# Patient Record
Sex: FEMALE | Race: WHITE | NOT HISPANIC OR LATINO | Employment: OTHER | URBAN - METROPOLITAN AREA
[De-identification: names, ages, dates, MRNs, and addresses within clinical notes are randomized per-mention and may not be internally consistent; named-entity substitution may affect disease eponyms.]

---

## 2017-01-12 DIAGNOSIS — R30.0 BURNING WITH URINATION: ICD-10-CM

## 2017-01-12 DIAGNOSIS — Z87.440 HISTORY OF UTI: Primary | ICD-10-CM

## 2017-01-15 LAB
APPEARANCE UR: ABNORMAL
BACTERIA UR CULT: ABNORMAL
BACTERIA UR CULT: ABNORMAL
BILIRUB UR QL STRIP: ABNORMAL
COLOR UR: YELLOW
GLUCOSE UR QL: ABNORMAL
HGB UR QL STRIP: ABNORMAL
KETONES UR QL STRIP: ABNORMAL
LEUKOCYTE ESTERASE UR QL STRIP: ABNORMAL
NITRITE UR QL STRIP: ABNORMAL
OTHER ANTIBIOTIC SUSC ISLT: ABNORMAL
PH UR STRIP: 6 [PH] (ref 5–8)
PROT UR QL STRIP: ABNORMAL
SP GR UR: 1.02 (ref 1–1.03)
UROBILINOGEN UR STRIP-MCNC: ABNORMAL MG/DL

## 2017-01-20 ENCOUNTER — HOSPITAL ENCOUNTER (EMERGENCY)
Facility: HOSPITAL | Age: 82
Discharge: HOME | End: 2017-01-20
Attending: EMERGENCY MEDICINE | Admitting: EMERGENCY MEDICINE

## 2017-01-20 ENCOUNTER — APPOINTMENT (OUTPATIENT)
Dept: GENERAL RADIOLOGY | Facility: HOSPITAL | Age: 82
End: 2017-01-20

## 2017-01-20 VITALS
OXYGEN SATURATION: 95 % | RESPIRATION RATE: 16 BRPM | TEMPERATURE: 98 F | HEIGHT: 63 IN | HEART RATE: 56 BPM | BODY MASS INDEX: 25.69 KG/M2 | WEIGHT: 145 LBS | SYSTOLIC BLOOD PRESSURE: 125 MMHG | DIASTOLIC BLOOD PRESSURE: 60 MMHG

## 2017-01-20 DIAGNOSIS — J20.9 ACUTE BRONCHITIS, UNSPECIFIED ORGANISM: Primary | ICD-10-CM

## 2017-01-20 LAB
ANION GAP SERPL CALCULATED.3IONS-SCNC: 14 MMOL/L
BASOPHILS # BLD AUTO: 0.03 10*3/MM3 (ref 0–0.2)
BASOPHILS NFR BLD AUTO: 0.4 % (ref 0–2)
BUN BLD-MCNC: 26 MG/DL (ref 8–23)
BUN/CREAT SERPL: 22.4 (ref 7–25)
CALCIUM SPEC-SCNC: 9 MG/DL (ref 8.8–10.5)
CHLORIDE SERPL-SCNC: 103 MMOL/L (ref 98–107)
CO2 SERPL-SCNC: 25 MMOL/L (ref 22–29)
CREAT BLD-MCNC: 1.16 MG/DL (ref 0.57–1)
DEPRECATED RDW RBC AUTO: 44.4 FL (ref 37–54)
EOSINOPHIL # BLD AUTO: 0.21 10*3/MM3 (ref 0.1–0.3)
EOSINOPHIL NFR BLD AUTO: 3 % (ref 0–4)
ERYTHROCYTE [DISTWIDTH] IN BLOOD BY AUTOMATED COUNT: 13.6 % (ref 11.5–14.5)
GFR SERPL CREATININE-BSD FRML MDRD: 45 ML/MIN/1.73
GLUCOSE BLD-MCNC: 93 MG/DL (ref 65–99)
HCT VFR BLD AUTO: 35.8 % (ref 37–47)
HGB BLD-MCNC: 11.8 G/DL (ref 12–16)
IMM GRANULOCYTES # BLD: 0.02 10*3/MM3 (ref 0–0.03)
IMM GRANULOCYTES NFR BLD: 0.3 % (ref 0–0.5)
LYMPHOCYTES # BLD AUTO: 1.68 10*3/MM3 (ref 0.6–4.8)
LYMPHOCYTES NFR BLD AUTO: 23.7 % (ref 20–45)
MCH RBC QN AUTO: 29.2 PG (ref 27–31)
MCHC RBC AUTO-ENTMCNC: 33 G/DL (ref 31–37)
MCV RBC AUTO: 88.6 FL (ref 81–99)
MONOCYTES # BLD AUTO: 0.61 10*3/MM3 (ref 0–1)
MONOCYTES NFR BLD AUTO: 8.6 % (ref 3–8)
NEUTROPHILS # BLD AUTO: 4.54 10*3/MM3 (ref 1.5–8.3)
NEUTROPHILS NFR BLD AUTO: 64 % (ref 45–70)
NRBC BLD MANUAL-RTO: 0 /100 WBC (ref 0–0)
PLATELET # BLD AUTO: 177 10*3/MM3 (ref 140–500)
PMV BLD AUTO: 10.4 FL (ref 7.4–10.4)
POTASSIUM BLD-SCNC: 3.5 MMOL/L (ref 3.5–5.2)
RBC # BLD AUTO: 4.04 10*6/MM3 (ref 4.2–5.4)
SODIUM BLD-SCNC: 142 MMOL/L (ref 136–145)
WBC NRBC COR # BLD: 7.09 10*3/MM3 (ref 4.8–10.8)

## 2017-01-20 PROCEDURE — 93010 ELECTROCARDIOGRAM REPORT: CPT | Performed by: INTERNAL MEDICINE

## 2017-01-20 PROCEDURE — 71020 HC CHEST PA AND LATERAL: CPT

## 2017-01-20 PROCEDURE — 93005 ELECTROCARDIOGRAM TRACING: CPT

## 2017-01-20 PROCEDURE — 99284 EMERGENCY DEPT VISIT MOD MDM: CPT | Performed by: EMERGENCY MEDICINE

## 2017-01-20 PROCEDURE — 93005 ELECTROCARDIOGRAM TRACING: CPT | Performed by: EMERGENCY MEDICINE

## 2017-01-20 PROCEDURE — 99284 EMERGENCY DEPT VISIT MOD MDM: CPT

## 2017-01-20 PROCEDURE — 85025 COMPLETE CBC W/AUTO DIFF WBC: CPT | Performed by: EMERGENCY MEDICINE

## 2017-01-20 PROCEDURE — 80048 BASIC METABOLIC PNL TOTAL CA: CPT | Performed by: EMERGENCY MEDICINE

## 2017-01-20 RX ORDER — CEFUROXIME AXETIL 250 MG/1
500 TABLET ORAL ONCE
Status: COMPLETED | OUTPATIENT
Start: 2017-01-20 | End: 2017-01-20

## 2017-01-20 RX ORDER — BENZONATATE 200 MG/1
200 CAPSULE ORAL 3 TIMES DAILY PRN
Qty: 21 CAPSULE | Refills: 0 | Status: SHIPPED | OUTPATIENT
Start: 2017-01-20 | End: 2017-01-27

## 2017-01-20 RX ORDER — CEFUROXIME AXETIL 500 MG/1
500 TABLET ORAL 2 TIMES DAILY
Qty: 14 TABLET | Refills: 0 | Status: SHIPPED | OUTPATIENT
Start: 2017-01-20 | End: 2017-01-27

## 2017-01-20 RX ORDER — CEFUROXIME AXETIL 250 MG/1
500 TABLET ORAL ONCE
Status: DISCONTINUED | OUTPATIENT
Start: 2017-01-20 | End: 2017-01-20 | Stop reason: HOSPADM

## 2017-01-20 RX ORDER — BENZONATATE 100 MG/1
200 CAPSULE ORAL ONCE
Status: DISCONTINUED | OUTPATIENT
Start: 2017-01-20 | End: 2017-01-20 | Stop reason: HOSPADM

## 2017-01-20 RX ORDER — BENZONATATE 100 MG/1
200 CAPSULE ORAL ONCE
Status: COMPLETED | OUTPATIENT
Start: 2017-01-20 | End: 2017-01-20

## 2017-01-20 RX ADMIN — CEFUROXIME AXETIL 500 MG: 250 TABLET ORAL at 21:13

## 2017-01-20 RX ADMIN — BENZONATATE 200 MG: 100 CAPSULE, LIQUID FILLED ORAL at 21:13

## 2017-01-21 PROCEDURE — 93005 ELECTROCARDIOGRAM TRACING: CPT | Performed by: EMERGENCY MEDICINE

## 2017-01-21 NOTE — ED PROVIDER NOTES
Subjective   Patient is a 81 y.o. female presenting with URI.   History provided by:  Patient  URI   Presenting symptoms: cough    Presenting symptoms: no fever    Severity:  Moderate  Onset quality:  Gradual  Timing:  Intermittent  Progression:  Worsening  Chronicity:  New  Relieved by:  Nothing  Associated symptoms: no arthralgias, no headaches, no myalgias, no neck pain, no sinus pain, no sneezing, no swollen glands and no wheezing    Associated symptoms comment:  Pain - left mid back  Risk factors: being elderly and recent illness    Risk factors: no chronic cardiac disease, no chronic kidney disease, no chronic respiratory disease and no diabetes mellitus    Risk factors comment:  History of DVT and PE.    HPI Narrative:Ms. Yeoman is an 80 yo WF reason secondary to cough and left back pain.  Patient reports onset of symptoms 4-5 days ago.  She reports occasional yellow sputum.  She has had trouble sleeping secondary to cough.  She went to [facility] earlier today.  They were concerned about possible PE as patient has history of such.  No workup was performed there.  She was transferred to ER for evaluation.    Patient is under care of Dr. Caroline Weiner.  Patient placed on Cipro 3 days ago for recurrent UTIs.  She reports it is not working.        Review of Systems   Constitutional: Negative.  Negative for appetite change, diaphoresis and fever.   HENT: Negative.  Negative for sneezing.    Eyes: Negative.    Respiratory: Positive for cough. Negative for chest tightness, shortness of breath and wheezing.    Cardiovascular: Negative for chest pain, palpitations and leg swelling.   Genitourinary: Negative.  Negative for difficulty urinating, flank pain, frequency and hematuria.   Musculoskeletal: Negative.  Negative for arthralgias, myalgias and neck pain.   Skin: Negative.  Negative for color change, pallor and rash.   Neurological: Negative.  Negative for dizziness, seizures, syncope and headaches.    Psychiatric/Behavioral: Negative.  Negative for agitation, behavioral problems and hallucinations.       Past Medical History   Diagnosis Date   • Esophageal reflux    • Hypercholesteremia    • Hyperlipidemia    • Hypertension    • Osteoporosis    • Pulmonary embolism    • Pulmonary hypertension    • Thyroid disease    • Venous thrombosis        Allergies   Allergen Reactions   • Sulfa Antibiotics        Past Surgical History   Procedure Laterality Date   • Appendectomy     • Hysterectomy     • Cystocele repair       bladder reconstruction   • Salpingectomy         Family History   Problem Relation Age of Onset   • Cancer Mother        Social History     Social History   • Marital status:      Spouse name: N/A   • Number of children: N/A   • Years of education: N/A     Social History Main Topics   • Smoking status: Never Smoker   • Smokeless tobacco: None   • Alcohol use No   • Drug use: None   • Sexual activity: Not Asked     Other Topics Concern   • None     Social History Narrative           Objective   Physical Exam   Constitutional: She is oriented to person, place, and time. She appears well-developed and well-nourished. No distress.   81-year-old white female laying in bed.  She appears in good health for age.  She is friendly and talkative.  She is in no respiratory distress.  She does however have a nonproductive cough.   HENT:   Head: Normocephalic and atraumatic.   Right Ear: External ear normal.   Left Ear: External ear normal.   Nose: Nose normal.   Mouth/Throat: Oropharynx is clear and moist.   Eyes: Conjunctivae and EOM are normal. Pupils are equal, round, and reactive to light.   Neck: Normal range of motion. Neck supple.   Cardiovascular: Normal rate, regular rhythm, normal heart sounds and intact distal pulses.  Exam reveals no gallop and no friction rub.    No murmur heard.  Pulmonary/Chest: Effort normal and breath sounds normal.   Abdominal: Soft. Bowel sounds are normal. She exhibits  no distension. There is no tenderness.   Musculoskeletal: Normal range of motion.   Neurological: She is alert and oriented to person, place, and time.   Skin: Skin is warm and dry. She is not diaphoretic.   Psychiatric: She has a normal mood and affect. Her behavior is normal.   Nursing note and vitals reviewed.      ECG 12 Lead    Date/Time: 1/20/2017 5:00 PM  Performed by: JESÚS LANDA  Authorized by: JESÚS LANDA   Interpreted by physician  Previous ECG: no previous ECG available  Rhythm: sinus bradycardia  Rate: bradycardic  QRS axis: left  Conduction: conduction normal  ST Depression: V4, V5 and V6  T depression: aVR and V2  T flattening: V1, V3, V4, V5, V6, aVL and I  Other findings comments: low-voltage in all leads.  Late R-wave transition.  Clinical impression: abnormal ECG               ED Course  ED Course   Comment By Time   01/20/17  8:16 PM  Patient was referred ER overconcerned about possible PE.  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  This was approximately 10 years ago.  Patient has not been on anticoagulation for years.  Symptoms much more consistent with bronchitis/pneumonia.   Jesús Landa MD 01/20 2107 01/20/17  9:03 PM  Publishers only a minimal anemia and mild renal insufficiency.  This is not unexpected for age.  Patient's symptoms are consistent with PE.  O2 saturations have been 95% or greater on room air.  Normal respiratory rate.  Heart rate is been slightly low.  Normal blood pressure.  None of this consistent with a PE.  Possible left lower lobe infiltrate.  Will treat with by mouth antibiotics for bronchitis. Jesús Landa MD 01/21 0108      Labs Reviewed   BASIC METABOLIC PANEL - Abnormal; Notable for the following:        Result Value    BUN 26 (*)     Creatinine 1.16 (*)     eGFR Non  Amer 45 (*)     All other components within normal limits    Narrative:     The MDRD GFR formula is only valid for adults with stable renal function  between ages 18 and 70.   CBC WITH AUTO DIFFERENTIAL - Abnormal; Notable for the following:     RBC 4.04 (*)     Hemoglobin 11.8 (*)     Hematocrit 35.8 (*)     Monocyte % 8.6 (*)     All other components within normal limits   CBC AND DIFFERENTIAL    Narrative:     The following orders were created for panel order CBC & Differential.  Procedure                               Abnormality         Status                     ---------                               -----------         ------                     CBC Auto Differential[[id number]]         Abnormal            Final result                 Please view results for these tests on the individual orders.             MDM  Number of Diagnoses or Management Options  Acute bronchitis, unspecified organism: new and requires workup     Amount and/or Complexity of Data Reviewed  Clinical lab tests: ordered and reviewed  Tests in the radiology section of CPT®: ordered and reviewed  Independent visualization of images, tracings, or specimens: yes    Risk of Complications, Morbidity, and/or Mortality  Presenting problems: moderate  Diagnostic procedures: moderate  Management options: moderate    Patient Progress  Patient progress: stable      Final diagnoses:   Acute bronchitis, unspecified organism            Ghassan Banegas MD  01/21/17 0105

## 2017-01-21 NOTE — DISCHARGE INSTRUCTIONS
Medications directed.  Resume Macrobid after completion of Ceftin.  Follow-up with PMD as above.  Return to ED for worsened symptoms, medical emergencies.

## 2017-01-31 ENCOUNTER — OFFICE VISIT (OUTPATIENT)
Dept: FAMILY MEDICINE CLINIC | Facility: CLINIC | Age: 82
End: 2017-01-31

## 2017-01-31 VITALS
RESPIRATION RATE: 18 BRPM | HEART RATE: 52 BPM | OXYGEN SATURATION: 97 % | SYSTOLIC BLOOD PRESSURE: 120 MMHG | WEIGHT: 144.8 LBS | BODY MASS INDEX: 25.66 KG/M2 | TEMPERATURE: 98.3 F | DIASTOLIC BLOOD PRESSURE: 74 MMHG | HEIGHT: 63 IN

## 2017-01-31 DIAGNOSIS — I51.7 CARDIOMEGALY: ICD-10-CM

## 2017-01-31 DIAGNOSIS — R05.3 PERSISTENT COUGH: Primary | ICD-10-CM

## 2017-01-31 DIAGNOSIS — Z12.31 ENCOUNTER FOR SCREENING MAMMOGRAM FOR BREAST CANCER: ICD-10-CM

## 2017-01-31 DIAGNOSIS — E03.9 HYPOTHYROIDISM, UNSPECIFIED TYPE: ICD-10-CM

## 2017-01-31 DIAGNOSIS — R07.81 RIB PAIN ON LEFT SIDE: ICD-10-CM

## 2017-01-31 PROBLEM — R79.89 CREATININE ELEVATION: Status: ACTIVE | Noted: 2017-01-31

## 2017-01-31 PROBLEM — IMO0002 CREATININE ELEVATION: Status: ACTIVE | Noted: 2017-01-31

## 2017-01-31 PROCEDURE — 99214 OFFICE O/P EST MOD 30 MIN: CPT | Performed by: INTERNAL MEDICINE

## 2017-01-31 NOTE — MR AVS SNAPSHOT
Donna M Yeoman   1/31/2017 9:30 AM   Office Visit    Dept Phone:  [phone]   Encounter #:  [id number]    Provider:  Caroline Weiner MD   Department:  [facility] PRIMARY [facility]ARE                Your Full Care Plan              Today's Medication Changes          These changes are accurate as of: 1/31/17 10:06 AM.  If you have any questions, ask your nurse or doctor.               Stop taking medication(s)listed here:     ciprofloxacin 250 MG tablet   Commonly known as:  CIPRO   Stopped by:  Caroline Weiner MD           doxycycline 100 MG tablet   Commonly known as:  ADOXA   Stopped by:  Caroline Weiner MD           esomeprazole 40 MG capsule   Commonly known as:  nexIUM   Stopped by:  Caroline Weiner MD                      Your Updated Medication List          This list is accurate as of: 1/31/17 10:06 AM.  Always use your most recent med list.                allopurinol 100 MG tablet   Commonly known as:  ZYLOPRIM   Take 1 tablet by mouth daily.       Calcium Carb-Cholecalciferol 600-800 MG-UNIT tablet       glucose blood test strip   Le Contour Test In Vitro Strip; Patient Sig: Le Contour Test In Vitro Strip TEST ONCE DAILY; 1; 3; 01-Sep-2015; Active       levothyroxine 75 MCG tablet   Commonly known as:  SYNTHROID, LEVOTHROID   Take 1 tablet by mouth daily.       MICROLET LANCETS misc   1 each daily. As directed       nitrofurantoin 50 MG capsule   Commonly known as:  MACRODANTIN       nystatin-triamcinolone 561291-7.1 UNIT/GM-% ointment   Commonly known as:  MYCOLOG       Oxybutynin 3 (28) % (MG/ACT) gel       PROAIR  (90 BASE) MCG/ACT inhaler   Generic drug:  albuterol   2 puffs Q4H for cough and wheeze       rosuvastatin 5 MG tablet   Commonly known as:  CRESTOR   TAKE 1 TABLET DAILY.       valsartan-hydrochlorothiazide 160-25 MG per tablet   Commonly known as:  DIOVAN-HCT   Take 1 tablet by mouth daily.               We Performed the Following     "   T3, Free     T4, Free     TSH       You Were Diagnosed With        Codes Comments    Persistent cough    -  Primary ICD-10-CM: R05  ICD-9-CM: 786.2     Cardiomegaly     ICD-10-CM: I51.7  ICD-9-CM: 429.3     Rib pain on left side     ICD-10-CM: R07.81  ICD-9-CM: 786.50     Encounter for screening mammogram for breast cancer     ICD-10-CM: Z12.31  ICD-9-CM: V76.12     Hypothyroidism, unspecified type     ICD-10-CM: E03.9  ICD-9-CM: 244.9       Medications to be Given to You by a Medical Professional       Instructions     None    Patient Instructions History      Upcoming Appointments     Visit Type Date Time Department    OFFICE VISIT 1/31/2017  9:30 AM [employer] Signup     Our records indicate that you have an active [email or web address] account.    You can view your After Visit Summary by going to [employer] and logging in with your [employer] username and password.  If you don't have a [employer] username and password but a parent or guardian has access to your record, the parent or guardian should login with their own [employer] username and password and access your record to view the After Visit Summary.    If you have questions, you can email [email or web address] or call [phone] to talk to our [employer] staff.  Remember, [employer] is NOT to be used for urgent needs.  For medical emergencies, dial 911.               Other Info from Your Visit           Allergies     Sulfa Antibiotics        Reason for Visit     cough seen at [facility]       Vital Signs     Blood Pressure Pulse Temperature Respirations Height Weight    120/74 (BP Location: Left arm) 52 98.3 °F (36.8 °C) (Oral) 18 63\" (160 cm) 144 lb 12.8 oz (65.7 kg)    Oxygen Saturation Body Mass Index Smoking Status             97% 25.65 kg/m2 Never Smoker         Problems and Diagnoses Noted     Enlarged heart    Serum creatinine raised    Encounter for screening mammogram for breast cancer    Underactive thyroid "    Persistent cough    Rib pain on left side

## 2017-01-31 NOTE — PROGRESS NOTES
Subjective   Donna M Yeoman is a 82 y.o. female who comes in today for   Chief Complaint   Patient presents with   • cough     seen at [facility]    .    History of Present Illness   On 1/20/17 had 3 weeks of pain in posterior back left sided high and then she started coughing so went to  and then he sent her to [facility] ED [place].  CXR showed CMG but otherwise negative.  No fever.  Dry cough but feels PND.  RN clear.  Says she has coughed for over a year.  Is due for mammogram.  ED started her on cefuroxime 500mg for week.  Thinks she is somewhat better.  Cough is better and is sleeping better.    The following portions of the patient's history were reviewed and updated as appropriate: allergies, current medications, past family history, past medical history, past social history, past surgical history and problem list.    Review of Systems   Constitutional: Negative for fever.   Respiratory: Positive for cough. Negative for shortness of breath and wheezing.    Cardiovascular: Negative for chest pain, palpitations and leg swelling.   Musculoskeletal: Positive for back pain (left sided and high up in thoracic area.  constant but cough did make it worse).       Vitals:    01/31/17 0914   BP: 120/74   Pulse: 52   Resp: 18   Temp: 98.3 °F (36.8 °C)   SpO2: 97%       Objective   Physical Exam   Constitutional: She is oriented to person, place, and time. She appears well-developed and well-nourished.   HENT:   Head: Normocephalic and atraumatic.   Right Ear: External ear normal.   Left Ear: External ear normal.   Mouth/Throat: Oropharynx is clear and moist.   Eyes: Conjunctivae are normal.   Neck: Neck supple.   Cardiovascular: Normal rate, regular rhythm and normal heart sounds.    Pulmonary/Chest: Effort normal and breath sounds normal.   Abdominal: Soft. Bowel sounds are normal.   Neurological: She is alert and oriented to person, place, and time.   Skin: Skin is warm.   Psychiatric: She has a normal mood and  affect. Her behavior is normal. Judgment and thought content normal.   Nursing note and vitals reviewed.      Assessment/Plan   Shirley was seen today for cough.    Diagnoses and all orders for this visit:    Persistent cough  -     CT Chest Without Contrast; Future    Cardiomegaly  -     CT Chest Without Contrast; Future    Rib pain on left side  -     CT Chest Without Contrast; Future    Encounter for screening mammogram for breast cancer  -     Mammo Screening Bilateral With CAD; Future    Hypothyroidism, unspecified type  -     TSH  -     T4, Free  -     T3, Free  -     Cancel: Comprehensive Metabolic Panel        Recheck thyroid after she has adjusted her dosage  mammo reordered (she never had done)  Chest ct to look at lungs and bones ?rib fracure vs OA in back (long standing OA)  May need echo to look at possible CMG  Reviewed UC and ED visits from 1/20/17.    adviar inhaler 250/50 disc given 1 puff bid and rinse mouth afterwards             I have asked for the patient to return to clinic in 6month(s).

## 2017-02-01 LAB
ALBUMIN SERPL-MCNC: 4.3 G/DL (ref 3.5–5.2)
ALBUMIN/GLOB SERPL: 1.5 G/DL
ALP SERPL-CCNC: 68 U/L (ref 39–117)
ALT SERPL-CCNC: 15 U/L (ref 1–33)
AST SERPL-CCNC: 22 U/L (ref 1–32)
BILIRUB SERPL-MCNC: 0.6 MG/DL (ref 0.1–1.2)
BUN SERPL-MCNC: 19 MG/DL (ref 8–23)
BUN/CREAT SERPL: 21.6 (ref 7–25)
CALCIUM SERPL-MCNC: 9.5 MG/DL (ref 8.6–10.5)
CHLORIDE SERPL-SCNC: 103 MMOL/L (ref 98–107)
CO2 SERPL-SCNC: 29.1 MMOL/L (ref 22–29)
CREAT SERPL-MCNC: 0.88 MG/DL (ref 0.57–1)
GLOBULIN SER CALC-MCNC: 2.8 GM/DL
GLUCOSE SERPL-MCNC: 101 MG/DL (ref 65–99)
POTASSIUM SERPL-SCNC: 3.9 MMOL/L (ref 3.5–5.2)
PROT SERPL-MCNC: 7.1 G/DL (ref 6–8.5)
SODIUM SERPL-SCNC: 145 MMOL/L (ref 136–145)
T3FREE SERPL-MCNC: 2.6 PG/ML (ref 2–4.4)
T4 FREE SERPL-MCNC: 1.44 NG/DL (ref 0.93–1.7)
TSH SERPL DL<=0.005 MIU/L-ACNC: 1.18 MIU/ML (ref 0.27–4.2)

## 2017-02-09 ENCOUNTER — HOSPITAL ENCOUNTER (OUTPATIENT)
Dept: CT IMAGING | Facility: HOSPITAL | Age: 82
Discharge: HOME | End: 2017-02-09
Admitting: INTERNAL MEDICINE

## 2017-02-09 DIAGNOSIS — R05.3 PERSISTENT COUGH: ICD-10-CM

## 2017-02-09 DIAGNOSIS — I51.7 CARDIOMEGALY: ICD-10-CM

## 2017-02-09 DIAGNOSIS — R07.81 RIB PAIN ON LEFT SIDE: ICD-10-CM

## 2017-02-09 PROCEDURE — 71250 CT THORAX DX C-: CPT

## 2017-02-22 ENCOUNTER — HOSPITAL ENCOUNTER (OUTPATIENT)
Dept: MAMMOGRAPHY | Facility: HOSPITAL | Age: 82
Discharge: HOME | End: 2017-02-22
Admitting: INTERNAL MEDICINE

## 2017-02-22 DIAGNOSIS — Z12.31 ENCOUNTER FOR SCREENING MAMMOGRAM FOR BREAST CANCER: ICD-10-CM

## 2017-02-22 PROCEDURE — G0202 SCR MAMMO BI INCL CAD: HCPCS

## 2017-03-20 DIAGNOSIS — E78.5 HYPERLIPIDEMIA: ICD-10-CM

## 2017-03-20 RX ORDER — ROSUVASTATIN CALCIUM 5 MG/1
TABLET, COATED ORAL
Qty: 90 TABLET | Refills: 1 | Status: SHIPPED | OUTPATIENT
Start: 2017-03-20 | End: 2017-11-13 | Stop reason: SDUPTHER

## 2017-04-06 ENCOUNTER — LAB (OUTPATIENT)
Dept: LAB | Facility: HOSPITAL | Age: 82
End: 2017-04-06

## 2017-04-06 DIAGNOSIS — D47.2 MGUS (MONOCLONAL GAMMOPATHY OF UNKNOWN SIGNIFICANCE): ICD-10-CM

## 2017-04-06 LAB
BASOPHILS # BLD AUTO: 0.03 10*3/MM3 (ref 0–0.1)
BASOPHILS NFR BLD AUTO: 0.5 % (ref 0–1.1)
DEPRECATED RDW RBC AUTO: 44.8 FL (ref 37–49)
EOSINOPHIL # BLD AUTO: 0.16 10*3/MM3 (ref 0–0.36)
EOSINOPHIL NFR BLD AUTO: 2.8 % (ref 1–5)
ERYTHROCYTE [DISTWIDTH] IN BLOOD BY AUTOMATED COUNT: 13.4 % (ref 11.7–14.5)
HCT VFR BLD AUTO: 39.7 % (ref 34–45)
HGB BLD-MCNC: 12.7 G/DL (ref 11.5–14.9)
IMM GRANULOCYTES # BLD: 0.02 10*3/MM3 (ref 0–0.03)
IMM GRANULOCYTES NFR BLD: 0.3 % (ref 0–0.5)
LYMPHOCYTES # BLD AUTO: 1.76 10*3/MM3 (ref 1–3.5)
LYMPHOCYTES NFR BLD AUTO: 30.7 % (ref 20–49)
MCH RBC QN AUTO: 29.3 PG (ref 27–33)
MCHC RBC AUTO-ENTMCNC: 32 G/DL (ref 32–35)
MCV RBC AUTO: 91.7 FL (ref 83–97)
MONOCYTES # BLD AUTO: 0.54 10*3/MM3 (ref 0.25–0.8)
MONOCYTES NFR BLD AUTO: 9.4 % (ref 4–12)
NEUTROPHILS # BLD AUTO: 3.23 10*3/MM3 (ref 1.5–7)
NEUTROPHILS NFR BLD AUTO: 56.3 % (ref 39–75)
NRBC BLD MANUAL-RTO: 0 /100 WBC (ref 0–0)
PLATELET # BLD AUTO: 175 10*3/MM3 (ref 150–375)
PMV BLD AUTO: 9.9 FL (ref 8.9–12.1)
RBC # BLD AUTO: 4.33 10*6/MM3 (ref 3.9–5)
WBC NRBC COR # BLD: 5.74 10*3/MM3 (ref 4–10)

## 2017-04-06 PROCEDURE — 36415 COLL VENOUS BLD VENIPUNCTURE: CPT | Performed by: INTERNAL MEDICINE

## 2017-04-06 PROCEDURE — 85025 COMPLETE CBC W/AUTO DIFF WBC: CPT | Performed by: INTERNAL MEDICINE

## 2017-04-10 LAB
ALBUMIN SERPL-MCNC: 3.7 G/DL (ref 2.9–4.4)
ALBUMIN/GLOB SERPL: 1.2 {RATIO} (ref 0.7–1.7)
ALPHA1 GLOB FLD ELPH-MCNC: 0.2 G/DL (ref 0–0.4)
ALPHA2 GLOB SERPL ELPH-MCNC: 0.8 G/DL (ref 0.4–1)
B-GLOBULIN SERPL ELPH-MCNC: 0.9 G/DL (ref 0.7–1.3)
GAMMA GLOB SERPL ELPH-MCNC: 1.3 G/DL (ref 0.4–1.8)
GLOBULIN SER CALC-MCNC: 3.2 G/DL (ref 2.2–3.9)
IGA SERPL-MCNC: 152 MG/DL (ref 64–422)
IGG SERPL-MCNC: 1147 MG/DL (ref 700–1600)
IGM SERPL-MCNC: 56 MG/DL (ref 26–217)
INTERPRETATION SERPL IEP-IMP: ABNORMAL
KAPPA LC SERPL-MCNC: 26.53 MG/L (ref 3.3–19.4)
KAPPA LC/LAMBDA SER: 0.88 {RATIO} (ref 0.26–1.65)
LAMBDA LC FREE SERPL-MCNC: 30.3 MG/L (ref 5.71–26.3)
Lab: ABNORMAL
M-SPIKE: 0.6 G/DL
PROT SERPL-MCNC: 6.9 G/DL (ref 6–8.5)

## 2017-04-13 ENCOUNTER — APPOINTMENT (OUTPATIENT)
Dept: LAB | Facility: HOSPITAL | Age: 82
End: 2017-04-13

## 2017-04-13 ENCOUNTER — OFFICE VISIT (OUTPATIENT)
Dept: ONCOLOGY | Facility: CLINIC | Age: 82
End: 2017-04-13

## 2017-04-13 VITALS
DIASTOLIC BLOOD PRESSURE: 70 MMHG | WEIGHT: 145.6 LBS | RESPIRATION RATE: 16 BRPM | TEMPERATURE: 97.4 F | BODY MASS INDEX: 25.8 KG/M2 | HEART RATE: 54 BPM | SYSTOLIC BLOOD PRESSURE: 120 MMHG | HEIGHT: 63 IN

## 2017-04-13 DIAGNOSIS — D47.2 MGUS (MONOCLONAL GAMMOPATHY OF UNKNOWN SIGNIFICANCE): Primary | ICD-10-CM

## 2017-04-13 PROCEDURE — G0463 HOSPITAL OUTPT CLINIC VISIT: HCPCS | Performed by: INTERNAL MEDICINE

## 2017-04-13 PROCEDURE — 99213 OFFICE O/P EST LOW 20 MIN: CPT | Performed by: INTERNAL MEDICINE

## 2017-04-13 RX ORDER — ESTRADIOL 0.1 MG/G
CREAM VAGINAL
Refills: 11 | COMMUNITY
Start: 2017-03-28

## 2017-04-13 NOTE — PROGRESS NOTES
Subjective   REASON FOR FOLLOW UP:  IgG Lambda MGUS    HISTORY OF PRESENT ILLNESS:     History of Present Illness  Shirley is a 82 y.o. 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  She had labs drawn in our office last week that showed her monoclonal protein is still present but is very stable.  Her blood count is normal and she looks great in the office today.  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    She is up-to-date on her mammograms.  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    Past Medical History, Past Surgical History, Social History, Family History have been reviewed and are without significant changes except as mentioned.    Review of Systems   Constitutional: Negative for activity change, appetite change, fatigue, fever and unexpected weight change.   HENT: Negative for hearing loss, nosebleeds, trouble swallowing and voice change.    Eyes: Negative for visual disturbance.   Respiratory: Negative for cough, shortness of breath and wheezing.    Cardiovascular: Negative for chest pain and palpitations.   Gastrointestinal: Negative for abdominal pain, diarrhea, nausea and vomiting.   Genitourinary: Negative for difficulty urinating, frequency, hematuria and urgency.   Musculoskeletal: Positive for arthralgias and back pain. Negative for neck pain.   Skin: Negative for rash.   Neurological: Negative for dizziness, seizures, syncope and headaches.   Hematological: Negative for adenopathy. Does not bruise/bleed easily.   Psychiatric/Behavioral: Negative for behavioral problems. The patient is not nervous/anxious.       A comprehensive 14 point review of systems was performed and was negative except as mentioned.    Medications:  The current medication list was reviewed in the EMR    ALLERGIES:    Allergies   Allergen Reactions   • Sulfa  "Antibiotics        Objective      Vitals:    04/13/17 1008   BP: 120/70   Pulse: 54   Resp: 16   Temp: 97.4 °F (36.3 °C)   Weight: 145 lb 9.6 oz (66 kg)   Height: 62.6\" (159 cm)  Comment: new ht.   PainSc: 0-No pain     Current Status 4/13/2017   ECOG score 0       Physical Exam   Constitutional: She is oriented to person, place, and time. She appears well-developed and well-nourished. No distress.   HENT:   Head: Normocephalic.   Eyes: Conjunctivae and EOM are normal. Pupils are equal, round, and reactive to light. No scleral icterus.   Neck: Normal range of motion. Neck supple. No JVD present. No thyromegaly present.   Cardiovascular: Normal rate and regular rhythm.  Exam reveals no gallop and no friction rub.    No murmur heard.  Pulmonary/Chest: Effort normal and breath sounds normal. She has no wheezes. She has no rales.   Abdominal: Soft. She exhibits no distension and no mass. There is no tenderness.   Musculoskeletal: Normal range of motion. She exhibits no edema or deformity.   Lymphadenopathy:     She has no cervical adenopathy.   Neurological: She is alert and oriented to person, place, and time. She has normal reflexes. No cranial nerve deficit.   Skin: Skin is warm and dry. No rash noted. No erythema.   Psychiatric: She has a normal mood and affect. Her behavior is normal. Judgment normal.        RECENT LABS:  Hematology WBC   Date Value Ref Range Status   04/06/2017 5.74 4.00 - 10.00 10*3/mm3 Final     RBC   Date Value Ref Range Status   04/06/2017 4.33 3.90 - 5.00 10*6/mm3 Final     Hemoglobin   Date Value Ref Range Status   04/06/2017 12.7 11.5 - 14.9 g/dL Final     Hematocrit   Date Value Ref Range Status   04/06/2017 39.7 34.0 - 45.0 % Final     Platelets   Date Value Ref Range Status   04/06/2017 175 150 - 375 10*3/mm3 Final        IgG 700 - 1600 mg/dL 1147   IgA 64 - 422 mg/dL 152   IgM 26 - 217 mg/dL 56   Total Protein 6.0 - 8.5 g/dL 6.9   Albumin 2.9 - 4.4 g/dL 3.7   Alpha-1-Globulin 0.0 - 0.4 " g/dL 0.2   Alpha-2-Globulin 0.4 - 1.0 g/dL 0.8   Beta Globulin 0.7 - 1.3 g/dL 0.9   Gamma Globulin 0.4 - 1.8 g/dL 1.3   M-Esteban Not Observed g/dL 0.6 (H)   Globulin 2.2 - 3.9 g/dL 3.2   A/G Ratio 0.7 - 1.7 1.2   Immunofixation Reflex, Serum  Comment   Comments: Immunofixation shows IgG monoclonal protein with lambda light chain   specificity.     Glucose 65 - 99 mg/dL 101 (H)   BUN 8 - 23 mg/dL 19   Creatinine 0.57 - 1.00 mg/dL 0.88   eGFR Non African Am >60 mL/min/1.73 62   Comments: The MDRD GFR formula is only valid for adults with stable   renal function between ages 18 and 70.      eGFR African Am >60 mL/min/1.73 75   BUN/Creatinine Ratio 7.0 - 25.0 21.6   Sodium 136 - 145 mmol/L 145   Potassium 3.5 - 5.2 mmol/L 3.9   Chloride 98 - 107 mmol/L 103   Total CO2 22.0 - 29.0 mmol/L 29.1 (H)   Calcium 8.6 - 10.5 mg/dL 9.5   Total Protein 6.0 - 8.5 g/dL 7.1   Albumin 3.50 - 5.20 g/dL 4.30   Globulin gm/dL 2.8   A/G Ratio g/dL 1.5   Total Bilirubin 0.1 - 1.2 mg/dL 0.6   Alkaline Phosphatase 39 - 117 U/L 68         Assessment/Plan   1.  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    Plan    1.  Return for follow-up in 12 months.  2.  Labs will again be drawn one week prior to visit including a CBC, serum chemistries, serum protein electrophoresis, immunofixation, and free serum light chain analysis.              4/13/2017      CC:

## 2017-06-13 DIAGNOSIS — I10 ESSENTIAL HYPERTENSION: ICD-10-CM

## 2017-06-14 RX ORDER — VALSARTAN AND HYDROCHLOROTHIAZIDE 160; 25 MG/1; MG/1
TABLET ORAL
Qty: 90 TABLET | Refills: 1 | Status: SHIPPED | OUTPATIENT
Start: 2017-06-14 | End: 2017-08-28 | Stop reason: ALTCHOICE

## 2017-07-01 PROBLEM — N64.89 BREAST ASYMMETRY: Status: ACTIVE | Noted: 2017-07-01

## 2017-07-01 PROBLEM — K63.5 COLON POLYP: Status: ACTIVE | Noted: 2017-07-01

## 2017-07-01 PROBLEM — R92.2 DENSE BREASTS: Status: ACTIVE | Noted: 2017-07-01

## 2017-07-01 PROBLEM — N64.4 BREAST PAIN: Status: ACTIVE | Noted: 2017-07-01

## 2017-07-01 PROBLEM — H92.09 OTALGIA: Status: ACTIVE | Noted: 2017-07-01

## 2017-07-01 PROBLEM — B35.4 TINEA CORPORIS: Status: ACTIVE | Noted: 2017-07-01

## 2017-07-01 PROBLEM — R79.9 ABNORMAL SERUM TOTAL PROTEIN LEVEL: Status: ACTIVE | Noted: 2017-07-01

## 2017-07-01 PROBLEM — K29.70 GASTRITIS: Status: ACTIVE | Noted: 2017-07-01

## 2017-07-01 PROBLEM — K44.9 HIATAL HERNIA: Status: ACTIVE | Noted: 2017-07-01

## 2017-07-01 PROBLEM — M54.6 PAIN IN THORACIC SPINE: Status: ACTIVE | Noted: 2017-07-01

## 2017-07-01 PROBLEM — R11.0 NAUSEA: Status: ACTIVE | Noted: 2017-07-01

## 2017-07-01 PROBLEM — M54.2 NECK PAIN: Status: ACTIVE | Noted: 2017-07-01

## 2017-07-01 PROBLEM — R42 DIZZINESS: Status: ACTIVE | Noted: 2017-07-01

## 2017-07-01 PROBLEM — R51.9 HEADACHE: Status: ACTIVE | Noted: 2017-07-01

## 2017-07-01 PROBLEM — R92.30 DENSE BREASTS: Status: ACTIVE | Noted: 2017-07-01

## 2017-07-01 PROBLEM — R20.8 BURNING SENSATION: Status: ACTIVE | Noted: 2017-07-01

## 2017-07-01 PROBLEM — J06.9 ACUTE UPPER RESPIRATORY INFECTION: Status: ACTIVE | Noted: 2017-07-01

## 2017-07-01 PROBLEM — M19.90 OSTEOARTHRITIS: Status: ACTIVE | Noted: 2017-07-01

## 2017-07-01 PROBLEM — I67.9 CEREBROVASCULAR SMALL VESSEL DISEASE: Status: ACTIVE | Noted: 2017-07-01

## 2017-07-01 PROBLEM — J34.0 ULCER OF NOSE: Status: ACTIVE | Noted: 2017-07-01

## 2017-07-01 PROBLEM — J18.9 PNEUMONIA: Status: ACTIVE | Noted: 2017-07-01

## 2017-07-01 PROBLEM — J30.2 SEASONAL ALLERGIC RHINITIS: Status: ACTIVE | Noted: 2017-07-01

## 2017-07-01 PROBLEM — M25.519 SHOULDER JOINT PAIN: Status: ACTIVE | Noted: 2017-07-01

## 2017-07-01 PROBLEM — M54.50 LOW BACK PAIN: Status: ACTIVE | Noted: 2017-07-01

## 2017-07-01 PROBLEM — G62.9 PERIPHERAL NEUROPATHY: Status: ACTIVE | Noted: 2017-07-01

## 2017-07-01 PROBLEM — I10 HYPERTENSION: Status: ACTIVE | Noted: 2017-07-01

## 2017-07-01 PROBLEM — M54.9 BACK PAIN: Status: ACTIVE | Noted: 2017-07-01

## 2017-07-01 PROBLEM — M10.9 GOUT: Status: ACTIVE | Noted: 2017-07-01

## 2017-07-01 PROBLEM — R73.09 INCREASED GLUCOSE LEVEL: Status: ACTIVE | Noted: 2017-07-01

## 2017-08-18 ENCOUNTER — OFFICE VISIT (OUTPATIENT)
Dept: FAMILY MEDICINE CLINIC | Facility: CLINIC | Age: 82
End: 2017-08-18

## 2017-08-18 VITALS
WEIGHT: 153 LBS | HEIGHT: 63 IN | OXYGEN SATURATION: 98 % | HEART RATE: 69 BPM | BODY MASS INDEX: 27.11 KG/M2 | DIASTOLIC BLOOD PRESSURE: 68 MMHG | RESPIRATION RATE: 18 BRPM | TEMPERATURE: 98.6 F | SYSTOLIC BLOOD PRESSURE: 122 MMHG

## 2017-08-18 DIAGNOSIS — R11.0 NAUSEA: ICD-10-CM

## 2017-08-18 DIAGNOSIS — E53.8 B12 DEFICIENCY: ICD-10-CM

## 2017-08-18 DIAGNOSIS — K44.9 HIATAL HERNIA: ICD-10-CM

## 2017-08-18 DIAGNOSIS — E55.9 VITAMIN D DEFICIENCY: ICD-10-CM

## 2017-08-18 DIAGNOSIS — E03.9 HYPOTHYROIDISM, UNSPECIFIED TYPE: ICD-10-CM

## 2017-08-18 DIAGNOSIS — E78.5 HYPERLIPIDEMIA, UNSPECIFIED HYPERLIPIDEMIA TYPE: ICD-10-CM

## 2017-08-18 DIAGNOSIS — E11.9 TYPE 2 DIABETES MELLITUS WITHOUT COMPLICATION, WITHOUT LONG-TERM CURRENT USE OF INSULIN (HCC): ICD-10-CM

## 2017-08-18 DIAGNOSIS — I10 ESSENTIAL HYPERTENSION: Primary | ICD-10-CM

## 2017-08-18 DIAGNOSIS — K21.00 GASTROESOPHAGEAL REFLUX DISEASE WITH ESOPHAGITIS: ICD-10-CM

## 2017-08-18 LAB
25(OH)D3+25(OH)D2 SERPL-MCNC: 30.4 NG/ML (ref 30–100)
ALBUMIN SERPL-MCNC: 4.4 G/DL (ref 3.5–5.2)
ALBUMIN/GLOB SERPL: 1.5 G/DL
ALP SERPL-CCNC: 69 U/L (ref 39–117)
ALT SERPL-CCNC: 12 U/L (ref 1–33)
AST SERPL-CCNC: 18 U/L (ref 1–32)
BASOPHILS # BLD AUTO: 0.03 10*3/MM3 (ref 0–0.2)
BASOPHILS NFR BLD AUTO: 0.3 % (ref 0–1.5)
BILIRUB SERPL-MCNC: 0.6 MG/DL (ref 0.1–1.2)
BUN SERPL-MCNC: 45 MG/DL (ref 8–23)
BUN/CREAT SERPL: 18.1 (ref 7–25)
CALCIUM SERPL-MCNC: 9.7 MG/DL (ref 8.6–10.5)
CHLORIDE SERPL-SCNC: 103 MMOL/L (ref 98–107)
CO2 SERPL-SCNC: 28.2 MMOL/L (ref 22–29)
CREAT SERPL-MCNC: 2.48 MG/DL (ref 0.57–1)
EOSINOPHIL # BLD AUTO: 0.39 10*3/MM3 (ref 0–0.7)
EOSINOPHIL NFR BLD AUTO: 4.5 % (ref 0.3–6.2)
ERYTHROCYTE [DISTWIDTH] IN BLOOD BY AUTOMATED COUNT: 13.9 % (ref 11.7–13)
GLOBULIN SER CALC-MCNC: 3 GM/DL
GLUCOSE SERPL-MCNC: 104 MG/DL (ref 65–99)
HBA1C MFR BLD: 6.85 % (ref 4.8–5.6)
HCT VFR BLD AUTO: 39.3 % (ref 35.6–45.5)
HGB BLD-MCNC: 12.1 G/DL (ref 11.9–15.5)
IMM GRANULOCYTES # BLD: 0.02 10*3/MM3 (ref 0–0.03)
IMM GRANULOCYTES NFR BLD: 0.2 % (ref 0–0.5)
LIPASE SERPL-CCNC: 34 U/L (ref 13–60)
LYMPHOCYTES # BLD AUTO: 1.34 10*3/MM3 (ref 0.9–4.8)
LYMPHOCYTES NFR BLD AUTO: 15.4 % (ref 19.6–45.3)
MCH RBC QN AUTO: 29 PG (ref 26.9–32)
MCHC RBC AUTO-ENTMCNC: 30.8 G/DL (ref 32.4–36.3)
MCV RBC AUTO: 94.2 FL (ref 80.5–98.2)
MONOCYTES # BLD AUTO: 0.66 10*3/MM3 (ref 0.2–1.2)
MONOCYTES NFR BLD AUTO: 7.6 % (ref 5–12)
NEUTROPHILS # BLD AUTO: 6.26 10*3/MM3 (ref 1.9–8.1)
NEUTROPHILS NFR BLD AUTO: 72 % (ref 42.7–76)
PLATELET # BLD AUTO: 218 10*3/MM3 (ref 140–500)
POTASSIUM SERPL-SCNC: 4.2 MMOL/L (ref 3.5–5.2)
PROT SERPL-MCNC: 7.4 G/DL (ref 6–8.5)
RBC # BLD AUTO: 4.17 10*6/MM3 (ref 3.9–5.2)
SODIUM SERPL-SCNC: 145 MMOL/L (ref 136–145)
T4 FREE SERPL-MCNC: 1.3 NG/DL (ref 0.93–1.7)
TSH SERPL DL<=0.005 MIU/L-ACNC: 1.79 MIU/ML (ref 0.27–4.2)
VIT B12 SERPL-MCNC: 1783 PG/ML (ref 211–946)
WBC # BLD AUTO: 8.7 10*3/MM3 (ref 4.5–10.7)

## 2017-08-18 PROCEDURE — 99214 OFFICE O/P EST MOD 30 MIN: CPT | Performed by: INTERNAL MEDICINE

## 2017-08-18 RX ORDER — OMEPRAZOLE 20 MG/1
20 CAPSULE, DELAYED RELEASE ORAL DAILY
COMMUNITY
End: 2017-08-18 | Stop reason: SDUPTHER

## 2017-08-18 RX ORDER — OMEPRAZOLE 20 MG/1
20 CAPSULE, DELAYED RELEASE ORAL 2 TIMES DAILY
Qty: 180 CAPSULE | Refills: 1 | Status: SHIPPED | OUTPATIENT
Start: 2017-08-18 | End: 2018-09-14 | Stop reason: SDUPTHER

## 2017-08-18 RX ORDER — CEPHALEXIN 250 MG/1
250 CAPSULE ORAL 4 TIMES DAILY
COMMUNITY
End: 2017-10-31

## 2017-08-18 NOTE — PROGRESS NOTES
"Subjective Donna M Yeoman is a 82 y.o. female who comes in today for   Chief Complaint   Patient presents with   • stomach issues   .    History of Present Illness   Here to f/u on recent frequent UTI's and required  Multiple antibiotics.  Finally cleared the urine and I did see that urine was clear recently.  Got a stomach bug during the UTI's and she got constipated and took otc and broke loose.  Has had some left sided pain in abdomen for 4 weeks. More of a persistent pain that gets worse at times.  Also some discomfort in her epigastric area.  Always feels nauseated.  This all started after and during the process of UTI's and stomach bug that made her feel nauseated and hot/cold which lasted a few days only.  Appetite is \"too good\"  Feels hungry all the time.  No weight loss.  Has gained 6 pounds.  Daily normal bm now.  No blood in stool.  No vomiting.  No sx at night other than nausea.  Back wakes her from pain but not the belly pain. In May, NP from [facility] came to her home and changed her stomach medications.  She is on omeprazole 20mg qd.  Also takes diovan/hct for HTN and crestor for HL and levoxyl for HT.  5/2014 EGD and cscope were ok.  Showed HH and gastritis .  Denies depression but has little drive or motivation.     The following portions of the patient's history were reviewed and updated as appropriate: allergies, current medications, past family history, past medical history, past social history, past surgical history and problem list.    Review of Systems   Constitutional: Positive for fatigue. Negative for fever.   Gastrointestinal: Positive for nausea. Negative for diarrhea and vomiting.   Psychiatric/Behavioral: Negative for dysphoric mood. The patient is not nervous/anxious.        Vitals:    08/18/17 1100   BP: 122/68   Pulse: 69   Resp: 18   Temp: 98.6 °F (37 °C)   SpO2: 98%       Objective   Physical Exam   Constitutional: She is oriented to person, place, and time. She appears " well-developed and well-nourished.   HENT:   Head: Normocephalic and atraumatic.   Right Ear: External ear normal.   Left Ear: External ear normal.   Mouth/Throat: Oropharynx is clear and moist.   Eyes: Conjunctivae are normal.   Cardiovascular: Normal rate, regular rhythm and normal heart sounds.    Pulmonary/Chest: Effort normal and breath sounds normal.   Abdominal: Soft. Bowel sounds are normal. She exhibits no distension and no mass. There is no tenderness.   Neurological: She is alert and oriented to person, place, and time.   Skin: Skin is warm.   Psychiatric: She has a normal mood and affect. Her behavior is normal. Judgment and thought content normal.   Nursing note and vitals reviewed.      Assessment/Plan   Shirley was seen today for stomach issues.    Diagnoses and all orders for this visit:    Essential hypertension  -     Vitamin D 25 Hydroxy  -     Vitamin B12  -     Comprehensive Metabolic Panel  -     Hemoglobin A1c  -     TSH  -     T4, Free  -     CBC & Differential  -     Lipase    Hyperlipidemia, unspecified hyperlipidemia type  -     Vitamin D 25 Hydroxy  -     Vitamin B12  -     Comprehensive Metabolic Panel  -     Hemoglobin A1c  -     TSH  -     T4, Free  -     CBC & Differential  -     Lipase    Hiatal hernia  -     Vitamin D 25 Hydroxy  -     Vitamin B12  -     Comprehensive Metabolic Panel  -     Hemoglobin A1c  -     TSH  -     T4, Free  -     CBC & Differential  -     Lipase    B12 deficiency  -     Vitamin D 25 Hydroxy  -     Vitamin B12  -     Comprehensive Metabolic Panel  -     Hemoglobin A1c  -     TSH  -     T4, Free  -     CBC & Differential  -     Lipase    Gastroesophageal reflux disease with esophagitis  -     Vitamin D 25 Hydroxy  -     Vitamin B12  -     Comprehensive Metabolic Panel  -     Hemoglobin A1c  -     TSH  -     T4, Free  -     CBC & Differential  -     Lipase    Nausea  -     Vitamin D 25 Hydroxy  -     Vitamin B12  -     Comprehensive Metabolic Panel  -      Hemoglobin A1c  -     TSH  -     T4, Free  -     CBC & Differential  -     Lipase    Hypothyroidism, unspecified type  -     Vitamin D 25 Hydroxy  -     Vitamin B12  -     Comprehensive Metabolic Panel  -     Hemoglobin A1c  -     TSH  -     T4, Free  -     CBC & Differential  -     Lipase    Type 2 diabetes mellitus without complication, without long-term current use of insulin  -     Vitamin D 25 Hydroxy  -     Vitamin B12  -     Comprehensive Metabolic Panel  -     Hemoglobin A1c  -     TSH  -     T4, Free  -     CBC & Differential  -     Lipase    Vitamin D deficiency   -     Vitamin D 25 Hydroxy    Other orders  -     omeprazole (priLOSEC) 20 MG capsule; Take 1 capsule by mouth 2 (Two) Times a Day.      Increase omeprazole to bid  Start florastor and benefiber  If discomfort and sx persist, then will have her see dr palmer or get CT scan. Has had these sx's for years.  She Also has a long and difficult h/o OA in her back and wonder if it could be related pain to that?  We also discussed the possibility that her fatigue and nausea (without weight loss) could be related to depression.  It is the anniversary of her 's death and she is lonely and misses him greatly.  She will let me know if she wants to pursue treatment for depression.    Labs today               I have asked for the patient to return to clinic in 6month(s).

## 2017-08-22 ENCOUNTER — RESULTS ENCOUNTER (OUTPATIENT)
Dept: FAMILY MEDICINE CLINIC | Facility: CLINIC | Age: 82
End: 2017-08-22

## 2017-08-22 DIAGNOSIS — R10.9 LEFT FLANK PAIN: ICD-10-CM

## 2017-08-22 DIAGNOSIS — R79.89 ELEVATED SERUM CREATININE: Primary | ICD-10-CM

## 2017-08-22 DIAGNOSIS — R79.89 ELEVATED SERUM CREATININE: ICD-10-CM

## 2017-08-24 ENCOUNTER — APPOINTMENT (OUTPATIENT)
Dept: CT IMAGING | Facility: HOSPITAL | Age: 82
End: 2017-08-24

## 2017-08-24 ENCOUNTER — HOSPITAL ENCOUNTER (OUTPATIENT)
Dept: CT IMAGING | Facility: HOSPITAL | Age: 82
Discharge: HOME | End: 2017-08-24
Admitting: INTERNAL MEDICINE

## 2017-08-24 DIAGNOSIS — R79.89 ELEVATED SERUM CREATININE: ICD-10-CM

## 2017-08-24 DIAGNOSIS — R10.9 LEFT FLANK PAIN: ICD-10-CM

## 2017-08-24 LAB
APPEARANCE UR: CLEAR
BILIRUB UR QL STRIP: NEGATIVE
COLOR UR: YELLOW
GLUCOSE UR QL: NEGATIVE
HGB UR QL STRIP: NEGATIVE
KETONES UR QL STRIP: NEGATIVE
LEUKOCYTE ESTERASE UR QL STRIP: NEGATIVE
NITRITE UR QL STRIP: NEGATIVE
PH UR STRIP: 6.5 [PH] (ref 5–8)
PROT UR QL STRIP: NEGATIVE
SP GR UR: 1.02 (ref 1–1.03)
UROBILINOGEN UR STRIP-MCNC: NORMAL MG/DL

## 2017-08-24 PROCEDURE — 74176 CT ABD & PELVIS W/O CONTRAST: CPT

## 2017-08-25 ENCOUNTER — TELEPHONE (OUTPATIENT)
Dept: FAMILY MEDICINE CLINIC | Facility: CLINIC | Age: 82
End: 2017-08-25

## 2017-08-25 DIAGNOSIS — N28.9 KIDNEY FUNCTION ABNORMAL: Primary | ICD-10-CM

## 2017-08-25 LAB
ALBUMIN SERPL-MCNC: 3.9 G/DL (ref 3.5–4.7)
ALBUMIN/GLOB SERPL: 1.4 {RATIO} (ref 1.2–2.2)
ALP SERPL-CCNC: 67 IU/L (ref 39–117)
ALT SERPL-CCNC: 10 IU/L (ref 0–32)
AST SERPL-CCNC: 20 IU/L (ref 0–40)
BILIRUB SERPL-MCNC: 0.5 MG/DL (ref 0–1.2)
BUN SERPL-MCNC: 27 MG/DL (ref 8–27)
BUN/CREAT SERPL: 16 (ref 12–28)
CALCIUM SERPL-MCNC: 9.1 MG/DL (ref 8.7–10.3)
CHLORIDE SERPL-SCNC: 106 MMOL/L (ref 96–106)
CO2 SERPL-SCNC: 28 MMOL/L (ref 18–29)
CREAT SERPL-MCNC: 1.72 MG/DL (ref 0.57–1)
GLOBULIN SER CALC-MCNC: 2.8 G/DL (ref 1.5–4.5)
GLUCOSE SERPL-MCNC: 148 MG/DL (ref 65–99)
POTASSIUM SERPL-SCNC: 4.2 MMOL/L (ref 3.5–5.2)
PROT SERPL-MCNC: 6.7 G/DL (ref 6–8.5)
SODIUM SERPL-SCNC: 143 MMOL/L (ref 134–144)

## 2017-08-28 ENCOUNTER — TELEPHONE (OUTPATIENT)
Dept: FAMILY MEDICINE CLINIC | Facility: CLINIC | Age: 82
End: 2017-08-28

## 2017-08-28 RX ORDER — AMLODIPINE BESYLATE 5 MG/1
5 TABLET ORAL DAILY
Qty: 30 TABLET | Refills: 2 | Status: SHIPPED | OUTPATIENT
Start: 2017-08-28 | End: 2017-11-28

## 2017-08-28 NOTE — TELEPHONE ENCOUNTER
Ok can try that but she has to keep hydrating and monitoring her BP and call me if it goes above 145/90.

## 2017-08-28 NOTE — TELEPHONE ENCOUNTER
Spoke with Dr. Webb. He said that he agreed with me about dc valsartan hctz and starting amlodipine 5mg qd instead.  Please order ua c/s and cmp on Friday or Monday next week.  Also she should f/u her urologist about the possible air in bladder found on CT scan this week if possible  Who does she see and can we get her in this week?

## 2017-08-28 NOTE — TELEPHONE ENCOUNTER
Patient would like to do medication change first and then have labs done if no improvement then would like to see specialist.

## 2017-08-28 NOTE — TELEPHONE ENCOUNTER
----- Message from Caroline Weiner MD sent at 8/28/2017 12:45 PM EDT -----  Regarding: valsartan-HCTZ  I think it is worth d/c valsartan-hctz and start amlodipine 5mg once daily to see if that may help improve her kidney function.  Let me know if she is ok with that change and where to send the new rx

## 2017-08-28 NOTE — TELEPHONE ENCOUNTER
I reviewed lab results Sat morning.  They weren't called to me but I happened to check Saturday.  Her creatinine is much better but still elevated.  Is she ok with me sending her to see kidney doctor, Dr. Webb, to see if he can find a cause for the change in function? In meantime, continue to hydrate well.  Avoid nsaids and no IV contrast

## 2017-09-05 ENCOUNTER — TELEPHONE (OUTPATIENT)
Dept: FAMILY MEDICINE CLINIC | Facility: CLINIC | Age: 82
End: 2017-09-05

## 2017-09-05 NOTE — TELEPHONE ENCOUNTER
Patient saw urologist this morning and with everything going on that there is no major concern, just going to keep an on everything. And the urine that was done in his office was clear.

## 2017-09-06 DIAGNOSIS — N28.9 ABNORMAL RENAL FUNCTION FINDING: Primary | ICD-10-CM

## 2017-09-06 NOTE — TELEPHONE ENCOUNTER
Yes she did she that and she does not self cath, thought this was sent back in a lab message; sorry.   She is coming in next week for the CMP

## 2017-09-06 NOTE — TELEPHONE ENCOUNTER
Ok thanks for letting me know.  Did he see her CT report about air in the bladder and questioning if she self catheterized?  When does she repeat cmp after her med changes?  Next week?

## 2017-09-11 ENCOUNTER — APPOINTMENT (OUTPATIENT)
Dept: CARDIOLOGY | Facility: HOSPITAL | Age: 82
End: 2017-09-11

## 2017-09-11 ENCOUNTER — HOSPITAL ENCOUNTER (EMERGENCY)
Facility: HOSPITAL | Age: 82
Discharge: HOME | End: 2017-09-11
Attending: EMERGENCY MEDICINE | Admitting: EMERGENCY MEDICINE

## 2017-09-11 VITALS
BODY MASS INDEX: 26.58 KG/M2 | TEMPERATURE: 98 F | WEIGHT: 150 LBS | RESPIRATION RATE: 16 BRPM | DIASTOLIC BLOOD PRESSURE: 95 MMHG | OXYGEN SATURATION: 98 % | HEART RATE: 80 BPM | SYSTOLIC BLOOD PRESSURE: 159 MMHG | HEIGHT: 63 IN

## 2017-09-11 DIAGNOSIS — R60.0 EDEMA OF RIGHT LOWER EXTREMITY: Primary | ICD-10-CM

## 2017-09-11 LAB
ALBUMIN SERPL-MCNC: 4.2 G/DL (ref 3.5–5.2)
ALBUMIN/GLOB SERPL: 1.2 G/DL
ALP SERPL-CCNC: 63 U/L (ref 39–117)
ALT SERPL W P-5'-P-CCNC: 13 U/L (ref 1–33)
ANION GAP SERPL CALCULATED.3IONS-SCNC: 10.5 MMOL/L
AST SERPL-CCNC: 18 U/L (ref 1–32)
BASOPHILS # BLD AUTO: 0.03 10*3/MM3 (ref 0–0.2)
BASOPHILS NFR BLD AUTO: 0.4 % (ref 0–1.5)
BH CV LOWER VASCULAR LEFT COMMON FEMORAL AUGMENT: NORMAL
BH CV LOWER VASCULAR LEFT COMMON FEMORAL COMPETENT: NORMAL
BH CV LOWER VASCULAR LEFT COMMON FEMORAL COMPRESS: NORMAL
BH CV LOWER VASCULAR LEFT COMMON FEMORAL PHASIC: NORMAL
BH CV LOWER VASCULAR LEFT COMMON FEMORAL SPONT: NORMAL
BH CV LOWER VASCULAR RIGHT COMMON FEMORAL AUGMENT: NORMAL
BH CV LOWER VASCULAR RIGHT COMMON FEMORAL COMPETENT: NORMAL
BH CV LOWER VASCULAR RIGHT COMMON FEMORAL COMPRESS: NORMAL
BH CV LOWER VASCULAR RIGHT COMMON FEMORAL PHASIC: NORMAL
BH CV LOWER VASCULAR RIGHT COMMON FEMORAL SPONT: NORMAL
BH CV LOWER VASCULAR RIGHT DISTAL FEMORAL COMPRESS: NORMAL
BH CV LOWER VASCULAR RIGHT GASTRONEMIUS COMPRESS: NORMAL
BH CV LOWER VASCULAR RIGHT GREATER SAPH AK COMPRESS: NORMAL
BH CV LOWER VASCULAR RIGHT GREATER SAPH BK COMPRESS: NORMAL
BH CV LOWER VASCULAR RIGHT MID FEMORAL AUGMENT: NORMAL
BH CV LOWER VASCULAR RIGHT MID FEMORAL COMPETENT: NORMAL
BH CV LOWER VASCULAR RIGHT MID FEMORAL COMPRESS: NORMAL
BH CV LOWER VASCULAR RIGHT MID FEMORAL PHASIC: NORMAL
BH CV LOWER VASCULAR RIGHT MID FEMORAL SPONT: NORMAL
BH CV LOWER VASCULAR RIGHT PERONEAL COMPRESS: NORMAL
BH CV LOWER VASCULAR RIGHT POPLITEAL AUGMENT: NORMAL
BH CV LOWER VASCULAR RIGHT POPLITEAL COMPETENT: NORMAL
BH CV LOWER VASCULAR RIGHT POPLITEAL COMPRESS: NORMAL
BH CV LOWER VASCULAR RIGHT POPLITEAL PHASIC: NORMAL
BH CV LOWER VASCULAR RIGHT POPLITEAL SPONT: NORMAL
BH CV LOWER VASCULAR RIGHT POSTERIOR TIBIAL COMPRESS: NORMAL
BH CV LOWER VASCULAR RIGHT PROXIMAL FEMORAL COMPRESS: NORMAL
BH CV LOWER VASCULAR RIGHT SAPHENOFEMORAL JUNCTION AUGMENT: NORMAL
BH CV LOWER VASCULAR RIGHT SAPHENOFEMORAL JUNCTION COMPETENT: NORMAL
BH CV LOWER VASCULAR RIGHT SAPHENOFEMORAL JUNCTION COMPRESS: NORMAL
BH CV LOWER VASCULAR RIGHT SAPHENOFEMORAL JUNCTION PHASIC: NORMAL
BH CV LOWER VASCULAR RIGHT SAPHENOFEMORAL JUNCTION SPONT: NORMAL
BILIRUB SERPL-MCNC: 0.6 MG/DL (ref 0.1–1.2)
BUN BLD-MCNC: 19 MG/DL (ref 8–23)
BUN/CREAT SERPL: 14.1 (ref 7–25)
CALCIUM SPEC-SCNC: 9.6 MG/DL (ref 8.6–10.5)
CHLORIDE SERPL-SCNC: 103 MMOL/L (ref 98–107)
CO2 SERPL-SCNC: 27.5 MMOL/L (ref 22–29)
CREAT BLD-MCNC: 1.35 MG/DL (ref 0.57–1)
DEPRECATED RDW RBC AUTO: 47.2 FL (ref 37–54)
EOSINOPHIL # BLD AUTO: 0.25 10*3/MM3 (ref 0–0.7)
EOSINOPHIL NFR BLD AUTO: 3.4 % (ref 0.3–6.2)
ERYTHROCYTE [DISTWIDTH] IN BLOOD BY AUTOMATED COUNT: 14.2 % (ref 11.7–13)
GFR SERPL CREATININE-BSD FRML MDRD: 38 ML/MIN/1.73
GLOBULIN UR ELPH-MCNC: 3.4 GM/DL
GLUCOSE BLD-MCNC: 109 MG/DL (ref 65–99)
HCT VFR BLD AUTO: 35.8 % (ref 35.6–45.5)
HGB BLD-MCNC: 11.8 G/DL (ref 11.9–15.5)
HOLD SPECIMEN: NORMAL
HOLD SPECIMEN: NORMAL
IMM GRANULOCYTES # BLD: 0.02 10*3/MM3 (ref 0–0.03)
IMM GRANULOCYTES NFR BLD: 0.3 % (ref 0–0.5)
LYMPHOCYTES # BLD AUTO: 1.93 10*3/MM3 (ref 0.9–4.8)
LYMPHOCYTES NFR BLD AUTO: 26.1 % (ref 19.6–45.3)
MCH RBC QN AUTO: 30.4 PG (ref 26.9–32)
MCHC RBC AUTO-ENTMCNC: 33 G/DL (ref 32.4–36.3)
MCV RBC AUTO: 92.3 FL (ref 80.5–98.2)
MONOCYTES # BLD AUTO: 0.48 10*3/MM3 (ref 0.2–1.2)
MONOCYTES NFR BLD AUTO: 6.5 % (ref 5–12)
NEUTROPHILS # BLD AUTO: 4.69 10*3/MM3 (ref 1.9–8.1)
NEUTROPHILS NFR BLD AUTO: 63.3 % (ref 42.7–76)
PLATELET # BLD AUTO: 171 10*3/MM3 (ref 140–500)
PMV BLD AUTO: 10.3 FL (ref 6–12)
POTASSIUM BLD-SCNC: 4 MMOL/L (ref 3.5–5.2)
PROT SERPL-MCNC: 7.6 G/DL (ref 6–8.5)
RBC # BLD AUTO: 3.88 10*6/MM3 (ref 3.9–5.2)
SODIUM BLD-SCNC: 141 MMOL/L (ref 136–145)
WBC NRBC COR # BLD: 7.4 10*3/MM3 (ref 4.5–10.7)
WHOLE BLOOD HOLD SPECIMEN: NORMAL
WHOLE BLOOD HOLD SPECIMEN: NORMAL

## 2017-09-11 PROCEDURE — 99284 EMERGENCY DEPT VISIT MOD MDM: CPT

## 2017-09-11 PROCEDURE — 93971 EXTREMITY STUDY: CPT

## 2017-09-11 PROCEDURE — 85025 COMPLETE CBC W/AUTO DIFF WBC: CPT | Performed by: EMERGENCY MEDICINE

## 2017-09-11 PROCEDURE — 80053 COMPREHEN METABOLIC PANEL: CPT | Performed by: EMERGENCY MEDICINE

## 2017-09-11 PROCEDURE — 36415 COLL VENOUS BLD VENIPUNCTURE: CPT | Performed by: EMERGENCY MEDICINE

## 2017-09-11 NOTE — ED TRIAGE NOTES
Came from urgent care to ED to r/o dvt in right leg. C/o swelling to entire lower limb that started last night without injury. + slight red coloring noted

## 2017-09-11 NOTE — PROGRESS NOTES
RLEV doppler completed. Prelim is negative for DVT called to [facility] all physicians were currently busy.

## 2017-09-12 NOTE — ED PROVIDER NOTES
" EMERGENCY DEPARTMENT ENCOUNTER    CHIEF COMPLAINT  Chief Complaint: right leg swelling  History given by: pt  History limited by: nothing  Room Number: 14/14  PMD: Caroline Weiner MD      HPI:  Pt is a 82 y.o. female who presents complaining of right leg swelling that began last night. She states that she is not very active and there has been no change in activity level. She denies fever, CP, and SOA. Pt has had previous DVT's. Pt is not on blood thinners. She is admittedly not thebest at keeping her feet elevated.    Duration:  One day  Onset: gradual  Timing: constant  Location: right leg  Radiation: none  Quality: \"swelling\"  Intensity/Severity: moderate  Progression: unchanged  Associated Symptoms: none  Aggravating Factors: unknown  Alleviating Factors: unknown  Previous Episodes: Pt has had previous DVT's  Treatment before arrival: unknown    PAST MEDICAL HISTORY  Active Ambulatory Problems     Diagnosis Date Noted   • Bloating 02/24/2016   • Epigastric pain 02/24/2016   • B12 deficiency 02/24/2016   • Type 2 diabetes mellitus without complication 02/24/2016   • Essential hypertension 02/24/2016   • MGUS (monoclonal gammopathy of unknown significance) 04/12/2016   • Sore throat 05/09/2016   • Gastroesophageal reflux disease with esophagitis 08/09/2016   • Cough 08/09/2016   • Hypothyroidism 08/09/2016   • Hyperglycemia 08/09/2016   • Right foot pain 08/09/2016   • Healthcare maintenance 11/22/2016   • Type 2 diabetes mellitus without complication, without long-term current use of insulin 11/22/2016   • Hyperlipidemia 11/22/2016   • Dysuria 11/22/2016   • Persistent cough 01/31/2017   • Cardiomegaly 01/31/2017   • Rib pain on left side 01/31/2017   • Creatinine elevation 01/31/2017   • Encounter for screening mammogram for breast cancer 01/31/2017   • Abnormal serum total protein level 07/01/2017   • Acute upper respiratory infection 07/01/2017   • Breast asymmetry 07/01/2017   • Breast pain 07/01/2017   • " Burning sensation 07/01/2017   • Cerebrovascular small vessel disease 07/01/2017   • Colon polyp 07/01/2017   • Dense breasts 07/01/2017   • Dizziness 07/01/2017   • Increased glucose level 07/01/2017   • Gastritis 07/01/2017   • Gout 07/01/2017   • Headache 07/01/2017   • Hiatal hernia 07/01/2017   • Hypertension 07/01/2017   • Low back pain 07/01/2017   • Ulcer of nose 07/01/2017   • Nausea 07/01/2017   • Neck pain 07/01/2017   • Osteoarthritis 07/01/2017   • Otalgia 07/01/2017   • Pain in thoracic spine 07/01/2017   • Shoulder joint pain 07/01/2017   • Back pain 07/01/2017   • Peripheral neuropathy 07/01/2017   • Pneumonia 07/01/2017   • Seasonal allergic rhinitis 07/01/2017   • Tinea corporis 07/01/2017   • Vitamin D deficiency  08/18/2017     Resolved Ambulatory Problems     Diagnosis Date Noted   • No Resolved Ambulatory Problems     Past Medical History:   Diagnosis Date   • Arthritis    • Colon polyp 7/1/2017   • Diabetes mellitus    • Esophageal reflux    • GERD (gastroesophageal reflux disease)    • H/O DVT (deep venous thrombosis)    • Headache 7/1/2017   • Hiatal hernia 7/1/2017   • Hypercholesteremia    • Hyperlipidemia    • Hypertension    • Low back pain 7/1/2017   • MGUS (monoclonal gammopathy of unknown significance)    • Osteoporosis    • Pneumonia 7/1/2017   • Pulmonary embolism    • Pulmonary hypertension    • Thyroid disease    • Venous thrombosis        PAST SURGICAL HISTORY  Past Surgical History:   Procedure Laterality Date   • APPENDECTOMY  1949   • BLADDER REPAIR     • COLONOSCOPY     • CYSTOCELE REPAIR      bladder reconstruction   • HYSTERECTOMY     • SALPINGECTOMY     • SALPINGO OOPHORECTOMY      for ectopic pregnancy       FAMILY HISTORY  Family History   Problem Relation Age of Onset   • Breast cancer Mother    • Colon cancer Mother    • Heart disease Other    • Diabetes Other    • Hypertension Other        SOCIAL HISTORY  Social History     Social History   • Marital status:       Spouse name: N/A   • Number of children: 4   • Years of education: High School     Occupational History   •  Retired     Social History Main Topics   • Smoking status: Never Smoker   • Smokeless tobacco: Not on file   • Alcohol use No   • Drug use: No   • Sexual activity: Not on file     Other Topics Concern   • Not on file     Social History Narrative    Patient is a . Her  was a patient at our practice with prostate cancer.        ALLERGIES  Review of patient's allergies indicates no known allergies.    REVIEW OF SYSTEMS  Review of Systems   Constitutional: Negative for fever.   HENT: Negative for sore throat.    Eyes: Negative.    Respiratory: Negative for cough and shortness of breath.    Cardiovascular: Positive for leg swelling (right). Negative for chest pain.   Gastrointestinal: Negative for abdominal pain, diarrhea and vomiting.   Genitourinary: Negative for dysuria.   Musculoskeletal: Negative for neck pain.   Skin: Negative for rash.   Allergic/Immunologic: Negative.    Neurological: Negative for weakness, numbness and headaches.   Hematological: Negative.    Psychiatric/Behavioral: Negative.    All other systems reviewed and are negative.      PHYSICAL EXAM  ED Triage Vitals   Temp Heart Rate Resp BP SpO2   09/11/17 1522 09/11/17 1522 09/11/17 1522 09/11/17 1522 09/11/17 1522   98.7 °F (37.1 °C) 87 17 156/93 97 %      Temp src Heart Rate Source Patient Position BP Location FiO2 (%)   09/11/17 1522 -- -- 09/11/17 1522 --   Tympanic   Right arm        Physical Exam   Constitutional: She is oriented to person, place, and time and well-developed, well-nourished, and in no distress. No distress.   HENT:   Head: Normocephalic and atraumatic.   Eyes: EOM are normal. Pupils are equal, round, and reactive to light.   Neck: Normal range of motion. Neck supple.   Cardiovascular: Normal rate, regular rhythm and normal heart sounds.    Pulmonary/Chest: Effort normal and breath sounds normal. No  respiratory distress.   Abdominal: Soft. There is no tenderness. There is no rebound and no guarding.   Musculoskeletal: Normal range of motion.        Right lower leg: She exhibits edema (minimal).   Neurological: She is alert and oriented to person, place, and time. She has normal sensation and normal strength.   Skin: Skin is warm and dry. No rash noted.   Psychiatric: Mood and affect normal.   Nursing note and vitals reviewed.      LAB RESULTS  Lab Results (last 24 hours)     Procedure Component Value Units Date/Time    CBC & Differential [[id number]] Collected:  09/11/17 1643    Specimen:  Blood Updated:  09/11/17 1711    Narrative:       The following orders were created for panel order CBC & Differential.  Procedure                               Abnormality         Status                     ---------                               -----------         ------                     CBC Auto Differential[[id number]]        Abnormal            Final result                 Please view results for these tests on the individual orders.    Comprehensive Metabolic Panel [[id number]]  (Abnormal) Collected:  09/11/17 1643    Specimen:  Blood Updated:  09/11/17 1731     Glucose 109 (H) mg/dL      BUN 19 mg/dL      Creatinine 1.35 (H) mg/dL      Sodium 141 mmol/L      Potassium 4.0 mmol/L      Chloride 103 mmol/L      CO2 27.5 mmol/L      Calcium 9.6 mg/dL      Total Protein 7.6 g/dL      Albumin 4.20 g/dL      ALT (SGPT) 13 U/L      AST (SGOT) 18 U/L      Alkaline Phosphatase 63 U/L      Total Bilirubin 0.6 mg/dL      eGFR Non African Amer 38 (L) mL/min/1.73      Globulin 3.4 gm/dL      A/G Ratio 1.2 g/dL      BUN/Creatinine Ratio 14.1     Anion Gap 10.5 mmol/L     Narrative:       The MDRD GFR formula is only valid for adults with stable renal function between ages 18 and 70.    CBC Auto Differential [[id number]]  (Abnormal) Collected:  09/11/17 1643    Specimen:  Blood Updated:  09/11/17 1711     WBC 7.40 10*3/mm3      RBC  3.88 (L) 10*6/mm3      Hemoglobin 11.8 (L) g/dL      Hematocrit 35.8 %      MCV 92.3 fL      MCH 30.4 pg      MCHC 33.0 g/dL      RDW 14.2 (H) %      RDW-SD 47.2 fl      MPV 10.3 fL      Platelets 171 10*3/mm3      Neutrophil % 63.3 %      Lymphocyte % 26.1 %      Monocyte % 6.5 %      Eosinophil % 3.4 %      Basophil % 0.4 %      Immature Grans % 0.3 %      Neutrophils, Absolute 4.69 10*3/mm3      Lymphocytes, Absolute 1.93 10*3/mm3      Monocytes, Absolute 0.48 10*3/mm3      Eosinophils, Absolute 0.25 10*3/mm3      Basophils, Absolute 0.03 10*3/mm3      Immature Grans, Absolute 0.02 10*3/mm3           I ordered the above labs and reviewed the results    PROCEDURES  Procedures      PROGRESS AND CONSULTS  ED Course     1524 - Ordered labs and Duplex Right Lower Extremity for further evaluation.     2035 - Notified pt of normal imaging. Advised pt to wear compression socks and elevate. Discussed plan to discharge the pt. Pt understands and agrees with plan, all questions addressed.    MEDICAL DECISION MAKING  Results were reviewed/discussed with the patient and they were also made aware of online access. Pt also made aware that some labs, such as cultures, will not be resulted during ER visit and follow up with PMD is necessary.     MDM  Number of Diagnoses or Management Options     Amount and/or Complexity of Data Reviewed  Clinical lab tests: reviewed and ordered (Creatinine - 1.35)  Tests in the radiology section of CPT®: reviewed and ordered (Duplex Right Lower Extremity - normal exam)           DIAGNOSIS  Final diagnoses:   Edema of right lower extremity       DISPOSITION  DISCHARGE    Patient discharged in stable condition.    Reviewed implications of results, diagnosis, meds, responsibility to follow up, warning signs and symptoms of possible worsening, potential complications and reasons to return to ER.    Patient/Family voiced understanding of above instructions.    Discussed plan for discharge, as there is  no emergent indication for admission.  Pt/family is agreeable and understands need for follow up and repeat testing.  Pt is aware that discharge does not mean that nothing is wrong but it indicates no emergency is present that requires admission and they must continue care with follow-up as given below or physician of their choice.     FOLLOW-UP  Caroline Weiner MD  [address]  [facility] [address]  [place] [id number] [phone]      As needed         Medication List      Stop          Cefixime 400 MG tablet   Commonly known as:  SUPRAX       cephalexin 250 MG capsule   Commonly known as:  KEFLEX       PROAIR  (90 Base) MCG/ACT inhaler   Generic drug:  albuterol               Latest Documented Vital Signs:  As of 11:11 PM  BP- 159/95 HR- 80 Temp- 98 °F (36.7 °C) (Oral) O2 sat- 98%    --  Documentation assistance provided by maximino Bernal for Dr. Mccullough.  Information recorded by the scribe was done at my direction and has been verified and validated by me.     Stanley Bernal  09/11/17 2040       Pan Mccullough MD  09/11/17 8590

## 2017-10-17 RX ORDER — LEVOTHYROXINE SODIUM 0.07 MG/1
TABLET ORAL
Qty: 90 TABLET | Refills: 1 | Status: SHIPPED | OUTPATIENT
Start: 2017-10-17 | End: 2017-11-28

## 2017-10-30 ENCOUNTER — TELEPHONE (OUTPATIENT)
Dept: FAMILY MEDICINE CLINIC | Facility: CLINIC | Age: 82
End: 2017-10-30

## 2017-10-30 NOTE — TELEPHONE ENCOUNTER
We had a medicare wellness at 8:30 that was scheduled for an hour, I made it 30 minutes and then worked her in at 9:00

## 2017-10-30 NOTE — TELEPHONE ENCOUNTER
Would like an appt for a bladder and ear infection. Did not know where to put her. We are booked this week,

## 2017-10-31 ENCOUNTER — OFFICE VISIT (OUTPATIENT)
Dept: FAMILY MEDICINE CLINIC | Facility: CLINIC | Age: 82
End: 2017-10-31

## 2017-10-31 VITALS
SYSTOLIC BLOOD PRESSURE: 124 MMHG | WEIGHT: 155.3 LBS | HEART RATE: 62 BPM | RESPIRATION RATE: 18 BRPM | OXYGEN SATURATION: 97 % | BODY MASS INDEX: 27.52 KG/M2 | TEMPERATURE: 98.3 F | HEIGHT: 63 IN | DIASTOLIC BLOOD PRESSURE: 74 MMHG

## 2017-10-31 DIAGNOSIS — R07.81 RIB PAIN ON LEFT SIDE: ICD-10-CM

## 2017-10-31 DIAGNOSIS — L72.0 INCLUSION CYST: ICD-10-CM

## 2017-10-31 DIAGNOSIS — R09.81 SINUS CONGESTION: ICD-10-CM

## 2017-10-31 DIAGNOSIS — R30.0 DYSURIA: Primary | ICD-10-CM

## 2017-10-31 LAB
BILIRUB BLD-MCNC: NEGATIVE MG/DL
CLARITY, POC: CLEAR
COLOR UR: NORMAL
GLUCOSE UR STRIP-MCNC: NEGATIVE MG/DL
KETONES UR QL: NEGATIVE
LEUKOCYTE EST, POC: NEGATIVE
NITRITE UR-MCNC: NEGATIVE MG/ML
PH UR: 5.5 [PH] (ref 5–8)
PROT UR STRIP-MCNC: NEGATIVE MG/DL
RBC # UR STRIP: NEGATIVE /UL
SP GR UR: 1.02 (ref 1–1.03)
UROBILINOGEN UR QL: NORMAL

## 2017-10-31 PROCEDURE — 99214 OFFICE O/P EST MOD 30 MIN: CPT | Performed by: INTERNAL MEDICINE

## 2017-10-31 PROCEDURE — 81003 URINALYSIS AUTO W/O SCOPE: CPT | Performed by: INTERNAL MEDICINE

## 2017-10-31 RX ORDER — LIDOCAINE 50 MG/G
1 PATCH TOPICAL EVERY 24 HOURS
Qty: 30 PATCH | Refills: 3 | Status: SHIPPED | OUTPATIENT
Start: 2017-10-31 | End: 2019-01-24

## 2017-10-31 RX ORDER — AMOXICILLIN AND CLAVULANATE POTASSIUM 875; 125 MG/1; MG/1
1 TABLET, FILM COATED ORAL 2 TIMES DAILY
Qty: 20 TABLET | Refills: 0 | Status: SHIPPED | OUTPATIENT
Start: 2017-10-31 | End: 2017-11-28

## 2017-11-01 PROBLEM — L72.0 INCLUSION CYST: Status: ACTIVE | Noted: 2017-11-01

## 2017-11-01 PROBLEM — R09.81 SINUS CONGESTION: Status: ACTIVE | Noted: 2017-11-01

## 2017-11-13 DIAGNOSIS — E78.5 HYPERLIPIDEMIA: ICD-10-CM

## 2017-11-13 RX ORDER — ALLOPURINOL 100 MG/1
TABLET ORAL
Qty: 90 TABLET | Refills: 1 | Status: SHIPPED | OUTPATIENT
Start: 2017-11-13 | End: 2018-05-21 | Stop reason: SDUPTHER

## 2017-11-13 RX ORDER — ROSUVASTATIN CALCIUM 5 MG/1
TABLET, COATED ORAL
Qty: 90 TABLET | Refills: 1 | Status: SHIPPED | OUTPATIENT
Start: 2017-11-13 | End: 2018-05-21 | Stop reason: SDUPTHER

## 2017-11-20 ENCOUNTER — TELEPHONE (OUTPATIENT)
Dept: FAMILY MEDICINE CLINIC | Facility: CLINIC | Age: 82
End: 2017-11-20

## 2017-11-20 NOTE — TELEPHONE ENCOUNTER
There is an over the counter 4% lidocaine patch that she can use or would she like to see if a topical cream would help the pain?  Neither may be covered by her insurance

## 2017-11-21 ENCOUNTER — TELEPHONE (OUTPATIENT)
Dept: FAMILY MEDICINE CLINIC | Facility: CLINIC | Age: 82
End: 2017-11-21

## 2017-11-21 DIAGNOSIS — G89.29 CHRONIC THORACIC BACK PAIN, UNSPECIFIED BACK PAIN LATERALITY: Primary | ICD-10-CM

## 2017-11-21 DIAGNOSIS — M54.6 CHRONIC THORACIC BACK PAIN, UNSPECIFIED BACK PAIN LATERALITY: Primary | ICD-10-CM

## 2017-11-21 DIAGNOSIS — M54.2 NECK PAIN: ICD-10-CM

## 2017-11-21 NOTE — TELEPHONE ENCOUNTER
With her elevated kidney function, I would rather her avoid the NSAID class of meds which is what meloxicam is in.  Does she want to see pain management?

## 2017-11-21 NOTE — TELEPHONE ENCOUNTER
Pt said that she saw dr. stinson about her ears and he also thinks a lot of this is stimulating from her arthritis and he prescribed her meloxicam is this okay to take for pt?   She also got OTC patches.     [phone]

## 2017-11-28 ENCOUNTER — OFFICE VISIT (OUTPATIENT)
Dept: PAIN MEDICINE | Facility: CLINIC | Age: 82
End: 2017-11-28

## 2017-11-28 VITALS
HEART RATE: 89 BPM | BODY MASS INDEX: 27.89 KG/M2 | DIASTOLIC BLOOD PRESSURE: 88 MMHG | WEIGHT: 157.4 LBS | SYSTOLIC BLOOD PRESSURE: 179 MMHG | RESPIRATION RATE: 18 BRPM | OXYGEN SATURATION: 96 % | HEIGHT: 63 IN | TEMPERATURE: 97 F

## 2017-11-28 DIAGNOSIS — M54.2 NECK PAIN: Primary | ICD-10-CM

## 2017-11-28 DIAGNOSIS — M54.50 CHRONIC BILATERAL LOW BACK PAIN WITHOUT SCIATICA: ICD-10-CM

## 2017-11-28 DIAGNOSIS — M54.6 PAIN IN THORACIC SPINE: ICD-10-CM

## 2017-11-28 DIAGNOSIS — G89.29 CHRONIC BILATERAL LOW BACK PAIN WITHOUT SCIATICA: ICD-10-CM

## 2017-11-28 LAB
POC AMPHETAMINES: NEGATIVE
POC BARBITURATES: NEGATIVE
POC BENZODIAZEPHINES: NEGATIVE
POC COCAINE: NEGATIVE
POC METHADONE: NEGATIVE
POC METHAMPHETAMINE SCREEN URINE: NEGATIVE
POC OPIATES: NEGATIVE
POC OXYCODONE: NEGATIVE
POC PHENCYCLIDINE: NEGATIVE
POC PROPOXYPHENE: NEGATIVE
POC THC: NEGATIVE
POC TRICYCLIC ANTIDEPRESSANTS: NEGATIVE

## 2017-11-28 PROCEDURE — 80305 DRUG TEST PRSMV DIR OPT OBS: CPT | Performed by: PAIN MEDICINE

## 2017-11-28 PROCEDURE — 99204 OFFICE O/P NEW MOD 45 MIN: CPT | Performed by: PAIN MEDICINE

## 2017-11-28 RX ORDER — AMLODIPINE BESYLATE 5 MG/1
5 TABLET ORAL DAILY
Qty: 30 TABLET | Refills: 3 | Status: SHIPPED | OUTPATIENT
Start: 2017-11-28 | End: 2017-12-10 | Stop reason: SDUPTHER

## 2017-11-28 RX ORDER — ASPIRIN 81 MG/1
81 TABLET ORAL DAILY
COMMUNITY

## 2017-11-28 NOTE — PATIENT INSTRUCTIONS
- can increase to 3 patches instead of just 1 for better coverage given multiple areas of pain.   - avoid NSAIDS : ibuprofen, aleve, naproxen  - ok to take tylenol as needed.   - will order compound cream- will be mailed to house.
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
## 2017-11-30 ENCOUNTER — TELEPHONE (OUTPATIENT)
Dept: FAMILY MEDICINE CLINIC | Facility: CLINIC | Age: 82
End: 2017-11-30

## 2017-11-30 NOTE — TELEPHONE ENCOUNTER
----- Message from Caroline Weiner MD sent at 11/28/2017 12:27 PM EST -----  Regarding: BP  Pt stopped in and said her BP is running high after d/c diovan hct in early fall.  She was to start amlodipine 5mg daily but somehow it was not started.  I have sent it into [facility] and she should take one daily.  Let me know in a week how her BP looks.  Goal is less than 135/85

## 2017-12-10 RX ORDER — AMLODIPINE BESYLATE 5 MG/1
TABLET ORAL
Qty: 45 TABLET | Refills: 4 | Status: SHIPPED | OUTPATIENT
Start: 2017-12-10 | End: 2018-01-24 | Stop reason: SINTOL

## 2017-12-19 ENCOUNTER — TELEPHONE (OUTPATIENT)
Dept: FAMILY MEDICINE CLINIC | Facility: CLINIC | Age: 82
End: 2017-12-19

## 2017-12-19 NOTE — TELEPHONE ENCOUNTER
Have her go back to the 5mg 1 1/2 tablets daily and is she ok to take a low dose HCTZ 12. 5mg qd?

## 2017-12-19 NOTE — TELEPHONE ENCOUNTER
Patient is starting to swell in legs feet and ankles after increasing the blood pressure medication. She is currently taking 2 of the 5mg daily due to BP elevation

## 2017-12-20 RX ORDER — HYDROCHLOROTHIAZIDE 12.5 MG/1
12.5 CAPSULE, GELATIN COATED ORAL DAILY
Qty: 30 CAPSULE | Refills: 3 | Status: SHIPPED | OUTPATIENT
Start: 2017-12-20 | End: 2018-01-24 | Stop reason: SDUPTHER

## 2017-12-20 NOTE — TELEPHONE ENCOUNTER
Patient is okay to go back to 1 1/2 tablets. And she is okay with the HCTZ. Please send to pharmacy

## 2018-01-24 ENCOUNTER — OFFICE VISIT (OUTPATIENT)
Dept: FAMILY MEDICINE CLINIC | Facility: CLINIC | Age: 83
End: 2018-01-24

## 2018-01-24 VITALS
HEART RATE: 84 BPM | TEMPERATURE: 97.8 F | HEIGHT: 63 IN | DIASTOLIC BLOOD PRESSURE: 70 MMHG | WEIGHT: 157.4 LBS | OXYGEN SATURATION: 97 % | BODY MASS INDEX: 27.89 KG/M2 | RESPIRATION RATE: 18 BRPM | SYSTOLIC BLOOD PRESSURE: 130 MMHG

## 2018-01-24 DIAGNOSIS — R60.0 LEG EDEMA: ICD-10-CM

## 2018-01-24 DIAGNOSIS — R35.0 URINARY FREQUENCY: ICD-10-CM

## 2018-01-24 DIAGNOSIS — M54.50 CHRONIC BILATERAL LOW BACK PAIN WITHOUT SCIATICA: ICD-10-CM

## 2018-01-24 DIAGNOSIS — E11.9 TYPE 2 DIABETES MELLITUS WITHOUT COMPLICATION, WITHOUT LONG-TERM CURRENT USE OF INSULIN (HCC): ICD-10-CM

## 2018-01-24 DIAGNOSIS — M1A.0490 IDIOPATHIC CHRONIC GOUT OF HAND WITHOUT TOPHUS, UNSPECIFIED LATERALITY: ICD-10-CM

## 2018-01-24 DIAGNOSIS — E53.8 B12 DEFICIENCY: ICD-10-CM

## 2018-01-24 DIAGNOSIS — G89.29 CHRONIC BILATERAL LOW BACK PAIN WITHOUT SCIATICA: ICD-10-CM

## 2018-01-24 DIAGNOSIS — R21 RASH: ICD-10-CM

## 2018-01-24 DIAGNOSIS — E55.9 VITAMIN D DEFICIENCY: ICD-10-CM

## 2018-01-24 DIAGNOSIS — I10 ESSENTIAL HYPERTENSION: Primary | ICD-10-CM

## 2018-01-24 LAB
25(OH)D3+25(OH)D2 SERPL-MCNC: 33.2 NG/ML (ref 30–100)
ALBUMIN SERPL-MCNC: 4.6 G/DL (ref 3.5–5.2)
ALBUMIN/GLOB SERPL: 1.4 G/DL
ALP SERPL-CCNC: 90 U/L (ref 39–117)
ALT SERPL-CCNC: 16 U/L (ref 1–33)
AST SERPL-CCNC: 25 U/L (ref 1–32)
BILIRUB SERPL-MCNC: 0.9 MG/DL (ref 0.1–1.2)
BUN SERPL-MCNC: 18 MG/DL (ref 8–23)
BUN/CREAT SERPL: 17 (ref 7–25)
CALCIUM SERPL-MCNC: 9.7 MG/DL (ref 8.6–10.5)
CHLORIDE SERPL-SCNC: 102 MMOL/L (ref 98–107)
CO2 SERPL-SCNC: 27.7 MMOL/L (ref 22–29)
CREAT SERPL-MCNC: 1.06 MG/DL (ref 0.57–1)
GFR SERPLBLD CREATININE-BSD FMLA CKD-EPI: 50 ML/MIN/1.73
GFR SERPLBLD CREATININE-BSD FMLA CKD-EPI: 60 ML/MIN/1.73
GLOBULIN SER CALC-MCNC: 3.3 GM/DL
GLUCOSE SERPL-MCNC: 127 MG/DL (ref 65–99)
HBA1C MFR BLD: 6.4 % (ref 4.8–5.6)
POTASSIUM SERPL-SCNC: 3.7 MMOL/L (ref 3.5–5.2)
PROT SERPL-MCNC: 7.9 G/DL (ref 6–8.5)
SODIUM SERPL-SCNC: 145 MMOL/L (ref 136–145)
T4 FREE SERPL-MCNC: 1.45 NG/DL (ref 0.93–1.7)
TSH SERPL DL<=0.005 MIU/L-ACNC: 2.61 MIU/ML (ref 0.27–4.2)
URATE SERPL-MCNC: 6 MG/DL (ref 2.4–5.7)
VIT B12 SERPL-MCNC: 1139 PG/ML (ref 211–946)

## 2018-01-24 PROCEDURE — 99214 OFFICE O/P EST MOD 30 MIN: CPT | Performed by: INTERNAL MEDICINE

## 2018-01-24 RX ORDER — CARVEDILOL 6.25 MG/1
6.25 TABLET ORAL 2 TIMES DAILY WITH MEALS
Qty: 60 TABLET | Refills: 4 | Status: SHIPPED | OUTPATIENT
Start: 2018-01-24 | End: 2018-06-16 | Stop reason: SDUPTHER

## 2018-01-24 RX ORDER — CEPHALEXIN 500 MG/1
500 CAPSULE ORAL 3 TIMES DAILY
Qty: 21 CAPSULE | Refills: 0 | Status: SHIPPED | OUTPATIENT
Start: 2018-01-24 | End: 2018-02-24

## 2018-01-24 RX ORDER — HYDROCHLOROTHIAZIDE 12.5 MG/1
CAPSULE, GELATIN COATED ORAL
Qty: 60 CAPSULE | Refills: 3 | Status: SHIPPED | OUTPATIENT
Start: 2018-01-24 | End: 2018-10-11 | Stop reason: SDUPTHER

## 2018-01-24 NOTE — PROGRESS NOTES
Subjective   Donna M Yeoman is a 82 y.o. female who comes in today for   Chief Complaint   Patient presents with   • Hypertension   • feet/ankle swelling   .    History of Present Illness   Here for LE swelling for 2 weeks and redness that started in the RLE and LLE  this morning.  No fever or chills.  LLE also swelling but less than the right.  She did start norvasc a few months ago (8/2017) due to elevated bp after we d/c diovan/hctz (which we had to do when she had acute renal injury from unknown cause) and we later  had to increase her to 7.5 mg norvasc and then swelling started.  Cause of renal insuff was never found.  She does see Dr. Allan regularly.  CT was also done in 8/2017.  Has h/o frequent UTI's and pessary usage in past.    The following portions of the patient's history were reviewed and updated as appropriate: allergies, current medications, past family history, past medical history, past social history, past surgical history and problem list.    Review of Systems   Constitutional: Negative for fever.   Respiratory: Negative for shortness of breath.    Cardiovascular: Positive for leg swelling.   Genitourinary: Positive for frequency.   Skin: Positive for rash.       Vitals:    01/24/18 0939   BP: 130/70   Pulse: 84   Resp: 18   Temp: 97.8 °F (36.6 °C)   SpO2: 97%       Objective   Physical Exam   Constitutional: She is oriented to person, place, and time. She appears well-developed and well-nourished.   HENT:   Head: Normocephalic and atraumatic.   Right Ear: External ear normal.   Left Ear: External ear normal.   Mouth/Throat: Oropharynx is clear and moist.   Eyes: Conjunctivae are normal.   Neck: Neck supple.   Cardiovascular: Normal rate, regular rhythm and normal heart sounds.    No bruits   Pulmonary/Chest: Effort normal and breath sounds normal. No respiratory distress. She has no wheezes. She has no rales.   Abdominal: Soft. Bowel sounds are normal. She exhibits no distension and no mass.  There is no tenderness.   Musculoskeletal: She exhibits edema (right greater than left but is 1+ at best.  red macular anterior leg rash without true cellulitis.  resembles prickly heat.  maybe a tiny bit warm to touch).   Lymphadenopathy:     She has no cervical adenopathy.   Neurological: She is alert and oriented to person, place, and time.   Skin: Skin is warm.   Psychiatric: She has a normal mood and affect. Her behavior is normal. Judgment and thought content normal.   Nursing note and vitals reviewed.      Assessment/Plan   Shirley was seen today for hypertension and feet/ankle swelling.    Diagnoses and all orders for this visit:    Essential hypertension  -     Comprehensive Metabolic Panel  -     T4, Free  -     TSH  -     Urinalysis With / Culture If Indicated - Urine, Clean Catch  -     Hemoglobin A1c  -     Vitamin D 25 Hydroxy  -     Vitamin B12    B12 deficiency  -     Comprehensive Metabolic Panel  -     T4, Free  -     TSH  -     Urinalysis With / Culture If Indicated - Urine, Clean Catch  -     Hemoglobin A1c  -     Vitamin D 25 Hydroxy  -     Vitamin B12    Vitamin D deficiency   -     Comprehensive Metabolic Panel  -     T4, Free  -     TSH  -     Urinalysis With / Culture If Indicated - Urine, Clean Catch  -     Hemoglobin A1c  -     Vitamin D 25 Hydroxy  -     Vitamin B12    Type 2 diabetes mellitus without complication, without long-term current use of insulin  -     Comprehensive Metabolic Panel  -     T4, Free  -     TSH  -     Urinalysis With / Culture If Indicated - Urine, Clean Catch  -     Hemoglobin A1c  -     Vitamin D 25 Hydroxy  -     Vitamin B12    Chronic bilateral low back pain without sciatica  -     Comprehensive Metabolic Panel  -     T4, Free  -     TSH  -     Urinalysis With / Culture If Indicated - Urine, Clean Catch  -     Hemoglobin A1c  -     Vitamin D 25 Hydroxy  -     Vitamin B12    Urinary frequency  -     Comprehensive Metabolic Panel  -     T4, Free  -     TSH  -      Urinalysis With / Culture If Indicated - Urine, Clean Catch  -     Hemoglobin A1c  -     Vitamin D 25 Hydroxy  -     Vitamin B12    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  -     cephalexin (KEFLEX) 500 MG capsule; Take 1 capsule by mouth 3 (Three) Times a Day.      Will d/c norvasc due to LE edema and constipation.  We have tried lowering the dosage and it isn't helping too much.  Start keflex for presumed very early and minimal cellulitic changes to LE skin anteriorly.  Have asked her to wear support hose.  Can increase her HCTZ to 12.5mg 1 to 2 tablets qd for swelling.  Will start coreg 3.125mg bid for HTN.  If her Uric acid level is improved, will go qod on allopurinol.    Check labs and ua              I have asked for the patient to return to clinic in 6month(s).

## 2018-01-25 LAB
APPEARANCE UR: CLEAR
BACTERIA #/AREA URNS HPF: ABNORMAL /HPF
BILIRUB UR QL STRIP: NEGATIVE
CASTS URNS MICRO: ABNORMAL
CASTS URNS QL MICRO: PRESENT /LPF
COLOR UR: YELLOW
EPI CELLS #/AREA URNS HPF: ABNORMAL /HPF
GLUCOSE UR QL: NEGATIVE
HGB UR QL STRIP: NEGATIVE
KETONES UR QL STRIP: NEGATIVE
LEUKOCYTE ESTERASE UR QL STRIP: NEGATIVE
MICRO URNS: NORMAL
MICRO URNS: NORMAL
MUCOUS THREADS URNS QL MICRO: PRESENT /HPF
NITRITE UR QL STRIP: NEGATIVE
PH UR STRIP: 5.5 [PH] (ref 5–7.5)
PROT UR QL STRIP: NEGATIVE
RBC #/AREA URNS HPF: ABNORMAL /HPF
SP GR UR: 1.02 (ref 1–1.03)
URINALYSIS REFLEX: NORMAL
UROBILINOGEN UR STRIP-MCNC: 0.2 MG/DL (ref 0.2–1)
WBC #/AREA URNS HPF: ABNORMAL /HPF

## 2018-01-29 ENCOUNTER — OFFICE VISIT (OUTPATIENT)
Dept: PAIN MEDICINE | Facility: CLINIC | Age: 83
End: 2018-01-29

## 2018-01-29 VITALS
OXYGEN SATURATION: 97 % | SYSTOLIC BLOOD PRESSURE: 139 MMHG | BODY MASS INDEX: 28 KG/M2 | HEART RATE: 60 BPM | HEIGHT: 63 IN | TEMPERATURE: 98.4 F | RESPIRATION RATE: 18 BRPM | WEIGHT: 158 LBS | DIASTOLIC BLOOD PRESSURE: 76 MMHG

## 2018-01-29 DIAGNOSIS — G89.29 CHRONIC BILATERAL LOW BACK PAIN WITH RIGHT-SIDED SCIATICA: Primary | ICD-10-CM

## 2018-01-29 DIAGNOSIS — M54.41 CHRONIC BILATERAL LOW BACK PAIN WITH RIGHT-SIDED SCIATICA: Primary | ICD-10-CM

## 2018-01-29 DIAGNOSIS — M54.2 NECK PAIN: ICD-10-CM

## 2018-01-29 PROCEDURE — 99213 OFFICE O/P EST LOW 20 MIN: CPT | Performed by: PAIN MEDICINE

## 2018-01-29 NOTE — PROGRESS NOTES
CHIEF COMPLAINT: Back Pain and Neck Pain    HPI  Donna M Yeoman is a 82 y.o. female.  She is here to follow up for Back Pain and Neck Pain    Donna M Yeoman is a 82 y.o. female  who presents to the office for follow-up. Since last visit their pain has improved with changes. Has increased number of patches, using heat and rarely tylenol and pain has improved. Insurance would not cover cream. Pain alternates between neck and low back.   Low back pain is intermittent dull ache and radiates down lateral aspect of RLE to right ankle.     The patient states their pain is a 2 on a scale of 1-10.  The patient describes this pain as episodic, dull, sharp.  The pain is located in bilateral neck and does radiate down RLE but has than before. This painful problem is aggravated by housework, physical activity and is alleviated by rest, pain medication.    Past pain medications:   NSAIDS - can not take due to elevated kidney function     Current pain medications:   lidoderm patches- alternates to locations on body.- OTC patches  Patient states narcotic medication makes her feel funny  Aleve prn     Past therapies:  Physical Therapy: yes (back pain)  Chiropractor: no  Massage Therapy: no  TENS: yes (didn't help)  Neck or back surgery: no  Past pain management: no     Previous Injections: back injections at [facility] in 2006  Effect of Injection (%): helped alot    PEG Assessment   What number best describes your pain on average in the past week? 5  What number best describes how, during the past week, pain has interfered with your enjoyment of life? 2  What number best describes how, during the past week, pain has interfered with your general activity? 2      Current Outpatient Prescriptions:   •  allopurinol (ZYLOPRIM) 100 MG tablet, TAKE 1 TABLET DAILY, Disp: 90 tablet, Rfl: 1  •  aspirin 81 MG EC tablet, Take 81 mg by mouth Daily., Disp: , Rfl:   •  carvedilol (COREG) 6.25 MG tablet, Take 1 tablet by mouth 2 (Two) Times  a Day With Meals., Disp: 60 tablet, Rfl: 4  •  cephalexin (KEFLEX) 500 MG capsule, Take 1 capsule by mouth 3 (Three) Times a Day., Disp: 21 capsule, Rfl: 0  •  ESTRACE VAGINAL 0.1 MG/GM vaginal cream, INSERT 1/2 TO 1 INCH VAGINALLY ONCE WEEKLY AS DIRECTED, Disp: , Rfl: 11  •  FIBER PO, Take  by mouth Daily., Disp: , Rfl:   •  hydrochlorothiazide (MICROZIDE) 12.5 MG capsule, Take 1 to 2 tablets daily for HTN and LE swelling, Disp: 60 capsule, Rfl: 3  •  levothyroxine (SYNTHROID, LEVOTHROID) 75 MCG tablet, Take 1 tablet by mouth daily., Disp: 90 tablet, Rfl: 2  •  lidocaine (LIDODERM) 5 %, Place 1 patch on the skin Daily. Remove & Discard patch within 12 hours or as directed by MD, Disp: 30 patch, Rfl: 3  •  NON FORMULARY, 50 mg. NITROFORMAC, Disp: , Rfl:   •  omeprazole (priLOSEC) 20 MG capsule, Take 1 capsule by mouth 2 (Two) Times a Day., Disp: 180 capsule, Rfl: 1  •  Oxybutynin 3 (28) % (MG/ACT) gel, Place on the skin., Disp: , Rfl:   •  PROAIR  (90 BASE) MCG/ACT inhaler, 2 puffs Q4H for cough and wheeze, Disp: 1 inhaler, Rfl: 0  •  Probiotic Product (PROBIOTIC DAILY PO), Take  by mouth Daily., Disp: , Rfl:   •  rosuvastatin (CRESTOR) 5 MG tablet, TAKE 1 TABLET DAILY., Disp: 90 tablet, Rfl: 1  •  sulfamethoxazole-trimethoprim (BACTRIM DS,SEPTRA DS) 800-160 MG per tablet, Take 1 tablet by mouth 2 (Two) Times a Day., Disp: 14 tablet, Rfl: 0    IMAGING  MRI OF THE CERVICAL SPINE WITHOUT CONTRAST, 04/09/2015  CLINICAL HISTORY:  Neck pain and bilateral arm pain   TECHNIQUE: Sagittal T1, gradient echo T2 and fat-suppressed fast  spin-echo T2-weighted images were obtained of the cervical spine. In  addition, axial gradient echo T2-weighted images were obtained from the  skull base to T1 and oblique sagittal T2-weighted images were obtained  through the right and left cervical neural foramina.  This study is correlated to prior MRI of the cervical spine from [facility] on 03/11/2011.  FINDINGS: The  craniocervical junction is normal in appearance. The  cervical cord and upper thoracic cord is normal in signal intensity  The C1-2 level is normal in appearance.  At C2-3, posterior disc margin is normal. The facets and uncovertebral  joints are normal and there is no canal or foraminal narrowing at C2-3.  At C3-4, there is mild disc space narrowing, degenerative endplate  change, and minimal diffuse posterior disc osteophyte complex results in  minimal canal narrowing. There is minimal right     There is mild  bilateral uncovertebral joint hypertrophy and there is mild left and  mild-to-moderate right bony foraminal narrowing which could potentially  affect the exiting right C4 nerve root.  At C4-5, there is left posterolateral eccentric focal small disc  protrusion that only minimally narrows the left side of the canal,  extends into the medial edge of the left foramen and there is moderate  narrowing of the medial aspect the left foramen at C4-5 which could  compromise the exiting left C5 nerve root. There is no central canal or  right foraminal narrowing at C4-5.  At C5-6, there is mild disc space narrowing and degenerative endplate  change, a minimal diffuse posterior disc osteophyte complex but only  minimal canal narrowing. There is some bilateral uncovertebral joint  hypertrophy left greater than right. There is only minimal facet  overgrowth. There is mild right and there is moderate left bony  foraminal narrowing at C5-6 that could potentially affect the exiting  left C6 nerve root.  At C6-7, there is moderate disc space narrowing. There are mild  degenerative endplate changes, minimal diffuse posterior disc osteophyte  complex but essentially no canal narrowing at C6-7. There is mild  bilateral uncovertebral joint hypertrophy. There is minimal left and  there is mild-to-moderate right bony foraminal narrowing at C6-7 that  could potentially affect the exiting right C7 nerve root.  At C7-T1, the disc  space is normal. There is mild right facet  overgrowth. There is no canal or foraminal narrowing at C7-T1.     MR LUMBAR SPINE WO CONTRAST-  HISTORY: Back pain, right radiculopathy.  COMPARISON: 09/22/2006.  There is a grade 1 retrolisthesis of L4 upon L5, unchanged. There is  moderate to severe loss of disc height and moderate endplate  degenerative changes at L4-L5 which are more prominent as compared to  09/22/2006. The conus is at T12-L1.  T12-L1: A small left paracentral disc bulge or protrusion is identified  which does not abut the cord. There is no evidence of lateral recess  narrowing. This is slightly more prominent as compared to the previous  examination.  L1-L2: There is a mild degree of facet degenerative disease bilaterally.  There is no evidence of disc herniation.  L2-L3: A broad-based disc osteophyte complex is present which is  slightly more prominent to the left. There is mild neural foraminal, as  on the right and mild to moderate neuroforaminal compromise on the left  secondary to extension of a disc osteophyte complex into the neural  foramen. This is slightly more prominent as compared to the previous  examination. The distal osteophyte complex on the left does not abut the  exiting nerve.  L3-L4: Mild facet degenerative disease is present bilaterally. There is  mild neural foraminal, as bilaterally.  L4-L5: Moderate facet degenerative disease is present bilaterally, more  prominent on the right similar to the prior examination. There is no  evidence of a focal herniation. The broad-based disc osteophyte complex  results in mild flattening of the ventral surface of the thecal sac.  There is mild neural foraminal compromise on the left and moderate  neural compromise on the right secondary to extension of the disc  osteophyte complex into the neural foramen. This is less prominent as  compared to the prior examination.  L5-S1: A broad-based disc osteophyte complex is present which is  slightly  "more prominent to the. Mild neural foraminal, as is present  bilaterally, unchanged.  IMPRESSION  1. No evidence of disc herniation.  2. 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 Although the loss of disc height and endplate  degenerative changes are more prominent at L4-L5, the degree of neural  foraminal, as on the right is less prominent. There is no evidence of a  compression fracture.    Imaging last reviewed: 01/29/18     PFSH:  The following portions of the patient's history were reviewed and updated as appropriate: problem list, past medical history, past surgery history, social history, family history, medications, and allergies    Review of Systems   Constitutional: Negative for chills and fever.   Respiratory: Negative for shortness of breath.    Cardiovascular: Negative for chest pain.   Gastrointestinal: Negative for constipation, diarrhea, nausea and vomiting.   Genitourinary: Negative for difficulty urinating, dyspareunia and dysuria.   Musculoskeletal: Positive for back pain and neck pain.   Skin: Negative for rash and wound.   Allergic/Immunologic: Negative for immunocompromised state.   Neurological: Negative for dizziness, weakness, light-headedness, numbness and headaches.   Hematological: Does not bruise/bleed easily.   Psychiatric/Behavioral: Negative for confusion, hallucinations, self-injury, sleep disturbance and suicidal ideas. The patient is not nervous/anxious.    All other systems reviewed and are negative.      Vitals:    01/29/18 1313   BP: 139/76   Pulse: 60   Resp: 18   Temp: 98.4 °F (36.9 °C)   SpO2: 97%   Weight: 71.7 kg (158 lb)   Height: 160 cm (63\")   PainSc:   2   PainLoc: Neck       Physical Exam   Constitutional: She appears well-developed and well-nourished. No distress.   HENT:   Head: Normocephalic and atraumatic.   Nose: Nose normal.   Mouth/Throat: Oropharynx is clear and moist.   Eyes: Conjunctivae and EOM are normal. "   Neck: Normal range of motion. Neck supple.   Pulmonary/Chest: Effort normal. No stridor. No respiratory distress.   Musculoskeletal:        Cervical back: She exhibits decreased range of motion, tenderness and pain.        Thoracic back: She exhibits tenderness and pain.        Lumbar back: She exhibits pain. She exhibits no tenderness.   +bilateral greater OCN but not over lesser OCN   Neurological: She is alert. She has normal strength. No cranial nerve deficit or sensory deficit.   Skin: Skin is warm and dry. No rash noted. She is not diaphoretic.   Psychiatric: She has a normal mood and affect. Her speech is normal and behavior is normal.   Nursing note and vitals reviewed.    Ortho Exam  Neurologic Exam     Mental Status   Speech: speech is normal     Cranial Nerves     CN III, IV, VI   Extraocular motions are normal.     Motor Exam     Strength   Strength 5/5 throughout.       Lab Results   Component Value Date    POCMETH Negative 11/28/2017    POCAMPHET Negative 11/28/2017    POCBARBITUR Negative 11/28/2017    POCBENZO Negative 11/28/2017    POCCOCAINE Negative 11/28/2017    POCMETHADO Negative 11/28/2017    POCOPIATES Negative 11/28/2017    POCOXYCODO Negative 11/28/2017    POCPHENCYC Negative 11/28/2017    POCPROPOXY Negative 11/28/2017    POCTHC Negative 11/28/2017    POCTRICYC Negative 11/28/2017     Last UDS results reviewed: 01/29/18   Last UDS: 11/2017  Comments: Consistent       Date of last APRIL reviewed : 01/29/18   Comments: Consistent     Assessment/Plan   Shirley was seen today for back pain and neck pain.    Diagnoses and all orders for this visit:    Chronic bilateral low back pain with right-sided sciatica    Neck pain      Requested Prescriptions      No prescriptions requested or ordered in this encounter     - Patient has significant cervical arthritis seen.  Along with bilateral foraminal narrowing.  This likely explains her chronic neck pain and intermittent right upper extremity  numbness.  Significant lumbar arthritic changes seen as well.  - Good pain relief with increasing number of patches due to her multiple areas.    - Patient's pain is currently well controlled on current regimen.  No changes made today.  - avoid NSAIDS due to elevated Cr level. Labs reviewed, Cr was 1.35 on 9/11/2017.   - ok to continue to take tylenol, normal liver function.   - Insurance did not cover compound cream.  Can consider Voltaren gel in the future given minimal systemic absorption with topical application.  Will hold off given pain is currently well controlled.  - may benefit from epidural steroid injection into both cervical and lumbar spine. She would like to hold off for now and try medication changes first. She will call back to schedule if she would like to precede with injections.   - Continue home exercise program.     Wt Readings from Last 3 Encounters:   01/29/18 71.7 kg (158 lb)   01/24/18 71.4 kg (157 lb 6.4 oz)   11/28/17 71.4 kg (157 lb 6.4 oz)     Body mass index is 27.99 kg/(m^2). Patient counseled on the importance of weight loss to help with overall health and pain control. Patient instructed to attempt weight loss.   Plan:   increase water intake and increase physical activity    Follow-up as needed for pain     Gissell Ahmadi MD  Pain Management

## 2018-04-17 ENCOUNTER — LAB (OUTPATIENT)
Dept: LAB | Facility: HOSPITAL | Age: 83
End: 2018-04-17

## 2018-04-17 DIAGNOSIS — D47.2 MGUS (MONOCLONAL GAMMOPATHY OF UNKNOWN SIGNIFICANCE): ICD-10-CM

## 2018-04-17 LAB
ALBUMIN SERPL-MCNC: 4.2 G/DL (ref 3.5–5.2)
ALBUMIN/GLOB SERPL: 1.4 G/DL (ref 1.1–2.4)
ALP SERPL-CCNC: 71 U/L (ref 38–116)
ALT SERPL W P-5'-P-CCNC: 13 U/L (ref 0–33)
ANION GAP SERPL CALCULATED.3IONS-SCNC: 12.4 MMOL/L
AST SERPL-CCNC: 22 U/L (ref 0–32)
BASOPHILS # BLD AUTO: 0.02 10*3/MM3 (ref 0–0.1)
BASOPHILS NFR BLD AUTO: 0.3 % (ref 0–1.1)
BILIRUB SERPL-MCNC: 0.5 MG/DL (ref 0.1–1.2)
BUN BLD-MCNC: 18 MG/DL (ref 6–20)
BUN/CREAT SERPL: 18.8 (ref 7.3–30)
CALCIUM SPEC-SCNC: 9.3 MG/DL (ref 8.5–10.2)
CHLORIDE SERPL-SCNC: 103 MMOL/L (ref 98–107)
CO2 SERPL-SCNC: 27.6 MMOL/L (ref 22–29)
CREAT BLD-MCNC: 0.96 MG/DL (ref 0.6–1.1)
DEPRECATED RDW RBC AUTO: 47.5 FL (ref 37–49)
EOSINOPHIL # BLD AUTO: 0.18 10*3/MM3 (ref 0–0.36)
EOSINOPHIL NFR BLD AUTO: 3.1 % (ref 1–5)
ERYTHROCYTE [DISTWIDTH] IN BLOOD BY AUTOMATED COUNT: 14.1 % (ref 11.7–14.5)
GFR SERPL CREATININE-BSD FRML MDRD: 56 ML/MIN/1.73
GLOBULIN UR ELPH-MCNC: 3.1 GM/DL (ref 1.8–3.5)
GLUCOSE BLD-MCNC: 177 MG/DL (ref 74–124)
HCT VFR BLD AUTO: 40.4 % (ref 34–45)
HGB BLD-MCNC: 13 G/DL (ref 11.5–14.9)
IMM GRANULOCYTES # BLD: 0.02 10*3/MM3 (ref 0–0.03)
IMM GRANULOCYTES NFR BLD: 0.3 % (ref 0–0.5)
LYMPHOCYTES # BLD AUTO: 1.37 10*3/MM3 (ref 1–3.5)
LYMPHOCYTES NFR BLD AUTO: 23.8 % (ref 20–49)
MCH RBC QN AUTO: 29.5 PG (ref 27–33)
MCHC RBC AUTO-ENTMCNC: 32.2 G/DL (ref 32–35)
MCV RBC AUTO: 91.6 FL (ref 83–97)
MONOCYTES # BLD AUTO: 0.64 10*3/MM3 (ref 0.25–0.8)
MONOCYTES NFR BLD AUTO: 11.1 % (ref 4–12)
NEUTROPHILS # BLD AUTO: 3.53 10*3/MM3 (ref 1.5–7)
NEUTROPHILS NFR BLD AUTO: 61.4 % (ref 39–75)
NRBC BLD MANUAL-RTO: 0 /100 WBC (ref 0–0)
PLATELET # BLD AUTO: 179 10*3/MM3 (ref 150–375)
PMV BLD AUTO: 9.9 FL (ref 8.9–12.1)
POTASSIUM BLD-SCNC: 3.8 MMOL/L (ref 3.5–4.7)
PROT SERPL-MCNC: 7.3 G/DL (ref 6.3–8)
RBC # BLD AUTO: 4.41 10*6/MM3 (ref 3.9–5)
SODIUM BLD-SCNC: 143 MMOL/L (ref 134–145)
WBC NRBC COR # BLD: 5.76 10*3/MM3 (ref 4–10)

## 2018-04-17 PROCEDURE — 85025 COMPLETE CBC W/AUTO DIFF WBC: CPT | Performed by: INTERNAL MEDICINE

## 2018-04-17 PROCEDURE — 80053 COMPREHEN METABOLIC PANEL: CPT

## 2018-04-17 PROCEDURE — 36415 COLL VENOUS BLD VENIPUNCTURE: CPT | Performed by: INTERNAL MEDICINE

## 2018-04-18 LAB
ALBUMIN SERPL-MCNC: 3.7 G/DL (ref 2.9–4.4)
ALBUMIN/GLOB SERPL: 1.2 {RATIO} (ref 0.7–1.7)
ALPHA1 GLOB FLD ELPH-MCNC: 0.2 G/DL (ref 0–0.4)
ALPHA2 GLOB SERPL ELPH-MCNC: 0.8 G/DL (ref 0.4–1)
B-GLOBULIN SERPL ELPH-MCNC: 1 G/DL (ref 0.7–1.3)
GAMMA GLOB SERPL ELPH-MCNC: 1.3 G/DL (ref 0.4–1.8)
GLOBULIN SER CALC-MCNC: 3.3 G/DL (ref 2.2–3.9)
IGA SERPL-MCNC: 152 MG/DL (ref 64–422)
IGG SERPL-MCNC: 1166 MG/DL (ref 700–1600)
IGM SERPL-MCNC: 51 MG/DL (ref 26–217)
INTERPRETATION SERPL IEP-IMP: ABNORMAL
KAPPA LC SERPL-MCNC: 19.5 MG/L (ref 3.3–19.4)
KAPPA LC/LAMBDA SER: 0.66 {RATIO} (ref 0.26–1.65)
LAMBDA LC FREE SERPL-MCNC: 29.4 MG/L (ref 5.7–26.3)
Lab: ABNORMAL
M-SPIKE: 0.6 G/DL
PROT SERPL-MCNC: 7 G/DL (ref 6–8.5)

## 2018-04-24 ENCOUNTER — OFFICE VISIT (OUTPATIENT)
Dept: ONCOLOGY | Facility: CLINIC | Age: 83
End: 2018-04-24

## 2018-04-24 ENCOUNTER — APPOINTMENT (OUTPATIENT)
Dept: LAB | Facility: HOSPITAL | Age: 83
End: 2018-04-24

## 2018-04-24 VITALS
OXYGEN SATURATION: 98 % | SYSTOLIC BLOOD PRESSURE: 178 MMHG | HEIGHT: 63 IN | HEART RATE: 62 BPM | TEMPERATURE: 98 F | BODY MASS INDEX: 28.42 KG/M2 | DIASTOLIC BLOOD PRESSURE: 94 MMHG | RESPIRATION RATE: 14 BRPM | WEIGHT: 160.4 LBS

## 2018-04-24 DIAGNOSIS — D47.2 MGUS (MONOCLONAL GAMMOPATHY OF UNKNOWN SIGNIFICANCE): Primary | ICD-10-CM

## 2018-04-24 PROCEDURE — G0463 HOSPITAL OUTPT CLINIC VISIT: HCPCS | Performed by: INTERNAL MEDICINE

## 2018-04-24 PROCEDURE — 99213 OFFICE O/P EST LOW 20 MIN: CPT | Performed by: INTERNAL MEDICINE

## 2018-04-24 NOTE — PROGRESS NOTES
Subjective   REASON FOR FOLLOW UP:  IgG Lambda MGUS    HISTORY OF PRESENT ILLNESS:     History of Present Illness  Shirley is a 83 y.o. 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  She had labs drawn in our office 4/17/2018 that showed her monoclonal protein is still present but is very stable.  Her blood count is normal and she looks great in the office today.  She remains very active for her age.     She is up-to-date on her mammograms.  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    Her blood pressure was a little elevated today she reports she's had some modification in her antihypertensive medications.  She plans to get back in with her primary care to discuss this.    Past Medical History, Past Surgical History, Social History, Family History have been reviewed and are without significant changes except as mentioned.    Review of Systems   Constitutional: Negative for activity change, appetite change, fatigue, fever and unexpected weight change.   HENT: Negative for hearing loss, nosebleeds, trouble swallowing and voice change.    Eyes: Negative for visual disturbance.   Respiratory: Negative for cough, shortness of breath and wheezing.    Cardiovascular: Negative for chest pain and palpitations.   Gastrointestinal: Negative for abdominal pain, diarrhea, nausea and vomiting.   Genitourinary: Negative for difficulty urinating, frequency, hematuria and urgency.   Musculoskeletal: Positive for arthralgias and back pain. Negative for neck pain.   Skin: Negative for rash.   Neurological: Negative for dizziness, seizures, syncope and headaches.   Hematological: Negative for adenopathy. Does not bruise/bleed easily.   Psychiatric/Behavioral: Negative for behavioral problems. The patient is not nervous/anxious.       A comprehensive 14 point review of systems was performed and was negative except as mentioned.    Medications:  The  "current medication list was reviewed in the EMR    ALLERGIES:    No Known Allergies    Objective      Vitals:    04/24/18 1324   BP: 178/94   Pulse: 62   Resp: 14   Temp: 98 °F (36.7 °C)   TempSrc: Oral   SpO2: 98%   Weight: 72.8 kg (160 lb 6.4 oz)   Height: 160 cm (62.99\")   PainSc: 0-No pain     Current Status 4/24/2018   ECOG score 0       Physical Exam   Constitutional: She is oriented to person, place, and time. She appears well-developed and well-nourished. No distress.   HENT:   Head: Normocephalic.   Eyes: Conjunctivae and EOM are normal. Pupils are equal, round, and reactive to light. No scleral icterus.   Neck: Normal range of motion. Neck supple. No JVD present. No thyromegaly present.   Cardiovascular: Normal rate and regular rhythm.  Exam reveals no gallop and no friction rub.    No murmur heard.  Pulmonary/Chest: Effort normal and breath sounds normal. She has no wheezes. She has no rales.   Abdominal: Soft. She exhibits no distension and no mass. There is no tenderness.   Musculoskeletal: Normal range of motion. She exhibits no edema or deformity.   Lymphadenopathy:     She has no cervical adenopathy.   Neurological: She is alert and oriented to person, place, and time. She has normal reflexes. No cranial nerve deficit.   Skin: Skin is warm and dry. No rash noted. No erythema.   Psychiatric: She has a normal mood and affect. Her behavior is normal. Judgment normal.        RECENT LABS:  Hematology WBC   Date Value Ref Range Status   04/17/2018 5.76 4.00 - 10.00 10*3/mm3 Final     RBC   Date Value Ref Range Status   04/17/2018 4.41 3.90 - 5.00 10*6/mm3 Final     Hemoglobin   Date Value Ref Range Status   04/17/2018 13.0 11.5 - 14.9 g/dL Final     Hematocrit   Date Value Ref Range Status   04/17/2018 40.4 34.0 - 45.0 % Final     Platelets   Date Value Ref Range Status   04/17/2018 179 150 - 375 10*3/mm3 Final          Lab Results   Component Value Date    GLUCOSE 177 (H) 04/17/2018    BUN 18 04/17/2018 "    CREATININE 0.96 04/17/2018    EGFRIFNONA 56 (L) 04/17/2018    EGFRIFAFRI 60 (L) 01/24/2018    BCR 18.8 04/17/2018    K 3.8 04/17/2018    CO2 27.6 04/17/2018    CALCIUM 9.3 04/17/2018    PROTENTOTREF 7.0 04/17/2018    ALBUMIN 4.20 04/17/2018    ALBUMIN 3.7 04/17/2018    LABIL2 1.4 04/17/2018    LABIL2 1.2 04/17/2018    AST 22 04/17/2018    ALT 13 04/17/2018     IgG 700 - 1600 mg/dL 1166    IgA 64 - 422 mg/dL 152    IgM 26 - 217 mg/dL 51    Total Protein 6.0 - 8.5 g/dL 7.0    Albumin 2.9 - 4.4 g/dL 3.7    Alpha-1-Globulin 0.0 - 0.4 g/dL 0.2    Alpha-2-Globulin 0.4 - 1.0 g/dL 0.8    Beta Globulin 0.7 - 1.3 g/dL 1.0    Gamma Globulin 0.4 - 1.8 g/dL 1.3    M-Esteban Not Observed g/dL 0.6     Globulin 2.2 - 3.9 g/dL 3.3    A/G Ratio 0.7 - 1.7 1.2    Immunofixation Reflex, Serum  Comment    Comments: Immunofixation shows IgG monoclonal protein with lambda light chain   specificity.     Free Light Chain, Kappa 3.3 - 19.4 mg/L 19.5     Free Lambda Light Chains 5.7 - 26.3 mg/L 29.4     Kappa/Lambda Ratio 0.26 - 1.65 0.66        Assessment/Plan   1.  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    Plan    1.  Return for follow-up in 12 months.  2.  Labs will again be drawn one week prior to visit including a CBC, serum chemistries, serum protein electrophoresis, immunofixation, and free serum light chain analysis.              4/24/2018      CC:

## 2018-05-21 DIAGNOSIS — E78.5 HYPERLIPIDEMIA: ICD-10-CM

## 2018-05-21 RX ORDER — ROSUVASTATIN CALCIUM 5 MG/1
TABLET, COATED ORAL
Qty: 90 TABLET | Refills: 1 | Status: SHIPPED | OUTPATIENT
Start: 2018-05-21 | End: 2018-11-19 | Stop reason: SDUPTHER

## 2018-05-21 RX ORDER — ALLOPURINOL 100 MG/1
TABLET ORAL
Qty: 90 TABLET | Refills: 1 | Status: SHIPPED | OUTPATIENT
Start: 2018-05-21 | End: 2019-01-28 | Stop reason: SDUPTHER

## 2018-06-18 RX ORDER — CARVEDILOL 6.25 MG/1
6.25 TABLET ORAL 2 TIMES DAILY WITH MEALS
Qty: 60 TABLET | Refills: 2 | Status: SHIPPED | OUTPATIENT
Start: 2018-06-18 | End: 2018-09-13 | Stop reason: SDUPTHER

## 2018-06-25 ENCOUNTER — TRANSCRIBE ORDERS (OUTPATIENT)
Dept: ADMINISTRATIVE | Facility: HOSPITAL | Age: 83
End: 2018-06-25

## 2018-06-25 DIAGNOSIS — Z12.31 SCREENING MAMMOGRAM, ENCOUNTER FOR: Primary | ICD-10-CM

## 2018-07-10 ENCOUNTER — HOSPITAL ENCOUNTER (OUTPATIENT)
Dept: MAMMOGRAPHY | Facility: HOSPITAL | Age: 83
Discharge: HOME | End: 2018-07-10
Admitting: INTERNAL MEDICINE

## 2018-07-10 DIAGNOSIS — Z12.31 SCREENING MAMMOGRAM, ENCOUNTER FOR: ICD-10-CM

## 2018-07-10 PROCEDURE — 77063 BREAST TOMOSYNTHESIS BI: CPT

## 2018-07-10 PROCEDURE — 77067 SCR MAMMO BI INCL CAD: CPT

## 2018-07-19 ENCOUNTER — OFFICE VISIT (OUTPATIENT)
Dept: FAMILY MEDICINE CLINIC | Facility: CLINIC | Age: 83
End: 2018-07-19

## 2018-07-19 VITALS
HEART RATE: 73 BPM | OXYGEN SATURATION: 95 % | BODY MASS INDEX: 28 KG/M2 | TEMPERATURE: 98.2 F | WEIGHT: 158 LBS | SYSTOLIC BLOOD PRESSURE: 140 MMHG | DIASTOLIC BLOOD PRESSURE: 70 MMHG | HEIGHT: 63 IN

## 2018-07-19 DIAGNOSIS — R30.0 DYSURIA: ICD-10-CM

## 2018-07-19 DIAGNOSIS — J06.9 ACUTE URI: ICD-10-CM

## 2018-07-19 DIAGNOSIS — N30.00 ACUTE CYSTITIS WITHOUT HEMATURIA: Primary | ICD-10-CM

## 2018-07-19 LAB
BILIRUB BLD-MCNC: NEGATIVE MG/DL
CLARITY, POC: CLEAR
COLOR UR: YELLOW
GLUCOSE UR STRIP-MCNC: NEGATIVE MG/DL
KETONES UR QL: NEGATIVE
LEUKOCYTE EST, POC: ABNORMAL
NITRITE UR-MCNC: NEGATIVE MG/ML
PH UR: 6.5 [PH] (ref 5–8)
PROT UR STRIP-MCNC: NEGATIVE MG/DL
RBC # UR STRIP: NEGATIVE /UL
SP GR UR: 1.01 (ref 1–1.03)
UROBILINOGEN UR QL: NORMAL

## 2018-07-19 PROCEDURE — 81003 URINALYSIS AUTO W/O SCOPE: CPT | Performed by: NURSE PRACTITIONER

## 2018-07-19 PROCEDURE — 99214 OFFICE O/P EST MOD 30 MIN: CPT | Performed by: NURSE PRACTITIONER

## 2018-07-19 RX ORDER — SULFAMETHOXAZOLE AND TRIMETHOPRIM 400; 80 MG/1; MG/1
1 TABLET ORAL 2 TIMES DAILY
Qty: 14 TABLET | Refills: 0 | Status: SHIPPED | OUTPATIENT
Start: 2018-07-19 | End: 2018-08-31

## 2018-07-19 NOTE — PROGRESS NOTES
Subjective Donna M Yeoman is a 83 y.o. female.     Pleasant patient here today complains of sinus pressure and postnasal drip  Scratchy sore throat cough  Nonproductive  No fever no chills no chest pain or shortness of breath  Symptoms started approximate 6 days ago no worsening    Also thinks she may have a UTI  Urinary frequency urgency positive odor and dysuria    History of frequent urinary tract infections    No abdominal pain no flank pain no lethargy or severe symptoms            Sinusitis   Associated symptoms include coughing. Pertinent negatives include no chills or shortness of breath.   Cough   Pertinent negatives include no chest pain, chills, fever or shortness of breath.   Urinary Tract Infection    Pertinent negatives include no chills.        The following portions of the patient's history were reviewed and updated as appropriate: allergies, current medications, past family history, past medical history, past social history, past surgical history and problem list.    Review of Systems   Constitutional: Negative for chills, fatigue and fever.   Respiratory: Positive for cough. Negative for shortness of breath.    Cardiovascular: Negative for chest pain.   Genitourinary: Positive for dysuria.   Musculoskeletal: Negative for back pain.       Objective   Physical Exam   Constitutional: She is oriented to person, place, and time. She appears well-developed and well-nourished.   Nontoxic appears well quite pleasant   HENT:   Head: Normocephalic.   Mouth/Throat: Oropharynx is clear and moist.   Turbinates congested   Eyes: Pupils are equal, round, and reactive to light. Conjunctivae are normal.   Neck: Neck supple.   Cardiovascular: Normal rate, regular rhythm and normal heart sounds.  Exam reveals no friction rub.    No murmur heard.  Pulmonary/Chest: Effort normal and breath sounds normal. No respiratory distress. She has no wheezes.   Abdominal: Soft. Bowel sounds are normal.   No CVA tenderness    Musculoskeletal: She exhibits no edema.   Neurological: She is alert and oriented to person, place, and time.   Skin: Skin is warm and dry.   Psychiatric: She has a normal mood and affect. Her behavior is normal. Judgment and thought content normal.   Vitals reviewed.        Assessment/Plan   Shirley was seen today for sinusitis, cough and urinary tract infection.    Diagnoses and all orders for this visit:    Acute cystitis without hematuria  -     Urine Culture - Urine, Urine, Clean Catch    Dysuria  -     POCT urinalysis dipstick, automated  -     Urine Culture - Urine, Urine, Clean Catch    Acute URI    Other orders  -     sulfamethoxazole-trimethoprim (BACTRIM) 400-80 MG tablet; Take 1 tablet by mouth 2 (Two) Times a Day.                  Discharge instructions  Plenty of fluids rest  Saline nasal spray as needed  Menthol as needed  Mucinex DM for cough congestion    Start Bactrim today    If increased pain fever chills nausea vomiting chest pain shortness of breath emergency room  Recheck if not improving next week  If fever 101 or greater  Urgent recheck or urgent care for chest x-rayThmica Ferguson,      James Epley, NP

## 2018-07-19 NOTE — PATIENT INSTRUCTIONS
Discharge instructions  Plenty of fluids rest  Saline nasal spray as needed  Menthol as needed  Mucinex DM for cough congestion    Start Bactrim today    If increased pain fever chills nausea vomiting chest pain shortness of breath emergency room  Recheck if not improving next week  If fever 101 or greater  Urgent recheck or urgent care for chest x-rayThank Marty,      James Epley,  NP

## 2018-07-23 RX ORDER — LEVOTHYROXINE SODIUM 0.07 MG/1
TABLET ORAL
Qty: 90 TABLET | Refills: 1 | Status: SHIPPED | OUTPATIENT
Start: 2018-07-23 | End: 2018-11-30 | Stop reason: SDUPTHER

## 2018-07-25 LAB
BACTERIA UR CULT: ABNORMAL
BACTERIA UR CULT: ABNORMAL
OTHER ANTIBIOTIC SUSC ISLT: ABNORMAL

## 2018-08-16 DIAGNOSIS — I15.9 SECONDARY HYPERTENSION: ICD-10-CM

## 2018-08-16 DIAGNOSIS — E55.9 VITAMIN D DEFICIENCY: ICD-10-CM

## 2018-08-16 DIAGNOSIS — E03.9 HYPOTHYROIDISM, UNSPECIFIED TYPE: ICD-10-CM

## 2018-08-16 DIAGNOSIS — E78.5 HYPERLIPIDEMIA, UNSPECIFIED HYPERLIPIDEMIA TYPE: Primary | ICD-10-CM

## 2018-08-16 DIAGNOSIS — E53.8 VITAMIN B12 DEFICIENCY: ICD-10-CM

## 2018-08-16 DIAGNOSIS — E11.9 TYPE 2 DIABETES MELLITUS WITHOUT COMPLICATION, WITHOUT LONG-TERM CURRENT USE OF INSULIN (HCC): ICD-10-CM

## 2018-08-25 LAB
25(OH)D3+25(OH)D2 SERPL-MCNC: 39.9 NG/ML (ref 30–100)
ALBUMIN SERPL-MCNC: 4 G/DL (ref 3.5–5.2)
ALBUMIN/GLOB SERPL: 1.4 G/DL
ALP SERPL-CCNC: 66 U/L (ref 39–117)
ALT SERPL-CCNC: 15 U/L (ref 1–33)
AST SERPL-CCNC: 23 U/L (ref 1–32)
BASOPHILS # BLD AUTO: 0.01 10*3/MM3 (ref 0–0.2)
BASOPHILS NFR BLD AUTO: 0.2 % (ref 0–1.5)
BILIRUB SERPL-MCNC: 0.7 MG/DL (ref 0.1–1.2)
BUN SERPL-MCNC: 17 MG/DL (ref 8–23)
BUN/CREAT SERPL: 18.7 (ref 7–25)
CALCIUM SERPL-MCNC: 9.1 MG/DL (ref 8.6–10.5)
CHLORIDE SERPL-SCNC: 105 MMOL/L (ref 98–107)
CHOLEST SERPL-MCNC: 159 MG/DL (ref 0–200)
CO2 SERPL-SCNC: 26.5 MMOL/L (ref 22–29)
CREAT SERPL-MCNC: 0.91 MG/DL (ref 0.57–1)
EOSINOPHIL # BLD AUTO: 0.19 10*3/MM3 (ref 0–0.7)
EOSINOPHIL NFR BLD AUTO: 3.5 % (ref 0.3–6.2)
ERYTHROCYTE [DISTWIDTH] IN BLOOD BY AUTOMATED COUNT: 14.4 % (ref 11.7–13)
GLOBULIN SER CALC-MCNC: 2.9 GM/DL
GLUCOSE SERPL-MCNC: 127 MG/DL (ref 65–99)
HBA1C MFR BLD: 6.5 % (ref 4.8–5.6)
HCT VFR BLD AUTO: 42.6 % (ref 35.6–45.5)
HDLC SERPL-MCNC: 39 MG/DL (ref 40–60)
HGB BLD-MCNC: 13.2 G/DL (ref 11.9–15.5)
IMM GRANULOCYTES # BLD: 0 10*3/MM3 (ref 0–0.03)
IMM GRANULOCYTES NFR BLD: 0 % (ref 0–0.5)
LDLC SERPL CALC-MCNC: 94 MG/DL (ref 0–100)
LDLC/HDLC SERPL: 2.4 {RATIO}
LYMPHOCYTES # BLD AUTO: 1.42 10*3/MM3 (ref 0.9–4.8)
LYMPHOCYTES NFR BLD AUTO: 25.8 % (ref 19.6–45.3)
MCH RBC QN AUTO: 29.4 PG (ref 26.9–32)
MCHC RBC AUTO-ENTMCNC: 31 G/DL (ref 32.4–36.3)
MCV RBC AUTO: 94.9 FL (ref 80.5–98.2)
MONOCYTES # BLD AUTO: 0.46 10*3/MM3 (ref 0.2–1.2)
MONOCYTES NFR BLD AUTO: 8.4 % (ref 5–12)
NEUTROPHILS # BLD AUTO: 3.42 10*3/MM3 (ref 1.9–8.1)
NEUTROPHILS NFR BLD AUTO: 62.1 % (ref 42.7–76)
PLATELET # BLD AUTO: 216 10*3/MM3 (ref 140–500)
POTASSIUM SERPL-SCNC: 3.7 MMOL/L (ref 3.5–5.2)
PROT SERPL-MCNC: 6.9 G/DL (ref 6–8.5)
RBC # BLD AUTO: 4.49 10*6/MM3 (ref 3.9–5.2)
SODIUM SERPL-SCNC: 144 MMOL/L (ref 136–145)
T3FREE SERPL-MCNC: 2.7 PG/ML (ref 2–4.4)
T4 FREE SERPL-MCNC: 1.39 NG/DL (ref 0.93–1.7)
TRIGL SERPL-MCNC: 132 MG/DL (ref 0–150)
TSH SERPL DL<=0.005 MIU/L-ACNC: 2.82 MIU/ML (ref 0.27–4.2)
VIT B12 SERPL-MCNC: 1146 PG/ML (ref 211–946)
VLDLC SERPL CALC-MCNC: 26.4 MG/DL (ref 5–40)
WBC # BLD AUTO: 5.5 10*3/MM3 (ref 4.5–10.7)

## 2018-08-31 ENCOUNTER — OFFICE VISIT (OUTPATIENT)
Dept: FAMILY MEDICINE CLINIC | Facility: CLINIC | Age: 83
End: 2018-08-31

## 2018-08-31 ENCOUNTER — TELEPHONE (OUTPATIENT)
Dept: FAMILY MEDICINE CLINIC | Facility: CLINIC | Age: 83
End: 2018-08-31

## 2018-08-31 VITALS
DIASTOLIC BLOOD PRESSURE: 70 MMHG | WEIGHT: 156.6 LBS | TEMPERATURE: 97.8 F | SYSTOLIC BLOOD PRESSURE: 126 MMHG | HEIGHT: 63 IN | BODY MASS INDEX: 27.75 KG/M2 | RESPIRATION RATE: 16 BRPM | OXYGEN SATURATION: 96 % | HEART RATE: 66 BPM

## 2018-08-31 DIAGNOSIS — E53.8 B12 DEFICIENCY: ICD-10-CM

## 2018-08-31 DIAGNOSIS — M54.50 CHRONIC BILATERAL LOW BACK PAIN WITHOUT SCIATICA: ICD-10-CM

## 2018-08-31 DIAGNOSIS — R10.9 RIGHT LATERAL ABDOMINAL PAIN: ICD-10-CM

## 2018-08-31 DIAGNOSIS — E03.9 HYPOTHYROIDISM, UNSPECIFIED TYPE: ICD-10-CM

## 2018-08-31 DIAGNOSIS — I10 ESSENTIAL HYPERTENSION: Primary | ICD-10-CM

## 2018-08-31 DIAGNOSIS — G89.29 CHRONIC BILATERAL LOW BACK PAIN WITHOUT SCIATICA: ICD-10-CM

## 2018-08-31 DIAGNOSIS — E11.9 TYPE 2 DIABETES MELLITUS WITHOUT COMPLICATION, WITHOUT LONG-TERM CURRENT USE OF INSULIN (HCC): ICD-10-CM

## 2018-08-31 DIAGNOSIS — K44.9 HIATAL HERNIA: ICD-10-CM

## 2018-08-31 DIAGNOSIS — K21.00 GASTROESOPHAGEAL REFLUX DISEASE WITH ESOPHAGITIS: ICD-10-CM

## 2018-08-31 DIAGNOSIS — E78.5 HYPERLIPIDEMIA, UNSPECIFIED HYPERLIPIDEMIA TYPE: ICD-10-CM

## 2018-08-31 DIAGNOSIS — R10.9 LEFT LATERAL ABDOMINAL PAIN: ICD-10-CM

## 2018-08-31 DIAGNOSIS — R11.0 NAUSEA: ICD-10-CM

## 2018-08-31 DIAGNOSIS — D17.9 MULTIPLE LIPOMAS: ICD-10-CM

## 2018-08-31 DIAGNOSIS — E55.9 VITAMIN D DEFICIENCY: ICD-10-CM

## 2018-08-31 PROCEDURE — 99214 OFFICE O/P EST MOD 30 MIN: CPT | Performed by: INTERNAL MEDICINE

## 2018-08-31 RX ORDER — DULOXETIN HYDROCHLORIDE 30 MG/1
30 CAPSULE, DELAYED RELEASE ORAL DAILY
Qty: 30 CAPSULE | Refills: 5 | Status: SHIPPED | OUTPATIENT
Start: 2018-08-31 | End: 2018-11-30 | Stop reason: SINTOL

## 2018-09-11 ENCOUNTER — HOSPITAL ENCOUNTER (OUTPATIENT)
Dept: CT IMAGING | Facility: HOSPITAL | Age: 83
Discharge: HOME | End: 2018-09-11
Admitting: INTERNAL MEDICINE

## 2018-09-11 DIAGNOSIS — R10.9 RIGHT LATERAL ABDOMINAL PAIN: ICD-10-CM

## 2018-09-11 DIAGNOSIS — R11.0 NAUSEA: ICD-10-CM

## 2018-09-11 DIAGNOSIS — R10.9 LEFT LATERAL ABDOMINAL PAIN: ICD-10-CM

## 2018-09-11 DIAGNOSIS — D17.9 MULTIPLE LIPOMAS: ICD-10-CM

## 2018-09-11 LAB — CREAT BLDA-MCNC: 0.9 MG/DL (ref 0.6–1.3)

## 2018-09-11 PROCEDURE — 25010000002 IOPAMIDOL 61 % SOLUTION: Performed by: INTERNAL MEDICINE

## 2018-09-11 PROCEDURE — 82565 ASSAY OF CREATININE: CPT

## 2018-09-11 PROCEDURE — 74177 CT ABD & PELVIS W/CONTRAST: CPT

## 2018-09-11 RX ADMIN — IOPAMIDOL 85 ML: 612 INJECTION, SOLUTION INTRAVENOUS at 09:17

## 2018-09-12 ENCOUNTER — TELEPHONE (OUTPATIENT)
Dept: FAMILY MEDICINE CLINIC | Facility: CLINIC | Age: 83
End: 2018-09-12

## 2018-09-12 NOTE — TELEPHONE ENCOUNTER
Patient would like a 90 day supply of omeprazole 20mg sent to [facility] pharmacy/

## 2018-09-13 RX ORDER — OMEPRAZOLE 20 MG/1
20 CAPSULE, DELAYED RELEASE ORAL
Qty: 180 CAPSULE | Refills: 1 | OUTPATIENT
Start: 2018-09-13

## 2018-09-13 RX ORDER — CARVEDILOL 6.25 MG/1
6.25 TABLET ORAL 2 TIMES DAILY WITH MEALS
Qty: 60 TABLET | Refills: 5 | Status: SHIPPED | OUTPATIENT
Start: 2018-09-13 | End: 2019-03-17 | Stop reason: SDUPTHER

## 2018-09-14 RX ORDER — OMEPRAZOLE 20 MG/1
20 CAPSULE, DELAYED RELEASE ORAL 2 TIMES DAILY
Qty: 180 CAPSULE | Refills: 1 | Status: SHIPPED | OUTPATIENT
Start: 2018-09-14 | End: 2018-09-18 | Stop reason: SDUPTHER

## 2018-09-18 ENCOUNTER — TELEPHONE (OUTPATIENT)
Dept: FAMILY MEDICINE CLINIC | Facility: CLINIC | Age: 83
End: 2018-09-18

## 2018-09-18 RX ORDER — OMEPRAZOLE 40 MG/1
40 CAPSULE, DELAYED RELEASE ORAL 2 TIMES DAILY
Qty: 90 CAPSULE | Refills: 1 | Status: SHIPPED | OUTPATIENT
Start: 2018-09-18 | End: 2019-01-02 | Stop reason: SDUPTHER

## 2018-09-18 NOTE — TELEPHONE ENCOUNTER
Insurance will not cover omeprazole BID- would you like to prescribe differently?  If so please sent to [facility]

## 2018-10-08 ENCOUNTER — OFFICE (AMBULATORY)
Dept: URBAN - METROPOLITAN AREA CLINIC 2 | Facility: CLINIC | Age: 83
End: 2018-10-08

## 2018-10-08 VITALS
HEART RATE: 54 BPM | DIASTOLIC BLOOD PRESSURE: 76 MMHG | HEIGHT: 63 IN | SYSTOLIC BLOOD PRESSURE: 128 MMHG | WEIGHT: 153 LBS

## 2018-10-08 DIAGNOSIS — K44.9 DIAPHRAGMATIC HERNIA WITHOUT OBSTRUCTION OR GANGRENE: ICD-10-CM

## 2018-10-08 DIAGNOSIS — K30 FUNCTIONAL DYSPEPSIA: ICD-10-CM

## 2018-10-08 DIAGNOSIS — R14.2 ERUCTATION: ICD-10-CM

## 2018-10-08 DIAGNOSIS — Z86.010 PERSONAL HISTORY OF COLONIC POLYPS: ICD-10-CM

## 2018-10-08 DIAGNOSIS — K21.9 GASTRO-ESOPHAGEAL REFLUX DISEASE WITHOUT ESOPHAGITIS: ICD-10-CM

## 2018-10-08 DIAGNOSIS — K22.2 ESOPHAGEAL OBSTRUCTION: ICD-10-CM

## 2018-10-08 DIAGNOSIS — R11.0 NAUSEA: ICD-10-CM

## 2018-10-08 DIAGNOSIS — R19.4 CHANGE IN BOWEL HABIT: ICD-10-CM

## 2018-10-08 DIAGNOSIS — R13.10 DYSPHAGIA, UNSPECIFIED: ICD-10-CM

## 2018-10-08 DIAGNOSIS — K59.00 CONSTIPATION, UNSPECIFIED: ICD-10-CM

## 2018-10-08 DIAGNOSIS — R14.3 FLATULENCE: ICD-10-CM

## 2018-10-08 DIAGNOSIS — R14.0 ABDOMINAL DISTENSION (GASEOUS): ICD-10-CM

## 2018-10-08 PROCEDURE — 99204 OFFICE O/P NEW MOD 45 MIN: CPT | Performed by: INTERNAL MEDICINE

## 2018-10-08 NOTE — SERVICEHPINOTES
Thank you very much for referring Ms. Yeoman for evaluation. As you know she is a pleasant 83-year-old white female who does have a history of reflux, she also has a history of an esophageal stricture, hiatal hernia polyps and diverticular disease. She says that she's been having ongoing nausea for a year or more. She denies emesis. She tries to avoid spicy or fried foods. She also has a history of reflux. She is now on omeprazole 40 mg daily and she thinks that's helped. There is no weight loss, there is no melena or hematemesis. She is not a smoker or drinker. She also has vague dysphagia symptoms and says sometimes things don't want to go down and she points to the base of the neck. She does have a history of an esophageal stricture. She also says at night she'll get burning in the chest and throat. In spite of her complaint she has a good appetite. Her weight is stable. She does complain of increased belching as well as increased flatus as of late. CT scan and laboratories are unremarkable.She also has had a change in bowel habits. She is to have 2-3 stools a day she now says that she'll have about 5 of them times a week. She'll feel like she has to go but can't she'll have to strain. There is no bleeding however. Family history is unremarkable. She does have a personal history of polyps and is due for colonoscopy next year. In spite of her complaint she is in no distress, she does not look acutely ill.

## 2018-10-08 NOTE — SERVICENOTES
Records reviewed.  CT scan from September 11 unremarkable other than diverticular disease, renal cysts and mild atherosclerosis.  HDL 39.  Glucose 127.  Chemistries otherwise unremarkable. CBC unremarkable.  Hgb A1c 6.5.

## 2018-10-12 RX ORDER — HYDROCHLOROTHIAZIDE 12.5 MG/1
CAPSULE, GELATIN COATED ORAL
Qty: 60 CAPSULE | Refills: 3 | Status: SHIPPED | OUTPATIENT
Start: 2018-10-12 | End: 2019-09-05 | Stop reason: SDUPTHER

## 2018-10-15 VITALS
SYSTOLIC BLOOD PRESSURE: 161 MMHG | HEART RATE: 84 BPM | RESPIRATION RATE: 21 BRPM | SYSTOLIC BLOOD PRESSURE: 133 MMHG | SYSTOLIC BLOOD PRESSURE: 147 MMHG | OXYGEN SATURATION: 94 % | OXYGEN SATURATION: 84 % | HEART RATE: 69 BPM | SYSTOLIC BLOOD PRESSURE: 153 MMHG | TEMPERATURE: 95.6 F | HEART RATE: 66 BPM | RESPIRATION RATE: 25 BRPM | DIASTOLIC BLOOD PRESSURE: 73 MMHG | SYSTOLIC BLOOD PRESSURE: 124 MMHG | HEART RATE: 63 BPM | DIASTOLIC BLOOD PRESSURE: 81 MMHG | WEIGHT: 154 LBS | DIASTOLIC BLOOD PRESSURE: 78 MMHG | RESPIRATION RATE: 24 BRPM | OXYGEN SATURATION: 99 % | RESPIRATION RATE: 17 BRPM | DIASTOLIC BLOOD PRESSURE: 92 MMHG | RESPIRATION RATE: 20 BRPM | HEART RATE: 71 BPM | HEART RATE: 65 BPM | OXYGEN SATURATION: 95 % | DIASTOLIC BLOOD PRESSURE: 76 MMHG | DIASTOLIC BLOOD PRESSURE: 118 MMHG | DIASTOLIC BLOOD PRESSURE: 104 MMHG | OXYGEN SATURATION: 90 % | RESPIRATION RATE: 11 BRPM | RESPIRATION RATE: 22 BRPM | SYSTOLIC BLOOD PRESSURE: 182 MMHG | HEART RATE: 58 BPM | TEMPERATURE: 97.4 F | SYSTOLIC BLOOD PRESSURE: 162 MMHG | OXYGEN SATURATION: 97 % | DIASTOLIC BLOOD PRESSURE: 59 MMHG | RESPIRATION RATE: 30 BRPM | RESPIRATION RATE: 18 BRPM | DIASTOLIC BLOOD PRESSURE: 89 MMHG | HEART RATE: 56 BPM | DIASTOLIC BLOOD PRESSURE: 83 MMHG | OXYGEN SATURATION: 98 % | SYSTOLIC BLOOD PRESSURE: 116 MMHG | DIASTOLIC BLOOD PRESSURE: 94 MMHG | SYSTOLIC BLOOD PRESSURE: 146 MMHG | OXYGEN SATURATION: 91 % | HEIGHT: 63 IN

## 2018-10-16 ENCOUNTER — AMBULATORY SURGICAL CENTER (AMBULATORY)
Dept: URBAN - METROPOLITAN AREA SURGERY 17 | Facility: SURGERY | Age: 83
End: 2018-10-16
Payer: MEDICARE

## 2018-10-16 ENCOUNTER — OFFICE (AMBULATORY)
Dept: URBAN - METROPOLITAN AREA PATHOLOGY 4 | Facility: PATHOLOGY | Age: 83
End: 2018-10-16
Payer: MEDICARE

## 2018-10-16 DIAGNOSIS — K64.4 RESIDUAL HEMORRHOIDAL SKIN TAGS: ICD-10-CM

## 2018-10-16 DIAGNOSIS — K21.9 GASTRO-ESOPHAGEAL REFLUX DISEASE WITHOUT ESOPHAGITIS: ICD-10-CM

## 2018-10-16 DIAGNOSIS — K22.2 ESOPHAGEAL OBSTRUCTION: ICD-10-CM

## 2018-10-16 DIAGNOSIS — Z86.010 PERSONAL HISTORY OF COLONIC POLYPS: ICD-10-CM

## 2018-10-16 DIAGNOSIS — K31.7 POLYP OF STOMACH AND DUODENUM: ICD-10-CM

## 2018-10-16 DIAGNOSIS — K29.70 GASTRITIS, UNSPECIFIED, WITHOUT BLEEDING: ICD-10-CM

## 2018-10-16 DIAGNOSIS — R13.10 DYSPHAGIA, UNSPECIFIED: ICD-10-CM

## 2018-10-16 DIAGNOSIS — K29.50 UNSPECIFIED CHRONIC GASTRITIS WITHOUT BLEEDING: ICD-10-CM

## 2018-10-16 DIAGNOSIS — K31.89 OTHER DISEASES OF STOMACH AND DUODENUM: ICD-10-CM

## 2018-10-16 DIAGNOSIS — K57.30 DIVERTICULOSIS OF LARGE INTESTINE WITHOUT PERFORATION OR ABS: ICD-10-CM

## 2018-10-16 LAB
GI HISTOLOGY: A. SELECT: (no result)
GI HISTOLOGY: B. SELECT: (no result)
GI HISTOLOGY: PDF REPORT: (no result)

## 2018-10-16 PROCEDURE — 45378 DIAGNOSTIC COLONOSCOPY: CPT | Mod: PT | Performed by: INTERNAL MEDICINE

## 2018-10-16 PROCEDURE — 43248 EGD GUIDE WIRE INSERTION: CPT | Performed by: INTERNAL MEDICINE

## 2018-10-16 PROCEDURE — 88305 TISSUE EXAM BY PATHOLOGIST: CPT | Performed by: INTERNAL MEDICINE

## 2018-10-16 PROCEDURE — 43239 EGD BIOPSY SINGLE/MULTIPLE: CPT | Mod: 59 | Performed by: INTERNAL MEDICINE

## 2018-10-16 PROCEDURE — 88342 IMHCHEM/IMCYTCHM 1ST ANTB: CPT | Performed by: INTERNAL MEDICINE

## 2018-10-26 ENCOUNTER — TELEPHONE (OUTPATIENT)
Dept: FAMILY MEDICINE CLINIC | Facility: CLINIC | Age: 83
End: 2018-10-26

## 2018-10-27 NOTE — TELEPHONE ENCOUNTER
I received it.  Stomach showed gastritis,  Colon diverticulosis and high fiber diet recommended.  Biopsies were taken that I can not see  She should call their office for results

## 2018-11-19 DIAGNOSIS — E78.5 HYPERLIPIDEMIA: ICD-10-CM

## 2018-11-19 RX ORDER — ROSUVASTATIN CALCIUM 5 MG/1
TABLET, COATED ORAL
Qty: 90 TABLET | Refills: 1 | Status: SHIPPED | OUTPATIENT
Start: 2018-11-19 | End: 2019-05-24 | Stop reason: SDUPTHER

## 2018-11-30 ENCOUNTER — OFFICE VISIT (OUTPATIENT)
Dept: FAMILY MEDICINE CLINIC | Facility: CLINIC | Age: 83
End: 2018-11-30

## 2018-11-30 VITALS
OXYGEN SATURATION: 95 % | WEIGHT: 153.4 LBS | DIASTOLIC BLOOD PRESSURE: 85 MMHG | TEMPERATURE: 98.1 F | SYSTOLIC BLOOD PRESSURE: 177 MMHG | HEART RATE: 76 BPM | BODY MASS INDEX: 27.18 KG/M2

## 2018-11-30 DIAGNOSIS — M54.41 CHRONIC BILATERAL LOW BACK PAIN WITH RIGHT-SIDED SCIATICA: ICD-10-CM

## 2018-11-30 DIAGNOSIS — G89.29 CHRONIC BILATERAL LOW BACK PAIN WITH RIGHT-SIDED SCIATICA: ICD-10-CM

## 2018-11-30 DIAGNOSIS — Z23 NEED FOR PNEUMOCOCCAL VACCINE: ICD-10-CM

## 2018-11-30 DIAGNOSIS — Z23 NEED FOR IMMUNIZATION AGAINST INFLUENZA: Primary | ICD-10-CM

## 2018-11-30 PROCEDURE — 99213 OFFICE O/P EST LOW 20 MIN: CPT | Performed by: INTERNAL MEDICINE

## 2018-11-30 PROCEDURE — G0008 ADMIN INFLUENZA VIRUS VAC: HCPCS | Performed by: INTERNAL MEDICINE

## 2018-11-30 PROCEDURE — 90670 PCV13 VACCINE IM: CPT | Performed by: INTERNAL MEDICINE

## 2018-11-30 PROCEDURE — G0009 ADMIN PNEUMOCOCCAL VACCINE: HCPCS | Performed by: INTERNAL MEDICINE

## 2018-11-30 PROCEDURE — 90662 IIV NO PRSV INCREASED AG IM: CPT | Performed by: INTERNAL MEDICINE

## 2018-11-30 RX ORDER — TOLTERODINE TARTRATE 2 MG/1
2 TABLET, EXTENDED RELEASE ORAL 2 TIMES DAILY
Refills: 4 | COMMUNITY
Start: 2018-11-14 | End: 2019-01-24

## 2018-11-30 RX ORDER — METHYLPREDNISOLONE 4 MG/1
TABLET ORAL
Qty: 21 TABLET | Refills: 0 | Status: SHIPPED | OUTPATIENT
Start: 2018-11-30 | End: 2019-01-24

## 2018-11-30 NOTE — PATIENT INSTRUCTIONS
MyPlate from [employer]  The general, healthful diet is based on the 2010 Dietary Guidelines for Americans. The amount of food you need to eat from each food group depends on your age, sex, and level of physical activity and can be individualized by a dietitian. Go to ChooseMyPlate.gov for more information.  What do I need to know about the MyPlate plan?  · Enjoy your food, but eat less.  · Avoid oversized portions.  ? ½ of your plate should include fruits and vegetables.  ? ¼ of your plate should be grains.  ? ¼ of your plate should be protein.  Grains  · Make at least half of your grains whole grains.  · For a 2,000 calorie daily food plan, eat 6 oz every day.  · 1 oz is about 1 slice bread, 1 cup cereal, or ½ cup cooked rice, cereal, or pasta.  Vegetables  · Make half your plate fruits and vegetables.  · For a 2,000 calorie daily food plan, eat 2½ cups every day.  · 1 cup is about 1 cup raw or cooked vegetables or vegetable juice or 2 cups raw leafy greens.  Fruits  · Make half your plate fruits and vegetables.  · For a 2,000 calorie daily food plan, eat 2 cups every day.  · 1 cup is about 1 cup fruit or 100% fruit juice or ½ cup dried fruit.  Protein  · For a 2,000 calorie daily food plan, eat 5½ oz every day.  · 1 oz is about 1 oz meat, poultry, or fish, ¼ cup cooked beans, 1 egg, 1 Tbsp peanut butter, or ½ oz nuts or seeds.  Dairy  · Switch to fat-free or low-fat (1%) milk.  · For a 2,000 calorie daily food plan, eat 3 cups every day.  · 1 cup is about 1 cup milk or yogurt or soy milk (soy beverage), 1½ oz natural cheese, or 2 oz processed cheese.  Fats, Oils, and Empty Calories  · Only small amounts of oils are recommended.  · Empty calories are calories from solid fats or added sugars.  · Compare sodium in foods like soup, bread, and frozen meals. Choose the foods with lower numbers.  · Drink water instead of sugary drinks.  What foods can I eat?  Grains  Whole grains such as whole wheat, quinoa, millet, and  bulgur. Bread, rolls, and pasta made from whole grains. Brown or wild rice. Hot or cold cereals made from whole grains and without added sugar.  Vegetables  All fresh vegetables, especially fresh red, dark green, or orange vegetables. Peas and beans. Low-sodium frozen or canned vegetables prepared without added salt. Low-sodium vegetable juices.  Fruits  All fresh, frozen, and dried fruits. Canned fruit packed in water or fruit juice without added sugar. Fruit juices without added sugar.  Meats and Other Protein Sources  Boiled, baked, or grilled lean meat trimmed of fat. Skinless poultry. Fresh seafood and shellfish. Canned seafood packed in water. Unsalted nuts and unsalted nut butters. Tofu. Dried beans and pea. Eggs.  Dairy  Low-fat or fat-free milk, yogurt, and cheeses.  Sweets and Desserts  Frozen desserts made from low-fat milk.  Fats and Oils  Olive, peanut, and canola oils and margarine. Salad dressing and mayonnaise made from these oils.  Other  Soups and casseroles made from allowed ingredients and without added fat or salt.  The items listed above may not be a complete list of recommended foods or beverages. Contact your dietitian for more options.  What foods are not recommended?  Grains  Sweetened, low-fiber cereals. Packaged baked goods. Snack crackers and chips. Cheese crackers, butter crackers, and biscuits. Frozen waffles, sweet breads, doughnuts, pastries, packaged baking mixes, pancakes, cakes, and cookies.  Vegetables  Regular canned or frozen vegetables or vegetables prepared with salt. Canned tomatoes. Canned tomato sauce. Fried vegetables. Vegetables in cream sauce or cheese sauce.  Fruits  Fruits packed in syrup or made with added sugar.  Meats and Other Protein Sources  Marbled or fatty meats such as ribs. Poultry with skin. Fried meats, poultry, eggs, or fish. Sausages, hot dogs, and deli meats such as pastrami, bologna, or salami.  Dairy  Whole milk, cream, cheeses made from whole milk,  sour cream. Ice cream or yogurt made from whole milk or with added sugar.  Beverages  For adults, no more than one alcoholic drink per day. Regular soft drinks or other sugary beverages. Juice drinks.  Sweets and Desserts  Sugary or fatty desserts, candy, and other sweets.  Fats and Oils  Solid shortening or partially hydrogenated oils. Solid margarine. Margarine that contains trans fats. Butter.  The items listed above may not be a complete list of foods and beverages to avoid. Contact your dietitian for more information.  This information is not intended to replace advice given to you by your health care provider. Make sure you discuss any questions you have with your health care provider.  Document Released: 01/06/2009 Document Revised: 05/25/2017 Document Reviewed: 11/26/2014  [employer] Interactive Patient Education © 2018 [employer]      Calorie Counting for Weight Loss  Calories are units of energy. Your body needs a certain amount of calories from food to keep you going throughout the day. When you eat more calories than your body needs, your body stores the extra calories as fat. When you eat fewer calories than your body needs, your body burns fat to get the energy it needs.  Calorie counting means keeping track of how many calories you eat and drink each day. Calorie counting can be helpful if you need to lose weight. If you make sure to eat fewer calories than your body needs, you should lose weight. Ask your health care provider what a healthy weight is for you.  For calorie counting to work, you will need to eat the right number of calories in a day in order to lose a healthy amount of weight per week. A dietitian can help you determine how many calories you need in a day and will give you suggestions on how to reach your calorie goal.  · A healthy amount of weight to lose per week is usually 1-2 lb (0.5-0.9 kg). This usually means that your daily calorie intake should be reduced by 500-750  calories.  · Eating 1,200 - 1,500 calories per day can help most women lose weight.  · Eating 1,500 - 1,800 calories per day can help most men lose weight.    What do I need to know about calorie counting?  In order to meet your daily calorie goal, you will need to:  · Find out how many calories are in each food you would like to eat. Try to do this before you eat.  · Decide how much of the food you plan to eat.  · Write down what you ate and how many calories it had. Doing this is called keeping a food log.    To successfully lose weight, it is important to balance calorie counting with a healthy lifestyle that includes regular activity. Aim for 150 minutes of moderate exercise (such as walking) or 75 minutes of vigorous exercise (such as running) each week.  Where do I find calorie information?    The number of calories in a food can be found on a Nutrition Facts label. If a food does not have a Nutrition Facts label, try to look up the calories online or ask your dietitian for help.  Remember that calories are listed per serving. If you choose to have more than one serving of a food, you will have to multiply the calories per serving by the amount of servings you plan to eat. For example, the label on a package of bread might say that a serving size is 1 slice and that there are 90 calories in a serving. If you eat 1 slice, you will have eaten 90 calories. If you eat 2 slices, you will have eaten 180 calories.  How do I keep a food log?  Immediately after each meal, record the following information in your food log:  · What you ate. Don't forget to include toppings, sauces, and other extras on the food.  · How much you ate. This can be measured in cups, ounces, or number of items.  · How many calories each food and drink had.  · The total number of calories in the meal.    Keep your food log near you, such as in a small notebook in your pocket, or use a mobile annita or website. Some programs will calculate calories  "for you and show you how many calories you have left for the day to meet your goal.  What are some calorie counting tips?  · Use your calories on foods and drinks that will fill you up and not leave you hungry:  ? Some examples of foods that fill you up are nuts and nut butters, vegetables, lean proteins, and high-fiber foods like whole grains. High-fiber foods are foods with more than 5 g fiber per serving.  ? Drinks such as sodas, specialty coffee drinks, alcohol, and juices have a lot of calories, yet do not fill you up.  · Eat nutritious foods and avoid empty calories. Empty calories are calories you get from foods or beverages that do not have many vitamins or protein, such as candy, sweets, and soda. It is better to have a nutritious high-calorie food (such as an avocado) than a food with few nutrients (such as a bag of chips).  · Know how many calories are in the foods you eat most often. This will help you calculate calorie counts faster.  · Pay attention to calories in drinks. Low-calorie drinks include water and unsweetened drinks.  · Pay attention to nutrition labels for \"low fat\" or \"fat free\" foods. These foods sometimes have the same amount of calories or more calories than the full fat versions. They also often have added sugar, starch, or salt, to make up for flavor that was removed with the fat.  · Find a way of tracking calories that works for you. Get creative. Try different apps or programs if writing down calories does not work for you.  What are some portion control tips?  · Know how many calories are in a serving. This will help you know how many servings of a certain food you can have.  · Use a measuring cup to measure serving sizes. You could also try weighing out portions on a kitchen scale. With time, you will be able to estimate serving sizes for some foods.  · Take some time to put servings of different foods on your favorite plates, bowls, and cups so you know what a serving looks " like.  · Try not to eat straight from a bag or box. Doing this can lead to overeating. Put the amount you would like to eat in a cup or on a plate to make sure you are eating the right portion.  · Use smaller plates, glasses, and bowls to prevent overeating.  · Try not to multitask (for example, watch TV or use your computer) while eating. If it is time to eat, sit down at a table and enjoy your food. This will help you to know when you are full. It will also help you to be aware of what you are eating and how much you are eating.  What are tips for following this plan?  Reading food labels  · Check the calorie count compared to the serving size. The serving size may be smaller than what you are used to eating.  · Check the source of the calories. Make sure the food you are eating is high in vitamins and protein and low in saturated and trans fats.  Shopping  · Read nutrition labels while you shop. This will help you make healthy decisions before you decide to purchase your food.  · Make a grocery list and stick to it.  Cooking  · Try to cook your favorite foods in a healthier way. For example, try baking instead of frying.  · Use low-fat dairy products.  Meal planning  · Use more fruits and vegetables. Half of your plate should be fruits and vegetables.  · Include lean proteins like poultry and fish.  How do I count calories when eating out?  · Ask for smaller portion sizes.  · Consider sharing an entree and sides instead of getting your own entree.  · If you get your own entree, eat only half. Ask for a box at the beginning of your meal and put the rest of your entree in it so you are not tempted to eat it.  · If calories are listed on the menu, choose the lower calorie options.  · Choose dishes that include vegetables, fruits, whole grains, low-fat dairy products, and lean protein.  · Choose items that are boiled, broiled, grilled, or steamed. Stay away from items that are buttered, battered, fried, or served  with cream sauce. Items labeled “crispy” are usually fried, unless stated otherwise.  · Choose water, low-fat milk, unsweetened iced tea, or other drinks without added sugar. If you want an alcoholic beverage, choose a lower calorie option such as a glass of wine or light beer.  · Ask for dressings, sauces, and syrups on the side. These are usually high in calories, so you should limit the amount you eat.  · If you want a salad, choose a garden salad and ask for grilled meats. Avoid extra toppings like newman, cheese, or fried items. Ask for the dressing on the side, or ask for olive oil and vinegar or lemon to use as dressing.  · Estimate how many servings of a food you are given. For example, a serving of cooked rice is ½ cup or about the size of half a baseball. Knowing serving sizes will help you be aware of how much food you are eating at restaurants. The list below tells you how big or small some common portion sizes are based on everyday objects:  ? 1 oz--4 stacked dice.  ? 3 oz--1 deck of cards.  ? 1 tsp--1 die.  ? 1 Tbsp--½ a ping-pong ball.  ? 2 Tbsp--1 ping-pong ball.  ? ½ cup--½ baseball.  ? 1 cup--1 baseball.  Summary  · Calorie counting means keeping track of how many calories you eat and drink each day. If you eat fewer calories than your body needs, you should lose weight.  · A healthy amount of weight to lose per week is usually 1-2 lb (0.5-0.9 kg). This usually means reducing your daily calorie intake by 500-750 calories.  · The number of calories in a food can be found on a Nutrition Facts label. If a food does not have a Nutrition Facts label, try to look up the calories online or ask your dietitian for help.  · Use your calories on foods and drinks that will fill you up, and not on foods and drinks that will leave you hungry.  · Use smaller plates, glasses, and bowls to prevent overeating.  This information is not intended to replace advice given to you by your health care provider. Make sure you  discuss any questions you have with your health care provider.  Document Released: 12/18/2006 Document Revised: 11/17/2017 Document Reviewed: 11/17/2017  [employer] Interactive Patient Education © 2018 [employer]    Serving Sizes  A serving size is a measured amount of food or drink, such as one slice of bread, that has an associated nutrient content. Knowing the serving size of a food or drink can help you determine how much of that food you should consume.  What is the size of one serving?  The size of one healthy serving depends on the food or drink. To determine a serving size, read the food label. If the food or drink does not have a food label, try to find serving size information online. Or, use the following to estimate the size of one adult serving:  Grain  1 slice bread. ½ bagel. ½ cup pasta.  Vegetable  ½ cup cooked or canned vegetables. 1 cup raw, leafy greens.  Fruit  ½ cup canned fruit. 1 medium fruit. ¼ cup dried fruit.  Meat and Other Protein Sources  1 oz meat, poultry, or fish. ¼ cup cooked beans. 1 egg. ¼ cup nuts or seeds. 1 Tbsp nut butter. ¼ cup tofu or tempeh. 2 Tbsp hummus.  Dairy  An individual container of yogurt (6-8 oz). 1 piece of cheese the size of your thumb (1 oz). 1 cup (8 oz) milk or milk alternative.  Fat  A piece the size of one dice. 1 tsp soft margarine. 1 Tbsp mayonnaise. 1 tsp vegetable oil. 1 Tbsp regular salad dressing. 2 Tbsp low-fat salad dressing.  How many servings should I eat from each food group each day?  The following are the suggested number of servings to try and have every day from each food group. You can also look at your eating throughout the week and aim for meeting these requirements on most days for overall healthy eating.  Grain  6-8 servings. Try to have half of your grains from whole grains, such as whole wheat bread, corn tortillas, oatmeal, brown rice, whole wheat pasta, and bulgur.  Vegetable  At least 2½-3 servings.  Fruit  2 servings.  Meat and  Other Protein Foods  5-6 servings. Aim to have lean proteins, such as chicken, turkey, fish, beans, or tofu.  Dairy  3 servings. Choose low-fat or nonfat if you are trying to control your weight.  Fat  2-3 servings.  Is a serving the same thing as a portion?  No. A portion is the actual amount you eat, which may be more than one serving. Knowing the specific serving size of a food and the nutritional information that goes with it can help you make a healthy decision on what size portion to eat.  What are some tips to help me learn healthy serving sizes?  · Check food labels for serving sizes. Many foods that come as a single portion actually contain multiple servings.  · Determine the serving size of foods you commonly eat and figure out how large a portion you usually eat.  · Measure the number of servings that can be held by the bowls, glasses, cups, and plates you typically use. For example, pour your breakfast cereal into your regular bowl and then pour it into a measuring cup.  · For 1-2 days, measure the serving sizes of all the foods you eat.  · Practice estimating serving sizes and determining how big your portions should be.  This information is not intended to replace advice given to you by your health care provider. Make sure you discuss any questions you have with your health care provider.  Document Released: 09/15/2004 Document Revised: 08/12/2017 Document Reviewed: 03/17/2015  [employer] Interactive Patient Education © 2018 [employer]

## 2018-11-30 NOTE — PROGRESS NOTES
Subjective   Donna M Yeoman is a 83 y.o. female who comes in today for   Chief Complaint   Patient presents with   • Follow-up     med check   • Back Pain     no trauma going on about 10 days   .    History of Present Illness   Needs handicap sticker   10 days of LBP radiating to the right leg most times.  Occasionally to the left as well.  Worse with sitting.  Better with walking and lying down.  No bowel or bladder sx's.  Back gets tight if sits too long.  Has improved since the first day that it hit. Went to bed fine and woke up with severe pain.  H/o deg spine disease.  Last MRI 1/2015.  Has had epidurals in past at [facility] which did help.  MRI are hard on her back.  No longer taking metformin and not checking her sugars.  Weight is down 3 pounds.  She had a full GI evaluation with Dr. Lo recently.  He did dilate her esophagus and also started her on Benefiber which is greatly reduced her belly pain.  The following portions of the patient's history were reviewed and updated as appropriate: allergies, current medications, past family history, past medical history, past social history, past surgical history and problem list.    Review of Systems   Constitutional: Negative.    Gastrointestinal: Negative.    Musculoskeletal: Positive for back pain.       Vitals:    11/30/18 1046   BP: 177/85   Pulse: 76   Temp: 98.1 °F (36.7 °C)   SpO2: 95%       Objective   Physical Exam   Constitutional: She is oriented to person, place, and time. She appears well-developed and well-nourished.   HENT:   Head: Normocephalic and atraumatic.   Right Ear: External ear normal.   Left Ear: External ear normal.   Mouth/Throat: Oropharynx is clear and moist.   Eyes: Conjunctivae are normal.   Neck: Neck supple.   Cardiovascular: Normal rate, regular rhythm and normal heart sounds.   No bruits   Pulmonary/Chest: Effort normal and breath sounds normal. No respiratory distress. She has no wheezes. She has no rales.   Abdominal: Soft. Bowel  sounds are normal. She exhibits no distension and no mass. There is no tenderness.   Musculoskeletal: She exhibits deformity (kyphosis). She exhibits no edema or tenderness.   Lymphadenopathy:     She has no cervical adenopathy.   Neurological: She is alert and oriented to person, place, and time.   Negative straight leg raise bilaterally   Skin: Skin is warm.   Psychiatric: She has a normal mood and affect. Her behavior is normal. Judgment and thought content normal.   Nursing note and vitals reviewed.        Current Outpatient Medications:   •  allopurinol (ZYLOPRIM) 100 MG tablet, TAKE 1 TABLET DAILY, Disp: 90 tablet, Rfl: 1  •  aspirin 81 MG EC tablet, Take 81 mg by mouth Daily., Disp: , Rfl:   •  carvedilol (COREG) 6.25 MG tablet, TAKE 1 TABLET BY MOUTH 2 (TWO) TIMES A DAY WITH MEALS., Disp: 60 tablet, Rfl: 5  •  ESTRACE VAGINAL 0.1 MG/GM vaginal cream, INSERT 1/2 TO 1 INCH VAGINALLY ONCE WEEKLY AS DIRECTED, Disp: , Rfl: 11  •  FIBER PO, Take  by mouth Daily., Disp: , Rfl:   •  hydrochlorothiazide (MICROZIDE) 12.5 MG capsule, TAKE 1 TO 2 TABLETS DAILY FOR HTN AND LE SWELLING, Disp: 60 capsule, Rfl: 3  •  levothyroxine (SYNTHROID, LEVOTHROID) 75 MCG tablet, Take 1 tablet by mouth daily., Disp: 90 tablet, Rfl: 2  •  lidocaine (LIDODERM) 5 %, Place 1 patch on the skin Daily. Remove & Discard patch within 12 hours or as directed by MD, Disp: 30 patch, Rfl: 3  •  omeprazole (priLOSEC) 40 MG capsule, Take 1 capsule by mouth 2 (Two) Times a Day., Disp: 90 capsule, Rfl: 1  •  Probiotic Product (PROBIOTIC DAILY PO), Take  by mouth Daily., Disp: , Rfl:   •  rosuvastatin (CRESTOR) 5 MG tablet, TAKE 1 TABLET DAILY., Disp: 90 tablet, Rfl: 1  •  MethylPREDNISolone (MEDROL, JOSE,) 4 MG tablet, Take as directed on package instructions., Disp: 21 tablet, Rfl: 0  •  PROAIR  (90 BASE) MCG/ACT inhaler, 2 puffs Q4H for cough and wheeze, Disp: 1 inhaler, Rfl: 0  •  tolterodine (DETROL) 2 MG tablet, Take 2 mg by mouth 2 (Two)  Times a Day., Disp: , Rfl: 4    Assessment/Plan   Shirley was seen today for follow-up and back pain.    Diagnoses and all orders for this visit:    Need for immunization against influenza  -     Fluzone High Dose =>65Years; Standing  -     Fluzone High Dose =>65Years    Need for pneumococcal vaccine  -     Pneumococcal Conjugate Vaccine 13-Valent All (PCV13)    Chronic bilateral low back pain with right-sided sciatica    Other orders  -     MethylPREDNISolone (MEDROL, JOSE,) 4 MG tablet; Take as directed on package instructions.    We will give her her flu shot today as well as a Prevnar 13.  She asked me about stopping her allopurinol.  Based on her recent lab values I have encouraged her to continue taking that daily to prevent further gout attacks.  She has a long history of chronic back and neck pain due to osteoarthritis and degenerative changes.  She has done well with epidurals in the past but it's been almost 4 years since her last MRI.  This test isn't very easy for her to do based on lying still.  So we have decided to try a steroid pack to see if this will improve her symptoms in her back and if it does we will avoid an MRI.  If she is not better in a week she is going to call me and I will order an MRI and give her a few Xanax to take if needed based on claustrophobia.  She did not do well with the Cymbalta that I gave her recently hoping that that would help her chronic pain issues.               I have asked for the patient to return to clinic in 6month(s).

## 2018-12-18 ENCOUNTER — TELEPHONE (OUTPATIENT)
Dept: FAMILY MEDICINE CLINIC | Facility: CLINIC | Age: 83
End: 2018-12-18

## 2018-12-18 NOTE — TELEPHONE ENCOUNTER
Pt called as an FYI.  She states that the resent Steriods helped. However she is now beginning to have issues with her back again.

## 2018-12-18 NOTE — TELEPHONE ENCOUNTER
Pt states that she may consider pain Management. But.. She would like to wait until Holidays are over to make her decision.

## 2019-01-03 RX ORDER — OMEPRAZOLE 40 MG/1
CAPSULE, DELAYED RELEASE ORAL
Qty: 90 CAPSULE | Refills: 1 | Status: SHIPPED | OUTPATIENT
Start: 2019-01-03 | End: 2019-04-10 | Stop reason: SDUPTHER

## 2019-01-21 ENCOUNTER — TELEPHONE (OUTPATIENT)
Dept: FAMILY MEDICINE CLINIC | Facility: CLINIC | Age: 84
End: 2019-01-21

## 2019-01-21 DIAGNOSIS — M54.9 CHRONIC BACK PAIN, UNSPECIFIED BACK LOCATION, UNSPECIFIED BACK PAIN LATERALITY: ICD-10-CM

## 2019-01-21 DIAGNOSIS — R93.89 ABNORMAL MRI: Primary | ICD-10-CM

## 2019-01-21 DIAGNOSIS — G89.29 CHRONIC BACK PAIN, UNSPECIFIED BACK LOCATION, UNSPECIFIED BACK PAIN LATERALITY: ICD-10-CM

## 2019-01-21 NOTE — TELEPHONE ENCOUNTER
I placed the order for pain management.  If there is an acute pain, she needs to go to ER or UC where they can do xrays now.

## 2019-01-21 NOTE — TELEPHONE ENCOUNTER
Pt called she fall since seeing you   Pt was notified to see pain management , but since she fall want to proceed ASAP

## 2019-01-24 ENCOUNTER — OFFICE VISIT (OUTPATIENT)
Dept: PAIN MEDICINE | Facility: CLINIC | Age: 84
End: 2019-01-24

## 2019-01-24 VITALS
DIASTOLIC BLOOD PRESSURE: 78 MMHG | HEART RATE: 58 BPM | HEIGHT: 63 IN | SYSTOLIC BLOOD PRESSURE: 173 MMHG | BODY MASS INDEX: 26.97 KG/M2 | OXYGEN SATURATION: 96 % | WEIGHT: 152.2 LBS | TEMPERATURE: 97.8 F | RESPIRATION RATE: 15 BRPM

## 2019-01-24 DIAGNOSIS — G89.29 CHRONIC BILATERAL LOW BACK PAIN WITH LEFT-SIDED SCIATICA: Primary | ICD-10-CM

## 2019-01-24 DIAGNOSIS — M54.42 CHRONIC BILATERAL LOW BACK PAIN WITH LEFT-SIDED SCIATICA: Primary | ICD-10-CM

## 2019-01-24 DIAGNOSIS — F40.240 CLAUSTROPHOBIA: ICD-10-CM

## 2019-01-24 PROCEDURE — 99213 OFFICE O/P EST LOW 20 MIN: CPT | Performed by: PAIN MEDICINE

## 2019-01-24 RX ORDER — ALPRAZOLAM 0.5 MG/1
TABLET ORAL
Qty: 3 TABLET | Refills: 0 | Status: SHIPPED | OUTPATIENT
Start: 2019-01-24 | End: 2019-05-24

## 2019-01-24 RX ORDER — CEPHALEXIN 250 MG/1
CAPSULE ORAL
COMMUNITY
Start: 2019-01-21 | End: 2019-02-26

## 2019-01-24 NOTE — PROGRESS NOTES
CHIEF COMPLAINT: Back Pain and Extremity Pain    HPI  Donna M Yeoman is a 83 y.o. female.  She is here to follow up for Back Pain and Extremity Pain    Donna M Yeoman is a 83 y.o. female  who presents to the office for follow-up.    Since last visit their pain has worsened. Last apt was 1 year ago. States pain has been present but worsening over last several months and now significantly interfering with daily activities.  Pt states she fell the first part of December and her back pain has gradually gotten worse since then. It is on bilateral sides of low back but radiates down the left leg, not the right. No weakness down LLE. PCP ordered MDP - some help.  She still c/o some neck pain.     The patient states their pain is a 5 on a scale of 1-10.  The patient describes this pain as constant dull and ache.  The pain is located in bilateral low back and does radiate posterior left leg. This painful problem is aggravated by physical activity and is alleviated by pain medication.    Current pain medications:   Tylenol prn    Past therapies:  Physical Therapy: yes (back pain)  Chiropractor: no  Massage Therapy: no  TENS: yes (didn't help)  Neck or back surgery: no  Past pain management: no     Previous Injections: back injections at [facility] in 2006  Effect of Injection (%): helped alot    PEG Assessment   What number best describes your pain on average in the past week? 8  What number best describes how, during the past week, pain has interfered with your enjoyment of life? 8  What number best describes how, during the past week, pain has interfered with your general activity? 8      Current Outpatient Medications:   •  allopurinol (ZYLOPRIM) 100 MG tablet, TAKE 1 TABLET DAILY, Disp: 90 tablet, Rfl: 1  •  aspirin 81 MG EC tablet, Take 81 mg by mouth Daily., Disp: , Rfl:   •  carvedilol (COREG) 6.25 MG tablet, TAKE 1 TABLET BY MOUTH 2 (TWO) TIMES A DAY WITH MEALS., Disp: 60 tablet, Rfl: 5  •  cephalexin (KEFLEX)  250 MG capsule, , Disp: , Rfl:   •  CRANBERRY PO, Take  by mouth., Disp: , Rfl:   •  Cyanocobalamin (B-12 PO), Take  by mouth., Disp: , Rfl:   •  ESTRACE VAGINAL 0.1 MG/GM vaginal cream, INSERT 1/2 TO 1 INCH VAGINALLY ONCE WEEKLY AS DIRECTED, Disp: , Rfl: 11  •  FIBER PO, Take  by mouth Daily., Disp: , Rfl:   •  hydrochlorothiazide (MICROZIDE) 12.5 MG capsule, TAKE 1 TO 2 TABLETS DAILY FOR HTN AND LE SWELLING, Disp: 60 capsule, Rfl: 3  •  levothyroxine (SYNTHROID, LEVOTHROID) 75 MCG tablet, Take 1 tablet by mouth daily., Disp: 90 tablet, Rfl: 2  •  Loratadine-Pseudoephedrine (PX ALLERGY RELIEF D, LORATID, PO), Take  by mouth., Disp: , Rfl:   •  Mirabegron ER (MYRBETRIQ) 25 MG tablet sustained-release 24 hour 24 hr tablet, Take 25 mg by mouth Daily., Disp: , Rfl:   •  omeprazole (priLOSEC) 40 MG capsule, TAKE 1 CAPSULE BY MOUTH TWICE A DAY, Disp: 90 capsule, Rfl: 1  •  Probiotic Product (PROBIOTIC DAILY PO), Take  by mouth Daily., Disp: , Rfl:   •  rosuvastatin (CRESTOR) 5 MG tablet, TAKE 1 TABLET DAILY., Disp: 90 tablet, Rfl: 1  •  ALPRAZolam (XANAX) 0.5 MG tablet, Take 1 tablet po 1 hour prior to MRI. May repeat in 30 minutes if needed x 2, Disp: 3 tablet, Rfl: 0    IMAGING  MR LUMBAR SPINE WO CONTRAST-     HISTORY: Back pain, right radiculopathy.     COMPARISON: 09/22/2006.     There is a grade 1 retrolisthesis of L4 upon L5, unchanged. There is  moderate to severe loss of disc height and moderate endplate  degenerative changes at L4-L5 which are more prominent as compared to  09/22/2006. The conus is at T12-L1.     T12-L1: A small left paracentral disc bulge or protrusion is identified  which does not abut the cord. There is no evidence of lateral recess  narrowing. This is slightly more prominent as compared to the previous  examination.     L1-L2: There is a mild degree of facet degenerative disease bilaterally.  There is no evidence of disc herniation.     L2-L3: A broad-based disc osteophyte complex is  present which is  slightly more prominent to the left. There is mild neural foraminal, as  on the right and mild to moderate neuroforaminal compromise on the left  secondary to extension of a disc osteophyte complex into the neural  foramen. This is slightly more prominent as compared to the previous  examination. The distal osteophyte complex on the left does not abut the  exiting nerve.     L3-L4: Mild facet degenerative disease is present bilaterally. There is  mild neural foraminal, as bilaterally.     L4-L5: Moderate facet degenerative disease is present bilaterally, more  prominent on the right similar to the prior examination. There is no  evidence of a focal herniation. The broad-based disc osteophyte complex  results in mild flattening of the ventral surface of the thecal sac.  There is mild neural foraminal compromise on the left and moderate  neural compromise on the right secondary to extension of the disc  osteophyte complex into the neural foramen. This is less prominent as  compared to the prior examination.     L5-S1: A broad-based disc osteophyte complex is present which is  slightly more prominent to the. Mild neural foraminal, as is present  bilaterally, unchanged.     IMPRESSION  1. No evidence of disc herniation.  2. 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 Although the loss of disc height and endplate  degenerative changes are more prominent at L4-L5, the degree of neural  foraminal, as on the right is less prominent. There is no evidence of a  compression fracture.    Imaging last reviewed: 01/24/19     PFSH:  The following portions of the patient's history were reviewed and updated as appropriate: problem list, past medical history, past surgery history, social history, family history, medications, and allergies    Review of Systems   Constitutional: Positive for activity change. Negative for chills and fever.   HENT: Positive for hearing loss  "([place]).    Respiratory: Negative for shortness of breath.    Cardiovascular: Negative for chest pain.   Gastrointestinal: Negative for constipation (takes fiber), diarrhea, nausea and vomiting.   Genitourinary: Negative for difficulty urinating (pt states she gets a lot of bladder infections), dyspareunia and dysuria.   Musculoskeletal: Positive for back pain and neck pain.   Skin: Negative for rash and wound.   Allergic/Immunologic: Negative for immunocompromised state.   Neurological: Positive for dizziness (at times-thinks r/t medicine). Negative for weakness, light-headedness, numbness and headaches.   Hematological: Does not bruise/bleed easily.   Psychiatric/Behavioral: Positive for sleep disturbance (sometimes josias r/t left leg pain). Negative for agitation, confusion, hallucinations, self-injury and suicidal ideas. The patient is not nervous/anxious.    All other systems reviewed and are negative.      Vitals:    01/24/19 1542   BP: 173/78   Pulse: 58   Resp: 15   Temp: 97.8 °F (36.6 °C)   SpO2: 96%   Weight: 69 kg (152 lb 3.2 oz)   Height: 160 cm (62.99\")   PainSc:   5   PainLoc: Back       Physical Exam   Constitutional: She appears well-developed and well-nourished. No distress.   HENT:   Head: Normocephalic and atraumatic.   Nose: Nose normal.   Mouth/Throat: Oropharynx is clear and moist.   Eyes: Conjunctivae and EOM are normal.   Neck: Normal range of motion. Neck supple.   Pulmonary/Chest: Effort normal. No stridor. No respiratory distress.   Musculoskeletal:        Cervical back: She exhibits pain.        Lumbar back: She exhibits pain. She exhibits no tenderness.   Neurological: She is alert. She has normal strength. No cranial nerve deficit or sensory deficit.   Skin: Skin is warm and dry. No rash noted. She is not diaphoretic.   Psychiatric: She has a normal mood and affect. Her speech is normal and behavior is normal.   Nursing note and vitals reviewed.    Back Exam     Tests   Straight leg raise " right: negative  Straight leg raise left: negative          Neurologic Exam     Mental Status   Speech: speech is normal     Cranial Nerves     CN III, IV, VI   Extraocular motions are normal.     Motor Exam     Strength   Strength 5/5 throughout.       Lab Results   Component Value Date    POCMETH Negative 11/28/2017    POCAMPHET Negative 11/28/2017    POCBARBITUR Negative 11/28/2017    POCBENZO Negative 11/28/2017    POCCOCAINE Negative 11/28/2017    POCMETHADO Negative 11/28/2017    POCOPIATES Negative 11/28/2017    POCOXYCODO Negative 11/28/2017    POCPHENCYC Negative 11/28/2017    POCPROPOXY Negative 11/28/2017    POCTHC Negative 11/28/2017    POCTRICYC Negative 11/28/2017     Last UDS results reviewed: 01/24/19   Last UDS: 11/2017  Comments: .conis     Date of last APRIL reviewed : 01/24/19   Comments: Alyssa Watson was seen today for back pain and extremity pain.    Diagnoses and all orders for this visit:    Chronic bilateral low back pain with left-sided sciatica  -     Case Request  -     MRI Lumbar Spine Without Contrast; Future    Claustrophobia  -     ALPRAZolam (XANAX) 0.5 MG tablet; Take 1 tablet po 1 hour prior to MRI. May repeat in 30 minutes if needed x 2      Requested Prescriptions     Signed Prescriptions Disp Refills   • ALPRAZolam (XANAX) 0.5 MG tablet 3 tablet 0     Sig: Take 1 tablet po 1 hour prior to MRI. May repeat in 30 minutes if needed x 2     - will update imaging of low back given new radicular symptoms down LLE. No weakness. Needs xanax given claustrophobia.   - will LESI for acute flare up. Discussed with the patient regarding the etiology of their pain. Informed them that they would likely benefit from a L5/S1 lumbar epidural steroid injection.  The procedure was described in detail and the risks, benefits and alternatives were discussed with the patient (including but not limited to: bleeding, infection, nerve damage, worsening of pain, inability to perform injection,  paralysis, seizures, and death) who agreed to proceed.     - will perform one injection then reassess     - continue tylenol.   - avoid NSAIDS due to elevated Cr level in past.   - Continue home exercise program.     Wt Readings from Last 3 Encounters:   01/24/19 69 kg (152 lb 3.2 oz)   01/09/19 69.4 kg (153 lb)   11/30/18 69.6 kg (153 lb 6.4 oz)     Body mass index is 26.97 kg/m². Patient counseled on the importance of weight loss to help with overall health and pain control. Patient instructed to attempt weight loss.   Plan: Calorie counting  increase physical activity, reduce screen time and reduce portion size    Follow-up after injection    Gissell Ahmadi MD  Pain Management

## 2019-01-29 RX ORDER — ALLOPURINOL 100 MG/1
TABLET ORAL
Qty: 90 TABLET | Refills: 1 | Status: SHIPPED | OUTPATIENT
Start: 2019-01-29 | End: 2019-08-07 | Stop reason: SDUPTHER

## 2019-01-29 RX ORDER — LEVOTHYROXINE SODIUM 0.07 MG/1
TABLET ORAL
Qty: 90 TABLET | Refills: 1 | Status: SHIPPED | OUTPATIENT
Start: 2019-01-29 | End: 2019-05-24 | Stop reason: SDUPTHER

## 2019-02-11 ENCOUNTER — DOCUMENTATION (OUTPATIENT)
Dept: PAIN MEDICINE | Facility: CLINIC | Age: 84
End: 2019-02-11

## 2019-02-11 ENCOUNTER — OUTSIDE FACILITY SERVICE (OUTPATIENT)
Dept: PAIN MEDICINE | Facility: CLINIC | Age: 84
End: 2019-02-11

## 2019-02-11 PROCEDURE — 62323 NJX INTERLAMINAR LMBR/SAC: CPT | Performed by: PAIN MEDICINE

## 2019-02-11 NOTE — PROGRESS NOTES
Lumbar Epidural Steroid Injection  [facility]    PREOPERATIVE DIAGNOSIS:   Chronic low back pain  POSTOPERATIVE DIAGNOSIS:  Same as preop diagnosis    PROCEDURE:   Lumbar Epidural Steroid Injection, Therapeutic Translaminar Injection, with epidurogram, at  L5/S1 level    PRE-PROCEDURE DISCUSSION WITH PATIENT:    Risks and complications were discussed with the patient prior to starting the procedure and informed consent was obtained.  We discussed various topics including but not limited to bleeding, infection, injury, paralysis, nerve injury, dural puncture, coma, death, worsening of clinical picture, lack of pain relief, and postprocedural soreness.    SURGEON:  Gissell Ahmadi MD    REASON FOR PROCEDURE:    Diagnostic injection at this level is needed    SEDATION:  Patient declined administration of moderate sedation    ANESTHETIC:  NONE  STEROID:   Methylprednisolone (DEPO MEDROL) 80mg/ml    DESCRIPTON OF PROCEDURE:    After obtaining informed consent, I.V. was not started in the preop area.   The patient was taken to the operating room and placed in the prone position.  EKG, blood pressure, and pulse oximeter were monitored throughout, and sedation was provided as needed by the RN under my guidance. All pressure points were well padded.  The lumbar spine area was prepped with Chloraprep and draped in a sterile fashion.  Under fluoroscopic guidance, the above mentioned interlaminar space was identified. Skin and subcutaneous tissues were anesthetized with 1% lidocaine in the middle of the space. A Tuohy needle was introduced through the skin and advanced to this interlaminar space and into the epidural space under fluoroscopic guidance and verified with loss-of-resistance technique to air.  After confirming the position of the needle with the fluoroscope with all the views, and after aspiration was confirmed negative for blood and CSF, 1.5 mL of Omnipaque was injected.  After seeing  appropriate epidurogram with lateral and PA views, a total of 3 cc solution was injected, consisting of 0cc of local anesthetic as above, with normal saline and injectable steroid as above.     ESTIMATED BLOOD LOSS:  <5 mL  SPECIMENS:  None    COMPLICATIONS:     There was indication that a dural puncture occurred. and Aspiration was positive for CSF flow.   The needle was retracted and LISSETT was obtained again in the same intralaminar space, with appropriate contrast spread into the epidural space. Injectate was placed with appropriate spread of medication.     TOLERANCE & DISCHARGE CONDITION:    The patient tolerated the procedure well.  The patient was transported to the recovery area without difficulties.  The patient was discharged to home under the care of family in stable and satisfactory condition.    PLAN OF CARE:  1. The patient was given our standard instruction sheet.  2. The patient will Return to clinic 4-6 wks  3. The patient will resume all medications as per the medication reconciliation sheet.

## 2019-02-12 ENCOUNTER — TELEPHONE (OUTPATIENT)
Dept: FAMILY MEDICINE CLINIC | Facility: CLINIC | Age: 84
End: 2019-02-12

## 2019-02-12 NOTE — TELEPHONE ENCOUNTER
Pt is on new bladder med (myrbetriq) and it can make bp up    Yesterday day epideral 195/86    Today at dentist office today 185/102

## 2019-02-12 NOTE — TELEPHONE ENCOUNTER
Yes stop myrbetriq as it is known to elevate BP.  Have her take an extra coreg 6.25 mg tonight (total of 12.5 mg ) and follow her BP's at home.

## 2019-02-13 ENCOUNTER — HOSPITAL ENCOUNTER (OUTPATIENT)
Dept: MRI IMAGING | Facility: HOSPITAL | Age: 84
Discharge: HOME | End: 2019-02-13
Attending: PAIN MEDICINE | Admitting: PAIN MEDICINE

## 2019-02-13 DIAGNOSIS — M54.42 CHRONIC BILATERAL LOW BACK PAIN WITH LEFT-SIDED SCIATICA: ICD-10-CM

## 2019-02-13 DIAGNOSIS — G89.29 CHRONIC BILATERAL LOW BACK PAIN WITH LEFT-SIDED SCIATICA: ICD-10-CM

## 2019-02-13 PROCEDURE — 72148 MRI LUMBAR SPINE W/O DYE: CPT

## 2019-02-15 ENCOUNTER — TELEPHONE (OUTPATIENT)
Dept: FAMILY MEDICINE CLINIC | Facility: CLINIC | Age: 84
End: 2019-02-15

## 2019-02-15 DIAGNOSIS — M54.42 CHRONIC BILATERAL LOW BACK PAIN WITH LEFT-SIDED SCIATICA: Primary | ICD-10-CM

## 2019-02-15 DIAGNOSIS — G89.29 CHRONIC BILATERAL LOW BACK PAIN WITH LEFT-SIDED SCIATICA: Primary | ICD-10-CM

## 2019-02-15 NOTE — TELEPHONE ENCOUNTER
Pt called reporting her bp    Last night it was 172/92  This am it was 182/66      Please advise???

## 2019-02-15 NOTE — TELEPHONE ENCOUNTER
I spoke to pt. She is only taking coreg bid. She takes the hctz maybe 3 x a week. Said she thought it was just a water pill. I explained it is for bp also. How would you like for me to respond to her?

## 2019-02-15 NOTE — PROGRESS NOTES
Called and discussed results with patient. She is having mainly left sided symptoms but left leg radiculopathy has improved with DENI- still present. No weakness in LLE, some tingling down anterior thigh. No new changes/worsening.   Given the significant size of her L2/L3 disc herniation along with nerve involvement, I recommend she follow up with her [facility] team. She has seen Dr. Hall back in 3/2015. Since it has been several years I will place new referral and send imaging over for review.   THANKS!!

## 2019-02-15 NOTE — TELEPHONE ENCOUNTER
She is on coreg 6.125mg bid and HCTZ 12.5 mg 1 to 2 tabs/daily.  Is she taking both of these like prescribed?  Also what is her HR ?

## 2019-02-22 ENCOUNTER — TELEPHONE (OUTPATIENT)
Dept: FAMILY MEDICINE CLINIC | Facility: CLINIC | Age: 84
End: 2019-02-22

## 2019-02-25 ENCOUNTER — OFFICE (AMBULATORY)
Dept: URBAN - METROPOLITAN AREA CLINIC 2 | Facility: CLINIC | Age: 84
End: 2019-02-25

## 2019-02-25 VITALS
SYSTOLIC BLOOD PRESSURE: 146 MMHG | RESPIRATION RATE: 14 BRPM | HEIGHT: 63 IN | HEART RATE: 58 BPM | DIASTOLIC BLOOD PRESSURE: 80 MMHG | WEIGHT: 150 LBS

## 2019-02-25 DIAGNOSIS — K31.89 OTHER DISEASES OF STOMACH AND DUODENUM: ICD-10-CM

## 2019-02-25 DIAGNOSIS — K21.9 GASTRO-ESOPHAGEAL REFLUX DISEASE WITHOUT ESOPHAGITIS: ICD-10-CM

## 2019-02-25 DIAGNOSIS — K30 FUNCTIONAL DYSPEPSIA: ICD-10-CM

## 2019-02-25 DIAGNOSIS — K44.9 DIAPHRAGMATIC HERNIA WITHOUT OBSTRUCTION OR GANGRENE: ICD-10-CM

## 2019-02-25 DIAGNOSIS — Z83.71 FAMILY HISTORY OF COLONIC POLYPS: ICD-10-CM

## 2019-02-25 DIAGNOSIS — R13.10 DYSPHAGIA, UNSPECIFIED: ICD-10-CM

## 2019-02-25 DIAGNOSIS — K57.30 DIVERTICULOSIS OF LARGE INTESTINE WITHOUT PERFORATION OR ABS: ICD-10-CM

## 2019-02-25 DIAGNOSIS — R14.3 FLATULENCE: ICD-10-CM

## 2019-02-25 DIAGNOSIS — K29.70 GASTRITIS, UNSPECIFIED, WITHOUT BLEEDING: ICD-10-CM

## 2019-02-25 DIAGNOSIS — Z80.0 FAMILY HISTORY OF MALIGNANT NEOPLASM OF DIGESTIVE ORGANS: ICD-10-CM

## 2019-02-25 DIAGNOSIS — Z86.010 PERSONAL HISTORY OF COLONIC POLYPS: ICD-10-CM

## 2019-02-25 DIAGNOSIS — K22.2 ESOPHAGEAL OBSTRUCTION: ICD-10-CM

## 2019-02-25 PROCEDURE — 99213 OFFICE O/P EST LOW 20 MIN: CPT | Performed by: INTERNAL MEDICINE

## 2019-02-25 NOTE — PROGRESS NOTES
Subjective   Patient ID: Donna M Yeoman is a 84 y.o. female is being seen for consultation today at the request of Gissell Ahmadi, * for back pain that radiates into bilateral lower extremity's L>R. She has had 1 DENI She also has neck pain.    History of Present Illness    This patient has been having pain in the left side of her thorax and some into her left arm.  She also has pain primarily in her left lower back with radiation into her left leg.  It goes in the posterior lateral thigh and posterior lateral calf and into the left foot.  She does not have much pain in the anterior aspect of the left thigh.  Right side is mostly okay.  She has no difficulty with bowel or bladder control or other associated symptoms.  She has been treated with one epidural block for this and that helped.  She has been treated with epidural blocks in the neck before and those helped as well.  Activity seems to make the pain worse.    The following portions of the patient's history were reviewed and updated as appropriate: allergies, current medications, past family history, past medical history, past social history, past surgical history and problem list.    Review of Systems   Constitutional: Positive for activity change.   Respiratory: Negative for chest tightness and shortness of breath.    Cardiovascular: Negative for chest pain.   Musculoskeletal: Positive for back pain and neck pain.        Bilateral leg pain L>R   Neurological: Positive for headaches. Negative for numbness.        Positive for tingling   Hematological: Bruises/bleeds easily.   Psychiatric/Behavioral: The patient is nervous/anxious.    All other systems reviewed and are negative.      Objective   Physical Exam   Constitutional: She is oriented to person, place, and time. She appears well-developed and well-nourished.   HENT:   Head: Normocephalic and atraumatic.   Eyes: Conjunctivae and EOM are normal. Pupils are equal, round, and reactive to light.    Fundoscopic exam:       The right eye shows no papilledema. The right eye shows venous pulsations.        The left eye shows no papilledema. The left eye shows venous pulsations.   Neck: Carotid bruit is not present.   Neurological: She is oriented to person, place, and time. She has a normal Finger-Nose-Finger Test and a normal Heel to Shin Test. Gait normal.   Reflex Scores:       Tricep reflexes are 2+ on the right side and 2+ on the left side.       Bicep reflexes are 2+ on the right side and 2+ on the left side.       Brachioradialis reflexes are 2+ on the right side and 2+ on the left side.       Patellar reflexes are 2+ on the right side and 2+ on the left side.       Achilles reflexes are 2+ on the right side and 2+ on the left side.  Psychiatric: Her speech is normal.     Neurologic Exam     Mental Status   Oriented to person, place, and time.   Registration of memory: Good recent and remote memory.   Attention: normal. Concentration: normal.   Speech: speech is normal   Level of consciousness: alert  Knowledge: consistent with education.     Cranial Nerves     CN II   Visual fields full to confrontation.   Visual acuity: normal    CN III, IV, VI   Pupils are equal, round, and reactive to light.  Extraocular motions are normal.     CN V   Facial sensation intact.   Right corneal reflex: normal  Left corneal reflex: normal    CN VII   Facial expression full, symmetric.   Right facial weakness: none  Left facial weakness: none    CN VIII   Hearing: intact    CN IX, X   Palate: symmetric    CN XI   Right sternocleidomastoid strength: normal  Left sternocleidomastoid strength: normal    CN XII   Tongue: not atrophic  Tongue deviation: none    Motor Exam   Muscle bulk: normal  Right arm tone: normal  Left arm tone: normal  Right leg tone: normal  Left leg tone: normal    Strength   Strength 5/5 except as noted.     Sensory Exam   Light touch normal.     Gait, Coordination, and Reflexes     Gait  Gait:  normal    Coordination   Finger to nose coordination: normal  Heel to shin coordination: normal    Reflexes   Right brachioradialis: 2+  Left brachioradialis: 2+  Right biceps: 2+  Left biceps: 2+  Right triceps: 2+  Left triceps: 2+  Right patellar: 2+  Left patellar: 2+  Right achilles: 2+  Left achilles: 2+  Right : 2+  Left : 2+      Assessment/Plan   Independent Review of Radiographic Studies:      I reviewed an MRI of her lumbar spine done on 13 February myself.  This does show a widely patent canal and neuroforamina at T12-L1.  L1-2 is also fairly open.  L2-3 shows some disc bulging right in the middle and some right lateral recess stenosis.  Just below the disc space there is a large disc herniation to the left side.  This appears to extend all the way down to the bottom of the L3 vertebral body but not to the disc space.  L4-5 looks okay as does L5-S1.    Medical Decision Making:      I told the patient and her daughter about the imaging.  I told him that from my point of view I would not recommend jumping into any surgery at her age.  I told her that she should go ahead and try the other 2 epidural blocks and also add in some physical therapy.  If that is simply not helping then we could certainly consider surgery but I would really recommend trying that before we consider anything more aggressive.  They agree and will call if that is not helping.    Shirley was seen today for back pain and leg pain.    Diagnoses and all orders for this visit:    Neuritis or radiculitis due to rupture of lumbar intervertebral disc  -     Ambulatory Referral to Physical Therapy      No Follow-up on file.

## 2019-02-26 ENCOUNTER — OFFICE VISIT (OUTPATIENT)
Dept: NEUROSURGERY | Facility: CLINIC | Age: 84
End: 2019-02-26

## 2019-02-26 VITALS
HEART RATE: 59 BPM | BODY MASS INDEX: 26.93 KG/M2 | HEIGHT: 63 IN | WEIGHT: 152 LBS | SYSTOLIC BLOOD PRESSURE: 169 MMHG | DIASTOLIC BLOOD PRESSURE: 91 MMHG

## 2019-02-26 DIAGNOSIS — M51.16 NEURITIS OR RADICULITIS DUE TO RUPTURE OF LUMBAR INTERVERTEBRAL DISC: Primary | ICD-10-CM

## 2019-02-26 PROCEDURE — 99203 OFFICE O/P NEW LOW 30 MIN: CPT | Performed by: NEUROLOGICAL SURGERY

## 2019-02-26 RX ORDER — CEPHALEXIN 250 MG/1
250 CAPSULE ORAL 4 TIMES DAILY
COMMUNITY
End: 2019-05-24

## 2019-03-05 ENCOUNTER — TELEPHONE (OUTPATIENT)
Dept: FAMILY MEDICINE CLINIC | Facility: CLINIC | Age: 84
End: 2019-03-05

## 2019-03-05 RX ORDER — LANCETS 33 GAUGE
EACH MISCELLANEOUS
Qty: 100 EACH | Refills: 4 | Status: SHIPPED | OUTPATIENT
Start: 2019-03-05 | End: 2019-05-10 | Stop reason: SDUPTHER

## 2019-03-05 NOTE — TELEPHONE ENCOUNTER
[facility] requesting detail for testing    We have test as directed. Please change to bid on the one touch delica lancets [phone] #[phone]

## 2019-03-19 ENCOUNTER — OFFICE VISIT (OUTPATIENT)
Dept: PAIN MEDICINE | Facility: CLINIC | Age: 84
End: 2019-03-19

## 2019-03-19 VITALS
DIASTOLIC BLOOD PRESSURE: 77 MMHG | SYSTOLIC BLOOD PRESSURE: 137 MMHG | WEIGHT: 151 LBS | BODY MASS INDEX: 26.75 KG/M2 | OXYGEN SATURATION: 96 % | HEIGHT: 63 IN | TEMPERATURE: 98 F | HEART RATE: 59 BPM

## 2019-03-19 DIAGNOSIS — M54.42 CHRONIC BILATERAL LOW BACK PAIN WITH LEFT-SIDED SCIATICA: ICD-10-CM

## 2019-03-19 DIAGNOSIS — G89.29 OTHER CHRONIC PAIN: Primary | ICD-10-CM

## 2019-03-19 DIAGNOSIS — G89.29 CHRONIC BILATERAL LOW BACK PAIN WITH LEFT-SIDED SCIATICA: ICD-10-CM

## 2019-03-19 PROCEDURE — 99214 OFFICE O/P EST MOD 30 MIN: CPT | Performed by: NURSE PRACTITIONER

## 2019-03-19 RX ORDER — CARVEDILOL 6.25 MG/1
6.25 TABLET ORAL 2 TIMES DAILY WITH MEALS
Qty: 60 TABLET | Refills: 5 | Status: SHIPPED | OUTPATIENT
Start: 2019-03-19 | End: 2019-07-13 | Stop reason: SDUPTHER

## 2019-03-19 NOTE — PROGRESS NOTES
CHIEF COMPLAINT  F/U back pain- Lumbar Epidural Steroid Injection. Patient states the first four weeks after the injection the procedure helped 100% but now her pain is increasing. Patient is having physical therapy twice a week at this time.     Subjective   Donna M Yeoman is a 84 y.o. female  who presents to the office for follow-up of procedure.  She completed a L5/S1 LESI   on  2/11/19 performed by Dr. Ahmadi for management of back pain. Patient reports 100% relief from the procedure X 1 month.     Patient was evaluated by Dr. Hall 2/26/2019.  Recommending completing a complete series of 3 lumbar epidural injections.  If this is not helping we could consider surgery.  She has also started physical therapy.       Back Pain    This is a chronic problem. The current episode started more than 1 year ago. The problem occurs daily. The problem has been waxing and waning since onset. The pain is present in the lumbar spine. The quality of the pain is described as aching. The pain radiates to the left foot. The pain is at a severity of 5/10. The pain is moderate. Exacerbated by: pain is aggravated by activity. Associated symptoms include headaches. Pertinent negatives include no chest pain, dysuria, fever, numbness or weakness. Treatments tried: Tylenol, injections. The treatment provided moderate relief.      Current pain medications:   Tylenol prn     Past therapies:  Physical Therapy: yes (back pain)  Chiropractor: no  Massage Therapy: no  TENS: yes (didn't help)  Neck or back surgery: no  Past pain management: no     Previous Injections: back injections at [facility] in 2006  Effect of Injection (%): helped alot    MRI LUMBAR      PEG Assessment   What number best describes your pain on average in the past week?7  What number best describes how, during the past week, pain has interfered with your enjoyment of life?7  What number best describes how, during the past week, pain has interfered with your general  "activity?  7      The following portions of the patient's history were reviewed and updated as appropriate: allergies, current medications, past family history, past medical history, past social history, past surgical history and problem list.    Review of Systems   Constitutional: Positive for activity change and fatigue. Negative for chills and fever.   HENT: Positive for hearing loss ([place]).    Respiratory: Positive for shortness of breath.    Cardiovascular: Negative for chest pain.   Gastrointestinal: Negative for constipation (takes fiber), diarrhea, nausea and vomiting.   Genitourinary: Negative for difficulty urinating (pt states she gets a lot of bladder infections), dyspareunia and dysuria.   Musculoskeletal: Positive for back pain and neck pain.   Skin: Negative for rash and wound.   Allergic/Immunologic: Negative for immunocompromised state.   Neurological: Positive for dizziness (at times-thinks r/t medicine) and headaches. Negative for weakness, light-headedness and numbness.   Hematological: Does not bruise/bleed easily.   Psychiatric/Behavioral: Positive for agitation and sleep disturbance (sometimes josias r/t left leg pain). Negative for confusion, hallucinations, self-injury and suicidal ideas. The patient is not nervous/anxious.    All other systems reviewed and are negative.      Vitals:    03/19/19 1121   BP: 137/77   Pulse: 59   Temp: 98 °F (36.7 °C)   SpO2: 96%   Weight: 68.5 kg (151 lb)   Height: 160 cm (63\")   PainSc:   5   PainLoc: Back     Objective   Physical Exam   Constitutional: She is oriented to person, place, and time. She appears well-developed and well-nourished. No distress.   HENT:   Head: Normocephalic and atraumatic.   Eyes: Conjunctivae and EOM are normal.   Neck: Neck supple.   Cardiovascular: Normal rate.   Pulmonary/Chest: Effort normal. No respiratory distress.   Musculoskeletal:        Lumbar back: She exhibits tenderness and pain.   +SLR LEFT    Neurological: She is alert " and oriented to person, place, and time. She has normal strength. No sensory deficit. Coordination and gait normal.   Skin: Skin is warm and dry. She is not diaphoretic.   Psychiatric: She has a normal mood and affect. Her behavior is normal.   Nursing note and vitals reviewed.      Assessment/Plan   Shirley was seen today for back pain.    Diagnoses and all orders for this visit:    Other chronic pain    Chronic bilateral low back pain with left-sided sciatica  -     Case Request      --- Repeat L5/S1 LESI X 2, 4 weeks apart.  Reviewed the procedure at length with the patient.  Included in the review was expectations, complications, risk and benefits.The procedure was described in detail and the risks, benefits and alternatives were discussed with the patient (including but not limited to: bleeding, infection, nerve damage, worsening of pain, inability to perform injection, paralysis, seizures, and death) who agreed to proceed.  Discussed the potential for sedation if warranted/wanted.  The procedure will plan to be performed at [facility] with fluoroscopic guidance(unless ultrasound is indicated). Questions were answered and in a way the patient could understand.  Patient verbalized understanding and wishes to proceed.  This intervention will be ordered.  --- Continue with PT   --- Follow-up after procedure         APRIL REPORT  APRIL report has been reviewed and scanned into the patient's chart.    As the clinician, I personally reviewed the APRIL from 3/18/19 while the patient was in the office today.    EMR Dragon/Transcription disclaimer:   Much of this encounter note is an electronic transcription/translation of spoken language to printed text. The electronic translation of spoken language may permit erroneous, or at times, nonsensical words or phrases to be inadvertently transcribed; Although I have reviewed the note for such errors, some may still exist.      INITIAL VISIT WITH  ALICIA KEEN, APRN

## 2019-04-01 ENCOUNTER — OFFICE VISIT (OUTPATIENT)
Dept: FAMILY MEDICINE CLINIC | Facility: CLINIC | Age: 84
End: 2019-04-01

## 2019-04-01 VITALS
WEIGHT: 153 LBS | HEIGHT: 63 IN | TEMPERATURE: 98.6 F | DIASTOLIC BLOOD PRESSURE: 77 MMHG | HEART RATE: 70 BPM | BODY MASS INDEX: 27.11 KG/M2 | OXYGEN SATURATION: 94 % | RESPIRATION RATE: 18 BRPM | SYSTOLIC BLOOD PRESSURE: 151 MMHG

## 2019-04-01 DIAGNOSIS — J30.89 SEASONAL ALLERGIC RHINITIS DUE TO OTHER ALLERGIC TRIGGER: Primary | ICD-10-CM

## 2019-04-01 DIAGNOSIS — H61.21 IMPACTED CERUMEN, RIGHT EAR: ICD-10-CM

## 2019-04-01 PROCEDURE — 99213 OFFICE O/P EST LOW 20 MIN: CPT | Performed by: NURSE PRACTITIONER

## 2019-04-01 RX ORDER — FLUTICASONE PROPIONATE 50 MCG
2 SPRAY, SUSPENSION (ML) NASAL DAILY
Qty: 1 BOTTLE | Refills: 0 | Status: SHIPPED | OUTPATIENT
Start: 2019-04-01 | End: 2019-04-28 | Stop reason: SDUPTHER

## 2019-04-01 RX ORDER — BLOOD SUGAR DIAGNOSTIC
STRIP MISCELLANEOUS
Refills: 5 | COMMUNITY
Start: 2019-02-24 | End: 2019-12-17 | Stop reason: SDUPTHER

## 2019-04-01 RX ORDER — BROMPHENIRAMINE MALEATE, PSEUDOEPHEDRINE HYDROCHLORIDE, AND DEXTROMETHORPHAN HYDROBROMIDE 2; 30; 10 MG/5ML; MG/5ML; MG/5ML
5 SYRUP ORAL 4 TIMES DAILY PRN
Qty: 118 ML | Refills: 1 | Status: SHIPPED | OUTPATIENT
Start: 2019-04-01 | End: 2019-05-24

## 2019-04-01 NOTE — PROGRESS NOTES
Subjective   Donna M Yeoman is a 84 y.o. female.     Chief Complaint   Patient presents with   • Cough     ear, throat,       HPI  Patient is new to me. Here for 1 day h/o right ear pain that is intermittent and associated with congestion and cough.  She denies any fever chills but does think she has been more fatigued over the last several months intermittently.  She has been using cough drops which helped a little bit but not much.  She is feeling frustrated with the congestion and fatigue.  She reports that she has been more cold than normal and in fact has trouble getting warm but this is been going on for several months and is not worsened recently.  She does tell me that she typically does get allergy type symptoms with runny nose and congestion this type of this time of year but it does seem worse this year.  She does get a flu vaccine and had one this year.    She has been out of her loratadine as well as her fluticasone which she typically uses this time of year.  And she does report that they usually do seem to help her symptoms.     She has recently been to the urgent care and treated for UTI with Cipro which she is taking and tolerating well.  Her symptoms of UTI are mostly resolved.    Social History     Tobacco Use   • Smoking status: Never Smoker   • Smokeless tobacco: Never Used   Substance Use Topics   • Alcohol use: No   • Drug use: No       The following portions of the patient's history were reviewed and updated as appropriate: allergies, current medications, past family history, past medical history, past social history, past surgical history and problem list.    Review of Systems   Constitutional: Negative for activity change, appetite change and fever.   HENT: Positive for postnasal drip, sinus pressure and sore throat. Negative for ear discharge, sinus pain and trouble swallowing.    Eyes: Positive for itching (Intermittently). Negative for discharge.   Respiratory: Positive for cough (Dry  "and hacking). Negative for shortness of breath and wheezing.    Cardiovascular: Negative for chest pain and palpitations.   Gastrointestinal: Negative for abdominal pain, diarrhea, nausea and vomiting.   Genitourinary: Negative for dysuria, frequency and hematuria.   Musculoskeletal: Negative for arthralgias and myalgias.   Allergic/Immunologic: Positive for environmental allergies.   Neurological: Negative for weakness and headaches.       Objective   Blood pressure 151/77, pulse 70, temperature 98.6 °F (37 °C), resp. rate 18, height 160 cm (63\"), weight 69.4 kg (153 lb), SpO2 94 %, not currently breastfeeding.    Physical Exam   Constitutional: She is oriented to person, place, and time. She appears well-developed and well-nourished. No distress.   HENT:   Head: Normocephalic and atraumatic.   Right Ear: External ear normal. Tympanic membrane is not erythematous and not bulging. A middle ear effusion is present.   Left Ear: External ear and ear canal normal. Tympanic membrane is not erythematous and not bulging.  No middle ear effusion.   Nose: Mucosal edema and rhinorrhea present.   Mouth/Throat: Uvula is midline. Posterior oropharyngeal erythema (PND noted) present. No oropharyngeal exudate.   Right ear canal was impacted with cerumen and initially not able to visualize TM.  After ear was flushed TM visualized, mildly pink most likely due to flushing her ear, small amount of effusion behind TM, no erythema or bulge.      Patient did feel like her ear was may be a little bit better after it was flushed.   Eyes: Conjunctivae are normal. Right eye exhibits no discharge. Left eye exhibits no discharge.   Neck: Neck supple.   Cardiovascular: Normal rate, regular rhythm and normal heart sounds.   Pulmonary/Chest: Effort normal and breath sounds normal. She has no wheezes. She has no rales.   Dry cough noted   Abdominal: Soft. Bowel sounds are normal. There is no tenderness.   Musculoskeletal: She exhibits no " deformity.   Gait smooth and steady   Lymphadenopathy:     She has no cervical adenopathy.   Neurological: She is alert and oriented to person, place, and time.   Skin: Skin is warm and dry. No rash noted.   Psychiatric: She has a normal mood and affect.   Nursing note and vitals reviewed.      Assessment   Problem List Items Addressed This Visit        Respiratory    Seasonal allergic rhinitis - Primary    Relevant Medications    fluticasone (FLONASE) 50 MCG/ACT nasal spray    brompheniramine-pseudoephedrine-DM 30-2-10 MG/5ML syrup      Other Visit Diagnoses     Impacted cerumen, right ear               Procedures           Impression and Plan:  Fluticasone BID x 1 week, you can try to double up on the loratadine for 1 week as well.  Did instruct her to only use Bromfed for short amount of time due to possible increase in blood pressure.  She is to return if she has worsening symptoms.  I reviewed her most recent urgent care for UTI.  Culture is still pending.  She does report that she gets frequent UTIs.  We discussed management of them.    Health Maintenance Due   Topic Date Due   • URINE MICROALBUMIN  01/31/2018   • DXA SCAN  01/31/2019   • HEMOGLOBIN A1C  02/24/2019

## 2019-04-08 ENCOUNTER — TELEPHONE (OUTPATIENT)
Dept: FAMILY MEDICINE CLINIC | Facility: CLINIC | Age: 84
End: 2019-04-08

## 2019-04-10 RX ORDER — OMEPRAZOLE 40 MG/1
CAPSULE, DELAYED RELEASE ORAL
Qty: 90 CAPSULE | Refills: 1 | Status: SHIPPED | OUTPATIENT
Start: 2019-04-10 | End: 2019-07-10 | Stop reason: SDUPTHER

## 2019-04-23 ENCOUNTER — DOCUMENTATION (OUTPATIENT)
Dept: PAIN MEDICINE | Facility: CLINIC | Age: 84
End: 2019-04-23

## 2019-04-23 ENCOUNTER — OUTSIDE FACILITY SERVICE (OUTPATIENT)
Dept: PAIN MEDICINE | Facility: CLINIC | Age: 84
End: 2019-04-23

## 2019-04-23 PROCEDURE — 62323 NJX INTERLAMINAR LMBR/SAC: CPT | Performed by: ANESTHESIOLOGY

## 2019-04-24 ENCOUNTER — DOCUMENTATION (OUTPATIENT)
Dept: PAIN MEDICINE | Facility: CLINIC | Age: 84
End: 2019-04-24

## 2019-04-24 NOTE — PROGRESS NOTES
Lumbar Epidural Steroid Injection  [facility]    PREOPERATIVE DIAGNOSIS:   Lumbar Degenerative Disc Disease and left Lumbar Radiculopathy  POSTOPERATIVE DIAGNOSIS:  Same as preop diagnosis    PROCEDURE:   Lumbar Epidural Steroid Injection, Therapeutic Translaminar Injection, with epidurogram, at  L5/S1 level    PRE-PROCEDURE DISCUSSION WITH PATIENT:    Risks and complications were discussed with the patient prior to starting the procedure and informed consent was obtained.  We discussed various topics including but not limited to bleeding, infection, injury, paralysis, nerve injury, dural puncture, coma, death, worsening of clinical picture, lack of pain relief, and postprocedural soreness.    SURGEON:  Bud Perry MD    REASON FOR PROCEDURE:    Previous clinically significant therapeutic effect is noted. and Acute pain flareup is noted & problematic, and the injection is used to attempt to break the flareup.      SEDATION:  Patient declined administration of moderate sedation    ANESTHETIC:  Marcaine 0.25%  STEROID:   Methylprednisolone (DEPO MEDROL) 80mg/ml    DESCRIPTON OF PROCEDURE:    After obtaining informed consent, I.V. was not started in the preop area.   The patient was taken to the operating room and placed in the prone position.  EKG, blood pressure, and pulse oximeter were monitored throughout, and sedation was provided as needed by the RN under my guidance. All pressure points were well padded.  The lumbar spine area was prepped with Chloraprep and draped in a sterile fashion.  Under fluoroscopic guidance, the above mentioned interlaminar space was identified. Skin and subcutaneous tissues were anesthetized with 1% lidocaine in the middle of the space. A Tuohy needle was introduced through the skin and advanced to this interlaminar space and into the epidural space under fluoroscopic guidance and verified with loss-of-resistance technique to air.  After confirming the position  of the needle with the fluoroscope with all the views, and after aspiration was confirmed negative for blood and CSF, 1.5 mL of Omnipaque was injected.  After seeing appropriate epidurogram with lateral and PA views, a total of 3 cc solution was injected, consisting of 1cc of local anesthetic as above, with normal saline and injectable steroid as above.     ESTIMATED BLOOD LOSS:  <5 mL  SPECIMENS:  None    COMPLICATIONS:     No complications were noted., There was no indication of vascular uptake on live injection of contrast dye., There was no indication of intrathecal uptake on live injection of contrast dye., There was not any evidence of dural puncture.   and The patient did not have any signs of postprocedure numbness nor weakness.    TOLERANCE & DISCHARGE CONDITION:    The patient tolerated the procedure well.  The patient was transported to the recovery area without difficulties.  The patient was discharged to home under the care of family in stable and satisfactory condition.    PLAN OF CARE:  1. The patient was given our standard instruction sheet.  2. The patient will Repeat injection in 4 wks PRN  3. The patient will resume all medications as per the medication reconciliation sheet.

## 2019-04-28 DIAGNOSIS — J30.89 SEASONAL ALLERGIC RHINITIS DUE TO OTHER ALLERGIC TRIGGER: ICD-10-CM

## 2019-04-29 RX ORDER — FLUTICASONE PROPIONATE 50 MCG
2 SPRAY, SUSPENSION (ML) NASAL DAILY
Qty: 16 ML | Refills: 0 | Status: SHIPPED | OUTPATIENT
Start: 2019-04-29 | End: 2019-05-24

## 2019-05-13 RX ORDER — LANCETS 33 GAUGE
EACH MISCELLANEOUS
Qty: 100 EACH | Refills: 1 | Status: SHIPPED | OUTPATIENT
Start: 2019-05-13

## 2019-05-16 ENCOUNTER — RESULTS ENCOUNTER (OUTPATIENT)
Dept: FAMILY MEDICINE CLINIC | Facility: CLINIC | Age: 84
End: 2019-05-16

## 2019-05-16 DIAGNOSIS — E03.9 HYPOTHYROIDISM, UNSPECIFIED TYPE: ICD-10-CM

## 2019-05-16 DIAGNOSIS — E53.8 B12 DEFICIENCY: ICD-10-CM

## 2019-05-16 DIAGNOSIS — I10 HYPERTENSION, UNSPECIFIED TYPE: ICD-10-CM

## 2019-05-16 DIAGNOSIS — E11.9 TYPE 2 DIABETES MELLITUS WITHOUT COMPLICATION, WITHOUT LONG-TERM CURRENT USE OF INSULIN (HCC): ICD-10-CM

## 2019-05-16 DIAGNOSIS — E78.5 HYPERLIPIDEMIA, UNSPECIFIED HYPERLIPIDEMIA TYPE: ICD-10-CM

## 2019-05-16 DIAGNOSIS — I10 ESSENTIAL HYPERTENSION: ICD-10-CM

## 2019-05-16 DIAGNOSIS — E55.9 VITAMIN D DEFICIENCY: ICD-10-CM

## 2019-05-16 DIAGNOSIS — I10 ESSENTIAL HYPERTENSION: Primary | ICD-10-CM

## 2019-05-18 LAB
25(OH)D3+25(OH)D2 SERPL-MCNC: 49.4 NG/ML (ref 30–100)
ALBUMIN SERPL-MCNC: 3.9 G/DL (ref 3.5–5.2)
ALBUMIN/GLOB SERPL: 1.4 G/DL
ALP SERPL-CCNC: 61 U/L (ref 39–117)
ALT SERPL-CCNC: 15 U/L (ref 1–33)
AST SERPL-CCNC: 21 U/L (ref 1–32)
BASOPHILS # BLD AUTO: 0.03 10*3/MM3 (ref 0–0.2)
BASOPHILS NFR BLD AUTO: 0.5 % (ref 0–1.5)
BILIRUB SERPL-MCNC: 0.7 MG/DL (ref 0.2–1.2)
BUN SERPL-MCNC: 18 MG/DL (ref 8–23)
BUN/CREAT SERPL: 18.2 (ref 7–25)
CALCIUM SERPL-MCNC: 9.4 MG/DL (ref 8.6–10.5)
CHLORIDE SERPL-SCNC: 105 MMOL/L (ref 98–107)
CHOLEST SERPL-MCNC: 187 MG/DL (ref 0–200)
CO2 SERPL-SCNC: 28.8 MMOL/L (ref 22–29)
CREAT SERPL-MCNC: 0.99 MG/DL (ref 0.57–1)
EOSINOPHIL # BLD AUTO: 0.24 10*3/MM3 (ref 0–0.4)
EOSINOPHIL NFR BLD AUTO: 3.9 % (ref 0.3–6.2)
ERYTHROCYTE [DISTWIDTH] IN BLOOD BY AUTOMATED COUNT: 14.5 % (ref 12.3–15.4)
GLOBULIN SER CALC-MCNC: 2.8 GM/DL
GLUCOSE SERPL-MCNC: 124 MG/DL (ref 65–99)
HBA1C MFR BLD: 7 % (ref 4.8–5.6)
HCT VFR BLD AUTO: 42 % (ref 34–46.6)
HDLC SERPL-MCNC: 50 MG/DL (ref 40–60)
HGB BLD-MCNC: 12.9 G/DL (ref 12–15.9)
IMM GRANULOCYTES # BLD AUTO: 0.01 10*3/MM3 (ref 0–0.05)
IMM GRANULOCYTES NFR BLD AUTO: 0.2 % (ref 0–0.5)
LDLC SERPL CALC-MCNC: 111 MG/DL (ref 0–100)
LDLC/HDLC SERPL: 2.23 {RATIO}
LYMPHOCYTES # BLD AUTO: 1.45 10*3/MM3 (ref 0.7–3.1)
LYMPHOCYTES NFR BLD AUTO: 23.5 % (ref 19.6–45.3)
MCH RBC QN AUTO: 29.4 PG (ref 26.6–33)
MCHC RBC AUTO-ENTMCNC: 30.7 G/DL (ref 31.5–35.7)
MCV RBC AUTO: 95.7 FL (ref 79–97)
MONOCYTES # BLD AUTO: 0.4 10*3/MM3 (ref 0.1–0.9)
MONOCYTES NFR BLD AUTO: 6.5 % (ref 5–12)
NEUTROPHILS # BLD AUTO: 4.05 10*3/MM3 (ref 1.7–7)
NEUTROPHILS NFR BLD AUTO: 65.4 % (ref 42.7–76)
NRBC BLD AUTO-RTO: 0 /100 WBC (ref 0–0.2)
PLATELET # BLD AUTO: 171 10*3/MM3 (ref 140–450)
POTASSIUM SERPL-SCNC: 3.7 MMOL/L (ref 3.5–5.2)
PROT SERPL-MCNC: 6.7 G/DL (ref 6–8.5)
RBC # BLD AUTO: 4.39 10*6/MM3 (ref 3.77–5.28)
SODIUM SERPL-SCNC: 146 MMOL/L (ref 136–145)
T3FREE SERPL-MCNC: 2.4 PG/ML (ref 2–4.4)
T4 FREE SERPL-MCNC: 1.34 NG/DL (ref 0.93–1.7)
TRIGL SERPL-MCNC: 128 MG/DL (ref 0–150)
TSH SERPL DL<=0.005 MIU/L-ACNC: 1.35 MIU/ML (ref 0.27–4.2)
URATE SERPL-MCNC: 5.3 MG/DL (ref 2.4–5.7)
VIT B12 SERPL-MCNC: 885 PG/ML (ref 211–946)
VLDLC SERPL CALC-MCNC: 25.6 MG/DL
WBC # BLD AUTO: 6.18 10*3/MM3 (ref 3.4–10.8)

## 2019-05-24 ENCOUNTER — OFFICE VISIT (OUTPATIENT)
Dept: FAMILY MEDICINE CLINIC | Facility: CLINIC | Age: 84
End: 2019-05-24

## 2019-05-24 VITALS
DIASTOLIC BLOOD PRESSURE: 78 MMHG | HEIGHT: 63 IN | BODY MASS INDEX: 26.86 KG/M2 | OXYGEN SATURATION: 98 % | WEIGHT: 151.6 LBS | HEART RATE: 63 BPM | SYSTOLIC BLOOD PRESSURE: 132 MMHG

## 2019-05-24 DIAGNOSIS — E03.9 HYPOTHYROIDISM, UNSPECIFIED TYPE: ICD-10-CM

## 2019-05-24 DIAGNOSIS — E78.5 HYPERLIPIDEMIA, UNSPECIFIED HYPERLIPIDEMIA TYPE: ICD-10-CM

## 2019-05-24 DIAGNOSIS — R09.81 SINUS CONGESTION: ICD-10-CM

## 2019-05-24 DIAGNOSIS — R51.9 MORNING HEADACHE: ICD-10-CM

## 2019-05-24 DIAGNOSIS — E53.8 B12 DEFICIENCY: ICD-10-CM

## 2019-05-24 DIAGNOSIS — E78.5 HYPERLIPIDEMIA: ICD-10-CM

## 2019-05-24 DIAGNOSIS — E11.9 TYPE 2 DIABETES MELLITUS WITHOUT COMPLICATION, WITHOUT LONG-TERM CURRENT USE OF INSULIN (HCC): ICD-10-CM

## 2019-05-24 DIAGNOSIS — E55.9 VITAMIN D DEFICIENCY: ICD-10-CM

## 2019-05-24 DIAGNOSIS — R06.83 SNORING: ICD-10-CM

## 2019-05-24 DIAGNOSIS — K21.00 GASTROESOPHAGEAL REFLUX DISEASE WITH ESOPHAGITIS: ICD-10-CM

## 2019-05-24 DIAGNOSIS — I10 ESSENTIAL HYPERTENSION: Primary | ICD-10-CM

## 2019-05-24 PROCEDURE — 99214 OFFICE O/P EST MOD 30 MIN: CPT | Performed by: INTERNAL MEDICINE

## 2019-05-24 RX ORDER — AMOXICILLIN 875 MG/1
875 TABLET, COATED ORAL 2 TIMES DAILY
Qty: 20 TABLET | Refills: 0 | Status: SHIPPED | OUTPATIENT
Start: 2019-05-24 | End: 2019-09-25

## 2019-05-24 RX ORDER — ROSUVASTATIN CALCIUM 10 MG/1
10 TABLET, COATED ORAL DAILY
Qty: 30 TABLET | Refills: 5 | Status: SHIPPED | OUTPATIENT
Start: 2019-05-24 | End: 2019-10-10 | Stop reason: SDUPTHER

## 2019-05-24 NOTE — PROGRESS NOTES
Subjective   Donna M Yeoman is a 84 y.o. female who comes in today for   Chief Complaint   Patient presents with   • Hypertension     lab results   • Sinus Problem   • Breathing Problem     Feels like trouble breathing   .    History of Present Illness   Here to f/u on HTN on coreg 6.25mg bid and HCTZ, HT on levoxyl, GERD on prilosec, NIDDM on no meds and following diet, HL on crestor 5 mg qd.  Having sinus congestion for a few months.  Taking flonase and claritin and helped for about a month and now RN vida is back.  Wakes with ha's every morning for 3 months.  Wondering if it is BP related.  Does snore and isn't using cpap.  Has a lifetime thing with coughing she states.  No change in her cough.  cxr neg 2017.  Mostly dry.  Doing great with her PT and epidurals.  Really helped her low back and neck.    The following portions of the patient's history were reviewed and updated as appropriate: allergies, current medications, past family history, past medical history, past social history, past surgical history and problem list.    Review of Systems   Constitutional: Negative for fever.   HENT: Positive for congestion.    Respiratory:        No change in her cough that she has had all of her life per patient history   Musculoskeletal:        Approved neck and back pain with epidurals and physical therapy   Neurological: Positive for headaches (in the am).       Vitals:    05/24/19 1049   BP: 132/78   Pulse: 63   SpO2: 98%     Fall Risk Assessment was completed, and patient is at low risk for falls.  PHQ-9 Depression Screening  Little interest or pleasure in doing things? 0   Feeling down, depressed, or hopeless? 0   Trouble falling or staying asleep, or sleeping too much?     Feeling tired or having little energy?     Poor appetite or overeating?     Feeling bad about yourself - or that you are a failure or have let yourself or your family down?     Trouble concentrating on things, such as reading the newspaper or  watching television?     Moving or speaking so slowly that other people could have noticed? Or the opposite - being so fidgety or restless that you have been moving around a lot more than usual?     Thoughts that you would be better off dead, or of hurting yourself in some way?     PHQ-9 Total Score 0   If you checked off any problems, how difficult have these problems made it for you to do your work, take care of things at home, or get along with other people?         Objective   Physical Exam   Constitutional: She is oriented to person, place, and time. She appears well-developed and well-nourished.   HENT:   Head: Normocephalic and atraumatic.   Right Ear: External ear normal.   Left Ear: External ear normal.   Mouth/Throat: Oropharynx is clear and moist.   Eyes: Conjunctivae are normal.   Neck: Neck supple.   Cardiovascular: Normal rate, regular rhythm and normal heart sounds.   No bruits   Pulmonary/Chest: Effort normal and breath sounds normal. No respiratory distress. She has no wheezes. She has no rales.   Abdominal: Soft. Bowel sounds are normal. She exhibits no distension and no mass. There is no tenderness.   Lymphadenopathy:     She has no cervical adenopathy.   Neurological: She is alert and oriented to person, place, and time.   Skin: Skin is warm.   Psychiatric: She has a normal mood and affect. Her behavior is normal. Judgment and thought content normal.   Nursing note and vitals reviewed.        Current Outpatient Medications:   •  allopurinol (ZYLOPRIM) 100 MG tablet, TAKE 1 TABLET DAILY, Disp: 90 tablet, Rfl: 1  •  aspirin 81 MG EC tablet, Take 81 mg by mouth Daily., Disp: , Rfl:   •  carvedilol (COREG) 6.25 MG tablet, TAKE 1 TABLET BY MOUTH 2 (TWO) TIMES A DAY WITH MEALS., Disp: 60 tablet, Rfl: 5  •  CRANBERRY PO, Take  by mouth., Disp: , Rfl:   •  Cyanocobalamin (B-12 PO), Take  by mouth., Disp: , Rfl:   •  ESTRACE VAGINAL 0.1 MG/GM vaginal cream, INSERT 1/2 TO 1 INCH VAGINALLY ONCE WEEKLY AS  DIRECTED, Disp: , Rfl: 11  •  FIBER PO, Take  by mouth Daily., Disp: , Rfl:   •  hydrochlorothiazide (MICROZIDE) 12.5 MG capsule, TAKE 1 TO 2 TABLETS DAILY FOR HTN AND LE SWELLING, Disp: 60 capsule, Rfl: 3  •  levothyroxine (SYNTHROID, LEVOTHROID) 75 MCG tablet, Take 1 tablet by mouth daily., Disp: 90 tablet, Rfl: 2  •  omeprazole (priLOSEC) 40 MG capsule, TAKE 1 CAPSULE BY MOUTH TWICE A DAY, Disp: 90 capsule, Rfl: 1  •  ONETOUCH DELICA LANCETS 33G misc, USE TO TEST BLOOD SUGAR TWICE DAILY, Disp: 100 each, Rfl: 1  •  ONETOUCH VERIO test strip, TEST EVERY MORNING, Disp: , Rfl: 5  •  Probiotic Product (PROBIOTIC DAILY PO), Take  by mouth Daily., Disp: , Rfl:   •  rosuvastatin (CRESTOR) 10 MG tablet, Take 1 tablet by mouth Daily., Disp: 30 tablet, Rfl: 5  •  amoxicillin (AMOXIL) 875 MG tablet, Take 1 tablet by mouth 2 (Two) Times a Day., Disp: 20 tablet, Rfl: 0    Assessment/Plan   Shirley was seen today for hypertension, sinus problem and breathing problem.    Diagnoses and all orders for this visit:    Essential hypertension    Hyperlipidemia, unspecified hyperlipidemia type  -     Comprehensive Metabolic Panel; Future  -     Lipid Panel With LDL / HDL Ratio; Future  -     Hemoglobin A1c; Future    Sinus congestion    B12 deficiency    Gastroesophageal reflux disease with esophagitis    Vitamin D deficiency     Type 2 diabetes mellitus without complication, without long-term current use of insulin (CMS/[facility])  -     Comprehensive Metabolic Panel; Future  -     Lipid Panel With LDL / HDL Ratio; Future  -     Hemoglobin A1c; Future    Hypothyroidism, unspecified type    Snoring  -     Ambulatory Referral to Sleep Medicine    Morning headache  -     Ambulatory Referral to Sleep Medicine    Hyperlipidemia  -     rosuvastatin (CRESTOR) 10 MG tablet; Take 1 tablet by mouth Daily.    Other orders  -     amoxicillin (AMOXIL) 875 MG tablet; Take 1 tablet by mouth 2 (Two) Times a Day.    start flonase back and amoxil for continued  sinus congestion.  If the cough and/or nasal symptoms persist she will need to call us back and get a chest x-ray and refer to Dr. Simpson  Call if persistent sinus sx and will have her see Dr. Simpson  Labs have been reviewed and I did increase her Crestor from 5 mg to 10 mg daily and she will come back in 2 months for follow-up labs.  Go to recheck her A1c at that time 2.  She was very hesitant and declined starting a medication such as metformin for her diabetes.  She says she is going to work on getting rid of the sweets in her diet.  I did refer her to sleep medicine for a sleep study.  I am wondering if the morning headaches could be related to sleep apnea.  This would also explain her fatigue.               I have asked for the patient to return to clinic in 6month(s).

## 2019-06-13 ENCOUNTER — OFFICE VISIT (OUTPATIENT)
Dept: SLEEP MEDICINE | Facility: HOSPITAL | Age: 84
End: 2019-06-13

## 2019-06-13 VITALS
WEIGHT: 151 LBS | SYSTOLIC BLOOD PRESSURE: 132 MMHG | HEIGHT: 63 IN | DIASTOLIC BLOOD PRESSURE: 74 MMHG | HEART RATE: 67 BPM | OXYGEN SATURATION: 97 % | BODY MASS INDEX: 26.75 KG/M2

## 2019-06-13 DIAGNOSIS — G47.33 OSA (OBSTRUCTIVE SLEEP APNEA): Primary | ICD-10-CM

## 2019-06-13 DIAGNOSIS — E11.9 TYPE 2 DIABETES MELLITUS WITHOUT COMPLICATION, WITHOUT LONG-TERM CURRENT USE OF INSULIN (HCC): ICD-10-CM

## 2019-06-13 DIAGNOSIS — I10 ESSENTIAL HYPERTENSION: ICD-10-CM

## 2019-06-13 DIAGNOSIS — R51.9 MORNING HEADACHE: ICD-10-CM

## 2019-06-13 DIAGNOSIS — R06.83 SNORING: ICD-10-CM

## 2019-06-13 PROCEDURE — G0463 HOSPITAL OUTPT CLINIC VISIT: HCPCS

## 2019-06-13 PROCEDURE — 99204 OFFICE O/P NEW MOD 45 MIN: CPT | Performed by: INTERNAL MEDICINE

## 2019-06-13 NOTE — PROGRESS NOTES
[facility]  [address] [address]  Phone   Fax         Donna M Yeoman  1935  84 y.o.  female      PCP:Caroline Weiner MD    Type of service: Initial New Patient Office Visit  6/13/2019 Date of Service    Chief Complaint   Patient presents with   • Sleep Apnea   • Daytime Sleepiness   • Snoring   • Fatigue   • Morning Headaches       History of present illness;  Donna M Yeoman is a new patient for me and was seen today for sleep related problems.    Patient gives the following sleep history.  Sleep schedule:  Bedtime: 10 PM  Wake time: 7 AM  Normally takes about 60 minutes to fall asleep  Average hours of sleep 9  Number of naps per day 1  When patient wakes up still feels tired and not rested  Snoring yes  Witnessed apneas yes  Have you ever awakened gasping for breath, coughing, choking: yes      Past Medical History:   Diagnosis Date   • Arthritis    • Colon polyp 7/1/2017   • Diabetes mellitus (CMS/HCC)    • Esophageal reflux    • GERD (gastroesophageal reflux disease)    • H/O DVT (deep venous thrombosis)    • Headache 7/1/2017   • Hiatal hernia 7/1/2017   • Hypercholesteremia    • Hyperlipidemia    • Hypertension    • Low back pain 7/1/2017   • MGUS (monoclonal gammopathy of unknown significance)    • Osteoporosis    • Pneumonia 7/1/2017   • Pulmonary embolism (CMS/HCC)    • Pulmonary hypertension (CMS/HCC)    • Thyroid disease    • Venous thrombosis        Social history:  Shift work: No  Tobacco use: No   Alcohol use: No  Caffeinated drinks: 2  Over-the-counter sleeping aid and medications: None  Narcotic medications: No    Family Hx  Family history of sleep apnea no  Family History   Problem Relation Age of Onset   • Breast cancer Mother    • Colon cancer Mother    • Cancer Mother         breast and colon    • Heart disease Other    • Diabetes Other    • Hypertension Other        Medications: reviewed    Current Outpatient  Medications:   •  allopurinol (ZYLOPRIM) 100 MG tablet, TAKE 1 TABLET DAILY, Disp: 90 tablet, Rfl: 1  •  amoxicillin (AMOXIL) 875 MG tablet, Take 1 tablet by mouth 2 (Two) Times a Day., Disp: 20 tablet, Rfl: 0  •  aspirin 81 MG EC tablet, Take 81 mg by mouth Daily., Disp: , Rfl:   •  carvedilol (COREG) 6.25 MG tablet, TAKE 1 TABLET BY MOUTH 2 (TWO) TIMES A DAY WITH MEALS., Disp: 60 tablet, Rfl: 5  •  CRANBERRY PO, Take  by mouth., Disp: , Rfl:   •  Cyanocobalamin (B-12 PO), Take  by mouth., Disp: , Rfl:   •  ESTRACE VAGINAL 0.1 MG/GM vaginal cream, INSERT 1/2 TO 1 INCH VAGINALLY ONCE WEEKLY AS DIRECTED, Disp: , Rfl: 11  •  FIBER PO, Take  by mouth Daily., Disp: , Rfl:   •  hydrochlorothiazide (MICROZIDE) 12.5 MG capsule, TAKE 1 TO 2 TABLETS DAILY FOR HTN AND LE SWELLING, Disp: 60 capsule, Rfl: 3  •  levothyroxine (SYNTHROID, LEVOTHROID) 75 MCG tablet, Take 1 tablet by mouth daily., Disp: 90 tablet, Rfl: 2  •  omeprazole (priLOSEC) 40 MG capsule, TAKE 1 CAPSULE BY MOUTH TWICE A DAY, Disp: 90 capsule, Rfl: 1  •  ONETOUCH DELICA LANCETS 33G misc, USE TO TEST BLOOD SUGAR TWICE DAILY, Disp: 100 each, Rfl: 1  •  ONETOUCH VERIO test strip, TEST EVERY MORNING, Disp: , Rfl: 5  •  Probiotic Product (PROBIOTIC DAILY PO), Take  by mouth Daily., Disp: , Rfl:   •  rosuvastatin (CRESTOR) 10 MG tablet, Take 1 tablet by mouth Daily., Disp: 30 tablet, Rfl: 5    Review of systems:  [place] Sleepiness Scale: Total score: 8   Positive for snoring, witnessed apneas, fatigue and daytime excessive sleepiness,   Negative for shortness of breath, cough, wheezing, chest pain, nausea and vomiting, palpitation, swelling of feet:    Morning headaches: Severe headaches 5-6 times a week  Awaken with sore throat or dry mouth : Yes  Leg jerking at night: No  Crawly feeling/urge sensation to move in the legs: No  Teeth grinding: No  Sleepwalking, nightmares, muscle weakness with laughing or anger,sleep paralysis: No  Nasal Congestion: No  Nocturia  "(how many times/night): 2 -3  Memory Problem: Yes    Physical exam:  Vitals:    06/13/19 0900   BP: 132/74   Pulse: 67   SpO2: 97%   Weight: 68.5 kg (151 lb)   Height: 160 cm (63\")    Body mass index is 26.75 kg/m². Neck Circumference: 14 inches  HEENT: Head is atraumatic, normocephalic  Eyes: pupils are round equal and reacting to light and accommodation, conjunctiva normal  Nose: no nasal septal defects or deviation and the nasal passages are clear, no nasal polyps,  Throat: tonsils are not enlarged, tongue normal size, oral airway Mallampati class 3  NECK: No lymphadenopathy, trachea is in the midline, thyroid not enlarged  RESPIRATORY SYSTEM: Breath sounds are equal on both sides, there are no wheezes or crackles  CARDIOVASULAR SYSTEM: Heart sounds are regular rhythm and jam rate, there are no murmurs or thrills  ABDOMEN: Soft, no hepatosplenomegaly, no evidence of ascites  EXTREMITES: No cyanosis, clubbing or edema   NEUROLOGICAL SYSTEM: Oriented x 3, no gross motor defects, gait normal    Medical records and labs reviewed in [facility]     Assessment and plan:  · Sleep apnea unspecified, (G47.30) Patient's symptoms and examination is consistent with sleep apnea.  I have talked to the patient about the signs and symptoms of sleep apnea and consequences of untreated sleep apnea.  I have placed a order in [facility] in lab Split night sleep test.  Patient will have a follow-up after this sleep test is done.   · Snoring secondary to sleep apnea  · Hypersomnia with [place] Sleepiness Scale of Total score: 8 due to sleep apnea.  · Morning headaches.  Patient has persistent headaches which occurs at least about 6 times a week.  This could be related to sleep apnea and related to hypoxia.  I have talked to the patient about the sleep apnea.  · History of hypertension  · History of cardiomyopathy, stable        Emmy Henderson MD, [facility]  Sleep Medicine.  [facility]  6/13/2019 ,    "

## 2019-07-10 RX ORDER — OMEPRAZOLE 40 MG/1
CAPSULE, DELAYED RELEASE ORAL
Qty: 90 CAPSULE | Refills: 1 | Status: SHIPPED | OUTPATIENT
Start: 2019-07-10 | End: 2019-10-01 | Stop reason: SDUPTHER

## 2019-07-15 RX ORDER — CARVEDILOL 6.25 MG/1
6.25 TABLET ORAL 2 TIMES DAILY WITH MEALS
Qty: 60 TABLET | Refills: 5 | Status: SHIPPED | OUTPATIENT
Start: 2019-07-15 | End: 2020-01-08

## 2019-07-23 ENCOUNTER — RESULTS ENCOUNTER (OUTPATIENT)
Dept: FAMILY MEDICINE CLINIC | Facility: CLINIC | Age: 84
End: 2019-07-23

## 2019-07-23 DIAGNOSIS — E78.5 HYPERLIPIDEMIA, UNSPECIFIED HYPERLIPIDEMIA TYPE: ICD-10-CM

## 2019-07-23 DIAGNOSIS — E11.9 TYPE 2 DIABETES MELLITUS WITHOUT COMPLICATION, WITHOUT LONG-TERM CURRENT USE OF INSULIN (HCC): ICD-10-CM

## 2019-07-24 ENCOUNTER — APPOINTMENT (OUTPATIENT)
Dept: LAB | Facility: HOSPITAL | Age: 84
End: 2019-07-24

## 2019-07-25 LAB
ALBUMIN SERPL-MCNC: 4 G/DL (ref 3.5–5.2)
ALBUMIN/GLOB SERPL: 1.3 G/DL
ALP SERPL-CCNC: 63 U/L (ref 39–117)
ALT SERPL-CCNC: 10 U/L (ref 1–33)
AST SERPL-CCNC: 19 U/L (ref 1–32)
BILIRUB SERPL-MCNC: 0.7 MG/DL (ref 0.2–1.2)
BUN SERPL-MCNC: 15 MG/DL (ref 8–23)
BUN/CREAT SERPL: 16.7 (ref 7–25)
CALCIUM SERPL-MCNC: 9.2 MG/DL (ref 8.6–10.5)
CHLORIDE SERPL-SCNC: 103 MMOL/L (ref 98–107)
CHOLEST SERPL-MCNC: 152 MG/DL (ref 0–200)
CO2 SERPL-SCNC: 31 MMOL/L (ref 22–29)
CREAT SERPL-MCNC: 0.9 MG/DL (ref 0.57–1)
GLOBULIN SER CALC-MCNC: 3 GM/DL
GLUCOSE SERPL-MCNC: 164 MG/DL (ref 65–99)
HBA1C MFR BLD: 7.2 % (ref 4.8–5.6)
HDLC SERPL-MCNC: 38 MG/DL (ref 40–60)
LDLC SERPL CALC-MCNC: 77 MG/DL (ref 0–100)
LDLC/HDLC SERPL: 2.04 {RATIO}
POTASSIUM SERPL-SCNC: 3.4 MMOL/L (ref 3.5–5.2)
PROT SERPL-MCNC: 7 G/DL (ref 6–8.5)
SODIUM SERPL-SCNC: 145 MMOL/L (ref 136–145)
TRIGL SERPL-MCNC: 183 MG/DL (ref 0–150)
VLDLC SERPL CALC-MCNC: 36.6 MG/DL

## 2019-08-07 ENCOUNTER — HOSPITAL ENCOUNTER (OUTPATIENT)
Dept: SLEEP MEDICINE | Facility: HOSPITAL | Age: 84
Discharge: HOME | End: 2019-08-07
Admitting: INTERNAL MEDICINE

## 2019-08-07 DIAGNOSIS — R06.83 SNORING: ICD-10-CM

## 2019-08-07 DIAGNOSIS — G47.33 OSA (OBSTRUCTIVE SLEEP APNEA): ICD-10-CM

## 2019-08-07 DIAGNOSIS — R51.9 MORNING HEADACHE: ICD-10-CM

## 2019-08-07 DIAGNOSIS — I10 ESSENTIAL HYPERTENSION: ICD-10-CM

## 2019-08-07 DIAGNOSIS — E11.9 TYPE 2 DIABETES MELLITUS WITHOUT COMPLICATION, WITHOUT LONG-TERM CURRENT USE OF INSULIN (HCC): ICD-10-CM

## 2019-08-07 PROCEDURE — 95811 POLYSOM 6/>YRS CPAP 4/> PARM: CPT

## 2019-08-07 PROCEDURE — 95811 POLYSOM 6/>YRS CPAP 4/> PARM: CPT | Performed by: INTERNAL MEDICINE

## 2019-08-07 RX ORDER — ALLOPURINOL 100 MG/1
TABLET ORAL
Qty: 90 TABLET | Refills: 1 | Status: SHIPPED | OUTPATIENT
Start: 2019-08-07 | End: 2020-02-20

## 2019-08-08 ENCOUNTER — LAB (OUTPATIENT)
Dept: LAB | Facility: HOSPITAL | Age: 84
End: 2019-08-08

## 2019-08-08 DIAGNOSIS — D47.2 MGUS (MONOCLONAL GAMMOPATHY OF UNKNOWN SIGNIFICANCE): Primary | ICD-10-CM

## 2019-08-08 LAB
ALBUMIN SERPL-MCNC: 4.2 G/DL (ref 3.5–5.2)
ALBUMIN/GLOB SERPL: 1.4 G/DL (ref 1.1–2.4)
ALP SERPL-CCNC: 61 U/L (ref 38–116)
ALT SERPL W P-5'-P-CCNC: 12 U/L (ref 0–33)
ANION GAP SERPL CALCULATED.3IONS-SCNC: 10.9 MMOL/L (ref 5–15)
AST SERPL-CCNC: 21 U/L (ref 0–32)
BASOPHILS # BLD AUTO: 0.03 10*3/MM3 (ref 0–0.2)
BASOPHILS NFR BLD AUTO: 0.5 % (ref 0–1.5)
BILIRUB SERPL-MCNC: 0.7 MG/DL (ref 0.2–1.2)
BUN BLD-MCNC: 21 MG/DL (ref 6–20)
BUN/CREAT SERPL: 21 (ref 7.3–30)
CALCIUM SPEC-SCNC: 9.5 MG/DL (ref 8.5–10.2)
CHLORIDE SERPL-SCNC: 102 MMOL/L (ref 98–107)
CO2 SERPL-SCNC: 29.1 MMOL/L (ref 22–29)
CREAT BLD-MCNC: 1 MG/DL (ref 0.6–1.1)
DEPRECATED RDW RBC AUTO: 47.1 FL (ref 37–54)
EOSINOPHIL # BLD AUTO: 0.21 10*3/MM3 (ref 0–0.4)
EOSINOPHIL NFR BLD AUTO: 3.2 % (ref 0.3–6.2)
ERYTHROCYTE [DISTWIDTH] IN BLOOD BY AUTOMATED COUNT: 13.9 % (ref 12.3–15.4)
GFR SERPL CREATININE-BSD FRML MDRD: 53 ML/MIN/1.73
GLOBULIN UR ELPH-MCNC: 3.1 GM/DL (ref 1.8–3.5)
GLUCOSE BLD-MCNC: 221 MG/DL (ref 74–124)
HCT VFR BLD AUTO: 40.4 % (ref 34–46.6)
HGB BLD-MCNC: 13 G/DL (ref 12–15.9)
IMM GRANULOCYTES # BLD AUTO: 0.01 10*3/MM3 (ref 0–0.05)
IMM GRANULOCYTES NFR BLD AUTO: 0.2 % (ref 0–0.5)
LYMPHOCYTES # BLD AUTO: 1.34 10*3/MM3 (ref 0.7–3.1)
LYMPHOCYTES NFR BLD AUTO: 20.4 % (ref 19.6–45.3)
MCH RBC QN AUTO: 30 PG (ref 26.6–33)
MCHC RBC AUTO-ENTMCNC: 32.2 G/DL (ref 31.5–35.7)
MCV RBC AUTO: 93.3 FL (ref 79–97)
MONOCYTES # BLD AUTO: 0.53 10*3/MM3 (ref 0.1–0.9)
MONOCYTES NFR BLD AUTO: 8.1 % (ref 5–12)
NEUTROPHILS # BLD AUTO: 4.44 10*3/MM3 (ref 1.7–7)
NEUTROPHILS NFR BLD AUTO: 67.6 % (ref 42.7–76)
NRBC BLD AUTO-RTO: 0 /100 WBC (ref 0–0.2)
PLATELET # BLD AUTO: 198 10*3/MM3 (ref 140–450)
PMV BLD AUTO: 10.3 FL (ref 6–12)
POTASSIUM BLD-SCNC: 3.4 MMOL/L (ref 3.5–4.7)
PROT SERPL-MCNC: 7.3 G/DL (ref 6.3–8)
RBC # BLD AUTO: 4.33 10*6/MM3 (ref 3.77–5.28)
SODIUM BLD-SCNC: 142 MMOL/L (ref 134–145)
WBC NRBC COR # BLD: 6.56 10*3/MM3 (ref 3.4–10.8)

## 2019-08-08 PROCEDURE — 80053 COMPREHEN METABOLIC PANEL: CPT

## 2019-08-08 PROCEDURE — 85025 COMPLETE CBC W/AUTO DIFF WBC: CPT

## 2019-08-08 PROCEDURE — 36415 COLL VENOUS BLD VENIPUNCTURE: CPT

## 2019-08-09 LAB
ALBUMIN SERPL-MCNC: 3.6 G/DL (ref 2.9–4.4)
ALBUMIN/GLOB SERPL: 1.1 {RATIO} (ref 0.7–1.7)
ALPHA1 GLOB FLD ELPH-MCNC: 0.2 G/DL (ref 0–0.4)
ALPHA2 GLOB SERPL ELPH-MCNC: 0.9 G/DL (ref 0.4–1)
B-GLOBULIN SERPL ELPH-MCNC: 1 G/DL (ref 0.7–1.3)
GAMMA GLOB SERPL ELPH-MCNC: 1.3 G/DL (ref 0.4–1.8)
GLOBULIN SER CALC-MCNC: 3.3 G/DL (ref 2.2–3.9)
IGA SERPL-MCNC: 144 MG/DL (ref 64–422)
IGG SERPL-MCNC: 1243 MG/DL (ref 700–1600)
IGM SERPL-MCNC: 43 MG/DL (ref 26–217)
INTERPRETATION SERPL IEP-IMP: ABNORMAL
KAPPA LC SERPL-MCNC: 22.6 MG/L (ref 3.3–19.4)
KAPPA LC/LAMBDA SER: 0.55 {RATIO} (ref 0.26–1.65)
LAMBDA LC FREE SERPL-MCNC: 41.1 MG/L (ref 5.7–26.3)
Lab: ABNORMAL
M-SPIKE: 0.7 G/DL
PROT SERPL-MCNC: 6.9 G/DL (ref 6–8.5)

## 2019-08-12 ENCOUNTER — TRANSCRIBE ORDERS (OUTPATIENT)
Dept: ADMINISTRATIVE | Facility: HOSPITAL | Age: 84
End: 2019-08-12

## 2019-08-12 DIAGNOSIS — Z12.31 SCREENING MAMMOGRAM, ENCOUNTER FOR: Primary | ICD-10-CM

## 2019-08-14 ENCOUNTER — APPOINTMENT (OUTPATIENT)
Dept: LAB | Facility: HOSPITAL | Age: 84
End: 2019-08-14

## 2019-08-14 ENCOUNTER — OFFICE VISIT (OUTPATIENT)
Dept: ONCOLOGY | Facility: CLINIC | Age: 84
End: 2019-08-14

## 2019-08-14 VITALS
SYSTOLIC BLOOD PRESSURE: 150 MMHG | OXYGEN SATURATION: 97 % | HEIGHT: 63 IN | DIASTOLIC BLOOD PRESSURE: 77 MMHG | WEIGHT: 151.9 LBS | RESPIRATION RATE: 16 BRPM | BODY MASS INDEX: 26.91 KG/M2 | TEMPERATURE: 97.7 F | HEART RATE: 61 BPM

## 2019-08-14 DIAGNOSIS — E53.8 B12 DEFICIENCY: ICD-10-CM

## 2019-08-14 DIAGNOSIS — D47.2 MGUS (MONOCLONAL GAMMOPATHY OF UNKNOWN SIGNIFICANCE): Primary | ICD-10-CM

## 2019-08-14 PROCEDURE — 99214 OFFICE O/P EST MOD 30 MIN: CPT | Performed by: INTERNAL MEDICINE

## 2019-08-14 PROCEDURE — G0463 HOSPITAL OUTPT CLINIC VISIT: HCPCS | Performed by: INTERNAL MEDICINE

## 2019-08-14 NOTE — PROGRESS NOTES
Subjective   REASON FOR FOLLOW UP:  IgG Lambda MGUS    HISTORY OF PRESENT ILLNESS:     History of Present Illness  Shirley is a 84 y.o. 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  She had labs drawn in our office 8/8/2019 showing a slight bump in her IgG lambda monoclonal spike quantitated at 0.7 g/dL.  Her IgG level was still within normal limits at 1243.  Her blood count is normal and she looks great in the office today.  She remains very active for her age.     She is up-to-date on her mammograms.  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    Past Medical History, Past Surgical History, Social History, Family History have been reviewed and are without significant changes except as mentioned.    Review of Systems   Constitutional: Negative for activity change, appetite change, fatigue, fever and unexpected weight change.   HENT: Negative for hearing loss, nosebleeds, trouble swallowing and voice change.    Eyes: Negative for visual disturbance.   Respiratory: Negative for cough, shortness of breath and wheezing.    Cardiovascular: Negative for chest pain and palpitations.   Gastrointestinal: Negative for abdominal pain, diarrhea, nausea and vomiting.   Genitourinary: Negative for difficulty urinating, frequency, hematuria and urgency.   Musculoskeletal: Positive for arthralgias and back pain. Negative for neck pain.   Skin: Negative for rash.   Neurological: Negative for dizziness, seizures, syncope and headaches.   Hematological: Negative for adenopathy. Does not bruise/bleed easily.   Psychiatric/Behavioral: Negative for behavioral problems. The patient is not nervous/anxious.       A comprehensive 14 point review of systems was performed and was negative except as mentioned.    Medications:  The current medication list was reviewed in the EMR    ALLERGIES:    No Known Allergies    Objective      Vitals:    08/14/19 0949  "  BP: 150/77   Pulse: 61   Resp: 16   Temp: 97.7 °F (36.5 °C)   TempSrc: Oral   SpO2: 97%   Weight: 68.9 kg (151 lb 14.4 oz)   Height: 160 cm (62.99\")   PainSc: 0-No pain     Current Status 4/24/2018   ECOG score 0       Physical Exam   Constitutional: She is oriented to person, place, and time. She appears well-developed and well-nourished. No distress.   HENT:   Head: Normocephalic.   Eyes: Conjunctivae and EOM are normal. Pupils are equal, round, and reactive to light. No scleral icterus.   Neck: Normal range of motion. Neck supple. No JVD present. No thyromegaly present.   Cardiovascular: Normal rate and regular rhythm. Exam reveals no gallop and no friction rub.   No murmur heard.  Pulmonary/Chest: Effort normal and breath sounds normal. She has no wheezes. She has no rales.   Abdominal: Soft. She exhibits no distension and no mass. There is no tenderness.   Musculoskeletal: Normal range of motion. She exhibits no edema or deformity.   Lymphadenopathy:     She has no cervical adenopathy.   Neurological: She is alert and oriented to person, place, and time. She has normal reflexes. No cranial nerve deficit.   Skin: Skin is warm and dry. No rash noted. No erythema.   Psychiatric: She has a normal mood and affect. Her behavior is normal. Judgment normal.        RECENT LABS:  Hematology WBC   Date Value Ref Range Status   08/08/2019 6.56 3.40 - 10.80 10*3/mm3 Final   05/17/2019 6.18 3.40 - 10.80 10*3/mm3 Final     RBC   Date Value Ref Range Status   08/08/2019 4.33 3.77 - 5.28 10*6/mm3 Final   05/17/2019 4.39 3.77 - 5.28 10*6/mm3 Final     Hemoglobin   Date Value Ref Range Status   08/08/2019 13.0 12.0 - 15.9 g/dL Final     Hematocrit   Date Value Ref Range Status   08/08/2019 40.4 34.0 - 46.6 % Final     Platelets   Date Value Ref Range Status   08/08/2019 198 140 - 450 10*3/mm3 Final          Lab Results   Component Value Date    GLUCOSE 221 (H) 08/08/2019    BUN 21 (H) 08/08/2019    CREATININE 1.00 08/08/2019    " EGFRIFNONA 53 (L) 08/08/2019    EGFRIFAFRI 72 07/25/2019    BCR 21.0 08/08/2019    K 3.4 (L) 08/08/2019    CO2 29.1 (H) 08/08/2019    CALCIUM 9.5 08/08/2019    PROTENTOTREF 6.9 08/08/2019    ALBUMIN 3.6 08/08/2019    ALBUMIN 4.20 08/08/2019    LABIL2 1.1 08/08/2019    AST 21 08/08/2019    ALT 12 08/08/2019       SPEP/LISA 8/8/2019  IgG  700 - 1600 mg/dL 1243    IgA  64 - 422 mg/dL 144    IgM  26 - 217 mg/dL 43    Total Protein  6.0 - 8.5 g/dL 6.9    Albumin  2.9 - 4.4 g/dL 3.6    Alpha-1-Globulin  0.0 - 0.4 g/dL 0.2    Alpha-2-Globulin  0.4 - 1.0 g/dL 0.9    Beta Globulin  0.7 - 1.3 g/dL 1.0    Gamma Globulin  0.4 - 1.8 g/dL 1.3    M-Esteban  Not Observed g/dL 0.7 Abnormally high     Globulin  2.2 - 3.9 g/dL 3.3    A/G Ratio  0.7 - 1.7 1.1    Immunofixation Reflex, Serum Comment    Comment: Immunofixation shows IgG monoclonal protein with lambda light chain   specificity.   Please note Comment    Comment: Protein electrophoresis scan will follow via computer, mail, or    delivery.   Free Light Chain, Kappa  3.3 - 19.4 mg/L 22.6 Abnormally high     Free Lambda Light Chains  5.7 - 26.3 mg/L 41.1 Abnormally high     Kappa/Lambda Ratio  0.26 - 1.65 0.55          Assessment/Plan   1.  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    Plan    1.  Return for follow-up in 6 months.  2.  Labs will again be drawn one week prior to visit including a CBC, serum chemistries, serum protein electrophoresis, immunofixation, and free serum light chain analysis.              8/14/2019      CC:

## 2019-08-16 ENCOUNTER — TELEPHONE (OUTPATIENT)
Dept: SLEEP MEDICINE | Facility: HOSPITAL | Age: 84
End: 2019-08-16

## 2019-08-16 NOTE — TELEPHONE ENCOUNTER
Tech called pts home #, pt answered. Tech went over study results and 's impression, patient verbalized her understanding. Tech confirmed DME of [facility] and scheduled F/U appt. MAB

## 2019-08-26 ENCOUNTER — OFFICE (AMBULATORY)
Dept: URBAN - METROPOLITAN AREA CLINIC 2 | Facility: CLINIC | Age: 84
End: 2019-08-26

## 2019-08-26 VITALS
HEART RATE: 68 BPM | WEIGHT: 151 LBS | DIASTOLIC BLOOD PRESSURE: 64 MMHG | HEIGHT: 63 IN | SYSTOLIC BLOOD PRESSURE: 118 MMHG

## 2019-08-26 DIAGNOSIS — K44.9 DIAPHRAGMATIC HERNIA WITHOUT OBSTRUCTION OR GANGRENE: ICD-10-CM

## 2019-08-26 DIAGNOSIS — R93.3 ABNORMAL FINDINGS ON DIAGNOSTIC IMAGING OF OTHER PARTS OF DI: ICD-10-CM

## 2019-08-26 DIAGNOSIS — K22.2 ESOPHAGEAL OBSTRUCTION: ICD-10-CM

## 2019-08-26 DIAGNOSIS — K30 FUNCTIONAL DYSPEPSIA: ICD-10-CM

## 2019-08-26 DIAGNOSIS — R14.3 FLATULENCE: ICD-10-CM

## 2019-08-26 DIAGNOSIS — R13.10 DYSPHAGIA, UNSPECIFIED: ICD-10-CM

## 2019-08-26 DIAGNOSIS — R14.0 ABDOMINAL DISTENSION (GASEOUS): ICD-10-CM

## 2019-08-26 DIAGNOSIS — K29.70 GASTRITIS, UNSPECIFIED, WITHOUT BLEEDING: ICD-10-CM

## 2019-08-26 DIAGNOSIS — K57.30 DIVERTICULOSIS OF LARGE INTESTINE WITHOUT PERFORATION OR ABS: ICD-10-CM

## 2019-08-26 DIAGNOSIS — K21.9 GASTRO-ESOPHAGEAL REFLUX DISEASE WITHOUT ESOPHAGITIS: ICD-10-CM

## 2019-08-26 DIAGNOSIS — R19.4 CHANGE IN BOWEL HABIT: ICD-10-CM

## 2019-08-26 PROCEDURE — 99214 OFFICE O/P EST MOD 30 MIN: CPT | Performed by: INTERNAL MEDICINE

## 2019-08-26 RX ORDER — METRONIDAZOLE 500 MG/1
TABLET, FILM COATED ORAL
Qty: 42 | Refills: 1 | Status: COMPLETED
Start: 2019-08-26 | End: 2020-03-09

## 2019-08-28 ENCOUNTER — HOSPITAL ENCOUNTER (OUTPATIENT)
Dept: MAMMOGRAPHY | Facility: HOSPITAL | Age: 84
Discharge: HOME | End: 2019-08-28
Admitting: INTERNAL MEDICINE

## 2019-08-28 DIAGNOSIS — Z12.31 SCREENING MAMMOGRAM, ENCOUNTER FOR: ICD-10-CM

## 2019-08-28 PROCEDURE — 77063 BREAST TOMOSYNTHESIS BI: CPT

## 2019-08-28 PROCEDURE — 77067 SCR MAMMO BI INCL CAD: CPT

## 2019-09-05 ENCOUNTER — TELEPHONE (OUTPATIENT)
Dept: FAMILY MEDICINE CLINIC | Facility: CLINIC | Age: 84
End: 2019-09-05

## 2019-09-05 RX ORDER — HYDROCHLOROTHIAZIDE 12.5 MG/1
CAPSULE, GELATIN COATED ORAL
Qty: 60 CAPSULE | Refills: 3 | Status: SHIPPED | OUTPATIENT
Start: 2019-09-05 | End: 2019-12-27

## 2019-09-06 DIAGNOSIS — R92.8 ABNORMAL MAMMOGRAM: Primary | ICD-10-CM

## 2019-09-06 NOTE — TELEPHONE ENCOUNTER
I called the patient this morning at 8 AM but had to leave a message.  Her mammogram was abnormal and she needs additional views and an ultrasound.  Let me know if it is okay to order

## 2019-09-13 ENCOUNTER — HOSPITAL ENCOUNTER (OUTPATIENT)
Dept: ULTRASOUND IMAGING | Facility: HOSPITAL | Age: 84
Discharge: HOME | End: 2019-09-13

## 2019-09-13 ENCOUNTER — HOSPITAL ENCOUNTER (OUTPATIENT)
Dept: MAMMOGRAPHY | Facility: HOSPITAL | Age: 84
Discharge: HOME | End: 2019-09-13
Admitting: INTERNAL MEDICINE

## 2019-09-13 DIAGNOSIS — R92.8 ABNORMAL MAMMOGRAM: ICD-10-CM

## 2019-09-13 PROCEDURE — 76642 ULTRASOUND BREAST LIMITED: CPT

## 2019-09-13 PROCEDURE — 77065 DX MAMMO INCL CAD UNI: CPT

## 2019-09-17 DIAGNOSIS — E11.9 TYPE 2 DIABETES MELLITUS WITHOUT COMPLICATION, WITHOUT LONG-TERM CURRENT USE OF INSULIN (HCC): ICD-10-CM

## 2019-09-17 DIAGNOSIS — E78.5 HYPERLIPIDEMIA, UNSPECIFIED HYPERLIPIDEMIA TYPE: Primary | ICD-10-CM

## 2019-09-17 DIAGNOSIS — R92.0 BREAST MICROCALCIFICATIONS: Primary | ICD-10-CM

## 2019-09-19 LAB
CHOLEST SERPL-MCNC: 160 MG/DL (ref 0–200)
HDLC SERPL-MCNC: 38 MG/DL (ref 40–60)
LDLC SERPL CALC-MCNC: 87 MG/DL (ref 0–100)
LDLC/HDLC SERPL: 2.29 {RATIO}
TRIGL SERPL-MCNC: 175 MG/DL (ref 0–150)
VLDLC SERPL CALC-MCNC: 35 MG/DL

## 2019-09-22 ENCOUNTER — RESULTS ENCOUNTER (OUTPATIENT)
Dept: FAMILY MEDICINE CLINIC | Facility: CLINIC | Age: 84
End: 2019-09-22

## 2019-09-22 DIAGNOSIS — E78.5 HYPERLIPIDEMIA, UNSPECIFIED HYPERLIPIDEMIA TYPE: ICD-10-CM

## 2019-09-22 DIAGNOSIS — E11.9 TYPE 2 DIABETES MELLITUS WITHOUT COMPLICATION, WITHOUT LONG-TERM CURRENT USE OF INSULIN (HCC): ICD-10-CM

## 2019-09-25 ENCOUNTER — HOSPITAL ENCOUNTER (OUTPATIENT)
Dept: GENERAL RADIOLOGY | Facility: HOSPITAL | Age: 84
Discharge: HOME | End: 2019-09-25
Admitting: INTERNAL MEDICINE

## 2019-09-25 ENCOUNTER — OFFICE VISIT (OUTPATIENT)
Dept: FAMILY MEDICINE CLINIC | Facility: CLINIC | Age: 84
End: 2019-09-25

## 2019-09-25 VITALS
SYSTOLIC BLOOD PRESSURE: 130 MMHG | BODY MASS INDEX: 27.11 KG/M2 | DIASTOLIC BLOOD PRESSURE: 74 MMHG | HEIGHT: 63 IN | OXYGEN SATURATION: 95 % | WEIGHT: 153 LBS | HEART RATE: 69 BPM

## 2019-09-25 DIAGNOSIS — Z23 NEED FOR IMMUNIZATION AGAINST INFLUENZA: ICD-10-CM

## 2019-09-25 DIAGNOSIS — R05.9 COUGH: ICD-10-CM

## 2019-09-25 DIAGNOSIS — R35.0 URINARY FREQUENCY: ICD-10-CM

## 2019-09-25 DIAGNOSIS — M25.50 ARTHRALGIA, UNSPECIFIED JOINT: ICD-10-CM

## 2019-09-25 DIAGNOSIS — M1A.9XX0 CHRONIC GOUT WITHOUT TOPHUS, UNSPECIFIED CAUSE, UNSPECIFIED SITE: ICD-10-CM

## 2019-09-25 DIAGNOSIS — E11.9 TYPE 2 DIABETES MELLITUS WITHOUT COMPLICATION, WITHOUT LONG-TERM CURRENT USE OF INSULIN (HCC): Primary | ICD-10-CM

## 2019-09-25 DIAGNOSIS — R09.89 DECREASED PULSES IN FEET: ICD-10-CM

## 2019-09-25 DIAGNOSIS — I73.89 ACROCYANOSIS (HCC): ICD-10-CM

## 2019-09-25 PROCEDURE — 90653 IIV ADJUVANT VACCINE IM: CPT | Performed by: INTERNAL MEDICINE

## 2019-09-25 PROCEDURE — 99214 OFFICE O/P EST MOD 30 MIN: CPT | Performed by: INTERNAL MEDICINE

## 2019-09-25 PROCEDURE — G0008 ADMIN INFLUENZA VIRUS VAC: HCPCS | Performed by: INTERNAL MEDICINE

## 2019-09-25 PROCEDURE — 71046 X-RAY EXAM CHEST 2 VIEWS: CPT

## 2019-09-25 NOTE — PROGRESS NOTES
Subjective   Donna M Yeoman is a 84 y.o. female who comes in today for   Chief Complaint   Patient presents with   • Urinary Tract Infection     Wants to do a UA.   • Hypertension   .    History of Present Illness   Here for f/u on HTN, NIDDM and chronic muscle and bone pain off and on throughout her body for years. Lower legs are hurting her and sometimes it is her back or neck, hands and feet.  Sometimes pain last a few seconds and comes and goes and the next day will have another place that hurts.  Feet look purple red by end of day.  Left ankle/foot have some swelling at times.  Saw Dr. Adame a few months ago for monoclonal gammopathy.  Levels were a little higher and if high in feb, he will do BM bx.      Right breast bx is scheduled for next week at [facility] due to calcifications on MMG    Nocturia due to OAB and h/o UTI.  Is now on CPAP for NICOLE and is getting new mask to improve her compliance.    NIDDM diet controlled.  -130.  Lab messed up her lab and didn't do cmp or a1c  Has clear RN often the left side.  No blood in it.  Often will have a dry cough as well.  No soa.  CXR and Chest CT 2017 neg.  Taking omeprazole 40 mg bid for GERD and maybe due to the cough but she isn't sure if she was told the cough was related to GERD.    The following portions of the patient's history were reviewed and updated as appropriate: allergies, current medications, past family history, past medical history, past social history, past surgical history and problem list.    Review of Systems   Constitutional: Negative for unexpected weight change.   HENT: Positive for congestion.    Respiratory: Positive for cough.    Genitourinary: Positive for frequency.   Musculoskeletal: Positive for arthralgias and myalgias.       Vitals:    09/25/19 1126   BP: 130/74   Pulse: 69   SpO2: 95%       STEADI Fall Risk Assessment has not been completed.    PHQ-2/PHQ-9 Depression Screening 5/24/2019   Little interest or pleasure in doing things  0   Feeling down, depressed, or hopeless 0   Total Score 0       Objective   Physical Exam   Constitutional: She is oriented to person, place, and time. She appears well-developed and well-nourished.   HENT:   Head: Normocephalic and atraumatic.   Right Ear: External ear normal.   Left Ear: External ear normal.   Mouth/Throat: Oropharynx is clear and moist.   Eyes: Conjunctivae are normal.   Neck: Neck supple.   Cardiovascular: Normal rate, regular rhythm and normal heart sounds.   No bruits   Pulmonary/Chest: Effort normal and breath sounds normal. No respiratory distress. She has no wheezes. She has no rales.   Abdominal: Soft. Bowel sounds are normal. She exhibits no distension and no mass. There is no tenderness.      During the foot exam she had a monofilament test performed.    Neurological Sensory Findings -  Altered sharp/dull right ankle/foot discrimination and altered sharp/dull left ankle/foot discrimination.  Vascular Status -  Her right foot exhibits abnormal foot vasculature  (diminished pulse and discoloration). Her right foot exhibits no edema. Her left foot exhibits abnormal foot vasculature  (diminished pulse and discoloration present) and abnormal foot edema (trace).  Lymphadenopathy:     She has no cervical adenopathy.   Neurological: She is alert and oriented to person, place, and time.   Skin: Skin is warm.   Psychiatric: She has a normal mood and affect. Her behavior is normal. Judgment and thought content normal.   Nursing note and vitals reviewed.        Current Outpatient Medications:   •  allopurinol (ZYLOPRIM) 100 MG tablet, TAKE 1 TABLET DAILY, Disp: 90 tablet, Rfl: 1  •  aspirin 81 MG EC tablet, Take 81 mg by mouth Daily., Disp: , Rfl:   •  carvedilol (COREG) 6.25 MG tablet, TAKE 1 TABLET BY MOUTH 2 (TWO) TIMES A DAY WITH MEALS., Disp: 60 tablet, Rfl: 5  •  CRANBERRY PO, Take  by mouth., Disp: , Rfl:   •  Cyanocobalamin (B-12 PO), Take  by mouth., Disp: , Rfl:   •  ESTRACE VAGINAL 0.1  MG/GM vaginal cream, INSERT 1/2 TO 1 INCH VAGINALLY ONCE WEEKLY AS DIRECTED, Disp: , Rfl: 11  •  FIBER PO, Take  by mouth Daily., Disp: , Rfl:   •  hydrochlorothiazide (MICROZIDE) 12.5 MG capsule, TAKE 1 TO 2 TABLETS DAILY FOR HTN AND LE SWELLING, Disp: 60 capsule, Rfl: 3  •  levothyroxine (SYNTHROID, LEVOTHROID) 75 MCG tablet, Take 1 tablet by mouth daily., Disp: 90 tablet, Rfl: 2  •  omeprazole (priLOSEC) 40 MG capsule, TAKE 1 CAPSULE BY MOUTH TWICE A DAY, Disp: 90 capsule, Rfl: 1  •  ONETOUCH DELICA LANCETS 33G misc, USE TO TEST BLOOD SUGAR TWICE DAILY, Disp: 100 each, Rfl: 1  •  ONETOUCH VERIO test strip, TEST EVERY MORNING, Disp: , Rfl: 5  •  Probiotic Product (PROBIOTIC DAILY PO), Take  by mouth Daily., Disp: , Rfl:   •  rosuvastatin (CRESTOR) 10 MG tablet, Take 1 tablet by mouth Daily., Disp: 30 tablet, Rfl: 5    Assessment/Plan   Shirley was seen today for urinary tract infection and hypertension.    Diagnoses and all orders for this visit:    Type 2 diabetes mellitus without complication, without long-term current use of insulin (CMS/[facility])  -     Comprehensive Metabolic Panel  -     Hemoglobin A1c  -     Urinalysis With Culture If Indicated -  -     Microalbumin / Creatinine Urine Ratio - Urine, Clean Catch    Urinary frequency  -     Urinalysis With Culture If Indicated -  -     Microalbumin / Creatinine Urine Ratio - Urine, Clean Catch    Need for immunization against influenza  -     Fluad Quad >65 years; Standing  -     Fluad Quad >65 years    Cough  -     XR Chest PA & Lateral; Future  -     Sedimentation rate, automated  -     Uric Acid  -     C-reactive protein    Arthralgia, unspecified joint    Chronic gout without tophus, unspecified cause, unspecified site  -     Sedimentation rate, automated  -     Uric Acid  -     C-reactive protein    Decreased pulses in feet  -     Ambulatory Referral to Vascular Surgery    Acrocyanosis (CMS/[facility])  -     Ambulatory Referral to Vascular Surgery    check  inflammatory markers due to arthralgias.  Some of her pain is related to moderate to severe OA but want to r/o other etiologies like PMR  Would like dr. Garcia to check her for PAD and Raynauds  Breast Bx next week due to microcalcifications  Dr. Adame follows her monoclonal gammopathy  Check a1c and diabetes labs along with urine.  Concerned her diabetes may be poorly controlled.                       I have asked for the patient to return to clinic in 6month(s).

## 2019-09-26 LAB
ALBUMIN SERPL-MCNC: 4.5 G/DL (ref 3.5–5.2)
ALBUMIN/CREAT UR: 16 MG/G CREAT (ref 0–30)
ALBUMIN/GLOB SERPL: 1.7 G/DL
ALP SERPL-CCNC: 62 U/L (ref 39–117)
ALT SERPL-CCNC: 14 U/L (ref 1–33)
APPEARANCE UR: ABNORMAL
AST SERPL-CCNC: 22 U/L (ref 1–32)
BACTERIA #/AREA URNS HPF: NORMAL /HPF
BILIRUB SERPL-MCNC: 0.6 MG/DL (ref 0.2–1.2)
BILIRUB UR QL STRIP: NEGATIVE
BUN SERPL-MCNC: 16 MG/DL (ref 8–23)
BUN/CREAT SERPL: 20.8 (ref 7–25)
CALCIUM SERPL-MCNC: 9.2 MG/DL (ref 8.6–10.5)
CHLORIDE SERPL-SCNC: 103 MMOL/L (ref 98–107)
CO2 SERPL-SCNC: 30.9 MMOL/L (ref 22–29)
COLOR UR: YELLOW
CREAT SERPL-MCNC: 0.77 MG/DL (ref 0.57–1)
CREAT UR-MCNC: 31.8 MG/DL
CRP SERPL-MCNC: 0.18 MG/DL (ref 0–0.5)
EPI CELLS #/AREA URNS HPF: NORMAL /HPF
ERYTHROCYTE [SEDIMENTATION RATE] IN BLOOD BY WESTERGREN METHOD: 19 MM/HR (ref 0–30)
GLOBULIN SER CALC-MCNC: 2.7 GM/DL
GLUCOSE SERPL-MCNC: 130 MG/DL (ref 65–99)
GLUCOSE UR QL: NEGATIVE
HBA1C MFR BLD: 7.4 % (ref 4.8–5.6)
HGB UR QL STRIP: NEGATIVE
KETONES UR QL STRIP: NEGATIVE
LEUKOCYTE ESTERASE UR QL STRIP: NEGATIVE
MICRO URNS: ABNORMAL
MICRO URNS: ABNORMAL
MICROALBUMIN UR-MCNC: 5.1 UG/ML
MUCOUS THREADS URNS QL MICRO: PRESENT /HPF
NITRITE UR QL STRIP: NEGATIVE
PH UR STRIP: 6.5 [PH] (ref 5–7.5)
POTASSIUM SERPL-SCNC: 3.8 MMOL/L (ref 3.5–5.2)
PROT SERPL-MCNC: 7.2 G/DL (ref 6–8.5)
PROT UR QL STRIP: NEGATIVE
RBC #/AREA URNS HPF: NORMAL /HPF
SODIUM SERPL-SCNC: 145 MMOL/L (ref 136–145)
SP GR UR: 1.02 (ref 1–1.03)
URATE SERPL-MCNC: 4.6 MG/DL (ref 2.4–5.7)
URINALYSIS REFLEX: ABNORMAL
UROBILINOGEN UR STRIP-MCNC: 0.2 MG/DL (ref 0.2–1)
WBC #/AREA URNS HPF: NORMAL /HPF

## 2019-09-30 DIAGNOSIS — I51.7 CARDIOMEGALY: Primary | ICD-10-CM

## 2019-09-30 RX ORDER — METFORMIN HYDROCHLORIDE 500 MG/1
500 TABLET, EXTENDED RELEASE ORAL
Qty: 30 TABLET | Refills: 4 | Status: SHIPPED | OUTPATIENT
Start: 2019-09-30 | End: 2020-01-21

## 2019-10-01 RX ORDER — OMEPRAZOLE 40 MG/1
CAPSULE, DELAYED RELEASE ORAL
Qty: 90 CAPSULE | Refills: 1 | Status: SHIPPED | OUTPATIENT
Start: 2019-10-01 | End: 2019-12-11 | Stop reason: SDUPTHER

## 2019-10-03 ENCOUNTER — HOSPITAL ENCOUNTER (OUTPATIENT)
Dept: MAMMOGRAPHY | Facility: HOSPITAL | Age: 84
Discharge: HOME | End: 2019-10-03
Admitting: RADIOLOGY

## 2019-10-03 VITALS
RESPIRATION RATE: 18 BRPM | OXYGEN SATURATION: 99 % | HEIGHT: 63 IN | TEMPERATURE: 97 F | WEIGHT: 153 LBS | DIASTOLIC BLOOD PRESSURE: 82 MMHG | BODY MASS INDEX: 27.11 KG/M2 | SYSTOLIC BLOOD PRESSURE: 160 MMHG | HEART RATE: 54 BPM

## 2019-10-03 PROCEDURE — 88305 TISSUE EXAM BY PATHOLOGIST: CPT | Performed by: INTERNAL MEDICINE

## 2019-10-03 RX ORDER — LIDOCAINE HYDROCHLORIDE AND EPINEPHRINE 10; 10 MG/ML; UG/ML
30 INJECTION, SOLUTION INFILTRATION; PERINEURAL ONCE
Status: COMPLETED | OUTPATIENT
Start: 2019-10-03 | End: 2019-10-03

## 2019-10-03 RX ADMIN — LIDOCAINE HYDROCHLORIDE AND EPINEPHRINE 30 ML: 10; 10 INJECTION, SOLUTION INFILTRATION; PERINEURAL at 08:40

## 2019-10-03 NOTE — NURSING NOTE
Biopsy site to right lateral breast clear with Dermabond dry and intact. No firmness or swelling noted at or around biopsy site. Denies pain. Ice pack with protective covering applied to biopsy site. Discharge instructions discussed with understanding voiced by patient. Copies provided to patient. No distress noted. To home via private vehicle accompanied by her son.

## 2019-10-03 NOTE — H&P
Name: Donna M Yeoman ADMIT: 10/3/2019   : 1935  PCP: Caroline Weiner MD    MRN: [id number] LOS: 0 days   AGE/SEX: 84 y.o. female  ROOM: Room/bed info not found       Chief complaint RIGHT BREAST LESION     Present Illness or Internal History:  Patient is a 84 y.o. female presents with RIGHT BREAST LESION.     Past Surgical History:  Past Surgical History:   Procedure Laterality Date   • APPENDECTOMY     • BLADDER REPAIR     • COLONOSCOPY     • CYSTOCELE REPAIR      bladder reconstruction   • HYSTERECTOMY     • SALPINGECTOMY     • SALPINGO OOPHORECTOMY      for ectopic pregnancy       Past Medical History:  Past Medical History:   Diagnosis Date   • Arthritis    • Colon polyp 2017   • Diabetes mellitus (CMS/HCC)    • Esophageal reflux    • GERD (gastroesophageal reflux disease)    • H/O DVT (deep venous thrombosis)    • Headache 2017   • Hiatal hernia 2017   • Hypercholesteremia    • Hyperlipidemia    • Hypertension    • Low back pain 2017   • MGUS (monoclonal gammopathy of unknown significance)    • Osteoporosis    • Pneumonia 2017   • Pulmonary embolism (CMS/HCC)    • Pulmonary hypertension (CMS/HCC)    • Thyroid disease    • Venous thrombosis        Home Medications:    (Not in a hospital admission)    Allergies:  Patient has no known allergies.    Family History:  Family History   Problem Relation Age of Onset   • Breast cancer Mother    • Colon cancer Mother    • Cancer Mother         breast and colon    • Heart disease Other    • Diabetes Other    • Hypertension Other        Social History:  Social History     Tobacco Use   • Smoking status: Never Smoker   • Smokeless tobacco: Never Used   Substance Use Topics   • Alcohol use: No   • Drug use: No        Objective     Physical Exam:      General Appearance:    Alert, cooperative, in no acute distress   Head:    Normocephalic, without obvious abnormality, atraumatic   Eyes:            Lids and lashes normal, conjunctivae  and sclerae normal, no   icterus, no pallor, corneas clear, PERRLA   Ears:    Ears appear intact with no abnormalities noted   Throat:   No oral lesions, no thrush, oral mucosa moist   Neck:   No adenopathy, supple, trachea midline, no thyromegaly, no     carotid bruit, no JVD   Back:     No kyphosis present, no scoliosis present, no skin lesions,       erythema or scars, no tenderness to percussion or                   palpation,   range of motion normal   Lungs:     Clear to auscultation,respirations regular, even and                   unlabored    Heart:    Regular rhythm and normal rate, normal S1 and S2, no            murmur, no gallop, no rub, no click   Breast Exam:    Deferred   Abdomen:     Normal bowel sounds, no masses, no organomegaly, soft        non-tender, non-distended, no guarding, no rebound                 tenderness   Genitalia:    Deferred   Extremities:   Moves all extremities well, no edema, no cyanosis, no              redness   Pulses:   Pulses palpable and equal bilaterally   Skin:   No bleeding, bruising or rash   Lymph nodes:   No palpable adenopathy   Neurologic:   Cranial nerves 2 - 12 grossly intact, sensation intact, DTR        present and equal bilaterally       Vital Signs  Temp:  [97 °F (36.1 °C)] 97 °F (36.1 °C)  Heart Rate:  [59] 59  Resp:  [18] 18  BP: (152)/(84) 152/84    Anticipated Surgical Procedure:  STEREOTACTIC BIOPSY OF RIGHT BREAST    The risks, benefits and alternatives of this procedure have been discussed with the patient or responsible party: Yes        Yoseph Baker MD  10/03/19  8:15 AM

## 2019-10-04 LAB
CYTO UR: NORMAL
LAB AP CASE REPORT: NORMAL
LAB AP CLINICAL INFORMATION: NORMAL
PATH REPORT.FINAL DX SPEC: NORMAL
PATH REPORT.GROSS SPEC: NORMAL

## 2019-10-09 RX ORDER — LEVOTHYROXINE SODIUM 0.07 MG/1
TABLET ORAL
Qty: 90 TABLET | Refills: 1 | Status: SHIPPED | OUTPATIENT
Start: 2019-10-09 | End: 2020-04-28

## 2019-10-10 DIAGNOSIS — E78.5 HYPERLIPIDEMIA: ICD-10-CM

## 2019-10-10 RX ORDER — ROSUVASTATIN CALCIUM 10 MG/1
TABLET, COATED ORAL
Qty: 90 TABLET | Refills: 1 | Status: SHIPPED | OUTPATIENT
Start: 2019-10-10 | End: 2020-04-02

## 2019-10-16 ENCOUNTER — HOSPITAL ENCOUNTER (OUTPATIENT)
Dept: CARDIOLOGY | Facility: HOSPITAL | Age: 84
Discharge: HOME | End: 2019-10-16
Admitting: INTERNAL MEDICINE

## 2019-10-16 VITALS
SYSTOLIC BLOOD PRESSURE: 120 MMHG | WEIGHT: 153 LBS | HEIGHT: 63 IN | HEART RATE: 63 BPM | BODY MASS INDEX: 27.11 KG/M2 | DIASTOLIC BLOOD PRESSURE: 64 MMHG

## 2019-10-16 DIAGNOSIS — I51.7 CARDIOMEGALY: ICD-10-CM

## 2019-10-16 LAB
AORTIC ARCH: 2.1 CM
AORTIC ROOT ANNULUS: 2.2 CM
ASCENDING AORTA: 2.7 CM
BH CV ECHO MEAS - ACS: 1.7 CM
BH CV ECHO MEAS - AO MAX PG (FULL): 2.7 MMHG
BH CV ECHO MEAS - AO MAX PG: 6 MMHG
BH CV ECHO MEAS - AO MEAN PG (FULL): 1.6 MMHG
BH CV ECHO MEAS - AO MEAN PG: 3.6 MMHG
BH CV ECHO MEAS - AO V2 MAX: 122.2 CM/SEC
BH CV ECHO MEAS - AO V2 MEAN: 88 CM/SEC
BH CV ECHO MEAS - AO V2 VTI: 30.4 CM
BH CV ECHO MEAS - AVA(I,A): 1.6 CM^2
BH CV ECHO MEAS - AVA(I,D): 1.6 CM^2
BH CV ECHO MEAS - AVA(V,A): 1.9 CM^2
BH CV ECHO MEAS - AVA(V,D): 1.9 CM^2
BH CV ECHO MEAS - BSA(HAYCOCK): 1.8 M^2
BH CV ECHO MEAS - BSA: 1.7 M^2
BH CV ECHO MEAS - BZI_BMI: 27.1 KILOGRAMS/M^2
BH CV ECHO MEAS - BZI_METRIC_HEIGHT: 160 CM
BH CV ECHO MEAS - BZI_METRIC_WEIGHT: 69.4 KG
BH CV ECHO MEAS - EDV(MOD-SP2): 61 ML
BH CV ECHO MEAS - EDV(MOD-SP4): 57 ML
BH CV ECHO MEAS - EDV(TEICH): 170.4 ML
BH CV ECHO MEAS - EF(CUBED): 72.2 %
BH CV ECHO MEAS - EF(MOD-BP): 63 %
BH CV ECHO MEAS - EF(MOD-SP2): 63.9 %
BH CV ECHO MEAS - EF(MOD-SP4): 59.6 %
BH CV ECHO MEAS - EF(TEICH): 63.1 %
BH CV ECHO MEAS - ESV(MOD-SP2): 22 ML
BH CV ECHO MEAS - ESV(MOD-SP4): 23 ML
BH CV ECHO MEAS - ESV(TEICH): 62.9 ML
BH CV ECHO MEAS - FS: 34.7 %
BH CV ECHO MEAS - IVS/LVPW: 1
BH CV ECHO MEAS - IVSD: 1.5 CM
BH CV ECHO MEAS - LAT PEAK E' VEL: 9 CM/SEC
BH CV ECHO MEAS - LV DIASTOLIC VOL/BSA (35-75): 33 ML/M^2
BH CV ECHO MEAS - LV MASS(C)D: 405.8 GRAMS
BH CV ECHO MEAS - LV MASS(C)DI: 235.1 GRAMS/M^2
BH CV ECHO MEAS - LV MAX PG: 3.2 MMHG
BH CV ECHO MEAS - LV MEAN PG: 2 MMHG
BH CV ECHO MEAS - LV SYSTOLIC VOL/BSA (12-30): 13.3 ML/M^2
BH CV ECHO MEAS - LV V1 MAX: 89.7 CM/SEC
BH CV ECHO MEAS - LV V1 MEAN: 66.8 CM/SEC
BH CV ECHO MEAS - LV V1 VTI: 18.8 CM
BH CV ECHO MEAS - LVIDD: 5.9 CM
BH CV ECHO MEAS - LVIDS: 3.8 CM
BH CV ECHO MEAS - LVLD AP2: 6 CM
BH CV ECHO MEAS - LVLD AP4: 5.5 CM
BH CV ECHO MEAS - LVLS AP2: 4.9 CM
BH CV ECHO MEAS - LVLS AP4: 4.7 CM
BH CV ECHO MEAS - LVOT AREA (M): 2.5 CM^2
BH CV ECHO MEAS - LVOT AREA: 2.5 CM^2
BH CV ECHO MEAS - LVOT DIAM: 1.8 CM
BH CV ECHO MEAS - LVPWD: 1.5 CM
BH CV ECHO MEAS - MED PEAK E' VEL: 8 CM/SEC
BH CV ECHO MEAS - MR MAX PG: 38 MMHG
BH CV ECHO MEAS - MR MAX VEL: 308.1 CM/SEC
BH CV ECHO MEAS - MV A DUR: 0.13 SEC
BH CV ECHO MEAS - MV A MAX VEL: 90.2 CM/SEC
BH CV ECHO MEAS - MV DEC SLOPE: 342 CM/SEC^2
BH CV ECHO MEAS - MV DEC TIME: 0.19 SEC
BH CV ECHO MEAS - MV E MAX VEL: 67.4 CM/SEC
BH CV ECHO MEAS - MV E/A: 0.75
BH CV ECHO MEAS - MV MAX PG: 4.1 MMHG
BH CV ECHO MEAS - MV MEAN PG: 1.2 MMHG
BH CV ECHO MEAS - MV P1/2T MAX VEL: 67.9 CM/SEC
BH CV ECHO MEAS - MV P1/2T: 58.2 MSEC
BH CV ECHO MEAS - MV V2 MAX: 101.8 CM/SEC
BH CV ECHO MEAS - MV V2 MEAN: 49 CM/SEC
BH CV ECHO MEAS - MV V2 VTI: 31.7 CM
BH CV ECHO MEAS - MVA P1/2T LCG: 3.2 CM^2
BH CV ECHO MEAS - MVA(P1/2T): 3.8 CM^2
BH CV ECHO MEAS - MVA(VTI): 1.5 CM^2
BH CV ECHO MEAS - PA ACC TIME: 0.09 SEC
BH CV ECHO MEAS - PA MAX PG (FULL): 2.8 MMHG
BH CV ECHO MEAS - PA MAX PG: 4.8 MMHG
BH CV ECHO MEAS - PA PR(ACCEL): 39.8 MMHG
BH CV ECHO MEAS - PA V2 MAX: 109.5 CM/SEC
BH CV ECHO MEAS - PULM A REVS DUR: 0.1 SEC
BH CV ECHO MEAS - PULM A REVS VEL: 25.2 CM/SEC
BH CV ECHO MEAS - PULM DIAS VEL: 34.9 CM/SEC
BH CV ECHO MEAS - PULM S/D: 1.3
BH CV ECHO MEAS - PULM SYS VEL: 47.1 CM/SEC
BH CV ECHO MEAS - PVA(V,A): 2.1 CM^2
BH CV ECHO MEAS - PVA(V,D): 2.1 CM^2
BH CV ECHO MEAS - QP/QS: 1
BH CV ECHO MEAS - RAP SYSTOLE: 3 MMHG
BH CV ECHO MEAS - RV MAX PG: 2 MMHG
BH CV ECHO MEAS - RV MEAN PG: 1.2 MMHG
BH CV ECHO MEAS - RV V1 MAX: 71.3 CM/SEC
BH CV ECHO MEAS - RV V1 MEAN: 53.2 CM/SEC
BH CV ECHO MEAS - RV V1 VTI: 15.6 CM
BH CV ECHO MEAS - RVOT AREA: 3.1 CM^2
BH CV ECHO MEAS - RVOT DIAM: 2 CM
BH CV ECHO MEAS - RVSP: 37 MMHG
BH CV ECHO MEAS - SI(CUBED): 84.1 ML/M^2
BH CV ECHO MEAS - SI(LVOT): 27.6 ML/M^2
BH CV ECHO MEAS - SI(MOD-SP2): 22.6 ML/M^2
BH CV ECHO MEAS - SI(MOD-SP4): 19.7 ML/M^2
BH CV ECHO MEAS - SI(TEICH): 62.3 ML/M^2
BH CV ECHO MEAS - SUP REN AO DIAM: 2.3 CM
BH CV ECHO MEAS - SV(CUBED): 145.1 ML
BH CV ECHO MEAS - SV(LVOT): 47.6 ML
BH CV ECHO MEAS - SV(MOD-SP2): 39 ML
BH CV ECHO MEAS - SV(MOD-SP4): 34 ML
BH CV ECHO MEAS - SV(RVOT): 49.1 ML
BH CV ECHO MEAS - SV(TEICH): 107.5 ML
BH CV ECHO MEAS - TAPSE (>1.6): 1.6 CM2
BH CV ECHO MEAS - TR MAX VEL: 290.5 CM/SEC
BH CV ECHO MEASUREMENTS AVERAGE E/E' RATIO: 7.93
BH CV XLRA - RV BASE: 3 CM
BH CV XLRA - TDI S': 12 CM/SEC
LEFT ATRIUM VOLUME INDEX: 29 ML/M2
LV EF 2D ECHO EST: 63 %
SINUS: 3.4 CM
STJ: 2.5 CM

## 2019-10-16 PROCEDURE — 93306 TTE W/DOPPLER COMPLETE: CPT

## 2019-10-16 PROCEDURE — 93306 TTE W/DOPPLER COMPLETE: CPT | Performed by: INTERNAL MEDICINE

## 2019-11-07 ENCOUNTER — OFFICE VISIT (OUTPATIENT)
Dept: SLEEP MEDICINE | Facility: HOSPITAL | Age: 84
End: 2019-11-07

## 2019-11-07 VITALS
HEIGHT: 63 IN | HEART RATE: 63 BPM | BODY MASS INDEX: 26.75 KG/M2 | SYSTOLIC BLOOD PRESSURE: 141 MMHG | DIASTOLIC BLOOD PRESSURE: 77 MMHG | WEIGHT: 151 LBS | OXYGEN SATURATION: 96 %

## 2019-11-07 DIAGNOSIS — Z99.89 OSA ON CPAP: Primary | ICD-10-CM

## 2019-11-07 DIAGNOSIS — G47.33 OSA ON CPAP: Primary | ICD-10-CM

## 2019-11-07 PROBLEM — R06.83 SNORING: Status: RESOLVED | Noted: 2019-06-13 | Resolved: 2019-11-07

## 2019-11-07 PROCEDURE — 99213 OFFICE O/P EST LOW 20 MIN: CPT | Performed by: INTERNAL MEDICINE

## 2019-11-07 PROCEDURE — G0463 HOSPITAL OUTPT CLINIC VISIT: HCPCS

## 2019-11-07 NOTE — PROGRESS NOTES
[facility]  [address] [facility]  3rd [place] [address]  Phone   Fax       SLEEP CLINIC FOLLOW UP PROGRESS NOTE.    Donna M Yeoman  1935  84 y.o.  female      PCP: Caroline Weiner MD      Date of visit: 11/7/2019    Chief Complaint   Patient presents with   • Sleep Apnea   • Follow-up       INTERM HISTORY:  This is a 84 y.o. years old patient who has a history of sleep apnea and is on CPAP.  Patient reports good compliance with the device.  Patient is using the CPAP and feels much better.  Initially she did not like the mask and now she has a nasal mask and likes it.    Sleep schedule  Normally goes to bed at 10 PM  Wakes up at 7:30 AM  Feels refreshed after waking up: Yes    The Smart card downloaded on 11/7/2019 has been reviewed by me and shows the following..  Compliance; 100 %  > 4 hr use, 100 %  Average use of the device 7 hours and 44 minutes per night  Residual AHI: 2.0 /hr (normal less than 5)      Past Medical History:   Diagnosis Date   • Arthritis    • Colon polyp 7/1/2017   • Diabetes mellitus (CMS/HCC)    • Esophageal reflux    • GERD (gastroesophageal reflux disease)    • H/O DVT (deep venous thrombosis)    • Headache 7/1/2017   • Hiatal hernia 7/1/2017   • Hypercholesteremia    • Hyperlipidemia    • Hypertension    • Low back pain 7/1/2017   • MGUS (monoclonal gammopathy of unknown significance)    • NICOLE on CPAP     AHI 15/h   • Osteoporosis    • Pneumonia 7/1/2017   • Pulmonary embolism (CMS/HCC)    • Pulmonary hypertension (CMS/HCC)    • Thyroid disease    • Venous thrombosis        MEDICATIONS: reviewed by me    Current Outpatient Medications:   •  allopurinol (ZYLOPRIM) 100 MG tablet, TAKE 1 TABLET DAILY, Disp: 90 tablet, Rfl: 1  •  aspirin 81 MG EC tablet, Take 81 mg by mouth Daily., Disp: , Rfl:   •  carvedilol (COREG) 6.25 MG tablet, TAKE 1 TABLET BY MOUTH 2 (TWO) TIMES A DAY WITH MEALS., Disp: 60 tablet, Rfl: 5  •  CRANBERRY PO, Take   "by mouth., Disp: , Rfl:   •  Cyanocobalamin (B-12 PO), Take  by mouth., Disp: , Rfl:   •  ESTRACE VAGINAL 0.1 MG/GM vaginal cream, INSERT 1/2 TO 1 INCH VAGINALLY ONCE WEEKLY AS DIRECTED, Disp: , Rfl: 11  •  FIBER PO, Take  by mouth Daily., Disp: , Rfl:   •  hydrochlorothiazide (MICROZIDE) 12.5 MG capsule, TAKE 1 TO 2 TABLETS DAILY FOR HTN AND LE SWELLING, Disp: 60 capsule, Rfl: 3  •  levothyroxine (SYNTHROID, LEVOTHROID) 75 MCG tablet, Take 1 tablet by mouth daily., Disp: 90 tablet, Rfl: 2  •  levothyroxine (SYNTHROID, LEVOTHROID) 75 MCG tablet, TAKE 1 TABLET DAILY, Disp: 90 tablet, Rfl: 1  •  metFORMIN ER (GLUCOPHAGE-XR) 500 MG 24 hr tablet, Take 1 tablet by mouth Daily With Breakfast., Disp: 30 tablet, Rfl: 4  •  omeprazole (priLOSEC) 40 MG capsule, TAKE 1 CAPSULE BY MOUTH TWICE A DAY, Disp: 90 capsule, Rfl: 1  •  ONETOUCH DELICA LANCETS 33G misc, USE TO TEST BLOOD SUGAR TWICE DAILY, Disp: 100 each, Rfl: 1  •  ONETOUCH VERIO test strip, TEST EVERY MORNING, Disp: , Rfl: 5  •  Probiotic Product (PROBIOTIC DAILY PO), Take  by mouth Daily., Disp: , Rfl:   •  rosuvastatin (CRESTOR) 10 MG tablet, TAKE 1 TABLET BY MOUTH EVERY DAY, Disp: 90 tablet, Rfl: 1    No Known Allergies reviewed by me    SOCIAL, FAMILY HISTORY: Medical records are reviewed and noted by me.    REVIEW OF SYSTEMS:   Lima Sleepiness Scale :Total score: 2   Snoring: Resolved  Morning headache: No headaches which is improved from the use of CPAP  Nasal congestion: No  Leg movements: No    PHYSICAL EXAMINATION:  Vitals:    11/07/19 1043   BP: 141/77   Pulse: 63   SpO2: 96%   Weight: 68.5 kg (151 lb)   Height: 160 cm (63\")    Body mass index is 26.75 kg/m².    HEENT: pupils are round equal and reacting to light and accommodation, nasal passage is clear, no nasal polyps, no lymphadenopathy, throat is clear, oral airway Mallampati class 3  RESPRATORY SYSTEM: Breath sounds are equal on both sides and are normal, no wheezes or crackles  CARDIOVASULAR " SYSTEM: Heart rate is regular without murmur  ABDOMEN: Soft, no ascites, no hepatosplenomegaly.  EXTREMITIES: No cyanosis, clubbing or edema       ASSESSMENT AND PLAN:  · Obstructive sleep apnea, patient is using the CPAP with good compliance for treatment of sleep apnea.  I have reviewed the smart card down load and discussed with the patient the download data and encouarged the patient to continue to use the CPAP. The residual AHI is acceptable.  The device is benefiting the patient.  The patient is also instructed to get the CPAP supplies from the DME company and see me in 1 year for follow-up.  The prescription for supplies has been sent to the DME company.  The DME company is [employer] by [employer]  · Headaches.  Patient headaches have improved with the use of CPAP        Emmy Henderson MD, [facility]  Sleep Medicine.(Board-certified)  [facility]   11/7/2019

## 2019-12-11 RX ORDER — OMEPRAZOLE 40 MG/1
CAPSULE, DELAYED RELEASE ORAL
Qty: 90 CAPSULE | Refills: 1 | Status: SHIPPED | OUTPATIENT
Start: 2019-12-11

## 2019-12-17 RX ORDER — BLOOD SUGAR DIAGNOSTIC
STRIP MISCELLANEOUS
Qty: 100 EACH | Refills: 5 | Status: SHIPPED | OUTPATIENT
Start: 2019-12-17

## 2019-12-27 RX ORDER — HYDROCHLOROTHIAZIDE 12.5 MG/1
CAPSULE, GELATIN COATED ORAL
Qty: 180 CAPSULE | Refills: 1 | Status: SHIPPED | OUTPATIENT
Start: 2019-12-27 | End: 2020-06-18

## 2020-01-08 RX ORDER — CARVEDILOL 6.25 MG/1
6.25 TABLET ORAL 2 TIMES DAILY WITH MEALS
Qty: 180 TABLET | Refills: 1 | Status: SHIPPED | OUTPATIENT
Start: 2020-01-08 | End: 2020-06-29

## 2020-01-21 RX ORDER — METFORMIN HYDROCHLORIDE 500 MG/1
TABLET, EXTENDED RELEASE ORAL
Qty: 90 TABLET | Refills: 1 | Status: SHIPPED | OUTPATIENT
Start: 2020-01-21 | End: 2020-06-05 | Stop reason: SINTOL

## 2020-01-23 ENCOUNTER — LAB (OUTPATIENT)
Dept: LAB | Facility: HOSPITAL | Age: 85
End: 2020-01-23

## 2020-01-23 DIAGNOSIS — D47.2 MGUS (MONOCLONAL GAMMOPATHY OF UNKNOWN SIGNIFICANCE): ICD-10-CM

## 2020-01-23 DIAGNOSIS — E53.8 B12 DEFICIENCY: ICD-10-CM

## 2020-01-23 LAB
ALBUMIN SERPL-MCNC: 4 G/DL (ref 3.5–5.2)
ALBUMIN/GLOB SERPL: 1.4 G/DL (ref 1.1–2.4)
ALP SERPL-CCNC: 60 U/L (ref 38–116)
ALT SERPL W P-5'-P-CCNC: 12 U/L (ref 0–33)
ANION GAP SERPL CALCULATED.3IONS-SCNC: 12.6 MMOL/L (ref 5–15)
AST SERPL-CCNC: 22 U/L (ref 0–32)
BASOPHILS # BLD AUTO: 0.04 10*3/MM3 (ref 0–0.2)
BASOPHILS NFR BLD AUTO: 0.6 % (ref 0–1.5)
BILIRUB SERPL-MCNC: 0.5 MG/DL (ref 0.2–1.2)
BUN BLD-MCNC: 21 MG/DL (ref 6–20)
BUN/CREAT SERPL: 21.9 (ref 7.3–30)
CALCIUM SPEC-SCNC: 9.4 MG/DL (ref 8.5–10.2)
CHLORIDE SERPL-SCNC: 104 MMOL/L (ref 98–107)
CO2 SERPL-SCNC: 28.4 MMOL/L (ref 22–29)
CREAT BLD-MCNC: 0.96 MG/DL (ref 0.6–1.1)
DEPRECATED RDW RBC AUTO: 48.8 FL (ref 37–54)
EOSINOPHIL # BLD AUTO: 0.23 10*3/MM3 (ref 0–0.4)
EOSINOPHIL NFR BLD AUTO: 3.6 % (ref 0.3–6.2)
ERYTHROCYTE [DISTWIDTH] IN BLOOD BY AUTOMATED COUNT: 14.6 % (ref 12.3–15.4)
GFR SERPL CREATININE-BSD FRML MDRD: 55 ML/MIN/1.73
GLOBULIN UR ELPH-MCNC: 2.8 GM/DL (ref 1.8–3.5)
GLUCOSE BLD-MCNC: 124 MG/DL (ref 74–124)
HCT VFR BLD AUTO: 37.7 % (ref 34–46.6)
HGB BLD-MCNC: 12.5 G/DL (ref 12–15.9)
IMM GRANULOCYTES # BLD AUTO: 0.03 10*3/MM3 (ref 0–0.05)
IMM GRANULOCYTES NFR BLD AUTO: 0.5 % (ref 0–0.5)
LYMPHOCYTES # BLD AUTO: 1.77 10*3/MM3 (ref 0.7–3.1)
LYMPHOCYTES NFR BLD AUTO: 27.5 % (ref 19.6–45.3)
MCH RBC QN AUTO: 30.4 PG (ref 26.6–33)
MCHC RBC AUTO-ENTMCNC: 33.2 G/DL (ref 31.5–35.7)
MCV RBC AUTO: 91.7 FL (ref 79–97)
MONOCYTES # BLD AUTO: 0.65 10*3/MM3 (ref 0.1–0.9)
MONOCYTES NFR BLD AUTO: 10.1 % (ref 5–12)
NEUTROPHILS # BLD AUTO: 3.72 10*3/MM3 (ref 1.7–7)
NEUTROPHILS NFR BLD AUTO: 57.7 % (ref 42.7–76)
NRBC BLD AUTO-RTO: 0 /100 WBC (ref 0–0.2)
PLATELET # BLD AUTO: 188 10*3/MM3 (ref 140–450)
PMV BLD AUTO: 10 FL (ref 6–12)
POTASSIUM BLD-SCNC: 3.6 MMOL/L (ref 3.5–4.7)
PROT SERPL-MCNC: 6.8 G/DL (ref 6.3–8)
RBC # BLD AUTO: 4.11 10*6/MM3 (ref 3.77–5.28)
SODIUM BLD-SCNC: 145 MMOL/L (ref 134–145)
VIT B12 BLD-MCNC: 786 PG/ML (ref 211–946)
WBC NRBC COR # BLD: 6.44 10*3/MM3 (ref 3.4–10.8)

## 2020-01-23 PROCEDURE — 80053 COMPREHEN METABOLIC PANEL: CPT

## 2020-01-23 PROCEDURE — 85025 COMPLETE CBC W/AUTO DIFF WBC: CPT

## 2020-01-23 PROCEDURE — 36415 COLL VENOUS BLD VENIPUNCTURE: CPT

## 2020-01-23 PROCEDURE — 82607 VITAMIN B-12: CPT | Performed by: INTERNAL MEDICINE

## 2020-01-24 LAB
ALBUMIN SERPL-MCNC: 3.6 G/DL (ref 2.9–4.4)
ALBUMIN/GLOB SERPL: 1.1 {RATIO} (ref 0.7–1.7)
ALPHA1 GLOB FLD ELPH-MCNC: 0.2 G/DL (ref 0–0.4)
ALPHA2 GLOB SERPL ELPH-MCNC: 0.9 G/DL (ref 0.4–1)
B-GLOBULIN SERPL ELPH-MCNC: 1 G/DL (ref 0.7–1.3)
GAMMA GLOB SERPL ELPH-MCNC: 1.2 G/DL (ref 0.4–1.8)
GLOBULIN SER CALC-MCNC: 3.3 G/DL (ref 2.2–3.9)
IGA SERPL-MCNC: 147 MG/DL (ref 64–422)
IGG SERPL-MCNC: 1292 MG/DL (ref 700–1600)
IGM SERPL-MCNC: 49 MG/DL (ref 26–217)
INTERPRETATION SERPL IEP-IMP: ABNORMAL
KAPPA LC SERPL-MCNC: 22.7 MG/L (ref 3.3–19.4)
KAPPA LC/LAMBDA SER: 0.59 {RATIO} (ref 0.26–1.65)
LAMBDA LC FREE SERPL-MCNC: 38.4 MG/L (ref 5.7–26.3)
Lab: ABNORMAL
M-SPIKE: 0.6 G/DL
PROT SERPL-MCNC: 6.9 G/DL (ref 6–8.5)

## 2020-01-30 ENCOUNTER — OFFICE VISIT (OUTPATIENT)
Dept: ONCOLOGY | Facility: CLINIC | Age: 85
End: 2020-01-30

## 2020-01-30 ENCOUNTER — APPOINTMENT (OUTPATIENT)
Dept: LAB | Facility: HOSPITAL | Age: 85
End: 2020-01-30

## 2020-01-30 VITALS
HEIGHT: 63 IN | DIASTOLIC BLOOD PRESSURE: 73 MMHG | HEART RATE: 60 BPM | RESPIRATION RATE: 18 BRPM | SYSTOLIC BLOOD PRESSURE: 169 MMHG | BODY MASS INDEX: 26.61 KG/M2 | WEIGHT: 150.2 LBS | TEMPERATURE: 98.3 F | OXYGEN SATURATION: 97 %

## 2020-01-30 DIAGNOSIS — D47.2 MGUS (MONOCLONAL GAMMOPATHY OF UNKNOWN SIGNIFICANCE): Primary | ICD-10-CM

## 2020-01-30 PROCEDURE — 99214 OFFICE O/P EST MOD 30 MIN: CPT | Performed by: INTERNAL MEDICINE

## 2020-01-30 NOTE — PROGRESS NOTES
Subjective   REASON FOR FOLLOW UP:  IgG Lambda MGUS    HISTORY OF PRESENT ILLNESS:     History of Present Illness  Shirley is a 85 y.o. 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  She had labs drawn in our office 1/23/2020 showing a stable IgG lambda monoclonal spike quantitated at 0.6 g/dL.  Her IgG level was still within normal limits at 1292.  Her blood count is normal and she looks great in the office today.  She remains very active for her age.  (This is her 85th birthday today).    Unfortunately, she is grieving the unexpected loss of her 57-year-old daughter due to complications following open heart surgery.  This happened on New Year's Elizabeth.    Past Medical History, Past Surgical History, Social History, Family History have been reviewed and are without significant changes except as mentioned.    Review of Systems   Constitutional: Negative for activity change, appetite change, fatigue, fever and unexpected weight change.   HENT: Negative for hearing loss, nosebleeds, trouble swallowing and voice change.    Eyes: Negative for visual disturbance.   Respiratory: Negative for cough, shortness of breath and wheezing.    Cardiovascular: Negative for chest pain and palpitations.   Gastrointestinal: Negative for abdominal pain, diarrhea, nausea and vomiting.   Genitourinary: Negative for difficulty urinating, frequency, hematuria and urgency.   Musculoskeletal: Positive for arthralgias and back pain. Negative for neck pain.   Skin: Negative for rash.   Neurological: Negative for dizziness, seizures, syncope and headaches.   Hematological: Negative for adenopathy. Does not bruise/bleed easily.   Psychiatric/Behavioral: Negative for behavioral problems. The patient is not nervous/anxious.       A comprehensive 14 point review of systems was performed and was negative except as mentioned.    Medications:  The current medication list was reviewed in the  "EM[facility]    ALLERGIES:    No Known Allergies    Objective      Vitals:    01/30/20 1146   BP: 169/73   Pulse: 60   Resp: 18   Temp: 98.3 °F (36.8 °C)   TempSrc: Oral   SpO2: 97%   Weight: 68.1 kg (150 lb 3.2 oz)   Height: 160 cm (62.99\")     Current Status 1/30/2020   ECOG score 1       Physical Exam   Constitutional: She is oriented to person, place, and time. She appears well-developed and well-nourished. No distress.   HENT:   Head: Normocephalic.   Eyes: Pupils are equal, round, and reactive to light. Conjunctivae and EOM are normal. No scleral icterus.   Neck: Normal range of motion. Neck supple. No JVD present. No thyromegaly present.   Cardiovascular: Normal rate and regular rhythm. Exam reveals no gallop and no friction rub.   No murmur heard.  Pulmonary/Chest: Effort normal and breath sounds normal. She has no wheezes. She has no rales.   Abdominal: Soft. She exhibits no distension and no mass. There is no tenderness.   Musculoskeletal: Normal range of motion. She exhibits no edema or deformity.   Lymphadenopathy:     She has no cervical adenopathy.   Neurological: She is alert and oriented to person, place, and time. She has normal reflexes. No cranial nerve deficit.   Skin: Skin is warm and dry. No rash noted. No erythema.   Psychiatric: She has a normal mood and affect. Her behavior is normal. Judgment normal.        RECENT LABS:  Hematology WBC   Date Value Ref Range Status   01/23/2020 6.44 3.40 - 10.80 10*3/mm3 Final   05/17/2019 6.18 3.40 - 10.80 10*3/mm3 Final     RBC   Date Value Ref Range Status   01/23/2020 4.11 3.77 - 5.28 10*6/mm3 Final   05/17/2019 4.39 3.77 - 5.28 10*6/mm3 Final     Hemoglobin   Date Value Ref Range Status   01/23/2020 12.5 12.0 - 15.9 g/dL Final     Hematocrit   Date Value Ref Range Status   01/23/2020 37.7 34.0 - 46.6 % Final     Platelets   Date Value Ref Range Status   01/23/2020 188 140 - 450 10*3/mm3 Final          Lab Results   Component Value Date    GLUCOSE 124 01/23/2020 "    BUN 21 (H) 01/23/2020    CREATININE 0.96 01/23/2020    EGFRIFNONA 55 (L) 01/23/2020    EGFRIFAFRI 87 09/25/2019    BCR 21.9 01/23/2020    K 3.6 01/23/2020    CO2 28.4 01/23/2020    CALCIUM 9.4 01/23/2020    PROTENTOTREF 6.9 01/23/2020    ALBUMIN 4.00 01/23/2020    ALBUMIN 3.6 01/23/2020    LABIL2 1.1 01/23/2020    AST 22 01/23/2020    ALT 12 01/23/2020       SPEP/LISA 1/23/2020  IgG  700 - 1600 mg/dL 1292    IgA  64 - 422 mg/dL 147    IgM  26 - 217 mg/dL 49    Total Protein  6.0 - 8.5 g/dL 6.9    Albumin  2.9 - 4.4 g/dL 3.6    Alpha-1-Globulin  0.0 - 0.4 g/dL 0.2    Alpha-2-Globulin  0.4 - 1.0 g/dL 0.9    Beta Globulin  0.7 - 1.3 g/dL 1.0    Gamma Globulin  0.4 - 1.8 g/dL 1.2    M-Esteban  Not Observed g/dL 0.6High     Globulin  2.2 - 3.9 g/dL 3.3    A/G Ratio  0.7 - 1.7 1.1    Immunofixation Reflex, Serum Comment    Comment: Immunofixation shows IgG monoclonal protein with lambda light chain   specificity.     Free Light Chain, Kappa  3.3 - 19.4 mg/L 22.7High     Free Lambda Light Chains  5.7 - 26.3 mg/L 38.4High     Kappa/Lambda Ratio  0.26 - 1.65 0.59        Lab Results   Component Value Date    [id number] 786 01/23/2020         Assessment/Plan   1.  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  2.  Grief reaction from the recent death of her 57-year-old daughter following heart surgery.    Plan    1.  Return for follow-up in 6 months.  2.  Labs will again be drawn one week prior to visit including a CBC, serum chemistries, serum protein electrophoresis, immunofixation, and free serum light chain analysis.              1/30/2020      CC:

## 2020-02-11 ENCOUNTER — OFFICE VISIT (OUTPATIENT)
Dept: FAMILY MEDICINE CLINIC | Facility: CLINIC | Age: 85
End: 2020-02-11

## 2020-02-11 VITALS
BODY MASS INDEX: 26.79 KG/M2 | RESPIRATION RATE: 16 BRPM | DIASTOLIC BLOOD PRESSURE: 68 MMHG | OXYGEN SATURATION: 99 % | SYSTOLIC BLOOD PRESSURE: 112 MMHG | HEART RATE: 110 BPM | WEIGHT: 151.2 LBS | TEMPERATURE: 97.4 F | HEIGHT: 63 IN

## 2020-02-11 DIAGNOSIS — M25.562 ACUTE PAIN OF LEFT KNEE: ICD-10-CM

## 2020-02-11 DIAGNOSIS — M79.662 PAIN AND SWELLING OF LEFT LOWER LEG: Primary | ICD-10-CM

## 2020-02-11 DIAGNOSIS — M79.89 PAIN AND SWELLING OF LEFT LOWER LEG: Primary | ICD-10-CM

## 2020-02-11 PROCEDURE — 99213 OFFICE O/P EST LOW 20 MIN: CPT | Performed by: INTERNAL MEDICINE

## 2020-02-11 NOTE — PROGRESS NOTES
"Subjective   Donna M Yeoman is a 85 y.o. female who comes in today for   Chief Complaint   Patient presents with   • Leg Pain     pt states is having left leg pain, that started a month ago and is getting worse    .    History of Present Illness   Pt lost her daughter suddenly 12/31/19 of a blood clot following CABG at the age of 57.    She is c/o left leg pain that starts in buttock and radiates down entire leg.  Some swelling in ankle and foot on the left leg as well. Noticed it a month ago and worsening.  Hard to go up and down steps and gets worse as the day goes on.  Swelling worsens as she is up throughout the day.  Elevation during the day doesn't always help.  Hard to get her shoe on by end of day.  Tylenol helps and heat does too.  Aches in the knee at night.   Having a lot of gas and reflux.  Wondering if something better than omeprazole.      The following portions of the patient's history were reviewed and updated as appropriate: allergies, current medications, past family history, past medical history, past social history, past surgical history and problem list.    Review of Systems   Respiratory: Negative for shortness of breath.    Cardiovascular: Positive for leg swelling. Negative for chest pain.   Musculoskeletal: Positive for arthralgias and back pain.       /68   Pulse 110   Temp 97.4 °F (36.3 °C) (Oral)   Resp 16   Ht 160 cm (62.99\")   Wt 68.6 kg (151 lb 3.2 oz)   SpO2 99%   BMI 26.79 kg/m²     STEADI Fall Risk Assessment has not been completed.    PHQ-2/PHQ-9 Depression Screening 5/24/2019   Little interest or pleasure in doing things 0   Feeling down, depressed, or hopeless 0   Total Score 0       Objective   Physical Exam   Constitutional: She is oriented to person, place, and time. She appears well-developed and well-nourished.   HENT:   Head: Normocephalic.   Cardiovascular: Normal rate, regular rhythm and normal heart sounds.   Pulmonary/Chest: Effort normal and breath sounds " normal.   Musculoskeletal: She exhibits tenderness (over medial and lateral knee compartments.  no instabilityn and no sweling.  no clot appreciated.  skin normal). She exhibits no edema or deformity.   Neurological: She is alert and oriented to person, place, and time.   Skin: Skin is warm.   Psychiatric: She has a normal mood and affect. Her behavior is normal. Judgment and thought content normal.   Nursing note and vitals reviewed.        Current Outpatient Medications:   •  allopurinol (ZYLOPRIM) 100 MG tablet, TAKE 1 TABLET DAILY, Disp: 90 tablet, Rfl: 1  •  aspirin 81 MG EC tablet, Take 81 mg by mouth Daily., Disp: , Rfl:   •  carvedilol (COREG) 6.25 MG tablet, TAKE 1 TABLET BY MOUTH 2 (TWO) TIMES A DAY WITH MEALS., Disp: 180 tablet, Rfl: 1  •  CRANBERRY PO, Take  by mouth., Disp: , Rfl:   •  Cyanocobalamin (B-12 PO), Take  by mouth., Disp: , Rfl:   •  ESTRACE VAGINAL 0.1 MG/GM vaginal cream, INSERT 1/2 TO 1 INCH VAGINALLY ONCE WEEKLY AS DIRECTED, Disp: , Rfl: 11  •  FIBER PO, Take  by mouth Daily., Disp: , Rfl:   •  hydroCHLOROthiazide (MICROZIDE) 12.5 MG capsule, TAKE 1 TO 2 TABLETS DAILY FOR HTN AND LE SWELLING, Disp: 180 capsule, Rfl: 1  •  levothyroxine (SYNTHROID, LEVOTHROID) 75 MCG tablet, Take 1 tablet by mouth daily., Disp: 90 tablet, Rfl: 2  •  levothyroxine (SYNTHROID, LEVOTHROID) 75 MCG tablet, TAKE 1 TABLET DAILY, Disp: 90 tablet, Rfl: 1  •  metFORMIN ER (GLUCOPHAGE-XR) 500 MG 24 hr tablet, TAKE 1 TABLET BY MOUTH EVERY DAY WITH BREAKFAST, Disp: 90 tablet, Rfl: 1  •  omeprazole (priLOSEC) 40 MG capsule, TAKE 1 CAPSULE BY MOUTH TWICE A DAY, Disp: 90 capsule, Rfl: 1  •  ONETOUCH DELICA LANCETS 33G misc, USE TO TEST BLOOD SUGAR TWICE DAILY, Disp: 100 each, Rfl: 1  •  ONETOUCH VERIO test strip, TEST EVERY MORNING, Disp: 100 each, Rfl: 5  •  Probiotic Product (PROBIOTIC DAILY PO), Take  by mouth Daily., Disp: , Rfl:   •  rosuvastatin (CRESTOR) 10 MG tablet, TAKE 1 TABLET BY MOUTH EVERY DAY, Disp: 90  tablet, Rfl: 1    Assessment/Plan   Shirley was seen today for leg pain.    Diagnoses and all orders for this visit:    Pain and swelling of left lower leg  -     Duplex Venous Lower Extremity - Left CAR; Future    Acute pain of left knee  -     Ambulatory Referral to Orthopedic Surgery      Venous doppler to r/o DVT  Referral to Dr. Matthew Coleman for injections and steroid injection.  Will let him do xrays.  Long h/o OA in her neck/back  Continue prn tylenol and heat/ice.  Knee brace discussed and recommended  Leg lifts to strengthen quad.  dexilant samples provided for GERD sx's.                  I have asked for the patient to return to clinic in 6month(s).

## 2020-02-19 ENCOUNTER — HOSPITAL ENCOUNTER (OUTPATIENT)
Dept: CARDIOLOGY | Facility: HOSPITAL | Age: 85
Discharge: HOME | End: 2020-02-19
Admitting: INTERNAL MEDICINE

## 2020-02-19 DIAGNOSIS — M79.662 PAIN AND SWELLING OF LEFT LOWER LEG: ICD-10-CM

## 2020-02-19 DIAGNOSIS — M79.89 PAIN AND SWELLING OF LEFT LOWER LEG: ICD-10-CM

## 2020-02-19 LAB
BH CV LOW VAS LEFT POPLITEAL SPONT: 1
BH CV LOWER VASCULAR LEFT COMMON FEMORAL AUGMENT: NORMAL
BH CV LOWER VASCULAR LEFT COMMON FEMORAL COMPETENT: NORMAL
BH CV LOWER VASCULAR LEFT COMMON FEMORAL COMPRESS: NORMAL
BH CV LOWER VASCULAR LEFT COMMON FEMORAL PHASIC: NORMAL
BH CV LOWER VASCULAR LEFT COMMON FEMORAL SPONT: NORMAL
BH CV LOWER VASCULAR LEFT DISTAL FEMORAL COMPRESS: NORMAL
BH CV LOWER VASCULAR LEFT GASTRONEMIUS COMPRESS: NORMAL
BH CV LOWER VASCULAR LEFT GREATER SAPH AK COMPRESS: NORMAL
BH CV LOWER VASCULAR LEFT GREATER SAPH BK COMPRESS: NORMAL
BH CV LOWER VASCULAR LEFT LESSER SAPH COMPRESS: NORMAL
BH CV LOWER VASCULAR LEFT MID FEMORAL AUGMENT: NORMAL
BH CV LOWER VASCULAR LEFT MID FEMORAL COMPETENT: NORMAL
BH CV LOWER VASCULAR LEFT MID FEMORAL COMPRESS: NORMAL
BH CV LOWER VASCULAR LEFT MID FEMORAL PHASIC: NORMAL
BH CV LOWER VASCULAR LEFT MID FEMORAL SPONT: NORMAL
BH CV LOWER VASCULAR LEFT PERONEAL COMPRESS: NORMAL
BH CV LOWER VASCULAR LEFT POPLITEAL AUGMENT: NORMAL
BH CV LOWER VASCULAR LEFT POPLITEAL COMPETENT: NORMAL
BH CV LOWER VASCULAR LEFT POPLITEAL COMPRESS: NORMAL
BH CV LOWER VASCULAR LEFT POPLITEAL PHASIC: NORMAL
BH CV LOWER VASCULAR LEFT POPLITEAL SPONT: NORMAL
BH CV LOWER VASCULAR LEFT POSTERIOR TIBIAL COMPRESS: NORMAL
BH CV LOWER VASCULAR LEFT PROFUNDA FEMORAL COMPRESS: NORMAL
BH CV LOWER VASCULAR LEFT PROXIMAL FEMORAL COMPRESS: NORMAL
BH CV LOWER VASCULAR LEFT SAPHENOFEMORAL JUNCTION COMPRESS: NORMAL
BH CV LOWER VASCULAR RIGHT COMMON FEMORAL AUGMENT: NORMAL
BH CV LOWER VASCULAR RIGHT COMMON FEMORAL COMPETENT: NORMAL
BH CV LOWER VASCULAR RIGHT COMMON FEMORAL COMPRESS: NORMAL
BH CV LOWER VASCULAR RIGHT COMMON FEMORAL PHASIC: NORMAL
BH CV LOWER VASCULAR RIGHT COMMON FEMORAL SPONT: NORMAL

## 2020-02-19 PROCEDURE — 93971 EXTREMITY STUDY: CPT

## 2020-02-20 RX ORDER — ALLOPURINOL 100 MG/1
TABLET ORAL
Qty: 90 TABLET | Refills: 1 | Status: SHIPPED | OUTPATIENT
Start: 2020-02-20 | End: 2020-07-27

## 2020-02-25 ENCOUNTER — OFFICE VISIT (OUTPATIENT)
Dept: ORTHOPEDIC SURGERY | Facility: CLINIC | Age: 85
End: 2020-02-25

## 2020-02-25 VITALS — TEMPERATURE: 97.3 F | HEIGHT: 63 IN | BODY MASS INDEX: 26.79 KG/M2 | WEIGHT: 151.2 LBS

## 2020-02-25 DIAGNOSIS — M54.50 CHRONIC LOW BACK PAIN, UNSPECIFIED BACK PAIN LATERALITY, UNSPECIFIED WHETHER SCIATICA PRESENT: ICD-10-CM

## 2020-02-25 DIAGNOSIS — G89.29 CHRONIC LOW BACK PAIN, UNSPECIFIED BACK PAIN LATERALITY, UNSPECIFIED WHETHER SCIATICA PRESENT: ICD-10-CM

## 2020-02-25 DIAGNOSIS — M25.562 LEFT KNEE PAIN, UNSPECIFIED CHRONICITY: Primary | ICD-10-CM

## 2020-02-25 PROCEDURE — 99204 OFFICE O/P NEW MOD 45 MIN: CPT | Performed by: NURSE PRACTITIONER

## 2020-02-25 PROCEDURE — 73562 X-RAY EXAM OF KNEE 3: CPT | Performed by: NURSE PRACTITIONER

## 2020-02-25 PROCEDURE — 20610 DRAIN/INJ JOINT/BURSA W/O US: CPT | Performed by: NURSE PRACTITIONER

## 2020-02-25 RX ORDER — METHYLPREDNISOLONE ACETATE 80 MG/ML
80 INJECTION, SUSPENSION INTRA-ARTICULAR; INTRALESIONAL; INTRAMUSCULAR; SOFT TISSUE
Status: COMPLETED | OUTPATIENT
Start: 2020-02-25 | End: 2020-02-25

## 2020-02-25 RX ADMIN — METHYLPREDNISOLONE ACETATE 80 MG: 80 INJECTION, SUSPENSION INTRA-ARTICULAR; INTRALESIONAL; INTRAMUSCULAR; SOFT TISSUE at 13:43

## 2020-02-25 NOTE — PROGRESS NOTES
Patient: Donna M Yeoman  YOB: 1935 85 y.o. female  Medical Record Number: [id number]    Chief Complaints:   Chief Complaint   Patient presents with   • Left Knee - Establish Care, Pain       History of Present Illness:Donna M Yeoman is a 85 y.o. female who presents as a new patient both myself as well as to the practice with complaints of left knee leg and back pain.  Patient reports she has had pain off and on for many years but worse the last 6 weeks.  Denies any injury.  Describes the pain as a moderate to severe stabbing aching type pain with intermittent swelling of the knee worse with standing walking, better with rest.    Allergies: No Known Allergies    Medications:   Current Outpatient Medications   Medication Sig Dispense Refill   • allopurinol (ZYLOPRIM) 100 MG tablet TAKE 1 TABLET DAILY 90 tablet 1   • aspirin 81 MG EC tablet Take 81 mg by mouth Daily.     • carvedilol (COREG) 6.25 MG tablet TAKE 1 TABLET BY MOUTH 2 (TWO) TIMES A DAY WITH MEALS. 180 tablet 1   • CRANBERRY PO Take  by mouth.     • Cyanocobalamin (B-12 PO) Take  by mouth.     • ESTRACE VAGINAL 0.1 MG/GM vaginal cream INSERT 1/2 TO 1 INCH VAGINALLY ONCE WEEKLY AS DIRECTED  11   • FIBER PO Take  by mouth Daily.     • hydroCHLOROthiazide (MICROZIDE) 12.5 MG capsule TAKE 1 TO 2 TABLETS DAILY FOR HTN AND LE SWELLING 180 capsule 1   • levothyroxine (SYNTHROID, LEVOTHROID) 75 MCG tablet Take 1 tablet by mouth daily. 90 tablet 2   • levothyroxine (SYNTHROID, LEVOTHROID) 75 MCG tablet TAKE 1 TABLET DAILY 90 tablet 1   • metFORMIN ER (GLUCOPHAGE-XR) 500 MG 24 hr tablet TAKE 1 TABLET BY MOUTH EVERY DAY WITH BREAKFAST 90 tablet 1   • omeprazole (priLOSEC) 40 MG capsule TAKE 1 CAPSULE BY MOUTH TWICE A DAY 90 capsule 1   • ONETOUCH DELICA LANCETS 33G misc USE TO TEST BLOOD SUGAR TWICE DAILY 100 each 1   • ONETOUCH VERIO test strip TEST EVERY MORNING 100 each 5   • Probiotic Product (PROBIOTIC DAILY PO) Take  by mouth Daily.     •  "rosuvastatin (CRESTOR) 10 MG tablet TAKE 1 TABLET BY MOUTH EVERY DAY 90 tablet 1     No current facility-administered medications for this visit.          The following portions of the patient's history were reviewed and updated as appropriate: allergies, current medications, past family history, past medical history, past social history, past surgical history and problem list.    Review of Systems:   A 14 point review of systems was performed. All systems negative except pertinent positives/negative listed in HPI above    Physical Exam:   Vitals:    02/25/20 1302   Temp: 97.3 °F (36.3 °C)   Weight: 68.6 kg (151 lb 3.2 oz)   Height: 160 cm (63\")   PainSc:   7       General: A and O x 3, ASA, NAD    SCLERA:    Normal    DENTITION:   Normal  Skin clear no unusual lesions noted  Left knee the patient has no appreciable effusion under 10 degrees flexion neutral and extension with a positive medial Yudith negative Lockman calf soft nontender low back patient has decreased range of motion secondary to pain with a positive left straight leg raise neurologic intact    Radiology:  Xrays 3views (ap,lateral, sunrise) left knee were ordered and reviewed today secondary to pain and shows bone-on-bone end-stage osteoarthritis.  No compared to views available    Assessment/Plan:  End-stage osteoarthritis left knee  Low back pain    Patient discussed treatment options.  She would like to proceed with left knee cortisone injection.  Prior to injection risks were discussed including pain infection.  Patient verbalized understanding would like to proceed with injection.  She was referred to outpatient physical therapy for both her knee and back.  In addition we will set her up for epidural injections of the lower back.  She has had this previously about a year ago did very well with those.  She did have an MRI about a year ago which did show significant arthritic changes.  So I certainly think that is appropriate and we will " otherwise see her back as needed    Large Joint Arthrocentesis: L knee  Date/Time: 2/25/2020 1:43 PM  Consent given by: patient  Site marked: site marked  Timeout: Immediately prior to procedure a time out was called to verify the correct patient, procedure, equipment, support staff and site/side marked as required   Supporting Documentation  Indications: pain and joint swelling   Procedure Details  Location: knee - L knee  Preparation: Patient was prepped and draped in the usual sterile fashion  Needle size: 22 G  Approach: anterolateral  Medications administered: 80 mg methylPREDNISolone acetate 80 MG/ML; 2 mL lidocaine (cardiac)  Patient tolerance: patient tolerated the procedure well with no immediate complications

## 2020-03-09 ENCOUNTER — OFFICE (AMBULATORY)
Dept: URBAN - METROPOLITAN AREA CLINIC 2 | Facility: CLINIC | Age: 85
End: 2020-03-09

## 2020-03-09 VITALS — SYSTOLIC BLOOD PRESSURE: 142 MMHG | WEIGHT: 149 LBS | DIASTOLIC BLOOD PRESSURE: 90 MMHG | HEIGHT: 63 IN

## 2020-03-09 DIAGNOSIS — K57.30 DIVERTICULOSIS OF LARGE INTESTINE WITHOUT PERFORATION OR ABS: ICD-10-CM

## 2020-03-09 DIAGNOSIS — K22.2 ESOPHAGEAL OBSTRUCTION: ICD-10-CM

## 2020-03-09 DIAGNOSIS — K30 FUNCTIONAL DYSPEPSIA: ICD-10-CM

## 2020-03-09 DIAGNOSIS — K59.00 CONSTIPATION, UNSPECIFIED: ICD-10-CM

## 2020-03-09 DIAGNOSIS — R93.3 ABNORMAL FINDINGS ON DIAGNOSTIC IMAGING OF OTHER PARTS OF DI: ICD-10-CM

## 2020-03-09 DIAGNOSIS — R13.10 DYSPHAGIA, UNSPECIFIED: ICD-10-CM

## 2020-03-09 DIAGNOSIS — K44.9 DIAPHRAGMATIC HERNIA WITHOUT OBSTRUCTION OR GANGRENE: ICD-10-CM

## 2020-03-09 DIAGNOSIS — K21.9 GASTRO-ESOPHAGEAL REFLUX DISEASE WITHOUT ESOPHAGITIS: ICD-10-CM

## 2020-03-09 DIAGNOSIS — K29.70 GASTRITIS, UNSPECIFIED, WITHOUT BLEEDING: ICD-10-CM

## 2020-03-09 DIAGNOSIS — R14.3 FLATULENCE: ICD-10-CM

## 2020-03-09 DIAGNOSIS — R14.0 ABDOMINAL DISTENSION (GASEOUS): ICD-10-CM

## 2020-03-09 PROCEDURE — 99214 OFFICE O/P EST MOD 30 MIN: CPT | Performed by: INTERNAL MEDICINE

## 2020-03-11 ENCOUNTER — ANESTHESIA (OUTPATIENT)
Dept: PAIN MEDICINE | Facility: HOSPITAL | Age: 85
End: 2020-03-11

## 2020-03-11 ENCOUNTER — HOSPITAL ENCOUNTER (OUTPATIENT)
Dept: GENERAL RADIOLOGY | Facility: HOSPITAL | Age: 85
Discharge: HOME | End: 2020-03-11

## 2020-03-11 ENCOUNTER — HOSPITAL ENCOUNTER (OUTPATIENT)
Dept: PAIN MEDICINE | Facility: HOSPITAL | Age: 85
Discharge: HOME | End: 2020-03-11
Admitting: ANESTHESIOLOGY

## 2020-03-11 ENCOUNTER — ANESTHESIA EVENT (OUTPATIENT)
Dept: PAIN MEDICINE | Facility: HOSPITAL | Age: 85
End: 2020-03-11

## 2020-03-11 VITALS
RESPIRATION RATE: 14 BRPM | HEIGHT: 63 IN | DIASTOLIC BLOOD PRESSURE: 76 MMHG | OXYGEN SATURATION: 97 % | TEMPERATURE: 97.1 F | BODY MASS INDEX: 26.58 KG/M2 | WEIGHT: 150 LBS | HEART RATE: 59 BPM | SYSTOLIC BLOOD PRESSURE: 133 MMHG

## 2020-03-11 DIAGNOSIS — R52 PAIN: ICD-10-CM

## 2020-03-11 DIAGNOSIS — M51.16 NEURITIS OR RADICULITIS DUE TO RUPTURE OF LUMBAR INTERVERTEBRAL DISC: Primary | ICD-10-CM

## 2020-03-11 PROCEDURE — 77003 FLUOROGUIDE FOR SPINE INJECT: CPT

## 2020-03-11 PROCEDURE — 25010000002 METHYLPREDNISOLONE PER 80 MG: Performed by: ANESTHESIOLOGY

## 2020-03-11 PROCEDURE — 0 IOPAMIDOL 41 % SOLUTION: Performed by: ANESTHESIOLOGY

## 2020-03-11 PROCEDURE — C1755 CATHETER, INTRASPINAL: HCPCS

## 2020-03-11 RX ORDER — FENTANYL CITRATE 50 UG/ML
50 INJECTION, SOLUTION INTRAMUSCULAR; INTRAVENOUS AS NEEDED
Status: DISCONTINUED | OUTPATIENT
Start: 2020-03-11 | End: 2020-03-12 | Stop reason: HOSPADM

## 2020-03-11 RX ORDER — MIDAZOLAM HYDROCHLORIDE 1 MG/ML
1 INJECTION INTRAMUSCULAR; INTRAVENOUS AS NEEDED
Status: DISCONTINUED | OUTPATIENT
Start: 2020-03-11 | End: 2020-03-12 | Stop reason: HOSPADM

## 2020-03-11 RX ORDER — LIDOCAINE HYDROCHLORIDE 10 MG/ML
1 INJECTION, SOLUTION INFILTRATION; PERINEURAL ONCE AS NEEDED
Status: DISCONTINUED | OUTPATIENT
Start: 2020-03-11 | End: 2020-03-12 | Stop reason: HOSPADM

## 2020-03-11 RX ORDER — METHYLPREDNISOLONE ACETATE 80 MG/ML
80 INJECTION, SUSPENSION INTRA-ARTICULAR; INTRALESIONAL; INTRAMUSCULAR; SOFT TISSUE ONCE
Status: COMPLETED | OUTPATIENT
Start: 2020-03-11 | End: 2020-03-11

## 2020-03-11 RX ORDER — SODIUM CHLORIDE 0.9 % (FLUSH) 0.9 %
1-10 SYRINGE (ML) INJECTION AS NEEDED
Status: DISCONTINUED | OUTPATIENT
Start: 2020-03-11 | End: 2020-03-12 | Stop reason: HOSPADM

## 2020-03-11 RX ADMIN — METHYLPREDNISOLONE ACETATE 80 MG: 80 INJECTION, SUSPENSION INTRA-ARTICULAR; INTRALESIONAL; INTRAMUSCULAR; SOFT TISSUE at 12:05

## 2020-03-11 RX ADMIN — IOPAMIDOL 2 ML: 408 INJECTION, SOLUTION INTRATHECAL at 12:05

## 2020-03-11 NOTE — ANESTHESIA PROCEDURE NOTES
PAIN Epidural block    Pre-sedation assessment completed: 3/11/2020 11:59 AM    Patient reassessed immediately prior to procedure    Patient location during procedure: pain clinic  Start Time: 3/11/2020 12:00 PM  Stop Time: 3/11/2020 12:06 PM  Indication:procedure for pain  Performed By  Anesthesiologist: Merline Esqueda MD  Preanesthetic Checklist  Completed: patient identified, site marked, surgical consent, pre-op evaluation, timeout performed, IV checked, risks and benefits discussed and monitors and equipment checked  Additional Notes  Lumbar degeneration, disc herniation.  Fluoro used.    Prep:  Pt Position:prone  Sterile Tech:cap, gloves and sterile barrier  Prep:chlorhexidine gluconate and isopropyl alcohol  Monitoring:EKG, continuous pulse oximetry and blood pressure monitoring  Procedure:Sedation: no     Approach:left paramedian  Guidance: fluoroscopy  Location:lumbar  Level:4-5  Needle Type:Tuohy  Needle Gauge:20  Aspiration:negative  Medications:  Depomedrol:80  Preservative Free Saline:3mL  Isovue:2mL  Comments:B/l spread  Post Assessment:  Dressing:occlusive dressing applied  Pt Tolerance:patient tolerated the procedure well with no apparent complications  Complications:no

## 2020-03-11 NOTE — H&P
Psychiatric    History and Physical    Patient Name: Donna M Yeoman  :  1935  MRN:  [id number]  Date of Admission: 3/11/2020    Subjective     Patient is a 85 y.o. female presents with chief complaint of chronic, moderate, severe low back and leg: left pain.  Onset of symptoms was gradual starting many years ago.  Symptoms are associated/aggravated by nothing in particular. Symptoms improve with injection.  Presents for lesi.  lesi have helped in past.  Recent mri shows degnerative changes & lumbar disc extrusion.      The following portions of the patients history were reviewed and updated as appropriate: current medications, allergies, past medical history, past surgical history, past family history, past social history and problem list                Objective     Past Medical History:   Past Medical History:   Diagnosis Date   • Arthritis    • Colon polyp 2017   • Diabetes mellitus (CMS/HCC)    • Esophageal reflux    • GERD (gastroesophageal reflux disease)    • H/O DVT (deep venous thrombosis)    • Headache 2017   • Hiatal hernia 2017   • History of vitamin D deficiency    • Hypercholesteremia    • Hyperlipidemia    • Hypertension    • Low back pain 2017   • MGUS (monoclonal gammopathy of unknown significance)    • NICOLE on CPAP     AHI 15/h   • Osteoporosis    • Peripheral neuropathy    • Pneumonia 2017   • Pulmonary embolism (CMS/HCC)    • Pulmonary hypertension (CMS/HCC)    • Thyroid disease    • Venous thrombosis      Past Surgical History:   Past Surgical History:   Procedure Laterality Date   • APPENDECTOMY     • BLADDER REPAIR     • COLONOSCOPY     • CYSTOCELE REPAIR      bladder reconstruction   • HYSTERECTOMY     • SALPINGECTOMY     • SALPINGO OOPHORECTOMY      for ectopic pregnancy     Family History:   Family History   Problem Relation Age of Onset   • Breast cancer Mother    • Colon cancer Mother    • Cancer Mother         breast and colon    • Heart disease Other   "  • Diabetes Other    • Hypertension Other      Social History:   Social History     Tobacco Use   • Smoking status: Never Smoker   • Smokeless tobacco: Never Used   Substance Use Topics   • Alcohol use: No   • Drug use: No       Vital Signs Range for the last 24 hours  Temperature: Temp:  [36.2 °C (97.1 °F)] 36.2 °C (97.1 °F)   Temp Source: Temp src: Oral   BP: BP: (149)/(70) 149/70   Pulse: Heart Rate:  [58] 58   Respirations: Resp:  [16] 16   SPO2: SpO2:  [98 %] 98 %   O2 Amount (l/min):     O2 Devices Device (Oxygen Therapy): room air   Weight: Weight:  [68 kg (150 lb)] 68 kg (150 lb)     Flowsheet Rows      First Filed Value   Admission Height  160 cm (63\") Documented at 03/11/2020 1147   Admission Weight  68 kg (150 lb) Documented at 03/11/2020 1147          --------------------------------------------------------------------------------    Current Outpatient Medications   Medication Sig Dispense Refill   • allopurinol (ZYLOPRIM) 100 MG tablet TAKE 1 TABLET DAILY 90 tablet 1   • carvedilol (COREG) 6.25 MG tablet TAKE 1 TABLET BY MOUTH 2 (TWO) TIMES A DAY WITH MEALS. 180 tablet 1   • CRANBERRY PO Take  by mouth.     • Cyanocobalamin (B-12 PO) Take  by mouth.     • FIBER PO Take  by mouth Daily.     • hydroCHLOROthiazide (MICROZIDE) 12.5 MG capsule TAKE 1 TO 2 TABLETS DAILY FOR HTN AND LE SWELLING 180 capsule 1   • levothyroxine (SYNTHROID, LEVOTHROID) 75 MCG tablet Take 1 tablet by mouth daily. 90 tablet 2   • levothyroxine (SYNTHROID, LEVOTHROID) 75 MCG tablet TAKE 1 TABLET DAILY 90 tablet 1   • metFORMIN ER (GLUCOPHAGE-XR) 500 MG 24 hr tablet TAKE 1 TABLET BY MOUTH EVERY DAY WITH BREAKFAST 90 tablet 1   • omeprazole (priLOSEC) 40 MG capsule TAKE 1 CAPSULE BY MOUTH TWICE A DAY 90 capsule 1   • ONETOUCH DELICA LANCETS 33G misc USE TO TEST BLOOD SUGAR TWICE DAILY 100 each 1   • ONETOUCH VERIO test strip TEST EVERY MORNING 100 each 5   • Probiotic Product (PROBIOTIC DAILY PO) Take  by mouth Daily.     • " rosuvastatin (CRESTOR) 10 MG tablet TAKE 1 TABLET BY MOUTH EVERY DAY 90 tablet 1   • aspirin 81 MG EC tablet Take 81 mg by mouth Daily.     • ESTRACE VAGINAL 0.1 MG/GM vaginal cream INSERT 1/2 TO 1 INCH VAGINALLY ONCE WEEKLY AS DIRECTED  11     Current Facility-Administered Medications   Medication Dose Route Frequency Provider Last Rate Last Dose   • fentaNYL citrate (PF) (SUBLIMAZE) injection 50 mcg  50 mcg Intravenous PRN Merline Esqueda MD       • iopamidol (ISOVUE-M 200) injection 41%  12 mL Epidural Once in imaging Merline Esqueda MD       • lidocaine (XYLOCAINE) 1 % injection 1 mL  1 mL Intradermal Once PRN Merline Esqueda MD       • methylPREDNISolone acetate (DEPO-medrol) injection 80 mg  80 mg Intra-articular Once Merline Esqueda MD       • midazolam (VERSED) injection 1 mg  1 mg Intravenous PRN Merline Esqueda MD       • sodium chloride 0.9 % flush 1-10 mL  1-10 mL Intravenous PRN Merline Esqueda MD           --------------------------------------------------------------------------------  Assessment/Plan      Anesthesia Evaluation     Patient summary reviewed and Nursing notes reviewed                Airway   Dental      Pulmonary    (+) pneumonia , pulmonary embolism, recent URI, sleep apnea,   Cardiovascular     (+) hypertension, DVT, hyperlipidemia,     ROS comment: · Left ventricular systolic function is normal. Calculated EF = 63%. Estimated EF was in agreement with the calculated EF. Estimated EF = 63%. Normal left ventricular cavity size noted. All left ventricular wall segments contract normally. Left ventricular wall thickness is consistent with moderate concentric hypertrophy. Left ventricular diastolic dysfunction is noted (grade I) consistent with impaired relaxation.  · Left atrial cavity size is mildly dilated. Left atrium is elongated.  · Trace mitral valve regurgitation is present.  · Trace tricuspid valve  regurgitation is present. Estimated right ventricular systolic pressure from tricuspid regurgitation is mildly elevated (35-45 mmHg). 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

## 2020-04-02 DIAGNOSIS — E78.5 HYPERLIPIDEMIA: ICD-10-CM

## 2020-04-02 RX ORDER — ROSUVASTATIN CALCIUM 10 MG/1
TABLET, COATED ORAL
Qty: 90 TABLET | Refills: 1 | Status: SHIPPED | OUTPATIENT
Start: 2020-04-02 | End: 2020-11-02 | Stop reason: SDUPTHER

## 2020-04-28 RX ORDER — LEVOTHYROXINE SODIUM 0.07 MG/1
TABLET ORAL
Qty: 90 TABLET | Refills: 1 | Status: SHIPPED | OUTPATIENT
Start: 2020-04-28 | End: 2020-10-02 | Stop reason: SDUPTHER

## 2020-06-05 ENCOUNTER — TELEPHONE (OUTPATIENT)
Dept: FAMILY MEDICINE CLINIC | Facility: CLINIC | Age: 85
End: 2020-06-05

## 2020-06-18 RX ORDER — HYDROCHLOROTHIAZIDE 12.5 MG/1
CAPSULE, GELATIN COATED ORAL
Qty: 180 CAPSULE | Refills: 1 | Status: SHIPPED | OUTPATIENT
Start: 2020-06-18 | End: 2020-11-19 | Stop reason: ALTCHOICE

## 2020-06-20 ENCOUNTER — OFFICE (AMBULATORY)
Dept: URBAN - METROPOLITAN AREA LAB 2 | Facility: LAB | Age: 85
End: 2020-06-20

## 2020-06-20 DIAGNOSIS — Z11.59 ENCOUNTER FOR SCREENING FOR OTHER VIRAL DISEASES: ICD-10-CM

## 2020-06-20 PROCEDURE — 87633 RESP VIRUS 12-25 TARGETS: CPT | Performed by: INTERNAL MEDICINE

## 2020-06-22 VITALS
TEMPERATURE: 96.9 F | HEART RATE: 61 BPM | DIASTOLIC BLOOD PRESSURE: 70 MMHG | OXYGEN SATURATION: 91 % | SYSTOLIC BLOOD PRESSURE: 130 MMHG | OXYGEN SATURATION: 97 % | DIASTOLIC BLOOD PRESSURE: 79 MMHG | DIASTOLIC BLOOD PRESSURE: 78 MMHG | DIASTOLIC BLOOD PRESSURE: 88 MMHG | DIASTOLIC BLOOD PRESSURE: 75 MMHG | SYSTOLIC BLOOD PRESSURE: 158 MMHG | RESPIRATION RATE: 20 BRPM | HEART RATE: 64 BPM | SYSTOLIC BLOOD PRESSURE: 153 MMHG | WEIGHT: 149 LBS | RESPIRATION RATE: 16 BRPM | RESPIRATION RATE: 14 BRPM | OXYGEN SATURATION: 96 % | OXYGEN SATURATION: 94 % | HEART RATE: 71 BPM | SYSTOLIC BLOOD PRESSURE: 115 MMHG | HEART RATE: 62 BPM | SYSTOLIC BLOOD PRESSURE: 121 MMHG | HEIGHT: 63 IN | TEMPERATURE: 96.3 F

## 2020-06-23 ENCOUNTER — AMBULATORY SURGICAL CENTER (AMBULATORY)
Dept: URBAN - METROPOLITAN AREA SURGERY 17 | Facility: SURGERY | Age: 85
End: 2020-06-23
Payer: MEDICARE

## 2020-06-23 ENCOUNTER — OFFICE (AMBULATORY)
Dept: URBAN - METROPOLITAN AREA PATHOLOGY 4 | Facility: PATHOLOGY | Age: 85
End: 2020-06-23
Payer: MEDICARE

## 2020-06-23 DIAGNOSIS — R14.0 ABDOMINAL DISTENSION (GASEOUS): ICD-10-CM

## 2020-06-23 DIAGNOSIS — K44.9 DIAPHRAGMATIC HERNIA WITHOUT OBSTRUCTION OR GANGRENE: ICD-10-CM

## 2020-06-23 DIAGNOSIS — K31.7 POLYP OF STOMACH AND DUODENUM: ICD-10-CM

## 2020-06-23 DIAGNOSIS — R11.0 NAUSEA: ICD-10-CM

## 2020-06-23 DIAGNOSIS — K29.70 GASTRITIS, UNSPECIFIED, WITHOUT BLEEDING: ICD-10-CM

## 2020-06-23 DIAGNOSIS — K21.9 GASTRO-ESOPHAGEAL REFLUX DISEASE WITHOUT ESOPHAGITIS: ICD-10-CM

## 2020-06-23 DIAGNOSIS — K31.89 OTHER DISEASES OF STOMACH AND DUODENUM: ICD-10-CM

## 2020-06-23 DIAGNOSIS — K30 FUNCTIONAL DYSPEPSIA: ICD-10-CM

## 2020-06-23 LAB
GI HISTOLOGY: A. UNSPECIFIED: (no result)
GI HISTOLOGY: B. UNSPECIFIED: (no result)
GI HISTOLOGY: PDF REPORT: (no result)

## 2020-06-23 PROCEDURE — 88342 IMHCHEM/IMCYTCHM 1ST ANTB: CPT | Performed by: INTERNAL MEDICINE

## 2020-06-23 PROCEDURE — 43239 EGD BIOPSY SINGLE/MULTIPLE: CPT | Performed by: INTERNAL MEDICINE

## 2020-06-23 PROCEDURE — 88305 TISSUE EXAM BY PATHOLOGIST: CPT | Performed by: INTERNAL MEDICINE

## 2020-06-23 NOTE — SERVICEHPINOTES
Ms. Yeoman is here for follow-up. For the past several months she's has been having nausea and increased belching. These are new complaints since the last time I saw her. When she was here 6 months ago she was doing well from an upper GI standpoint. She has been on omeprazole, Dr. Cook gave her samples of Dexilant, she thinks it did help but insurance will cover it. She is nauseated whether she eats or not and symptoms get worse with anything she eats or drinks. There is no emesis however. She denies weight loss. She also complains of upper abdominal "soreness" and points to the epigastrium. She also has a "nasty taste in the mouth". She also has a history of oropharyngeal dysphagia which is not new. She occasionally "chokes". She has no retrosternal dysphagia. She is now on metformin. She says the symptoms don't really correlate with being started on metformin. Her blood sugars run around 120. She is on aspirin but there is no melena, there is no blood or blood per rectum or hematemesis. She is also complaining of gas, bloating and belching. She does have a history of gas and bloating but the belching symptoms are new as well. The last I saw her I gave her are round of Flagyl to see if that would help with gas and bloating. There was no significant change.She has a history of constipation and polyps. She is up-to-date in terms of colonoscopy. She manages her constipation with Benefiber. She has no lower GI complaints. She is in no distress. She does not look acutely ill. She does not smoke or drink.

## 2020-06-26 ENCOUNTER — TRANSCRIBE ORDERS (OUTPATIENT)
Dept: ADMINISTRATIVE | Facility: HOSPITAL | Age: 85
End: 2020-06-26

## 2020-06-26 DIAGNOSIS — R11.0 NAUSEA: Primary | ICD-10-CM

## 2020-06-29 RX ORDER — CARVEDILOL 6.25 MG/1
TABLET ORAL
Qty: 180 TABLET | Refills: 1 | Status: SHIPPED | OUTPATIENT
Start: 2020-06-29 | End: 2020-12-18

## 2020-07-09 ENCOUNTER — OFFICE VISIT (OUTPATIENT)
Dept: FAMILY MEDICINE CLINIC | Facility: CLINIC | Age: 85
End: 2020-07-09

## 2020-07-09 VITALS
DIASTOLIC BLOOD PRESSURE: 62 MMHG | BODY MASS INDEX: 27.07 KG/M2 | OXYGEN SATURATION: 98 % | HEIGHT: 63 IN | SYSTOLIC BLOOD PRESSURE: 106 MMHG | TEMPERATURE: 97.3 F | WEIGHT: 152.8 LBS | HEART RATE: 59 BPM | RESPIRATION RATE: 16 BRPM

## 2020-07-09 DIAGNOSIS — N89.8 VAGINA ITCHING: ICD-10-CM

## 2020-07-09 DIAGNOSIS — E11.9 TYPE 2 DIABETES MELLITUS WITHOUT COMPLICATION, WITHOUT LONG-TERM CURRENT USE OF INSULIN (HCC): Primary | ICD-10-CM

## 2020-07-09 DIAGNOSIS — N95.2 VAGINAL ATROPHY: ICD-10-CM

## 2020-07-09 PROCEDURE — 99214 OFFICE O/P EST MOD 30 MIN: CPT | Performed by: INTERNAL MEDICINE

## 2020-07-09 RX ORDER — NYSTATIN AND TRIAMCINOLONE ACETONIDE 100000; 1 [USP'U]/G; MG/G
OINTMENT TOPICAL 2 TIMES DAILY
Qty: 30 G | Refills: 0 | Status: SHIPPED | OUTPATIENT
Start: 2020-07-09 | End: 2022-07-08 | Stop reason: HOSPADM

## 2020-07-09 RX ORDER — GLIMEPIRIDE 2 MG/1
2 TABLET ORAL
Qty: 30 TABLET | Refills: 5 | Status: SHIPPED | OUTPATIENT
Start: 2020-07-09 | End: 2020-12-15

## 2020-07-09 NOTE — PROGRESS NOTES
"Subjective   Donna M Yeoman is a 85 y.o. female who comes in today for   Chief Complaint   Patient presents with   • Rash      pt states rash on bottom has been trying to treat herself since before march    .    History of Present Illness   For past 3 mo she has had itchy vaginal area and often feels \"wet\" in between buttocks.  No obvious rash or lesion. No vaginal d/c.  Stopped her estrogen cream b/c ran out and hasn't seen urologist due ot C19 to get refill.  Goes to compound pharmacy b/c it is cheaper.  States she itches all over some times.  Also stopped her metformin due to stomach side effects and couldn't afford januvia.  Needs a generic diabetic med.    The following portions of the patient's history were reviewed and updated as appropriate: allergies, current medications, past family history, past medical history, past social history, past surgical history and problem list.    Review of Systems   Genitourinary: Negative for vaginal bleeding and vaginal discharge.   Skin:        See HPI       /62   Pulse 59   Temp 97.3 °F (36.3 °C) (Temporal)   Resp 16   Ht 160 cm (63\")   Wt 69.3 kg (152 lb 12.8 oz)   SpO2 98%   BMI 27.07 kg/m²     STEADI Fall Risk Assessment has not been completed.    PHQ-2/PHQ-9 Depression Screening 7/9/2020   Little interest or pleasure in doing things 0   Feeling down, depressed, or hopeless 0   Total Score 0       Objective   Physical Exam   Constitutional: She is oriented to person, place, and time. She appears well-developed and well-nourished.   HENT:   Head: Normocephalic and atraumatic.   Eyes: EOM are normal.   Pulmonary/Chest: Effort normal.   Genitourinary: Rectal exam shows no fissure, no mass and no tenderness. No labial fusion. There is no rash, tenderness, lesion or injury on the right labia. There is no rash, tenderness, lesion or injury on the left labia.   Genitourinary Comments: Vaginal atrophy and significant dryness found.  No rash or lesion.  No discharge " seen.  External flat hemorrhoids at anal opening.     Neurological: She is alert and oriented to person, place, and time.   Psychiatric: She has a normal mood and affect. Her behavior is normal. Judgment and thought content normal.   Nursing note and vitals reviewed.        Current Outpatient Medications:   •  allopurinol (ZYLOPRIM) 100 MG tablet, TAKE 1 TABLET DAILY, Disp: 90 tablet, Rfl: 1  •  aspirin 81 MG EC tablet, Take 81 mg by mouth Daily., Disp: , Rfl:   •  carvedilol (COREG) 6.25 MG tablet, TAKE 1 TABLET BY MOUTH TWICE A DAY WITH MEALS, Disp: 180 tablet, Rfl: 1  •  CRANBERRY PO, Take  by mouth., Disp: , Rfl:   •  Cyanocobalamin (B-12 PO), Take  by mouth., Disp: , Rfl:   •  ESTRACE VAGINAL 0.1 MG/GM vaginal cream, INSERT 1/2 TO 1 INCH VAGINALLY ONCE WEEKLY AS DIRECTED, Disp: , Rfl: 11  •  FIBER PO, Take  by mouth Daily., Disp: , Rfl:   •  hydroCHLOROthiazide (MICROZIDE) 12.5 MG capsule, TAKE 1 TO 2 TABLETS DAILY FOR HTN AND LE SWELLING, Disp: 180 capsule, Rfl: 1  •  levothyroxine (SYNTHROID, LEVOTHROID) 75 MCG tablet, Take 1 tablet by mouth daily., Disp: 90 tablet, Rfl: 2  •  levothyroxine (SYNTHROID, LEVOTHROID) 75 MCG tablet, TAKE 1 TABLET DAILY, Disp: 90 tablet, Rfl: 1  •  omeprazole (priLOSEC) 40 MG capsule, TAKE 1 CAPSULE BY MOUTH TWICE A DAY, Disp: 90 capsule, Rfl: 1  •  ONETOUCH DELICA LANCETS 33G misc, USE TO TEST BLOOD SUGAR TWICE DAILY, Disp: 100 each, Rfl: 1  •  ONETOUCH VERIO test strip, TEST EVERY MORNING, Disp: 100 each, Rfl: 5  •  Probiotic Product (PROBIOTIC DAILY PO), Take  by mouth Daily., Disp: , Rfl:   •  rosuvastatin (CRESTOR) 10 MG tablet, TAKE 1 TABLET BY MOUTH EVERY DAY, Disp: 90 tablet, Rfl: 1  •  glimepiride (Amaryl) 2 MG tablet, Take 1 tablet by mouth Every Morning Before Breakfast., Disp: 30 tablet, Rfl: 5  •  nystatin-triamcinolone (MYCOLOG) 813847-5.1 UNIT/GM-% ointment, Apply  topically to the appropriate area as directed 2 (Two) Times a Day., Disp: 30 g, Rfl:  0    Assessment/Plan   Shirley was seen today for rash.    Diagnoses and all orders for this visit:    Type 2 diabetes mellitus without complication, without long-term current use of insulin (CMS/[facility])    Vaginal atrophy    Vagina itching    Other orders  -     nystatin-triamcinolone (MYCOLOG) 946662-8.1 UNIT/GM-% ointment; Apply  topically to the appropriate area as directed 2 (Two) Times a Day.  -     glimepiride (Amaryl) 2 MG tablet; Take 1 tablet by mouth Every Morning Before Breakfast.      aquaphor as a barrier and lubricant to the tissue in the perineum  mycolog 2 1-2 times/day for no longer than 5-7 days at a time.    Restart topical estrogen and hand written script given to patient to take to compound pharmacy  If not improving, she will let me know.  For NIDDM, start amaryl 2 mg qd.  Cautioned on hypoglycemia and potential side effects with this class of drug and that it is the only other generic option.  She is aware of potential and will always take it with food and in the morning.    RTC 3 mo with labs           I have asked for the patient to return to clinic in 4month(s).

## 2020-07-15 RX ORDER — METFORMIN HYDROCHLORIDE 500 MG/1
TABLET, EXTENDED RELEASE ORAL
Qty: 90 TABLET | Refills: 1 | OUTPATIENT
Start: 2020-07-15

## 2020-07-16 ENCOUNTER — HOSPITAL ENCOUNTER (OUTPATIENT)
Dept: NUCLEAR MEDICINE | Facility: HOSPITAL | Age: 85
Discharge: HOME | End: 2020-07-16

## 2020-07-16 DIAGNOSIS — R11.0 NAUSEA: ICD-10-CM

## 2020-07-16 PROCEDURE — 78264 GASTRIC EMPTYING IMG STUDY: CPT

## 2020-07-16 PROCEDURE — 0 TECHNETIUM SULFUR COLLOID: Performed by: INTERNAL MEDICINE

## 2020-07-16 PROCEDURE — A9541 TC99M SULFUR COLLOID: HCPCS | Performed by: INTERNAL MEDICINE

## 2020-07-16 RX ADMIN — TECHNETIUM TC 99M SULFUR COLLOID 1 DOSE: KIT at 07:00

## 2020-07-17 ENCOUNTER — APPOINTMENT (OUTPATIENT)
Dept: NUCLEAR MEDICINE | Facility: HOSPITAL | Age: 85
End: 2020-07-17

## 2020-07-27 RX ORDER — ALLOPURINOL 100 MG/1
TABLET ORAL
Qty: 90 TABLET | Refills: 1 | Status: SHIPPED | OUTPATIENT
Start: 2020-07-27 | End: 2020-12-18

## 2020-07-30 ENCOUNTER — LAB (OUTPATIENT)
Dept: LAB | Facility: HOSPITAL | Age: 85
End: 2020-07-30

## 2020-07-30 DIAGNOSIS — E78.5 HYPERLIPIDEMIA: ICD-10-CM

## 2020-07-30 DIAGNOSIS — D47.2 MGUS (MONOCLONAL GAMMOPATHY OF UNKNOWN SIGNIFICANCE): ICD-10-CM

## 2020-07-30 LAB
ALBUMIN SERPL-MCNC: 4.1 G/DL (ref 3.5–5.2)
ALBUMIN/GLOB SERPL: 1.4 G/DL (ref 1.1–2.4)
ALP SERPL-CCNC: 55 U/L (ref 38–116)
ALT SERPL W P-5'-P-CCNC: 9 U/L (ref 0–33)
ANION GAP SERPL CALCULATED.3IONS-SCNC: 10.8 MMOL/L (ref 5–15)
AST SERPL-CCNC: 19 U/L (ref 0–32)
BASOPHILS # BLD AUTO: 0.03 10*3/MM3 (ref 0–0.2)
BASOPHILS NFR BLD AUTO: 0.5 % (ref 0–1.5)
BILIRUB SERPL-MCNC: 0.5 MG/DL (ref 0.2–1.2)
BUN SERPL-MCNC: 21 MG/DL (ref 6–20)
BUN/CREAT SERPL: 19.3 (ref 7.3–30)
CALCIUM SPEC-SCNC: 9.4 MG/DL (ref 8.5–10.2)
CHLORIDE SERPL-SCNC: 102 MMOL/L (ref 98–107)
CO2 SERPL-SCNC: 28.2 MMOL/L (ref 22–29)
CREAT SERPL-MCNC: 1.09 MG/DL (ref 0.6–1.1)
DEPRECATED RDW RBC AUTO: 48.2 FL (ref 37–54)
EOSINOPHIL # BLD AUTO: 0.21 10*3/MM3 (ref 0–0.4)
EOSINOPHIL NFR BLD AUTO: 3.4 % (ref 0.3–6.2)
ERYTHROCYTE [DISTWIDTH] IN BLOOD BY AUTOMATED COUNT: 14.3 % (ref 12.3–15.4)
GFR SERPL CREATININE-BSD FRML MDRD: 48 ML/MIN/1.73
GLOBULIN UR ELPH-MCNC: 3 GM/DL (ref 1.8–3.5)
GLUCOSE SERPL-MCNC: 132 MG/DL (ref 74–124)
HCT VFR BLD AUTO: 39 % (ref 34–46.6)
HGB BLD-MCNC: 12.4 G/DL (ref 12–15.9)
IMM GRANULOCYTES # BLD AUTO: 0.02 10*3/MM3 (ref 0–0.05)
IMM GRANULOCYTES NFR BLD AUTO: 0.3 % (ref 0–0.5)
LYMPHOCYTES # BLD AUTO: 1.59 10*3/MM3 (ref 0.7–3.1)
LYMPHOCYTES NFR BLD AUTO: 25.4 % (ref 19.6–45.3)
MCH RBC QN AUTO: 29.3 PG (ref 26.6–33)
MCHC RBC AUTO-ENTMCNC: 31.8 G/DL (ref 31.5–35.7)
MCV RBC AUTO: 92.2 FL (ref 79–97)
MONOCYTES # BLD AUTO: 0.55 10*3/MM3 (ref 0.1–0.9)
MONOCYTES NFR BLD AUTO: 8.8 % (ref 5–12)
NEUTROPHILS NFR BLD AUTO: 3.85 10*3/MM3 (ref 1.7–7)
NEUTROPHILS NFR BLD AUTO: 61.6 % (ref 42.7–76)
NRBC BLD AUTO-RTO: 0 /100 WBC (ref 0–0.2)
PLATELET # BLD AUTO: 189 10*3/MM3 (ref 140–450)
PMV BLD AUTO: 9.8 FL (ref 6–12)
POTASSIUM SERPL-SCNC: 3.8 MMOL/L (ref 3.5–4.7)
PROT SERPL-MCNC: 7.1 G/DL (ref 6.3–8)
RBC # BLD AUTO: 4.23 10*6/MM3 (ref 3.77–5.28)
SODIUM SERPL-SCNC: 141 MMOL/L (ref 134–145)
WBC # BLD AUTO: 6.25 10*3/MM3 (ref 3.4–10.8)

## 2020-07-30 PROCEDURE — 36415 COLL VENOUS BLD VENIPUNCTURE: CPT

## 2020-07-30 PROCEDURE — 80053 COMPREHEN METABOLIC PANEL: CPT

## 2020-07-30 PROCEDURE — 85025 COMPLETE CBC W/AUTO DIFF WBC: CPT

## 2020-07-30 RX ORDER — ROSUVASTATIN CALCIUM 10 MG/1
TABLET, COATED ORAL
Qty: 90 TABLET | Refills: 1 | OUTPATIENT
Start: 2020-07-30

## 2020-07-31 LAB
ALBUMIN SERPL-MCNC: 3.7 G/DL (ref 2.9–4.4)
ALBUMIN/GLOB SERPL: 1.3 {RATIO} (ref 0.7–1.7)
ALPHA1 GLOB FLD ELPH-MCNC: 0.2 G/DL (ref 0–0.4)
ALPHA2 GLOB SERPL ELPH-MCNC: 0.8 G/DL (ref 0.4–1)
B-GLOBULIN SERPL ELPH-MCNC: 0.8 G/DL (ref 0.7–1.3)
GAMMA GLOB SERPL ELPH-MCNC: 1.1 G/DL (ref 0.4–1.8)
GLOBULIN SER CALC-MCNC: 2.9 G/DL (ref 2.2–3.9)
IGA SERPL-MCNC: 142 MG/DL (ref 64–422)
IGG SERPL-MCNC: 1216 MG/DL (ref 586–1602)
IGM SERPL-MCNC: 46 MG/DL (ref 26–217)
INTERPRETATION SERPL IEP-IMP: ABNORMAL
KAPPA LC SERPL-MCNC: 23.9 MG/L (ref 3.3–19.4)
KAPPA LC/LAMBDA SER: 0.54 {RATIO} (ref 0.26–1.65)
LAMBDA LC FREE SERPL-MCNC: 44.6 MG/L (ref 5.7–26.3)
Lab: ABNORMAL
M-SPIKE: 0.7 G/DL
PROT SERPL-MCNC: 6.6 G/DL (ref 6–8.5)

## 2020-08-06 ENCOUNTER — APPOINTMENT (OUTPATIENT)
Dept: LAB | Facility: HOSPITAL | Age: 85
End: 2020-08-06

## 2020-08-06 ENCOUNTER — OFFICE VISIT (OUTPATIENT)
Dept: ONCOLOGY | Facility: CLINIC | Age: 85
End: 2020-08-06

## 2020-08-06 VITALS
SYSTOLIC BLOOD PRESSURE: 137 MMHG | RESPIRATION RATE: 18 BRPM | WEIGHT: 155.3 LBS | OXYGEN SATURATION: 96 % | BODY MASS INDEX: 27.52 KG/M2 | HEART RATE: 95 BPM | HEIGHT: 63 IN | TEMPERATURE: 97.7 F | DIASTOLIC BLOOD PRESSURE: 77 MMHG

## 2020-08-06 DIAGNOSIS — D47.2 MGUS (MONOCLONAL GAMMOPATHY OF UNKNOWN SIGNIFICANCE): Primary | ICD-10-CM

## 2020-08-06 PROCEDURE — 99213 OFFICE O/P EST LOW 20 MIN: CPT | Performed by: INTERNAL MEDICINE

## 2020-08-06 NOTE — PROGRESS NOTES
Subjective   REASON FOR FOLLOW UP:  IgG Lambda MGUS    HISTORY OF PRESENT ILLNESS:     History of Present Illness  Shirley is a 85 y.o. 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  She had labs drawn in our office 7/30/2020 showing a stable IgG lambda monoclonal spike quantitated at 0.7 g/dL.  Her IgG level was still within normal limits at 1216.  Her blood count is normal and she looks great in the office today.  She remains very active for her age.     Past Medical History, Past Surgical History, Social History, Family History have been reviewed and are without significant changes except as mentioned.    Review of Systems   Constitutional: Negative for activity change, appetite change, fatigue, fever and unexpected weight change.   HENT: Negative for hearing loss, nosebleeds, trouble swallowing and voice change.    Eyes: Negative for visual disturbance.   Respiratory: Negative for cough, shortness of breath and wheezing.    Cardiovascular: Negative for chest pain and palpitations.   Gastrointestinal: Negative for abdominal pain, diarrhea, nausea and vomiting.   Genitourinary: Negative for difficulty urinating, frequency, hematuria and urgency.   Musculoskeletal: Positive for arthralgias and back pain. Negative for neck pain.   Skin: Negative for rash.   Neurological: Negative for dizziness, seizures, syncope and headaches.   Hematological: Negative for adenopathy. Does not bruise/bleed easily.   Psychiatric/Behavioral: Negative for behavioral problems. The patient is not nervous/anxious.    Reviewed and unchanged on the visit of 8/6/2020  A comprehensive 14 point review of systems was performed and was negative except as mentioned.    Medications:  The current medication list was reviewed in the EMR    ALLERGIES:    No Known Allergies    Objective      Vitals:    08/06/20 1448   BP: 137/77   Pulse: 95   Resp: 18   Temp: 97.7 °F (36.5 °C)   TempSrc: Skin  "  SpO2: 96%   Weight: 70.4 kg (155 lb 4.8 oz)   Height: 160 cm (62.99\")   PainSc: 0-No pain     Current Status 8/6/2020   ECOG score 0       Physical Exam   Constitutional: She is oriented to person, place, and time. She appears well-developed and well-nourished. No distress.   HENT:   Head: Normocephalic.   Eyes: Pupils are equal, round, and reactive to light. Conjunctivae and EOM are normal. No scleral icterus.   Neck: Normal range of motion. Neck supple. No JVD present. No thyromegaly present.   Cardiovascular: Normal rate and regular rhythm. Exam reveals no gallop and no friction rub.   No murmur heard.  Pulmonary/Chest: Effort normal and breath sounds normal. She has no wheezes. She has no rales.   Abdominal: Soft. She exhibits no distension and no mass. There is no tenderness.   Musculoskeletal: Normal range of motion. She exhibits no edema or deformity.   Lymphadenopathy:     She has no cervical adenopathy.   Neurological: She is alert and oriented to person, place, and time. She has normal reflexes. No cranial nerve deficit.   Skin: Skin is warm and dry. No rash noted. No erythema.   Psychiatric: She has a normal mood and affect. Her behavior is normal. Judgment normal.   Repeated and unchanged on the visit of 8/6/2020    RECENT LABS:  Hematology WBC   Date Value Ref Range Status   07/30/2020 6.25 3.40 - 10.80 10*3/mm3 Final   05/17/2019 6.18 3.40 - 10.80 10*3/mm3 Final     RBC   Date Value Ref Range Status   07/30/2020 4.23 3.77 - 5.28 10*6/mm3 Final   05/17/2019 4.39 3.77 - 5.28 10*6/mm3 Final     Hemoglobin   Date Value Ref Range Status   07/30/2020 12.4 12.0 - 15.9 g/dL Final     Hematocrit   Date Value Ref Range Status   07/30/2020 39.0 34.0 - 46.6 % Final     Platelets   Date Value Ref Range Status   07/30/2020 189 140 - 450 10*3/mm3 Final          Lab Results   Component Value Date    GLUCOSE 132 (H) 07/30/2020    BUN 21 (H) 07/30/2020    CREATININE 1.09 07/30/2020    EGFRIFNONA 48 (L) 07/30/2020    " EGFRIFAFRI 87 09/25/2019    BCR 19.3 07/30/2020    K 3.8 07/30/2020    CO2 28.2 07/30/2020    CALCIUM 9.4 07/30/2020    PROTENTOTREF 6.6 07/30/2020    ALBUMIN 4.10 07/30/2020    ALBUMIN 3.7 07/30/2020    LABIL2 1.3 07/30/2020    AST 19 07/30/2020    ALT 9 07/30/2020       SPEP/LISA 7/30/2020  IgG  586 - 1602 mg/dL 1216    IgA  64 - 422 mg/dL 142    IgM  26 - 217 mg/dL 46    Total Protein  6.0 - 8.5 g/dL 6.6    Albumin  2.9 - 4.4 g/dL 3.7    Alpha-1-Globulin  0.0 - 0.4 g/dL 0.2    Alpha-2-Globulin  0.4 - 1.0 g/dL 0.8    Beta Globulin  0.7 - 1.3 g/dL 0.8    Gamma Globulin  0.4 - 1.8 g/dL 1.1    M-Esteban  Not Observed g/dL 0.7High     Globulin  2.2 - 3.9 g/dL 2.9    A/G Ratio  0.7 - 1.7 1.3    Immunofixation Reflex, Serum Comment    Comment: Immunofixation shows IgG monoclonal protein with lambda light chain   specificity.     Free Light Chain, Kappa  3.3 - 19.4 mg/L 23.9High     Free Lambda Light Chains  5.7 - 26.3 mg/L 44.6High     Kappa/Lambda Ratio  0.26 - 1.65 0.54      Lab Results   Component Value Date    ITGJLBSB19 786 01/23/2020     Assessment/Plan   1.  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  2.  Grief reaction from the recent death of her 57-year-old daughter following heart surgery.    Plan    1.  Return for follow-up in 6 months.  2.  Labs will again be drawn one week prior to visit including a CBC, serum chemistries, serum protein electrophoresis, immunofixation, and free serum light chain analysis.              8/6/2020      CC:

## 2020-08-24 ENCOUNTER — TELEPHONE (OUTPATIENT)
Dept: FAMILY MEDICINE CLINIC | Facility: CLINIC | Age: 85
End: 2020-08-24

## 2020-08-24 NOTE — TELEPHONE ENCOUNTER
PATIENT CALLED-HAS HAD EAR ACHES, SORE THROAT, SLIGHT COUGH 3 DAYS    NO FEVER, NO SHORTNESS OF BREATH    PLEASE ADVISE    [phone]

## 2020-08-25 ENCOUNTER — OFFICE VISIT (OUTPATIENT)
Dept: FAMILY MEDICINE CLINIC | Facility: CLINIC | Age: 85
End: 2020-08-25

## 2020-08-25 DIAGNOSIS — J01.90 ACUTE SINUSITIS, RECURRENCE NOT SPECIFIED, UNSPECIFIED LOCATION: Primary | ICD-10-CM

## 2020-08-25 PROCEDURE — 99442 PR PHYS/QHP TELEPHONE EVALUATION 11-20 MIN: CPT | Performed by: INTERNAL MEDICINE

## 2020-08-25 RX ORDER — AMOXICILLIN 875 MG/1
875 TABLET, COATED ORAL 2 TIMES DAILY
Qty: 20 TABLET | Refills: 0 | Status: SHIPPED | OUTPATIENT
Start: 2020-08-25 | End: 2020-11-11

## 2020-08-25 NOTE — PROGRESS NOTES
Subjective   Donna M Yeoman is a 85 y.o. female who comes in today for   Chief Complaint   Patient presents with   • Sore Throat   • Earache   .    History of Present Illness   Telephone visit due to pandemic  You have chosen to receive care through a telephone visit. Do you consent to use a telephone visit for your medical care today? Yes  This visit has been rescheduled as a phone visit to comply with patient safety concerns in accordance with CDC recommendations. Total time of discussion was 15 minutes.    For 2 weeks has had bilateral ear pain when she presses on the ear near her cheek.  Also c/o ST.  Ears in general hurt.  Hearing is no affected.  No fever.  No chills.  Mild cough and nasal congestion .  RN is clear.  Sinus pressure is present and her throat is scratchy.  Very minimal cough and mostly when she lays down at night.  No soa.  No n/v/d.  No loss of taste or smell.    The following portions of the patient's history were reviewed and updated as appropriate: allergies, current medications, past family history, past medical history, past social history, past surgical history and problem list.    Review of Systems   Constitutional: Negative for chills and fever.   HENT: Positive for ear pain, rhinorrhea, sinus pain and sore throat.    Respiratory: Positive for cough. Negative for shortness of breath.    Gastrointestinal: Negative.        There were no vitals taken for this visit.    STEADI Fall Risk Assessment has not been completed.    PHQ-2/PHQ-9 Depression Screening 7/9/2020   Little interest or pleasure in doing things 0   Feeling down, depressed, or hopeless 0   Total Score 0       Objective   Physical Exam   Constitutional: She is oriented to person, place, and time.   Neurological: She is alert and oriented to person, place, and time.   Psychiatric: She has a normal mood and affect. Her behavior is normal. Judgment and thought content normal.         Current Outpatient Medications:   •   allopurinol (ZYLOPRIM) 100 MG tablet, TAKE 1 TABLET DAILY, Disp: 90 tablet, Rfl: 1  •  amoxicillin (AMOXIL) 875 MG tablet, Take 1 tablet by mouth 2 (Two) Times a Day., Disp: 20 tablet, Rfl: 0  •  aspirin 81 MG EC tablet, Take 81 mg by mouth Daily., Disp: , Rfl:   •  carvedilol (COREG) 6.25 MG tablet, TAKE 1 TABLET BY MOUTH TWICE A DAY WITH MEALS, Disp: 180 tablet, Rfl: 1  •  CRANBERRY PO, Take  by mouth., Disp: , Rfl:   •  Cyanocobalamin (B-12 PO), Take  by mouth., Disp: , Rfl:   •  ESTRACE VAGINAL 0.1 MG/GM vaginal cream, INSERT 1/2 TO 1 INCH VAGINALLY ONCE WEEKLY AS DIRECTED, Disp: , Rfl: 11  •  FIBER PO, Take  by mouth Daily., Disp: , Rfl:   •  glimepiride (Amaryl) 2 MG tablet, Take 1 tablet by mouth Every Morning Before Breakfast., Disp: 30 tablet, Rfl: 5  •  hydroCHLOROthiazide (MICROZIDE) 12.5 MG capsule, TAKE 1 TO 2 TABLETS DAILY FOR HTN AND LE SWELLING, Disp: 180 capsule, Rfl: 1  •  levothyroxine (SYNTHROID, LEVOTHROID) 75 MCG tablet, Take 1 tablet by mouth daily., Disp: 90 tablet, Rfl: 2  •  levothyroxine (SYNTHROID, LEVOTHROID) 75 MCG tablet, TAKE 1 TABLET DAILY, Disp: 90 tablet, Rfl: 1  •  nystatin-triamcinolone (MYCOLOG) 650668-3.1 UNIT/GM-% ointment, Apply  topically to the appropriate area as directed 2 (Two) Times a Day., Disp: 30 g, Rfl: 0  •  omeprazole (priLOSEC) 40 MG capsule, TAKE 1 CAPSULE BY MOUTH TWICE A DAY, Disp: 90 capsule, Rfl: 1  •  ONETOUCH DELICA LANCETS 33G misc, USE TO TEST BLOOD SUGAR TWICE DAILY, Disp: 100 each, Rfl: 1  •  ONETOUCH VERIO test strip, TEST EVERY MORNING, Disp: 100 each, Rfl: 5  •  Probiotic Product (PROBIOTIC DAILY PO), Take  by mouth Daily., Disp: , Rfl:   •  rosuvastatin (CRESTOR) 10 MG tablet, TAKE 1 TABLET BY MOUTH EVERY DAY, Disp: 90 tablet, Rfl: 1    Assessment/Plan   Shirley was seen today for sore throat and earache.    Diagnoses and all orders for this visit:    Acute sinusitis, recurrence not specified, unspecified location    Other orders  -     amoxicillin  (AMOXIL) 875 MG tablet; Take 1 tablet by mouth 2 (Two) Times a Day.    start flonase and nasal washings  Amoxil for 10 days with food  Start mucinex   If not improving in 3 days, call office for appt   Total time CC for telemed visit 15 minutes               I have asked for the patient to return to clinic in 6day(s).

## 2020-09-09 ENCOUNTER — TELEPHONE (OUTPATIENT)
Dept: FAMILY MEDICINE CLINIC | Facility: CLINIC | Age: 85
End: 2020-09-09

## 2020-09-09 NOTE — TELEPHONE ENCOUNTER
PATIENT CALLED IN AND STATED SHE HAD PRESCRIBED A MEDICATION 8/25/2020 AND TOOK HER LAST PILL : PATIENT HAS HEADACHES AND EYE CABRERA ALL THE TIME  , DRAINAGE  PATIENT WOULD LIKE TO KNOW WHAT DOCTOR ARIEL WHATS TO DO .         PLEASE CALL HER AND ADVISE [phone]

## 2020-09-10 RX ORDER — METHYLPREDNISOLONE 4 MG/1
TABLET ORAL
Qty: 21 TABLET | Refills: 0 | Status: SHIPPED | OUTPATIENT
Start: 2020-09-10 | End: 2020-10-02

## 2020-09-10 NOTE — TELEPHONE ENCOUNTER
At her OV there was a little fluid behind one ear.  augmentin should have taken care of that.  Wondering if she needs a steroid pack in case it is induced by allergies and sinus pressure?  Is she ok with a steroid being sent to local pharmacy?

## 2020-09-10 NOTE — TELEPHONE ENCOUNTER
Pt is ok with sending over steriod pack. Pt would like [facility] and [facility].

## 2020-09-10 NOTE — TELEPHONE ENCOUNTER
PATIENT CALLED BACK TO SEE WHAT  RECOMMENDS BECAUSE SHE HAS NOT GOT ANY BETTER AND HER HEADCHES ARE WORSE. SHE IS ALSO PAIN IN HER EARS AND EYES.     PLEASE CALL HER AS SOON AS POSSIBLE.    BEST PH#: [phone]

## 2020-10-02 ENCOUNTER — OFFICE VISIT (OUTPATIENT)
Dept: FAMILY MEDICINE CLINIC | Facility: CLINIC | Age: 85
End: 2020-10-02

## 2020-10-02 VITALS
TEMPERATURE: 96.8 F | DIASTOLIC BLOOD PRESSURE: 80 MMHG | HEART RATE: 62 BPM | BODY MASS INDEX: 28.49 KG/M2 | OXYGEN SATURATION: 98 % | SYSTOLIC BLOOD PRESSURE: 130 MMHG | WEIGHT: 160.8 LBS | RESPIRATION RATE: 16 BRPM | HEIGHT: 63 IN

## 2020-10-02 DIAGNOSIS — H92.03 EAR PAIN, BILATERAL: ICD-10-CM

## 2020-10-02 DIAGNOSIS — Z23 NEED FOR IMMUNIZATION AGAINST INFLUENZA: ICD-10-CM

## 2020-10-02 DIAGNOSIS — Z12.31 SCREENING MAMMOGRAM, ENCOUNTER FOR: Primary | ICD-10-CM

## 2020-10-02 DIAGNOSIS — R42 VERTIGO: ICD-10-CM

## 2020-10-02 PROCEDURE — 90694 VACC AIIV4 NO PRSRV 0.5ML IM: CPT | Performed by: INTERNAL MEDICINE

## 2020-10-02 PROCEDURE — G0008 ADMIN INFLUENZA VIRUS VAC: HCPCS | Performed by: INTERNAL MEDICINE

## 2020-10-02 PROCEDURE — 99213 OFFICE O/P EST LOW 20 MIN: CPT | Performed by: INTERNAL MEDICINE

## 2020-10-02 NOTE — PROGRESS NOTES
Subjective   Donna M Yeoman is a 85 y.o. female who comes in today for   Chief Complaint   Patient presents with   • Nasal Congestion     pt states has been going on for awhile has gone through 2 trials of antibiotics    • Earache     pt states has been going on for about a month   • Ear Fullness   • Leg Pain     pt states has been off and on has been to orthopedic    .    History of Present Illness   Persistent ear pain, ha's and nasal congestion--sx for a month or longer.  I gave her one round of an antibiotic which helped a little.  Less pain in ears.  On 9/10/20 I gave her medrol and that didn't help.  She got a UTI and got another antibiotic and then had a tooth pulled and had a third antibiotic.  Nothing helping.  Her main complaint is that ears hurt and feel full.  Seems worse when lies down.  Nothing makes them better.  She has a frontal ha and nothing makes it better or worse and ha comes and goes.  Seals isn't present when she wakes in the morning but will start at various times. Tylenol helps.  No fever.  Occasional random dry cough.  No soa   she has had vertigo in the past and have seen an audiologist through an ear nose and throat clinic.  Epley maneuvers were very helpful for her.  She has noticed that her balance is been different.  She did misstep during the dark and fell against the hallway wall a few weeks ago hitting the back of her head but not losing consciousness and not having acute injury.  She does notice some dizziness when looking upward.  She is due for her MMG.  Had bx in 10/2019 and neg and her f/u MMG didn't happen in April b/c of covid and she never rescheduled it.        The following portions of the patient's history were reviewed and updated as appropriate: allergies, current medications, past family history, past medical history, past social history, past surgical history and problem list.    Review of Systems   Constitutional: Negative for fever.   HENT: Positive for congestion,  "ear pain and hearing loss (hearing loss is at baseline for her). Negative for ear discharge.    Respiratory: Positive for cough.    Neurological: Positive for headaches.       /80   Pulse 62   Temp 96.8 °F (36 °C) (Temporal)   Resp 16   Ht 160 cm (62.99\")   Wt 72.9 kg (160 lb 12.8 oz)   SpO2 98%   BMI 28.49 kg/m²     STEADI Fall Risk Assessment has not been completed.    PHQ-2/PHQ-9 Depression Screening 7/9/2020   Little interest or pleasure in doing things 0   Feeling down, depressed, or hopeless 0   Total Score 0       Objective   Physical Exam  Vitals signs and nursing note reviewed.   Constitutional:       Appearance: She is well-developed.   HENT:      Head: Normocephalic and atraumatic.      Right Ear: External ear normal. There is impacted cerumen.      Left Ear: External ear normal. There is impacted cerumen.   Eyes:      Conjunctiva/sclera: Conjunctivae normal.   Neck:      Musculoskeletal: Neck supple.   Cardiovascular:      Rate and Rhythm: Normal rate and regular rhythm.      Heart sounds: Normal heart sounds.      Comments: No bruits  Pulmonary:      Effort: Pulmonary effort is normal. No respiratory distress.      Breath sounds: Normal breath sounds. No wheezing or rales.   Abdominal:      General: Bowel sounds are normal. There is no distension.      Palpations: Abdomen is soft. There is no mass.      Tenderness: There is no abdominal tenderness.   Lymphadenopathy:      Cervical: No cervical adenopathy.   Skin:     General: Skin is warm.   Neurological:      Mental Status: She is alert and oriented to person, place, and time.   Psychiatric:         Behavior: Behavior normal.         Thought Content: Thought content normal.         Judgment: Judgment normal.           Current Outpatient Medications:   •  allopurinol (ZYLOPRIM) 100 MG tablet, TAKE 1 TABLET DAILY, Disp: 90 tablet, Rfl: 1  •  aspirin 81 MG EC tablet, Take 81 mg by mouth Daily., Disp: , Rfl:   •  carvedilol (COREG) 6.25 MG " tablet, TAKE 1 TABLET BY MOUTH TWICE A DAY WITH MEALS, Disp: 180 tablet, Rfl: 1  •  CRANBERRY PO, Take  by mouth., Disp: , Rfl:   •  Cyanocobalamin (B-12 PO), Take  by mouth., Disp: , Rfl:   •  ESTRACE VAGINAL 0.1 MG/GM vaginal cream, INSERT 1/2 TO 1 INCH VAGINALLY ONCE WEEKLY AS DIRECTED, Disp: , Rfl: 11  •  FIBER PO, Take  by mouth Daily., Disp: , Rfl:   •  glimepiride (Amaryl) 2 MG tablet, Take 1 tablet by mouth Every Morning Before Breakfast., Disp: 30 tablet, Rfl: 5  •  hydroCHLOROthiazide (MICROZIDE) 12.5 MG capsule, TAKE 1 TO 2 TABLETS DAILY FOR HTN AND LE SWELLING, Disp: 180 capsule, Rfl: 1  •  levothyroxine (SYNTHROID, LEVOTHROID) 75 MCG tablet, Take 1 tablet by mouth daily., Disp: 90 tablet, Rfl: 2  •  nystatin-triamcinolone (MYCOLOG) 710993-6.1 UNIT/GM-% ointment, Apply  topically to the appropriate area as directed 2 (Two) Times a Day., Disp: 30 g, Rfl: 0  •  omeprazole (priLOSEC) 40 MG capsule, TAKE 1 CAPSULE BY MOUTH TWICE A DAY, Disp: 90 capsule, Rfl: 1  •  ONETOUCH DELICA LANCETS 33G misc, USE TO TEST BLOOD SUGAR TWICE DAILY, Disp: 100 each, Rfl: 1  •  ONETOUCH VERIO test strip, TEST EVERY MORNING, Disp: 100 each, Rfl: 5  •  phenazopyridine (Pyridium) 100 MG tablet, Take 1 tablet by mouth 3 (Three) Times a Day As Needed for Bladder Spasms (UTI symptoms)., Disp: 6 tablet, Rfl: 0  •  Probiotic Product (PROBIOTIC DAILY PO), Take  by mouth Daily., Disp: , Rfl:   •  rosuvastatin (CRESTOR) 10 MG tablet, TAKE 1 TABLET BY MOUTH EVERY DAY, Disp: 90 tablet, Rfl: 1  •  amoxicillin (AMOXIL) 875 MG tablet, Take 1 tablet by mouth 2 (Two) Times a Day., Disp: 20 tablet, Rfl: 0    Assessment/Plan   Shirley was seen today for nasal congestion, earache, ear fullness and leg pain.    Diagnoses and all orders for this visit:    Screening mammogram, encounter for  -     Mammo Screening Bilateral With CAD; Future    Need for immunization against influenza  -     Fluad Quad 65+ yrs ([id number])    Vertigo  -     Ambulatory  Referral to ENT (Otolaryngology)    Ear pain, bilateral  -     Ambulatory Referral to ENT (Otolaryngology)      I certainly do not think her ear pain and dizziness is related to sinus infection.  She has been on 3 different antibiotics within the past month or so due to UTI, what we thought might be an ear infection and a pulled tooth.  I am curious if she may have TMJ and her pain is referred pain from clenching or grinding.  I have suggested that she call her dentist and get him nightguard.  I also have sent her to an ear nose and throat doctor in order to get further testing through the audiologist to see if possibly she has benign positional vertigo in the Epley maneuvers might be helpful.  We have also given her a flu shot today.  She is due for her screening mammogram and that is been ordered as well.             I have asked for the patient to return to clinic in 6month(s).

## 2020-10-19 ENCOUNTER — OFFICE (AMBULATORY)
Dept: URBAN - METROPOLITAN AREA CLINIC 2 | Facility: CLINIC | Age: 85
End: 2020-10-19

## 2020-10-19 VITALS — HEIGHT: 63 IN | WEIGHT: 160 LBS

## 2020-10-19 DIAGNOSIS — R19.4 CHANGE IN BOWEL HABIT: ICD-10-CM

## 2020-10-19 DIAGNOSIS — R10.13 EPIGASTRIC PAIN: ICD-10-CM

## 2020-10-19 DIAGNOSIS — K59.00 CONSTIPATION, UNSPECIFIED: ICD-10-CM

## 2020-10-19 DIAGNOSIS — K30 FUNCTIONAL DYSPEPSIA: ICD-10-CM

## 2020-10-19 DIAGNOSIS — R93.3 ABNORMAL FINDINGS ON DIAGNOSTIC IMAGING OF OTHER PARTS OF DI: ICD-10-CM

## 2020-10-19 DIAGNOSIS — Z11.59 ENCOUNTER FOR SCREENING FOR OTHER VIRAL DISEASES: ICD-10-CM

## 2020-10-19 DIAGNOSIS — K22.2 ESOPHAGEAL OBSTRUCTION: ICD-10-CM

## 2020-10-19 DIAGNOSIS — K44.9 DIAPHRAGMATIC HERNIA WITHOUT OBSTRUCTION OR GANGRENE: ICD-10-CM

## 2020-10-19 DIAGNOSIS — R14.0 ABDOMINAL DISTENSION (GASEOUS): ICD-10-CM

## 2020-10-19 DIAGNOSIS — R13.10 DYSPHAGIA, UNSPECIFIED: ICD-10-CM

## 2020-10-19 DIAGNOSIS — K57.30 DIVERTICULOSIS OF LARGE INTESTINE WITHOUT PERFORATION OR ABS: ICD-10-CM

## 2020-10-19 DIAGNOSIS — R14.3 FLATULENCE: ICD-10-CM

## 2020-10-19 PROBLEM — Z12.11 SURVEILLANCE DUE TO PRIOR COLONIC NEOPLASIA: Status: ACTIVE | Noted: 2018-10-16

## 2020-10-19 PROBLEM — K29.70 GASTRITIS, UNSPECIFIED, WITHOUT BLEEDING: Status: ACTIVE | Noted: 2018-10-16

## 2020-10-19 PROBLEM — K31.7 POLYP OF STOMACH AND DUODENUM: Status: ACTIVE | Noted: 2018-10-16

## 2020-10-19 PROBLEM — K31.89 OTHER DISEASES OF STOMACH AND DUODENUM: Status: ACTIVE | Noted: 2020-06-23

## 2020-10-19 PROCEDURE — 99213 OFFICE O/P EST LOW 20 MIN: CPT | Performed by: INTERNAL MEDICINE

## 2020-11-02 DIAGNOSIS — E78.5 HYPERLIPIDEMIA: ICD-10-CM

## 2020-11-02 NOTE — TELEPHONE ENCOUNTER
Caller: Yeoman Shirley M    Relationship: Self    Best call back number: [phone]    Medication needed:   Requested Prescriptions     Pending Prescriptions Disp Refills   • rosuvastatin (CRESTOR) 10 MG tablet 90 tablet 1     Sig: Take 1 tablet by mouth Daily.       When do you need the refill by: 11/2/20    What details did the patient provide when requesting the medication: pharmacy states out of refills. Pt needs filled today she is going out of town and out of meds.    Does the patient have less than a 3 day supply:  [x] Yes  [] No    What is the patient's preferred pharmacy:    [facility]/pharmacy #[id number] [place], [place] - [address] [facility]. AT [facility] - [phone]  - [phone]   [phone]

## 2020-11-03 RX ORDER — ROSUVASTATIN CALCIUM 10 MG/1
10 TABLET, COATED ORAL DAILY
Qty: 90 TABLET | Refills: 1 | Status: SHIPPED | OUTPATIENT
Start: 2020-11-03 | End: 2021-05-03

## 2020-11-11 ENCOUNTER — OFFICE VISIT (OUTPATIENT)
Dept: FAMILY MEDICINE CLINIC | Facility: CLINIC | Age: 85
End: 2020-11-11

## 2020-11-11 VITALS
WEIGHT: 158.6 LBS | OXYGEN SATURATION: 96 % | RESPIRATION RATE: 16 BRPM | DIASTOLIC BLOOD PRESSURE: 58 MMHG | HEIGHT: 63 IN | TEMPERATURE: 96.6 F | BODY MASS INDEX: 28.1 KG/M2 | HEART RATE: 70 BPM | SYSTOLIC BLOOD PRESSURE: 126 MMHG

## 2020-11-11 DIAGNOSIS — R30.0 DYSURIA: ICD-10-CM

## 2020-11-11 DIAGNOSIS — E03.9 HYPOTHYROIDISM, UNSPECIFIED TYPE: ICD-10-CM

## 2020-11-11 DIAGNOSIS — E55.9 VITAMIN D DEFICIENCY: ICD-10-CM

## 2020-11-11 DIAGNOSIS — I10 ESSENTIAL HYPERTENSION: ICD-10-CM

## 2020-11-11 DIAGNOSIS — E11.9 TYPE 2 DIABETES MELLITUS WITHOUT COMPLICATION, WITHOUT LONG-TERM CURRENT USE OF INSULIN (HCC): ICD-10-CM

## 2020-11-11 DIAGNOSIS — E53.8 B12 DEFICIENCY: ICD-10-CM

## 2020-11-11 DIAGNOSIS — E78.5 HYPERLIPIDEMIA, UNSPECIFIED HYPERLIPIDEMIA TYPE: Primary | ICD-10-CM

## 2020-11-11 PROCEDURE — 99213 OFFICE O/P EST LOW 20 MIN: CPT | Performed by: INTERNAL MEDICINE

## 2020-11-11 PROCEDURE — G0439 PPPS, SUBSEQ VISIT: HCPCS | Performed by: INTERNAL MEDICINE

## 2020-11-11 RX ORDER — CIPROFLOXACIN 250 MG/1
250 TABLET, FILM COATED ORAL 2 TIMES DAILY
Qty: 14 TABLET | Refills: 0 | Status: SHIPPED | OUTPATIENT
Start: 2020-11-11 | End: 2021-07-20

## 2020-11-11 NOTE — PROGRESS NOTES
"Subjective Donna M Yeoman is a 85 y.o. female who comes in today for No chief complaint on file.  .    History of Present Illness    here for f/u on HTN and GERD, HT   H/o UTI and is seeing Dr. Allan for further tests on her bladder.  She was treated twice past 2 months.  Now feels like she has some burning and urgency and hesitancy and difficulty holding her bladder.  Sees Dr. Allan on 11/20 and 11/30.    Right knee aches and stiff and some right leg soreness that heat has helped.  Better today.    The following portions of the patient's history were reviewed and updated as appropriate: allergies, current medications, past family history, past medical history, past social history, past surgical history and problem list.    Review of Systems   Constitutional: Negative.    HENT:        Ear pain is better after she quit chewing on ice,gum etc   Musculoskeletal: Positive for arthralgias.       /58   Pulse 70   Temp 96.6 °F (35.9 °C) (Temporal)   Resp 16   Ht 160 cm (62.99\")   Wt 71.9 kg (158 lb 9.6 oz)   SpO2 96%   BMI 28.10 kg/m²     STEADI Fall Risk Assessment has not been completed.    PHQ-2/PHQ-9 Depression Screening 7/9/2020   Little interest or pleasure in doing things 0   Feeling down, depressed, or hopeless 0   Total Score 0       Objective   Physical Exam  Vitals signs and nursing note reviewed.   Constitutional:       Appearance: Normal appearance. She is well-developed and normal weight.   HENT:      Head: Normocephalic and atraumatic.      Right Ear: External ear normal.      Left Ear: External ear normal.   Eyes:      Extraocular Movements: Extraocular movements intact.      Conjunctiva/sclera: Conjunctivae normal.   Neck:      Musculoskeletal: Neck supple.      Vascular: No carotid bruit.   Cardiovascular:      Rate and Rhythm: Normal rate and regular rhythm.      Heart sounds: Normal heart sounds.      Comments: No bruits  Pulmonary:      Effort: Pulmonary effort is normal. No " respiratory distress.      Breath sounds: Normal breath sounds. No wheezing or rales.   Abdominal:      General: Bowel sounds are normal. There is no distension.      Palpations: Abdomen is soft. There is no mass.      Tenderness: There is no abdominal tenderness.   Lymphadenopathy:      Cervical: No cervical adenopathy.   Skin:     General: Skin is warm.   Neurological:      General: No focal deficit present.      Mental Status: She is alert and oriented to person, place, and time. Mental status is at baseline.   Psychiatric:         Behavior: Behavior normal.         Thought Content: Thought content normal.         Judgment: Judgment normal.           Current Outpatient Medications:   •  allopurinol (ZYLOPRIM) 100 MG tablet, TAKE 1 TABLET DAILY, Disp: 90 tablet, Rfl: 1  •  aspirin 81 MG EC tablet, Take 81 mg by mouth Daily., Disp: , Rfl:   •  carvedilol (COREG) 6.25 MG tablet, TAKE 1 TABLET BY MOUTH TWICE A DAY WITH MEALS, Disp: 180 tablet, Rfl: 1  •  CRANBERRY PO, Take  by mouth., Disp: , Rfl:   •  Cyanocobalamin (B-12 PO), Take  by mouth., Disp: , Rfl:   •  ESTRACE VAGINAL 0.1 MG/GM vaginal cream, INSERT 1/2 TO 1 INCH VAGINALLY ONCE WEEKLY AS DIRECTED, Disp: , Rfl: 11  •  FIBER PO, Take  by mouth Daily., Disp: , Rfl:   •  glimepiride (Amaryl) 2 MG tablet, Take 1 tablet by mouth Every Morning Before Breakfast., Disp: 30 tablet, Rfl: 5  •  hydroCHLOROthiazide (MICROZIDE) 12.5 MG capsule, TAKE 1 TO 2 TABLETS DAILY FOR HTN AND LE SWELLING, Disp: 180 capsule, Rfl: 1  •  levothyroxine (SYNTHROID, LEVOTHROID) 75 MCG tablet, Take 1 tablet by mouth daily., Disp: 90 tablet, Rfl: 2  •  nystatin-triamcinolone (MYCOLOG) 581287-8.1 UNIT/GM-% ointment, Apply  topically to the appropriate area as directed 2 (Two) Times a Day., Disp: 30 g, Rfl: 0  •  omeprazole (priLOSEC) 40 MG capsule, TAKE 1 CAPSULE BY MOUTH TWICE A DAY, Disp: 90 capsule, Rfl: 1  •  ONETOUCH DELICA LANCETS 33G misc, USE TO TEST BLOOD SUGAR TWICE DAILY, Disp:  100 each, Rfl: 1  •  ONETOUCH VERIO test strip, TEST EVERY MORNING, Disp: 100 each, Rfl: 5  •  phenazopyridine (Pyridium) 100 MG tablet, Take 1 tablet by mouth 3 (Three) Times a Day As Needed for Bladder Spasms (UTI symptoms)., Disp: 6 tablet, Rfl: 0  •  Probiotic Product (PROBIOTIC DAILY PO), Take  by mouth Daily., Disp: , Rfl:   •  rosuvastatin (CRESTOR) 10 MG tablet, Take 1 tablet by mouth Daily., Disp: 90 tablet, Rfl: 1  •  ciprofloxacin (Cipro) 250 MG tablet, Take 1 tablet by mouth 2 (Two) Times a Day., Disp: 14 tablet, Rfl: 0    Assessment/Plan   Diagnoses and all orders for this visit:    1. Hyperlipidemia, unspecified hyperlipidemia type (Primary)  -     Microalbumin / Creatinine Urine Ratio - Urine, Clean Catch  -     Urinalysis With Culture If Indicated -  -     TSH  -     T4, Free  -     Hemoglobin A1c  -     Comprehensive Metabolic Panel  -     CBC & Differential    2. Essential hypertension  -     Microalbumin / Creatinine Urine Ratio - Urine, Clean Catch  -     Urinalysis With Culture If Indicated -  -     TSH  -     T4, Free  -     Hemoglobin A1c  -     Comprehensive Metabolic Panel  -     CBC & Differential  -     Vitamin B12  -     Vitamin D 25 Hydroxy    3. B12 deficiency  -     Vitamin B12    4. Vitamin D deficiency   -     Vitamin D 25 Hydroxy    5. Type 2 diabetes mellitus without complication, without long-term current use of insulin (CMS/[facility])  -     Microalbumin / Creatinine Urine Ratio - Urine, Clean Catch  -     Hemoglobin A1c    6. Hypothyroidism, unspecified type  -     TSH  -     T4, Free    7. Dysuria  -     Urinalysis With Culture If Indicated -    Other orders  -     ciprofloxacin (Cipro) 250 MG tablet; Take 1 tablet by mouth 2 (Two) Times a Day.  Dispense: 14 tablet; Refill: 0        MMG scheduled for January 2021  No falls  Screens neg for dementia and or depression  Fasting labs today  P23 once available or at her pharmacy  Advanced directive information given  CRC up to date based  on age.  Just saw Dr. Lo just this month.  Stomach sx's are stable and improved with dietary changes  Eye exam rec yearly  Thinks she has UTI and has long h/o UTI's.  Will get ua c/s and start cipro               I have asked for the patient to return to clinic in 6month(s).

## 2020-11-11 NOTE — PROGRESS NOTES
The ABCs of the Annual Wellness Visit  Subsequent Medicare Wellness Visit    No chief complaint on file.      Subjective   History of Present Illness:  Donna M Yeoman is a 85 y.o. female who presents for a Subsequent Medicare Wellness Visit.    HEALTH RISK ASSESSMENT    Recent Hospitalizations:  No hospitalization(s) within the last year.    Current Medical Providers:  Patient Care Team:  Caroline Weiner MD as PCP - General  King Adame MD as Consulting Physician (Hematology and Oncology)  Caroline Weiner MD as Referring Physician (Internal Medicine)  Socorro Crenshaw LPN (Inactive) as Licensed Practical Nurse    Smoking Status:  Social History     Tobacco Use   Smoking Status Never Smoker   Smokeless Tobacco Never Used       Alcohol Consumption:  Social History     Substance and Sexual Activity   Alcohol Use No       Depression Screen:   PHQ-2/PHQ-9 Depression Screening 7/9/2020   Little interest or pleasure in doing things 0   Feeling down, depressed, or hopeless 0   Total Score 0       Fall Risk Screen:  STEADI Fall Risk Assessment has not been completed.    Health Habits and Functional and Cognitive Screening:  No flowsheet data found.      Does the patient have evidence of cognitive impairment? No    Asprin use counseling:Taking ASA appropriately as indicated    Age-appropriate Screening Schedule:  Refer to the list below for future screening recommendations based on patient's age, sex and/or medical conditions. Orders for these recommended tests are listed in the plan section. The patient has been provided with a written plan.    Health Maintenance   Topic Date Due   • DXA SCAN  01/31/2019   • ZOSTER VACCINE (2 of 2) 11/20/2021 (Originally 3/28/2017)   • HEMOGLOBIN A1C  05/11/2021   • MAMMOGRAM  09/13/2021   • DIABETIC EYE EXAM  10/14/2021   • LIPID PANEL  11/11/2021   • URINE MICROALBUMIN  11/11/2021   • TDAP/TD VACCINES (2 - Td) 01/31/2027   • COLONOSCOPY  10/16/2028   • INFLUENZA VACCINE   Completed          The following portions of the patient's history were reviewed and updated as appropriate: allergies, current medications, past family history, past medical history, past social history, past surgical history and problem list.    Outpatient Medications Prior to Visit   Medication Sig Dispense Refill   • allopurinol (ZYLOPRIM) 100 MG tablet TAKE 1 TABLET DAILY 90 tablet 1   • aspirin 81 MG EC tablet Take 81 mg by mouth Daily.     • carvedilol (COREG) 6.25 MG tablet TAKE 1 TABLET BY MOUTH TWICE A DAY WITH MEALS 180 tablet 1   • CRANBERRY PO Take  by mouth.     • Cyanocobalamin (B-12 PO) Take  by mouth.     • ESTRACE VAGINAL 0.1 MG/GM vaginal cream INSERT 1/2 TO 1 INCH VAGINALLY ONCE WEEKLY AS DIRECTED  11   • FIBER PO Take  by mouth Daily.     • glimepiride (Amaryl) 2 MG tablet Take 1 tablet by mouth Every Morning Before Breakfast. 30 tablet 5   • hydroCHLOROthiazide (MICROZIDE) 12.5 MG capsule TAKE 1 TO 2 TABLETS DAILY FOR HTN AND LE SWELLING 180 capsule 1   • levothyroxine (SYNTHROID, LEVOTHROID) 75 MCG tablet Take 1 tablet by mouth daily. 90 tablet 2   • nystatin-triamcinolone (MYCOLOG) 135325-2.1 UNIT/GM-% ointment Apply  topically to the appropriate area as directed 2 (Two) Times a Day. 30 g 0   • omeprazole (priLOSEC) 40 MG capsule TAKE 1 CAPSULE BY MOUTH TWICE A DAY 90 capsule 1   • ONETOUCH DELICA LANCETS 33G misc USE TO TEST BLOOD SUGAR TWICE DAILY 100 each 1   • ONETOUCH VERIO test strip TEST EVERY MORNING 100 each 5   • phenazopyridine (Pyridium) 100 MG tablet Take 1 tablet by mouth 3 (Three) Times a Day As Needed for Bladder Spasms (UTI symptoms). 6 tablet 0   • Probiotic Product (PROBIOTIC DAILY PO) Take  by mouth Daily.     • rosuvastatin (CRESTOR) 10 MG tablet Take 1 tablet by mouth Daily. 90 tablet 1   • amoxicillin (AMOXIL) 875 MG tablet Take 1 tablet by mouth 2 (Two) Times a Day. 20 tablet 0     No facility-administered medications prior to visit.        Patient Active Problem List    Diagnosis   • Bloating   • Epigastric pain   • B12 deficiency   • Type 2 diabetes mellitus without complication (CMS/HCC)   • Essential hypertension   • MGUS (monoclonal gammopathy of unknown significance)   • Sore throat   • Gastroesophageal reflux disease with esophagitis   • Cough   • Hypothyroidism   • Hyperglycemia   • Right foot pain   • Healthcare maintenance   • Type 2 diabetes mellitus without complication, without long-term current use of insulin (CMS/HCC)   • Hyperlipidemia   • Dysuria   • Persistent cough   • Cardiomegaly   • Rib pain on left side   • Creatinine elevation   • Encounter for screening mammogram for breast cancer   • Abnormal serum total protein level   • Acute upper respiratory infection   • Breast asymmetry   • Breast pain   • Burning sensation   • Cerebrovascular small vessel disease   • Colon polyp   • Dense breasts   • Dizziness   • Increased glucose level   • Gastritis   • Gout   • Headache   • Hiatal hernia   • Low back pain   • Ulcer of nose   • Nausea   • Neck pain   • Osteoarthritis   • Otalgia   • Pain in thoracic spine   • Shoulder joint pain   • Back pain   • Peripheral neuropathy   • Pneumonia   • Seasonal allergic rhinitis   • Tinea corporis   • Vitamin D deficiency    • Sinus congestion   • Inclusion cyst   • Urinary frequency   • Leg edema   • Rash   • Neuritis or radiculitis due to rupture of lumbar intervertebral disc   • INCOLE on CPAP       Advanced Care Planning:  ACP discussion was held with the patient during this visit. Patient does not have an advance directive, information provided.    Review of Systems    Compared to one year ago, the patient feels her physical health is worse.  Compared to one year ago, the patient feels her mental health is the same.    Reviewed chart for potential of high risk medication in the elderly: yes  Reviewed chart for potential of harmful drug interactions in the elderly:yes    Objective         Vitals:    11/11/20 1022   BP: 126/58  "  Pulse: 70   Resp: 16   Temp: 96.6 °F (35.9 °C)   TempSrc: Temporal   SpO2: 96%   Weight: 71.9 kg (158 lb 9.6 oz)   Height: 160 cm (62.99\")   PainSc: 0-No pain       Body mass index is 28.1 kg/m².  Discussed the patient's BMI with her. The BMI is in the acceptable range.    Physical Exam          Assessment/Plan   Medicare Risks and Personalized Health Plan  CMS Preventative Services Quick Reference  Advance Directive Discussion  Breast Cancer/Mammogram Screening  Colon Cancer Screening  Dementia/Memory   Depression/Dysphoria  Diabetic Lab Screening   Fall Risk  Glaucoma Risk  Immunizations Discussed/Encouraged (specific immunizations; Pneumococcal 23 )    The above risks/problems have been discussed with the patient.  Pertinent information has been shared with the patient in the After Visit Summary.  Follow up plans and orders are seen below in the Assessment/Plan Section.    Diagnoses and all orders for this visit:    1. Hyperlipidemia, unspecified hyperlipidemia type (Primary)  -     Microalbumin / Creatinine Urine Ratio - Urine, Clean Catch  -     Urinalysis With Culture If Indicated -  -     TSH  -     T4, Free  -     Hemoglobin A1c  -     Comprehensive Metabolic Panel  -     CBC & Differential    2. Essential hypertension  -     Microalbumin / Creatinine Urine Ratio - Urine, Clean Catch  -     Urinalysis With Culture If Indicated -  -     TSH  -     T4, Free  -     Hemoglobin A1c  -     Comprehensive Metabolic Panel  -     CBC & Differential  -     Vitamin B12  -     Vitamin D 25 Hydroxy    3. B12 deficiency  -     Vitamin B12    4. Vitamin D deficiency   -     Vitamin D 25 Hydroxy    5. Type 2 diabetes mellitus without complication, without long-term current use of insulin (CMS/[facility])  -     Microalbumin / Creatinine Urine Ratio - Urine, Clean Catch  -     Hemoglobin A1c    6. Hypothyroidism, unspecified type  -     TSH  -     T4, Free    7. Dysuria  -     Urinalysis With Culture If Indicated -    Other " orders  -     ciprofloxacin (Cipro) 250 MG tablet; Take 1 tablet by mouth 2 (Two) Times a Day.  Dispense: 14 tablet; Refill: 0      Follow Up:  No follow-ups on file.     An After Visit Summary and PPPS were given to the patient.       MMG scheduled for January 2021  No falls  Screens neg for dementia and or depression  Fasting labs today  P23 once available or at her pharmacy  Advanced directive information given  CRC up to date based on age.  Just saw Dr. Lo just this month.  Stomach sx's are stable and improved with dietary changes  Eye exam rec yearly

## 2020-11-12 ENCOUNTER — OFFICE VISIT (OUTPATIENT)
Dept: SLEEP MEDICINE | Facility: HOSPITAL | Age: 85
End: 2020-11-12

## 2020-11-12 VITALS
WEIGHT: 159 LBS | DIASTOLIC BLOOD PRESSURE: 79 MMHG | SYSTOLIC BLOOD PRESSURE: 140 MMHG | HEART RATE: 79 BPM | BODY MASS INDEX: 28.17 KG/M2 | OXYGEN SATURATION: 97 % | HEIGHT: 63 IN

## 2020-11-12 DIAGNOSIS — G47.33 OSA ON CPAP: Primary | ICD-10-CM

## 2020-11-12 DIAGNOSIS — Z99.89 OSA ON CPAP: Primary | ICD-10-CM

## 2020-11-12 PROCEDURE — 99213 OFFICE O/P EST LOW 20 MIN: CPT | Performed by: INTERNAL MEDICINE

## 2020-11-12 PROCEDURE — G0463 HOSPITAL OUTPT CLINIC VISIT: HCPCS

## 2020-11-12 NOTE — PROGRESS NOTES
[facility]  [address] [facility] [address] [place] [address]  Phone   Fax       SLEEP CLINIC FOLLOW UP PROGRESS NOTE.    Donna M Yeoman  1935  85 y.o.  female      PCP: Caroline Weiner MD      Date of visit: 11/12/2020    Chief Complaint   Patient presents with   • Sleep Apnea   • Follow-up       INTERM HISTORY:  This is a 85 y.o. years old patient who has a history of sleep apnea and is on CPAP.  Patient reports good compliance with the device.  Patient has moderate sleep apnea with AHI of 15/h and is using the CPAP on a regular basis and feels much better.    Sleep schedule  Normally goes to bed at 10 PM  Wakes up at 7:30 AM  Do you feel refreshed after waking up ?:  Yes      The Smart card downloaded on 11/12/2020 has been reviewed by me and shows the following..  Compliance; 100%  > 4 hr use, 97%  Average use of the device 9 hours and 3 minutes per night  Residual AHI: 1.4 /hr (normal less than 5)  Mask type: Nasal mask  DME: [facility]      Past Medical History:   Diagnosis Date   • Arthritis    • Colon polyp 7/1/2017   • Diabetes mellitus (CMS/HCC)    • Esophageal reflux    • GERD (gastroesophageal reflux disease)    • H/O DVT (deep venous thrombosis)    • Headache 7/1/2017   • Hiatal hernia 7/1/2017   • History of vitamin D deficiency    • Hypercholesteremia    • Hyperlipidemia    • Hypertension    • Low back pain 7/1/2017   • MGUS (monoclonal gammopathy of unknown significance)    • NICOLE on CPAP     AHI 15/h   • Osteoporosis    • Peripheral neuropathy    • Pneumonia 7/1/2017   • Pulmonary embolism (CMS/HCC)    • Pulmonary hypertension (CMS/HCC)    • Thyroid disease    • Venous thrombosis        MEDICATIONS: reviewed by me    Current Outpatient Medications:   •  allopurinol (ZYLOPRIM) 100 MG tablet, TAKE 1 TABLET DAILY, Disp: 90 tablet, Rfl: 1  •  aspirin 81 MG EC tablet, Take 81 mg by mouth Daily., Disp: , Rfl:   •  carvedilol (COREG) 6.25 MG tablet,  TAKE 1 TABLET BY MOUTH TWICE A DAY WITH MEALS, Disp: 180 tablet, Rfl: 1  •  ciprofloxacin (Cipro) 250 MG tablet, Take 1 tablet by mouth 2 (Two) Times a Day., Disp: 14 tablet, Rfl: 0  •  CRANBERRY PO, Take  by mouth., Disp: , Rfl:   •  Cyanocobalamin (B-12 PO), Take  by mouth., Disp: , Rfl:   •  ESTRACE VAGINAL 0.1 MG/GM vaginal cream, INSERT 1/2 TO 1 INCH VAGINALLY ONCE WEEKLY AS DIRECTED, Disp: , Rfl: 11  •  FIBER PO, Take  by mouth Daily., Disp: , Rfl:   •  glimepiride (Amaryl) 2 MG tablet, Take 1 tablet by mouth Every Morning Before Breakfast., Disp: 30 tablet, Rfl: 5  •  hydroCHLOROthiazide (MICROZIDE) 12.5 MG capsule, TAKE 1 TO 2 TABLETS DAILY FOR HTN AND LE SWELLING, Disp: 180 capsule, Rfl: 1  •  levothyroxine (SYNTHROID, LEVOTHROID) 75 MCG tablet, Take 1 tablet by mouth daily., Disp: 90 tablet, Rfl: 2  •  nystatin-triamcinolone (MYCOLOG) 560749-5.1 UNIT/GM-% ointment, Apply  topically to the appropriate area as directed 2 (Two) Times a Day., Disp: 30 g, Rfl: 0  •  omeprazole (priLOSEC) 40 MG capsule, TAKE 1 CAPSULE BY MOUTH TWICE A DAY, Disp: 90 capsule, Rfl: 1  •  ONETOUCH DELICA LANCETS 33G misc, USE TO TEST BLOOD SUGAR TWICE DAILY, Disp: 100 each, Rfl: 1  •  ONETOUCH VERIO test strip, TEST EVERY MORNING, Disp: 100 each, Rfl: 5  •  phenazopyridine (Pyridium) 100 MG tablet, Take 1 tablet by mouth 3 (Three) Times a Day As Needed for Bladder Spasms (UTI symptoms)., Disp: 6 tablet, Rfl: 0  •  Probiotic Product (PROBIOTIC DAILY PO), Take  by mouth Daily., Disp: , Rfl:   •  rosuvastatin (CRESTOR) 10 MG tablet, Take 1 tablet by mouth Daily., Disp: 90 tablet, Rfl: 1    No Known Allergies reviewed by me    SOCIAL, FAMILY HISTORY: Medical records are reviewed and noted by me.  History of smoking no  History of alcohol use none  Caffeine use 3    REVIEW OF SYSTEMS:   [place] Sleepiness Scale :Total score: 2   Snoring: Resolved  Morning headache: No  Nasal congestion: No  Leg movements: No  Number of times you wake up  "once asleep: 3  Heart burn no    PHYSICAL EXAMINATION:  Vitals:    11/12/20 1001   BP: 140/79   Pulse: 79   SpO2: 97%   Weight: 72.1 kg (159 lb)   Height: 160 cm (63\")    Body mass index is 28.17 kg/m².    HEENT: pupils are round equal and reacting to light and accommodation, nasal passage is clear, no nasal polyps, no lymphadenopathy, throat is clear, oral airway Mallampati class 3  RESPRATORY SYSTEM: Breath sounds are equal on both sides and are normal, no wheezes or crackles  CARDIOVASULAR SYSTEM: Heart rate is regular without murmur  ABDOMEN: Soft, no ascites, no hepatosplenomegaly.  EXTREMITIES: No cyanosis, clubbing or edema       ASSESSMENT AND PLAN:  · Obstructive sleep apnea, patient is using the device with good compliance for treatment of sleep apnea.  I have reviewed the smart card down load and discussed with the patient the download data and encouarged the patient to continue to use the device.  The symptoms have improved and the residual AHI is acceptable.  The device is benefiting the patient and the device is medically necessary. The patient is also instructed to get the supplies from the DME company and a prescription for supplies has been sent to the DME company.    · Diabetes mellitus on oral hypoglycemic agents  · Return to clinic in 12 months for follow-up        Emmy Henderson MD  Sleep Medicine.(Board-certified)  [facility]  Medical Director for [facility]  11/12/2020               "

## 2020-11-14 LAB
25(OH)D3+25(OH)D2 SERPL-MCNC: 35.8 NG/ML (ref 30–100)
ALBUMIN SERPL-MCNC: 4 G/DL (ref 3.6–4.6)
ALBUMIN/CREAT UR: 85 MG/G CREAT (ref 0–29)
ALBUMIN/GLOB SERPL: 1.5 {RATIO} (ref 1.2–2.2)
ALP SERPL-CCNC: 60 IU/L (ref 39–117)
ALT SERPL-CCNC: 11 IU/L (ref 0–32)
APPEARANCE UR: ABNORMAL
AST SERPL-CCNC: 23 IU/L (ref 0–40)
BACTERIA #/AREA URNS HPF: ABNORMAL /[HPF]
BACTERIA UR CULT: ABNORMAL
BACTERIA UR CULT: ABNORMAL
BASOPHILS # BLD AUTO: 0 X10E3/UL (ref 0–0.2)
BASOPHILS NFR BLD AUTO: 0 %
BILIRUB SERPL-MCNC: 0.8 MG/DL (ref 0–1.2)
BILIRUB UR QL STRIP: NEGATIVE
BUN SERPL-MCNC: 21 MG/DL (ref 8–27)
BUN/CREAT SERPL: 22 (ref 12–28)
CALCIUM SERPL-MCNC: 9.6 MG/DL (ref 8.7–10.3)
CHLORIDE SERPL-SCNC: 105 MMOL/L (ref 96–106)
CO2 SERPL-SCNC: 28 MMOL/L (ref 20–29)
COLOR UR: YELLOW
CREAT SERPL-MCNC: 0.95 MG/DL (ref 0.57–1)
CREAT UR-MCNC: 207.7 MG/DL
EOSINOPHIL # BLD AUTO: 0.2 X10E3/UL (ref 0–0.4)
EOSINOPHIL NFR BLD AUTO: 4 %
EPI CELLS #/AREA URNS HPF: >10 /HPF (ref 0–10)
ERYTHROCYTE [DISTWIDTH] IN BLOOD BY AUTOMATED COUNT: 14.5 % (ref 11.7–15.4)
GLOBULIN SER CALC-MCNC: 2.7 G/DL (ref 1.5–4.5)
GLUCOSE SERPL-MCNC: 143 MG/DL (ref 65–99)
GLUCOSE UR QL: NEGATIVE
HBA1C MFR BLD: 6.7 % (ref 4.8–5.6)
HCT VFR BLD AUTO: 38.6 % (ref 34–46.6)
HGB BLD-MCNC: 12.6 G/DL (ref 11.1–15.9)
HGB UR QL STRIP: ABNORMAL
IMM GRANULOCYTES # BLD AUTO: 0 X10E3/UL (ref 0–0.1)
IMM GRANULOCYTES NFR BLD AUTO: 0 %
KETONES UR QL STRIP: NEGATIVE
LEUKOCYTE ESTERASE UR QL STRIP: ABNORMAL
LYMPHOCYTES # BLD AUTO: 1.4 X10E3/UL (ref 0.7–3.1)
LYMPHOCYTES NFR BLD AUTO: 25 %
MCH RBC QN AUTO: 29.9 PG (ref 26.6–33)
MCHC RBC AUTO-ENTMCNC: 32.6 G/DL (ref 31.5–35.7)
MCV RBC AUTO: 92 FL (ref 79–97)
MICRO URNS: ABNORMAL
MICROALBUMIN UR-MCNC: 176.1 UG/ML
MONOCYTES # BLD AUTO: 0.6 X10E3/UL (ref 0.1–0.9)
MONOCYTES NFR BLD AUTO: 9 %
MUCOUS THREADS URNS QL MICRO: PRESENT
NEUTROPHILS # BLD AUTO: 3.6 X10E3/UL (ref 1.4–7)
NEUTROPHILS NFR BLD AUTO: 62 %
NITRITE UR QL STRIP: NEGATIVE
OTHER ANTIBIOTIC SUSC ISLT: ABNORMAL
PH UR STRIP: 6.5 [PH] (ref 5–7.5)
PLATELET # BLD AUTO: 201 X10E3/UL (ref 150–450)
POTASSIUM SERPL-SCNC: 3.7 MMOL/L (ref 3.5–5.2)
PROT SERPL-MCNC: 6.7 G/DL (ref 6–8.5)
PROT UR QL STRIP: ABNORMAL
RBC # BLD AUTO: 4.21 X10E6/UL (ref 3.77–5.28)
RBC #/AREA URNS HPF: ABNORMAL /HPF (ref 0–2)
SODIUM SERPL-SCNC: 146 MMOL/L (ref 134–144)
SP GR UR: 1.02 (ref 1–1.03)
T4 FREE SERPL-MCNC: 1.1 NG/DL (ref 0.82–1.77)
TSH SERPL DL<=0.005 MIU/L-ACNC: 3.72 UIU/ML (ref 0.45–4.5)
URINALYSIS REFLEX: ABNORMAL
UROBILINOGEN UR STRIP-MCNC: 0.2 MG/DL (ref 0.2–1)
VIT B12 SERPL-MCNC: 802 PG/ML (ref 232–1245)
WBC # BLD AUTO: 5.9 X10E3/UL (ref 3.4–10.8)
WBC #/AREA URNS HPF: >30 /HPF (ref 0–5)

## 2020-11-18 ENCOUNTER — TELEPHONE (OUTPATIENT)
Dept: FAMILY MEDICINE CLINIC | Facility: CLINIC | Age: 85
End: 2020-11-18

## 2020-11-18 DIAGNOSIS — E78.5 HYPERLIPIDEMIA, UNSPECIFIED HYPERLIPIDEMIA TYPE: Primary | ICD-10-CM

## 2020-11-19 RX ORDER — LOSARTAN POTASSIUM 50 MG/1
50 TABLET ORAL DAILY
Qty: 30 TABLET | Refills: 5 | Status: SHIPPED | OUTPATIENT
Start: 2020-11-19 | End: 2021-05-14

## 2020-11-23 ENCOUNTER — RESULTS ENCOUNTER (OUTPATIENT)
Dept: FAMILY MEDICINE CLINIC | Facility: CLINIC | Age: 85
End: 2020-11-23

## 2020-11-23 DIAGNOSIS — E78.5 HYPERLIPIDEMIA, UNSPECIFIED HYPERLIPIDEMIA TYPE: ICD-10-CM

## 2020-12-01 LAB
BUN SERPL-MCNC: 14 MG/DL (ref 8–23)
BUN/CREAT SERPL: 14.1 (ref 7–25)
CALCIUM SERPL-MCNC: 10.1 MG/DL (ref 8.6–10.5)
CHLORIDE SERPL-SCNC: 98 MMOL/L (ref 98–107)
CO2 SERPL-SCNC: 32.6 MMOL/L (ref 22–29)
CREAT SERPL-MCNC: 0.99 MG/DL (ref 0.57–1)
GLUCOSE SERPL-MCNC: 128 MG/DL (ref 65–99)
POTASSIUM SERPL-SCNC: 4.2 MMOL/L (ref 3.5–5.2)
SODIUM SERPL-SCNC: 139 MMOL/L (ref 136–145)

## 2020-12-15 RX ORDER — GLIMEPIRIDE 2 MG/1
TABLET ORAL
Qty: 90 TABLET | Refills: 1 | Status: SHIPPED | OUTPATIENT
Start: 2020-12-15 | End: 2021-03-01

## 2020-12-18 RX ORDER — CARVEDILOL 6.25 MG/1
TABLET ORAL
Qty: 180 TABLET | Refills: 1 | Status: SHIPPED | OUTPATIENT
Start: 2020-12-18 | End: 2021-06-14

## 2020-12-18 RX ORDER — LEVOTHYROXINE SODIUM 0.07 MG/1
TABLET ORAL
Qty: 90 TABLET | Refills: 1 | Status: SHIPPED | OUTPATIENT
Start: 2020-12-18 | End: 2021-07-16

## 2020-12-18 RX ORDER — ALLOPURINOL 100 MG/1
TABLET ORAL
Qty: 90 TABLET | Refills: 1 | Status: SHIPPED | OUTPATIENT
Start: 2020-12-18 | End: 2021-07-16

## 2021-01-05 ENCOUNTER — HOSPITAL ENCOUNTER (OUTPATIENT)
Dept: MAMMOGRAPHY | Facility: HOSPITAL | Age: 86
Discharge: HOME | End: 2021-01-05
Admitting: INTERNAL MEDICINE

## 2021-01-05 DIAGNOSIS — Z12.31 SCREENING MAMMOGRAM, ENCOUNTER FOR: ICD-10-CM

## 2021-01-05 PROCEDURE — 77063 BREAST TOMOSYNTHESIS BI: CPT

## 2021-01-05 PROCEDURE — 77067 SCR MAMMO BI INCL CAD: CPT

## 2021-01-08 RX ORDER — GLIMEPIRIDE 2 MG/1
TABLET ORAL
Qty: 90 TABLET | Refills: 1 | OUTPATIENT
Start: 2021-01-08

## 2021-01-12 ENCOUNTER — OFFICE VISIT (OUTPATIENT)
Dept: FAMILY MEDICINE CLINIC | Facility: CLINIC | Age: 86
End: 2021-01-12

## 2021-01-12 VITALS
HEIGHT: 63 IN | DIASTOLIC BLOOD PRESSURE: 68 MMHG | BODY MASS INDEX: 28.65 KG/M2 | TEMPERATURE: 97.8 F | WEIGHT: 161.7 LBS | HEART RATE: 60 BPM | RESPIRATION RATE: 18 BRPM | SYSTOLIC BLOOD PRESSURE: 110 MMHG | OXYGEN SATURATION: 97 %

## 2021-01-12 DIAGNOSIS — Z00.00 ENCOUNTER FOR ANNUAL WELLNESS VISIT (AWV) IN MEDICARE PATIENT: Primary | ICD-10-CM

## 2021-01-12 PROCEDURE — G0439 PPPS, SUBSEQ VISIT: HCPCS | Performed by: INTERNAL MEDICINE

## 2021-01-12 RX ORDER — SULFAMETHOXAZOLE AND TRIMETHOPRIM 400; 80 MG/1; MG/1
1 TABLET ORAL DAILY
COMMUNITY
Start: 2020-11-30 | End: 2021-07-20

## 2021-01-12 NOTE — PROGRESS NOTES
The ABCs of the Annual Wellness Visit  Subsequent Medicare Wellness Visit    Chief Complaint   Patient presents with   • Medicare Wellness-subsequent       Subjective   History of Present Illness:  Donna M Yeoman is a 85 y.o. female who presents for a Subsequent Medicare Wellness Visit.    HEALTH RISK ASSESSMENT    Recent Hospitalizations:  No hospitalization(s) within the last year.    Current Medical Providers:  Patient Care Team:  Caroline Weiner MD as PCP - General  King Adame MD as Consulting Physician (Hematology and Oncology)  Caroline Weiner MD as Referring Physician (Internal Medicine)  Socorro Crenshaw LPN (Inactive) as Licensed Practical Nurse    Smoking Status:  Social History     Tobacco Use   Smoking Status Never Smoker   Smokeless Tobacco Never Used       Alcohol Consumption:  Social History     Substance and Sexual Activity   Alcohol Use No       Depression Screen:   PHQ-2/PHQ-9 Depression Screening 1/12/2021   Little interest or pleasure in doing things 0   Feeling down, depressed, or hopeless 0   Total Score 0       Fall Risk Screen:  NICK Fall Risk Assessment was completed, and patient is at HIGH risk for falls. Assessment completed on:1/12/2021    Health Habits and Functional and Cognitive Screening:  Functional & Cognitive Status 1/12/2021   Do you have difficulty preparing food and eating? No   Do you have difficulty bathing yourself, getting dressed or grooming yourself? No   Do you have difficulty using the toilet? No   Do you have difficulty moving around from place to place? No   Do you have trouble with steps or getting out of a bed or a chair? No   Current Diet Well Balanced Diet   Dental Exam Up to date   Eye Exam Up to date   Exercise (times per week) 0 times per week   Current Exercise Activities Include None   Do you need help using the phone?  No   Are you deaf or do you have serious difficulty hearing?  Yes   Do you need help with transportation? No   Do you need help  shopping? No   Do you need help preparing meals?  No   Do you need help with housework?  No   Do you need help with laundry? No   Do you need help taking your medications? No   Do you need help managing money? No   Do you ever drive or ride in a car without wearing a seat belt? No   Have you felt unusual stress, anger or loneliness in the last month? No   Who do you live with? Child   If you need help, do you have trouble finding someone available to you? No   Have you been bothered in the last four weeks by sexual problems? No   Do you have difficulty concentrating, remembering or making decisions? No         Does the patient have evidence of cognitive impairment? No    Asprin use counseling:Taking ASA appropriately as indicated    Age-appropriate Screening Schedule:  Refer to the list below for future screening recommendations based on patient's age, sex and/or medical conditions. Orders for these recommended tests are listed in the plan section. The patient has been provided with a written plan.    Health Maintenance   Topic Date Due   • DXA SCAN  01/31/2019   • ZOSTER VACCINE (2 of 2) 11/20/2021 (Originally 3/28/2017)   • HEMOGLOBIN A1C  05/11/2021   • DIABETIC EYE EXAM  10/14/2021   • LIPID PANEL  11/11/2021   • URINE MICROALBUMIN  11/11/2021   • MAMMOGRAM  01/05/2023   • TDAP/TD VACCINES (2 - Td) 01/31/2027   • COLONOSCOPY  10/16/2028   • INFLUENZA VACCINE  Completed          The following portions of the patient's history were reviewed and updated as appropriate: allergies, current medications, past family history, past medical history, past social history, past surgical history and problem list.    Outpatient Medications Prior to Visit   Medication Sig Dispense Refill   • allopurinol (ZYLOPRIM) 100 MG tablet TAKE 1 TABLET DAILY 90 tablet 1   • aspirin 81 MG EC tablet Take 81 mg by mouth Daily.     • carvedilol (COREG) 6.25 MG tablet TAKE 1 TABLET BY MOUTH TWICE A DAY WITH MEALS 180 tablet 1   • CRANBERRY PO  Take  by mouth.     • Cyanocobalamin (B-12 PO) Take  by mouth.     • ESTRACE VAGINAL 0.1 MG/GM vaginal cream INSERT 1/2 TO 1 INCH VAGINALLY ONCE WEEKLY AS DIRECTED  11   • FIBER PO Take  by mouth Daily.     • glimepiride (AMARYL) 2 MG tablet TAKE 1 TABLET BY MOUTH EVERY DAY BEFORE BREAKFAST 90 tablet 1   • levothyroxine (SYNTHROID, LEVOTHROID) 75 MCG tablet TAKE 1 TABLET DAILY 90 tablet 1   • losartan (Cozaar) 50 MG tablet Take 1 tablet by mouth Daily. 30 tablet 5   • nystatin-triamcinolone (MYCOLOG) 438145-0.1 UNIT/GM-% ointment Apply  topically to the appropriate area as directed 2 (Two) Times a Day. 30 g 0   • Probiotic Product (PROBIOTIC DAILY PO) Take  by mouth Daily.     • rosuvastatin (CRESTOR) 10 MG tablet Take 1 tablet by mouth Daily. 90 tablet 1   • sulfamethoxazole-trimethoprim (BACTRIM,SEPTRA) 400-80 MG tablet Take 1 tablet by mouth Daily.     • ciprofloxacin (Cipro) 250 MG tablet Take 1 tablet by mouth 2 (Two) Times a Day. 14 tablet 0   • omeprazole (priLOSEC) 40 MG capsule TAKE 1 CAPSULE BY MOUTH TWICE A DAY 90 capsule 1   • ONETOUCH DELICA LANCETS 33G misc USE TO TEST BLOOD SUGAR TWICE DAILY 100 each 1   • ONETOUCH VERIO test strip TEST EVERY MORNING 100 each 5   • phenazopyridine (Pyridium) 100 MG tablet Take 1 tablet by mouth 3 (Three) Times a Day As Needed for Bladder Spasms (UTI symptoms). 6 tablet 0     No facility-administered medications prior to visit.        Patient Active Problem List   Diagnosis   • Bloating   • Epigastric pain   • B12 deficiency   • Type 2 diabetes mellitus without complication (CMS/HCC)   • Essential hypertension   • MGUS (monoclonal gammopathy of unknown significance)   • Sore throat   • Gastroesophageal reflux disease with esophagitis   • Cough   • Hypothyroidism   • Hyperglycemia   • Right foot pain   • Healthcare maintenance   • Type 2 diabetes mellitus without complication, without long-term current use of insulin (CMS/HCC)   • Hyperlipidemia   • Dysuria   •  "Persistent cough   • Cardiomegaly   • Rib pain on left side   • Creatinine elevation   • Encounter for screening mammogram for breast cancer   • Abnormal serum total protein level   • Acute upper respiratory infection   • Breast asymmetry   • Breast pain   • Burning sensation   • Cerebrovascular small vessel disease   • Colon polyp   • Dense breasts   • Dizziness   • Increased glucose level   • Gastritis   • Gout   • Headache   • Hiatal hernia   • Low back pain   • Ulcer of nose   • Nausea   • Neck pain   • Osteoarthritis   • Otalgia   • Pain in thoracic spine   • Shoulder joint pain   • Back pain   • Peripheral neuropathy   • Pneumonia   • Seasonal allergic rhinitis   • Tinea corporis   • Vitamin D deficiency    • Sinus congestion   • Inclusion cyst   • Urinary frequency   • Leg edema   • Rash   • Neuritis or radiculitis due to rupture of lumbar intervertebral disc   • NICOLE on CPAP       Advanced Care Planning:  ACP discussion was held with the patient during this visit. Patient does not have an advance directive, information provided.    Review of Systems    Compared to one year ago, the patient feels her physical health is worse.  Compared to one year ago, the patient feels her mental health is worse.    Reviewed chart for potential of high risk medication in the elderly: yes  Reviewed chart for potential of harmful drug interactions in the elderly:yes    Objective         Vitals:    01/12/21 1425   BP: 110/68   BP Location: Left arm   Patient Position: Sitting   Cuff Size: Adult   Pulse: 60   Resp: 18   Temp: 97.8 °F (36.6 °C)   TempSrc: Temporal   SpO2: 97%   Weight: 73.3 kg (161 lb 11.2 oz)   Height: 160 cm (62.99\")   PainSc: 0-No pain       Body mass index is 28.65 kg/m².  Discussed the patient's BMI with her. The BMI is in the acceptable range.    Physical Exam    Lab Results   Component Value Date     (H) 11/30/2020    HGBA1C 6.7 (H) 11/11/2020        Assessment/Plan   Medicare Risks and Personalized " Health Plan  CMS Preventative Services Quick Reference  Advance Directive Discussion  Breast Cancer/Mammogram Screening  Colon Cancer Screening  Dementia/Memory   Depression/Dysphoria  Diabetic Lab Screening   Fall Risk  Glaucoma Risk  Immunizations Discussed/Encouraged (specific immunizations; Pneumococcal 23 and Shingrix )    The above risks/problems have been discussed with the patient.  Pertinent information has been shared with the patient in the After Visit Summary.  Follow up plans and orders are seen below in the Assessment/Plan Section.    There are no diagnoses linked to this encounter.  Follow Up:  No follow-ups on file.     An After Visit Summary and PPPS were given to the patient.     She sees [facility] and has had some eye trouble after getting new glasses.  Also tearing a lot.  I have asked her to make an appointment with Ralph which is the doctor who did her cataract surgery years ago.  Fasting labs reviewed  Advance directive requested  MMG 1/5/21 negative  CN 2018  She wants to delay the P23 vaccine b/c she would like the covid vaccine once available.   Offered PT for gait training and balance.  She declines now due to covid and will work on exercises at home  Fall risk discussed.  PT would help her but she declines at this time

## 2021-02-04 ENCOUNTER — LAB (OUTPATIENT)
Dept: LAB | Facility: HOSPITAL | Age: 86
End: 2021-02-04

## 2021-02-04 DIAGNOSIS — D47.2 MGUS (MONOCLONAL GAMMOPATHY OF UNKNOWN SIGNIFICANCE): ICD-10-CM

## 2021-02-04 LAB
ALBUMIN SERPL-MCNC: 4 G/DL (ref 3.5–5.2)
ALBUMIN/GLOB SERPL: 1.3 G/DL (ref 1.1–2.4)
ALP SERPL-CCNC: 58 U/L (ref 38–116)
ALT SERPL W P-5'-P-CCNC: 10 U/L (ref 0–33)
ANION GAP SERPL CALCULATED.3IONS-SCNC: 9 MMOL/L (ref 5–15)
AST SERPL-CCNC: 20 U/L (ref 0–32)
BASOPHILS # BLD AUTO: 0.03 10*3/MM3 (ref 0–0.2)
BASOPHILS NFR BLD AUTO: 0.5 % (ref 0–1.5)
BILIRUB SERPL-MCNC: 0.6 MG/DL (ref 0.2–1.2)
BUN SERPL-MCNC: 17 MG/DL (ref 6–20)
BUN/CREAT SERPL: 14 (ref 7.3–30)
CALCIUM SPEC-SCNC: 9.3 MG/DL (ref 8.5–10.2)
CHLORIDE SERPL-SCNC: 104 MMOL/L (ref 98–107)
CO2 SERPL-SCNC: 28 MMOL/L (ref 22–29)
CREAT SERPL-MCNC: 1.21 MG/DL (ref 0.6–1.1)
DEPRECATED RDW RBC AUTO: 50 FL (ref 37–54)
EOSINOPHIL # BLD AUTO: 0.39 10*3/MM3 (ref 0–0.4)
EOSINOPHIL NFR BLD AUTO: 6.5 % (ref 0.3–6.2)
ERYTHROCYTE [DISTWIDTH] IN BLOOD BY AUTOMATED COUNT: 14.6 % (ref 12.3–15.4)
GFR SERPL CREATININE-BSD FRML MDRD: 42 ML/MIN/1.73
GLOBULIN UR ELPH-MCNC: 3 GM/DL (ref 1.8–3.5)
GLUCOSE SERPL-MCNC: 164 MG/DL (ref 74–124)
HCT VFR BLD AUTO: 37.9 % (ref 34–46.6)
HGB BLD-MCNC: 12.7 G/DL (ref 12–15.9)
IMM GRANULOCYTES # BLD AUTO: 0.01 10*3/MM3 (ref 0–0.05)
IMM GRANULOCYTES NFR BLD AUTO: 0.2 % (ref 0–0.5)
LYMPHOCYTES # BLD AUTO: 1.76 10*3/MM3 (ref 0.7–3.1)
LYMPHOCYTES NFR BLD AUTO: 29.3 % (ref 19.6–45.3)
MCH RBC QN AUTO: 31.4 PG (ref 26.6–33)
MCHC RBC AUTO-ENTMCNC: 33.5 G/DL (ref 31.5–35.7)
MCV RBC AUTO: 93.8 FL (ref 79–97)
MONOCYTES # BLD AUTO: 0.51 10*3/MM3 (ref 0.1–0.9)
MONOCYTES NFR BLD AUTO: 8.5 % (ref 5–12)
NEUTROPHILS NFR BLD AUTO: 3.31 10*3/MM3 (ref 1.7–7)
NEUTROPHILS NFR BLD AUTO: 55 % (ref 42.7–76)
NRBC BLD AUTO-RTO: 0 /100 WBC (ref 0–0.2)
PLATELET # BLD AUTO: 160 10*3/MM3 (ref 140–450)
PMV BLD AUTO: 9.1 FL (ref 6–12)
POTASSIUM SERPL-SCNC: 4.6 MMOL/L (ref 3.5–4.7)
PROT SERPL-MCNC: 7 G/DL (ref 6.3–8)
RBC # BLD AUTO: 4.04 10*6/MM3 (ref 3.77–5.28)
SODIUM SERPL-SCNC: 141 MMOL/L (ref 134–145)
WBC # BLD AUTO: 6.01 10*3/MM3 (ref 3.4–10.8)

## 2021-02-04 PROCEDURE — 85025 COMPLETE CBC W/AUTO DIFF WBC: CPT

## 2021-02-04 PROCEDURE — 36415 COLL VENOUS BLD VENIPUNCTURE: CPT

## 2021-02-04 PROCEDURE — 80053 COMPREHEN METABOLIC PANEL: CPT

## 2021-02-05 LAB
ALBUMIN SERPL ELPH-MCNC: 3.4 G/DL (ref 2.9–4.4)
ALBUMIN/GLOB SERPL: 1.1 {RATIO} (ref 0.7–1.7)
ALPHA1 GLOB SERPL ELPH-MCNC: 0.2 G/DL (ref 0–0.4)
ALPHA2 GLOB SERPL ELPH-MCNC: 0.8 G/DL (ref 0.4–1)
B-GLOBULIN SERPL ELPH-MCNC: 0.9 G/DL (ref 0.7–1.3)
GAMMA GLOB SERPL ELPH-MCNC: 1.2 G/DL (ref 0.4–1.8)
GLOBULIN SER-MCNC: 3.1 G/DL (ref 2.2–3.9)
IGA SERPL-MCNC: 130 MG/DL (ref 64–422)
IGG SERPL-MCNC: 1221 MG/DL (ref 586–1602)
IGM SERPL-MCNC: 47 MG/DL (ref 26–217)
INTERPRETATION SERPL IEP-IMP: ABNORMAL
KAPPA LC FREE SER-MCNC: 23.7 MG/L (ref 3.3–19.4)
KAPPA LC FREE/LAMBDA FREE SER: 0.48 {RATIO} (ref 0.26–1.65)
LABORATORY COMMENT REPORT: ABNORMAL
LAMBDA LC FREE SERPL-MCNC: 49.6 MG/L (ref 5.7–26.3)
M PROTEIN SERPL ELPH-MCNC: 0.7 G/DL
PROT SERPL-MCNC: 6.5 G/DL (ref 6–8.5)

## 2021-02-10 ENCOUNTER — TELEPHONE (OUTPATIENT)
Dept: ONCOLOGY | Facility: CLINIC | Age: 86
End: 2021-02-10

## 2021-02-10 NOTE — TELEPHONE ENCOUNTER
Caller: JOSESITO    Relationship to patient: SELF    Best call back number: [phone]    Chief complaint: PT AS DEVELOPED A COUGH.     Type of visit: VITALS, F/U    Requested date: 2/17 NOTHING BEFORE 10AM     If rescheduling, when is the original appointment: 2/11/21    Additional notes:    HUB COULD NOT SCHEDULE WITHIN TIME

## 2021-02-11 ENCOUNTER — APPOINTMENT (OUTPATIENT)
Dept: LAB | Facility: HOSPITAL | Age: 86
End: 2021-02-11

## 2021-02-11 ENCOUNTER — OFFICE VISIT (OUTPATIENT)
Dept: ONCOLOGY | Facility: CLINIC | Age: 86
End: 2021-02-11

## 2021-02-11 DIAGNOSIS — D47.2 MGUS (MONOCLONAL GAMMOPATHY OF UNKNOWN SIGNIFICANCE): Primary | ICD-10-CM

## 2021-02-11 PROCEDURE — 99442 PR PHYS/QHP TELEPHONE EVALUATION 11-20 MIN: CPT | Performed by: INTERNAL MEDICINE

## 2021-02-11 NOTE — PROGRESS NOTES
Subjective    This visit has been rescheduled as a phone visit to comply with patient safety concerns in accordance with CDC recommendations. Total time of discussion was 12 minutes.    You have chosen to receive care through a telephone visit. Do you consent to use a telephone visit for your medical care today? Yes      REASON FOR FOLLOW UP:  IgG Lambda MGUS    HISTORY OF PRESENT ILLNESS:     History of Present Illness  Shirley is a 86 y.o. 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  She had labs drawn in our office 2/4//2021 showing a stable IgG lambda monoclonal spike quantitated at 0.7 g/dL.  Her IgG level was still within normal limits at 1221.  Her blood count was unremarkable and she denies any new symptoms.    Past Medical History, Past Surgical History, Social History, Family History have been reviewed and are without significant changes except as mentioned.    Review of Systems   Constitutional: Negative for activity change, appetite change, fatigue, fever and unexpected weight change.   HENT: Negative for hearing loss, nosebleeds, trouble swallowing and voice change.    Eyes: Negative for visual disturbance.   Respiratory: Positive for cough. Negative for shortness of breath and wheezing.    Cardiovascular: Negative for chest pain and palpitations.   Gastrointestinal: Negative for abdominal pain, diarrhea, nausea and vomiting.   Genitourinary: Negative for difficulty urinating, frequency, hematuria and urgency.   Musculoskeletal: Positive for arthralgias and back pain. Negative for neck pain.   Skin: Negative for rash.   Neurological: Negative for dizziness, seizures, syncope and headaches.   Hematological: Negative for adenopathy. Does not bruise/bleed easily.   Psychiatric/Behavioral: Negative for behavioral problems. The patient is not nervous/anxious.      A comprehensive 14 point review of systems  was performed and was negative except as mentioned.    Medications:  The current medication list was reviewed in the EMR    ALLERGIES:    No Known Allergies    Objective      There were no vitals filed for this visit.  Current Status 8/6/2020   ECOG score 0     No physical exam on the visit of 2/11/2021 as this was a telemedicine visit.  Physical Exam   Constitutional: She is oriented to person, place, and time. She appears well-developed. No distress.   HENT:   Head: Normocephalic.   Eyes: Pupils are equal, round, and reactive to light. Conjunctivae are normal. No scleral icterus.   Neck: Normal range of motion. Neck supple. No JVD present. No thyromegaly present.   Cardiovascular: Normal rate and regular rhythm. Exam reveals no gallop and no friction rub.   No murmur heard.  Pulmonary/Chest: Effort normal and breath sounds normal. She has no wheezes. She has no rales.   Abdominal: Soft. She exhibits no distension and no mass. There is no abdominal tenderness.   Musculoskeletal: Normal range of motion. No deformity.   Lymphadenopathy:     She has no cervical adenopathy.   Neurological: She is alert and oriented to person, place, and time. She has normal reflexes. No cranial nerve deficit.   Skin: Skin is warm and dry. No rash noted. No erythema.   Psychiatric: Her behavior is normal. Judgment normal.       RECENT LABS:  Hematology WBC   Date Value Ref Range Status   02/04/2021 6.01 3.40 - 10.80 10*3/mm3 Final   11/11/2020 5.9 3.4 - 10.8 x10E3/uL Final     RBC   Date Value Ref Range Status   02/04/2021 4.04 3.77 - 5.28 10*6/mm3 Final   11/11/2020 4.21 3.77 - 5.28 x10E6/uL Final     Hemoglobin   Date Value Ref Range Status   02/04/2021 12.7 12.0 - 15.9 g/dL Final     Hematocrit   Date Value Ref Range Status   02/04/2021 37.9 34.0 - 46.6 % Final     Platelets   Date Value Ref Range Status   02/04/2021 160 140 - 450 10*3/mm3 Final          Lab Results   Component Value Date    GLUCOSE 164 (H) 02/04/2021    BUN 17  02/04/2021    CREATININE 1.21 (H) 02/04/2021    EGFRIFNONA 42 (L) 02/04/2021    EGFRIFAFRI 65 11/30/2020    BCR 14.0 02/04/2021    K 4.6 02/04/2021    CO2 28.0 02/04/2021    CALCIUM 9.3 02/04/2021    PROTENTOTREF 6.5 02/04/2021    ALBUMIN 4.00 02/04/2021    ALBUMIN 3.4 02/04/2021    LABIL2 1.1 02/04/2021    AST 20 02/04/2021    ALT 10 02/04/2021       SPEP/LISA 2/4/2021  IgG   586 - 1602 mg/dL 1221    IgA   64 - 422 mg/dL 130    IgM   26 - 217 mg/dL 47    Total Protein   6.0 - 8.5 g/dL 6.5    Albumin   2.9 - 4.4 g/dL 3.4    Alpha-1-Globulin   0.0 - 0.4 g/dL 0.2    Alpha-2-Globulin   0.4 - 1.0 g/dL 0.8    Beta Globulin   0.7 - 1.3 g/dL 0.9    Gamma Globulin   0.4 - 1.8 g/dL 1.2    M-Esteban   Not Observed g/dL 0.7High     Globulin   2.2 - 3.9 g/dL 3.1    A/G Ratio   0.7 - 1.7 1.1    Immunofixation Reflex, Serum  CommentAbnormal     Comment: Immunofixation shows IgG monoclonal protein with lambda light chain   specificity.     Free Light Chain, Kappa   3.3 - 19.4 mg/L 23.7High     Free Lambda Light Chains   5.7 - 26.3 mg/L 49.6High     Kappa/Lambda Ratio   0.26 - 1.65 0.48        Lab Results   Component Value Date    TRHRQANK56 802 11/11/2020     Assessment/Plan   1.  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  2.  Chronic cough which she attributes to allergies.      Plan    1.  Return for follow-up in 6 months.  2.  Labs will again be drawn one week prior to visit including a CBC, serum chemistries, serum protein electrophoresis, immunofixation, and free serum light chain analysis.  3.  We encouraged the patient to get the Covid vaccine as soon as it becomes available to her.              2/11/2021      CC:

## 2021-03-01 RX ORDER — GLIMEPIRIDE 2 MG/1
TABLET ORAL
Qty: 90 TABLET | Refills: 1 | Status: SHIPPED | OUTPATIENT
Start: 2021-03-01 | End: 2022-01-05 | Stop reason: SDUPTHER

## 2021-03-02 DIAGNOSIS — Z23 IMMUNIZATION DUE: ICD-10-CM

## 2021-03-08 ENCOUNTER — IMMUNIZATION (OUTPATIENT)
Dept: VACCINE CLINIC | Facility: HOSPITAL | Age: 86
End: 2021-03-08

## 2021-03-08 PROCEDURE — 91300 HC SARSCOV02 VAC 30MCG/0.3ML IM: CPT | Performed by: INTERNAL MEDICINE

## 2021-03-08 PROCEDURE — 0001A: CPT | Performed by: INTERNAL MEDICINE

## 2021-03-29 ENCOUNTER — IMMUNIZATION (OUTPATIENT)
Dept: VACCINE CLINIC | Facility: HOSPITAL | Age: 86
End: 2021-03-29

## 2021-03-29 PROCEDURE — 91300 HC SARSCOV02 VAC 30MCG/0.3ML IM: CPT | Performed by: INTERNAL MEDICINE

## 2021-03-29 PROCEDURE — 0002A: CPT | Performed by: INTERNAL MEDICINE

## 2021-05-03 DIAGNOSIS — E78.5 HYPERLIPIDEMIA: ICD-10-CM

## 2021-05-03 RX ORDER — ROSUVASTATIN CALCIUM 10 MG/1
TABLET, COATED ORAL
Qty: 90 TABLET | Refills: 1 | Status: SHIPPED | OUTPATIENT
Start: 2021-05-03 | End: 2021-11-05

## 2021-05-14 RX ORDER — LOSARTAN POTASSIUM 50 MG/1
TABLET ORAL
Qty: 90 TABLET | Refills: 1 | Status: SHIPPED | OUTPATIENT
Start: 2021-05-14 | End: 2021-11-09

## 2021-06-11 ENCOUNTER — OFFICE VISIT (OUTPATIENT)
Dept: FAMILY MEDICINE CLINIC | Facility: CLINIC | Age: 86
End: 2021-06-11

## 2021-06-11 VITALS
TEMPERATURE: 97.5 F | HEIGHT: 63 IN | DIASTOLIC BLOOD PRESSURE: 79 MMHG | WEIGHT: 158.2 LBS | SYSTOLIC BLOOD PRESSURE: 142 MMHG | OXYGEN SATURATION: 97 % | BODY MASS INDEX: 28.03 KG/M2 | HEART RATE: 77 BPM

## 2021-06-11 DIAGNOSIS — M79.10 MUSCLE PAIN: Primary | ICD-10-CM

## 2021-06-11 PROCEDURE — 99213 OFFICE O/P EST LOW 20 MIN: CPT | Performed by: NURSE PRACTITIONER

## 2021-06-11 NOTE — PROGRESS NOTES
"Chief Complaint  Sore Throat (Pt c/o sore throat, sore chest, upper back and R arm pain, pt states that even breathing hurts,x about couple weeks, pt states of having cough and running nose for over a year now it comes and go.)    Subjective          Donna M Yeoman presents to [facility]  History of Present Illness New patient to me.  Here for appr 2 weeks of tightness in the muscles of her upper back and left arm.  Has trouble even using her right arm. Has chronic arthritis in neck, back, none in shoulders as far as she knows. The pain and muscle tightness is now radiating forward across her chest and neck. Has had some relief with heat, but not much.    Lives with her son.      Does not do well with medications such as pain medications or other medications that may make her dizzy.    Objective   Vital Signs:   /79   Pulse 77   Temp 97.5 °F (36.4 °C)   Ht 160 cm (63\")   Wt 71.8 kg (158 lb 3.2 oz)   SpO2 97%   BMI 28.02 kg/m²     Physical Exam  Vitals and nursing note reviewed.   Constitutional:       General: She is not in acute distress.     Appearance: She is well-developed. She is not ill-appearing or diaphoretic.   HENT:      Head: Normocephalic and atraumatic.   Eyes:      General:         Right eye: No discharge.         Left eye: No discharge.      Conjunctiva/sclera: Conjunctivae normal.   Cardiovascular:      Rate and Rhythm: Normal rate and regular rhythm.      Heart sounds: Normal heart sounds.   Pulmonary:      Effort: Pulmonary effort is normal.      Breath sounds: Normal breath sounds.   Abdominal:      General: Bowel sounds are normal.      Palpations: Abdomen is soft.      Tenderness: There is no abdominal tenderness.   Musculoskeletal:         General: No deformity.      Cervical back: Neck supple. Spasms and tenderness present. No deformity, erythema, rigidity, bony tenderness or crepitus. Pain with movement present. Decreased range of motion.      " Comments: Gait smooth and steady   Lymphadenopathy:      Cervical: No cervical adenopathy.   Skin:     General: Skin is warm and dry.   Neurological:      General: No focal deficit present.      Mental Status: She is alert and oriented to person, place, and time.   Psychiatric:         Mood and Affect: Mood normal.         Behavior: Behavior normal.        Result Review :                 Assessment and Plan    Diagnoses and all orders for this visit:    1. Muscle pain (Primary)  -     Ambulatory Referral to Physical Therapy Evaluate and treat    Neck pain with some left upper arm radiculopathy secondary to muscle spasms of left trapezius.  Discussed different modalities of treatment and she is agreeable to physical therapy which she has had good results from in the past for other things.  We discussed alarm signs and symptoms that require emergent evaluation and those that require follow-up visit.  If no better or worsen will have her follow-up.  Recommend ice as needed.  Stretching which was demonstrated.  Can use topicals and or OTC medications as needed for pain.    At this point I do not think any need for imaging but if no better then would recommend imaging.      Follow Up   Return if symptoms worsen or fail to improve.  Patient was given instructions and counseling regarding her condition or for health maintenance advice. Please see specific information pulled into the AVS if appropriate.

## 2021-06-11 NOTE — PATIENT INSTRUCTIONS
Muscle Cramps and Spasms  Muscle cramps and spasms are when muscles tighten by themselves. They usually get better within minutes. Muscle cramps are painful. They are usually stronger and last longer than muscle spasms. Muscle spasms may or may not be painful. They can last a few seconds or much longer.  Cramps and spasms can affect any muscle, but they occur most often in the calf muscles of the leg. They are usually not caused by a serious problem. In many cases, the cause is not known. Some common causes include:  · Doing more physical work or exercise than your body is ready for.  · Using the muscles too much (overuse) by repeating certain movements too many times.  · Staying in a certain position for a long time.  · Playing a sport or doing an activity without preparing properly.  · Using bad form or technique while playing a sport or doing an activity.  · Not having enough water in your body (dehydration).  · Injury.  · Side effects of some medicines.  · Low levels of the salts and minerals in your blood (electrolytes), such as low potassium or calcium.  Follow these instructions at home:  Managing pain and stiffness         · Massage, stretch, and relax the muscle. Do this for many minutes at a time.  · If told, put heat on tight or tense muscles as often as told by your doctor. Use the heat source that your doctor recommends, such as a moist heat pack or a heating pad.  ? Place a towel between your skin and the heat source.  ? Leave the heat on for 20-30 minutes.  ? Remove the heat if your skin turns bright red. This is very important if you are not able to feel pain, heat, or cold. You may have a greater risk of getting burned.  · If told, put ice on the affected area. This may help if you are sore or have pain after a cramp or spasm.  ? Put ice in a plastic bag.  ? Place a towel between your skin and the bag.  ? Leave the ice on for 20 minutes, 2-3 times a day.  · Try taking hot showers or baths to help  relax tight muscles.  Eating and drinking  · Drink enough fluid to keep your pee (urine) pale yellow.  · Eat a healthy diet to help ensure that your muscles work well. This should include:  ? Fruits and vegetables.  ? Lean protein.  ? Whole grains.  ? Low-fat or nonfat dairy products.  General instructions  · If you are having cramps often, avoid intense exercise for several days.  · Take over-the-counter and prescription medicines only as told by your doctor.  · Watch for any changes in your symptoms.  · Keep all follow-up visits as told by your doctor. This is important.  Contact a doctor if:  · Your cramps or spasms get worse or happen more often.  · Your cramps or spasms do not get better with time.  Summary  · Muscle cramps and spasms are when muscles tighten by themselves. They usually get better within minutes.  · Cramps and spasms occur most often in the calf muscles of the leg.  · Massage, stretch, and relax the muscle. This may help the cramp or spasm go away.  · Drink enough fluid to keep your pee (urine) pale yellow.  This information is not intended to replace advice given to you by your health care provider. Make sure you discuss any questions you have with your health care provider.  Document Revised: 05/13/2019 Document Reviewed: 05/13/2019  [employer] Patient Education © 2021 [employer]

## 2021-06-14 RX ORDER — CARVEDILOL 6.25 MG/1
TABLET ORAL
Qty: 180 TABLET | Refills: 1 | Status: SHIPPED | OUTPATIENT
Start: 2021-06-14 | End: 2021-12-23

## 2021-07-12 DIAGNOSIS — E55.9 VITAMIN D DEFICIENCY: ICD-10-CM

## 2021-07-12 DIAGNOSIS — E03.9 HYPOTHYROIDISM, UNSPECIFIED TYPE: ICD-10-CM

## 2021-07-12 DIAGNOSIS — E53.8 B12 DEFICIENCY: ICD-10-CM

## 2021-07-12 DIAGNOSIS — E11.9 TYPE 2 DIABETES MELLITUS WITHOUT COMPLICATION, WITHOUT LONG-TERM CURRENT USE OF INSULIN (HCC): ICD-10-CM

## 2021-07-12 DIAGNOSIS — E78.5 HYPERLIPIDEMIA, UNSPECIFIED HYPERLIPIDEMIA TYPE: Primary | ICD-10-CM

## 2021-07-13 LAB
25(OH)D3+25(OH)D2 SERPL-MCNC: 45.8 NG/ML (ref 30–100)
ALBUMIN SERPL-MCNC: 4.1 G/DL (ref 3.5–5.2)
ALBUMIN/GLOB SERPL: 1.6 G/DL
ALP SERPL-CCNC: 80 U/L (ref 39–117)
ALT SERPL-CCNC: 10 U/L (ref 1–33)
AST SERPL-CCNC: 18 U/L (ref 1–32)
BASOPHILS # BLD AUTO: 0.03 10*3/MM3 (ref 0–0.2)
BASOPHILS NFR BLD AUTO: 0.5 % (ref 0–1.5)
BILIRUB SERPL-MCNC: 0.7 MG/DL (ref 0–1.2)
BUN SERPL-MCNC: 12 MG/DL (ref 8–23)
BUN/CREAT SERPL: 14.1 (ref 7–25)
CALCIUM SERPL-MCNC: 9.3 MG/DL (ref 8.6–10.5)
CHLORIDE SERPL-SCNC: 108 MMOL/L (ref 98–107)
CHOLEST SERPL-MCNC: 137 MG/DL (ref 0–200)
CO2 SERPL-SCNC: 26.8 MMOL/L (ref 22–29)
CREAT SERPL-MCNC: 0.85 MG/DL (ref 0.57–1)
EOSINOPHIL # BLD AUTO: 0.29 10*3/MM3 (ref 0–0.4)
EOSINOPHIL NFR BLD AUTO: 5 % (ref 0.3–6.2)
ERYTHROCYTE [DISTWIDTH] IN BLOOD BY AUTOMATED COUNT: 13.9 % (ref 12.3–15.4)
GLOBULIN SER CALC-MCNC: 2.6 GM/DL
GLUCOSE SERPL-MCNC: 125 MG/DL (ref 65–99)
HBA1C MFR BLD: 6.3 % (ref 4.8–5.6)
HCT VFR BLD AUTO: 39.4 % (ref 34–46.6)
HDLC SERPL-MCNC: 36 MG/DL (ref 40–60)
HGB BLD-MCNC: 13.1 G/DL (ref 12–15.9)
IMM GRANULOCYTES # BLD AUTO: 0.01 10*3/MM3 (ref 0–0.05)
IMM GRANULOCYTES NFR BLD AUTO: 0.2 % (ref 0–0.5)
LDLC SERPL CALC-MCNC: 77 MG/DL (ref 0–100)
LDLC/HDLC SERPL: 2.06 {RATIO}
LYMPHOCYTES # BLD AUTO: 1.29 10*3/MM3 (ref 0.7–3.1)
LYMPHOCYTES NFR BLD AUTO: 22.4 % (ref 19.6–45.3)
MCH RBC QN AUTO: 30.3 PG (ref 26.6–33)
MCHC RBC AUTO-ENTMCNC: 33.2 G/DL (ref 31.5–35.7)
MCV RBC AUTO: 91.2 FL (ref 79–97)
MONOCYTES # BLD AUTO: 0.55 10*3/MM3 (ref 0.1–0.9)
MONOCYTES NFR BLD AUTO: 9.6 % (ref 5–12)
NEUTROPHILS # BLD AUTO: 3.58 10*3/MM3 (ref 1.7–7)
NEUTROPHILS NFR BLD AUTO: 62.3 % (ref 42.7–76)
NRBC BLD AUTO-RTO: 0 /100 WBC (ref 0–0.2)
PLATELET # BLD AUTO: 181 10*3/MM3 (ref 140–450)
POTASSIUM SERPL-SCNC: 4.2 MMOL/L (ref 3.5–5.2)
PROT SERPL-MCNC: 6.7 G/DL (ref 6–8.5)
RBC # BLD AUTO: 4.32 10*6/MM3 (ref 3.77–5.28)
SODIUM SERPL-SCNC: 145 MMOL/L (ref 136–145)
T4 FREE SERPL-MCNC: 1.11 NG/DL (ref 0.93–1.7)
TRIGL SERPL-MCNC: 134 MG/DL (ref 0–150)
TSH SERPL DL<=0.005 MIU/L-ACNC: 3.58 UIU/ML (ref 0.27–4.2)
VIT B12 SERPL-MCNC: 922 PG/ML (ref 211–946)
VLDLC SERPL CALC-MCNC: 24 MG/DL (ref 5–40)
WBC # BLD AUTO: 5.75 10*3/MM3 (ref 3.4–10.8)

## 2021-07-15 ENCOUNTER — TELEPHONE (OUTPATIENT)
Dept: FAMILY MEDICINE CLINIC | Facility: CLINIC | Age: 86
End: 2021-07-15

## 2021-07-16 RX ORDER — ALLOPURINOL 100 MG/1
TABLET ORAL
Qty: 90 TABLET | Refills: 1 | Status: SHIPPED | OUTPATIENT
Start: 2021-07-16 | End: 2022-01-21 | Stop reason: SDUPTHER

## 2021-07-16 RX ORDER — LEVOTHYROXINE SODIUM 0.07 MG/1
TABLET ORAL
Qty: 90 TABLET | Refills: 1 | Status: SHIPPED | OUTPATIENT
Start: 2021-07-16 | End: 2022-01-21 | Stop reason: SDUPTHER

## 2021-07-20 ENCOUNTER — OFFICE VISIT (OUTPATIENT)
Dept: FAMILY MEDICINE CLINIC | Facility: CLINIC | Age: 86
End: 2021-07-20

## 2021-07-20 VITALS
WEIGHT: 157.4 LBS | TEMPERATURE: 96.2 F | HEIGHT: 63 IN | OXYGEN SATURATION: 98 % | SYSTOLIC BLOOD PRESSURE: 118 MMHG | BODY MASS INDEX: 27.89 KG/M2 | RESPIRATION RATE: 16 BRPM | DIASTOLIC BLOOD PRESSURE: 72 MMHG | HEART RATE: 58 BPM

## 2021-07-20 DIAGNOSIS — R06.09 DOE (DYSPNEA ON EXERTION): ICD-10-CM

## 2021-07-20 DIAGNOSIS — K31.84 GASTROPARESIS: ICD-10-CM

## 2021-07-20 DIAGNOSIS — R94.31 ABNORMAL EKG: ICD-10-CM

## 2021-07-20 DIAGNOSIS — K22.2 ESOPHAGEAL STRICTURE: Primary | ICD-10-CM

## 2021-07-20 DIAGNOSIS — R07.89 ATYPICAL CHEST PAIN: ICD-10-CM

## 2021-07-20 DIAGNOSIS — R53.83 FATIGUE, UNSPECIFIED TYPE: ICD-10-CM

## 2021-07-20 DIAGNOSIS — K21.9 GASTROESOPHAGEAL REFLUX DISEASE, UNSPECIFIED WHETHER ESOPHAGITIS PRESENT: ICD-10-CM

## 2021-07-20 DIAGNOSIS — R61 SWEATY SKIN: ICD-10-CM

## 2021-07-20 PROCEDURE — 93000 ELECTROCARDIOGRAM COMPLETE: CPT | Performed by: INTERNAL MEDICINE

## 2021-07-20 PROCEDURE — 99214 OFFICE O/P EST MOD 30 MIN: CPT | Performed by: INTERNAL MEDICINE

## 2021-07-20 NOTE — PROGRESS NOTES
Procedure     ECG 12 Lead    Date/Time: 7/20/2021 11:50 AM  Performed by: Caroline Weiner MD  Authorized by: Caroline Weiner MD   Comparison: compared with previous ECG   Similar to previous ECG  Rhythm: sinus bradycardia  Ectopy: infrequent PVCs  Rate: bradycardic  Conduction: conduction normal  T inversion: V1, V2, V3 and V4    Clinical impression: abnormal EKG

## 2021-07-20 NOTE — PROGRESS NOTES
"Chief Complaint  Follow-up ( hld )    Subjective          Donna M Yeoman presents to [facility]  History of Present Illness  Here for f/u on HTN on coreg, losartan and baby aspirin daily.  GERD and gastroparesis were followed by Dr. Lo but he no longer comes to [facility] and she would like a new GI.  Omeprazole controls her sx currently. He also did a esophageal dilation as well.  HL on crestor 10 mg qd.  Gets sweaty at times with activity.  No cp and no soa.  She is due for MMG.  Having some chest sensation/pain that last 5 minutes.  Sits and goes away.  Doesn't occur when she is sweaty.  Steps makes her feel soa but has thought it was related to aging.  No edema. No palpitations.  Hasn't felt well since her second covid shot.    Objective   Vital Signs:   /72   Pulse 58   Temp 96.2 °F (35.7 °C) (Temporal)   Resp 16   Ht 160 cm (63\")   Wt 71.4 kg (157 lb 6.4 oz)   SpO2 98%   BMI 27.88 kg/m²     Physical Exam  Vitals and nursing note reviewed.   Constitutional:       Appearance: Normal appearance. She is well-developed.   HENT:      Head: Normocephalic and atraumatic.      Right Ear: External ear normal.      Left Ear: External ear normal.   Eyes:      Extraocular Movements: Extraocular movements intact.      Conjunctiva/sclera: Conjunctivae normal.   Neck:      Vascular: No carotid bruit.   Cardiovascular:      Rate and Rhythm: Normal rate and regular rhythm.      Heart sounds: Normal heart sounds.      Comments: No bruits  Pulmonary:      Effort: Pulmonary effort is normal. No respiratory distress.      Breath sounds: Normal breath sounds. No wheezing or rales.   Abdominal:      General: Bowel sounds are normal. There is no distension.      Palpations: Abdomen is soft. There is no mass.      Tenderness: There is no abdominal tenderness.   Musculoskeletal:      Cervical back: Neck supple.   Lymphadenopathy:      Cervical: No cervical adenopathy.   Skin:     General: Skin " is warm.   Neurological:      General: No focal deficit present.      Mental Status: She is alert and oriented to person, place, and time.   Psychiatric:         Mood and Affect: Mood normal.         Behavior: Behavior normal.         Thought Content: Thought content normal.         Judgment: Judgment normal.        Result Review :            Mammo Screening Digital Tomosynthesis Bilateral With CAD (01/05/2021 11:48)      Vitamin B12 (07/13/2021 10:06)  Hemoglobin A1c (07/13/2021 10:06)  Vitamin D 25 Hydroxy (07/13/2021 10:06)  T4, Free (07/13/2021 10:06)  TSH (07/13/2021 10:06)  Lipid Panel With LDL / HDL Ratio (07/13/2021 10:06)  Comprehensive Metabolic Panel (07/13/2021 10:06)  CBC & Differential (07/13/2021 10:06)    Assessment and Plan    Diagnoses and all orders for this visit:    1. Esophageal stricture (Primary)  -     Ambulatory Referral to Gastroenterology    2. Gastroesophageal reflux disease, unspecified whether esophagitis present  -     Ambulatory Referral to Gastroenterology    3. Gastroparesis  -     Ambulatory Referral to Gastroenterology    4. Sweaty skin  -     Ambulatory Referral to Cardiology    5. Atypical chest pain  -     Ambulatory Referral to Cardiology    6. Fatigue, unspecified type  -     Ambulatory Referral to Cardiology    7. SAINI (dyspnea on exertion)  -     Ambulatory Referral to Cardiology    8. Abnormal EKG  -     Ambulatory Referral to Cardiology        Follow Up   No follow-ups on file.  Patient was given instructions and counseling regarding her condition or for health maintenance advice. Please see specific information pulled into the AVS if appropriate.     Labs have been reviewed and look good.  It does not appear that her blood sugar is running too low that would cause sweatiness but I did ask her the next time she feels sweaty, to check her blood sugar.  Her thyroid is actually perfect and we did not adjust her medication today.  She does not need refills.  Her mammogram is  up-to-date.  With her atypical chest pain shortness of breath at times and sweatiness along with the fatigue, an EKG was done which does show a few minor changes which do seem to be stable when compared to EKG from 2017.  However, with the symptoms, I have referred her to local cardiology group for further evaluation and treatment.  Also, due to gastroparesis, acid reflux, and history of esophageal stricture, I have referred her to [facility] gastroenterology.  Her previous gastroenterologist, Dr. Yo Lo, has moved from the [facility] system and is no longer in a location where she can transport herself.

## 2021-07-29 ENCOUNTER — LAB (OUTPATIENT)
Dept: LAB | Facility: HOSPITAL | Age: 86
End: 2021-07-29

## 2021-07-29 DIAGNOSIS — D47.2 MGUS (MONOCLONAL GAMMOPATHY OF UNKNOWN SIGNIFICANCE): ICD-10-CM

## 2021-07-29 LAB
ALBUMIN SERPL-MCNC: 4 G/DL (ref 3.5–5.2)
ALBUMIN/GLOB SERPL: 1.3 G/DL (ref 1.1–2.4)
ALP SERPL-CCNC: 79 U/L (ref 38–116)
ALT SERPL W P-5'-P-CCNC: 10 U/L (ref 0–33)
ANION GAP SERPL CALCULATED.3IONS-SCNC: 8.7 MMOL/L (ref 5–15)
AST SERPL-CCNC: 22 U/L (ref 0–32)
BASOPHILS # BLD AUTO: 0.04 10*3/MM3 (ref 0–0.2)
BASOPHILS NFR BLD AUTO: 0.7 % (ref 0–1.5)
BILIRUB SERPL-MCNC: 0.7 MG/DL (ref 0.2–1.2)
BUN SERPL-MCNC: 15 MG/DL (ref 6–20)
BUN/CREAT SERPL: 17 (ref 7.3–30)
CALCIUM SPEC-SCNC: 9.3 MG/DL (ref 8.5–10.2)
CHLORIDE SERPL-SCNC: 108 MMOL/L (ref 98–107)
CO2 SERPL-SCNC: 26.3 MMOL/L (ref 22–29)
CREAT SERPL-MCNC: 0.88 MG/DL (ref 0.6–1.1)
DEPRECATED RDW RBC AUTO: 50.1 FL (ref 37–54)
EOSINOPHIL # BLD AUTO: 0.32 10*3/MM3 (ref 0–0.4)
EOSINOPHIL NFR BLD AUTO: 5.9 % (ref 0.3–6.2)
ERYTHROCYTE [DISTWIDTH] IN BLOOD BY AUTOMATED COUNT: 14.6 % (ref 12.3–15.4)
GFR SERPL CREATININE-BSD FRML MDRD: 61 ML/MIN/1.73
GLOBULIN UR ELPH-MCNC: 3.1 GM/DL (ref 1.8–3.5)
GLUCOSE SERPL-MCNC: 107 MG/DL (ref 74–124)
HCT VFR BLD AUTO: 39.3 % (ref 34–46.6)
HGB BLD-MCNC: 12.2 G/DL (ref 12–15.9)
IMM GRANULOCYTES # BLD AUTO: 0.01 10*3/MM3 (ref 0–0.05)
IMM GRANULOCYTES NFR BLD AUTO: 0.2 % (ref 0–0.5)
LYMPHOCYTES # BLD AUTO: 1.42 10*3/MM3 (ref 0.7–3.1)
LYMPHOCYTES NFR BLD AUTO: 26 % (ref 19.6–45.3)
MCH RBC QN AUTO: 29.1 PG (ref 26.6–33)
MCHC RBC AUTO-ENTMCNC: 31 G/DL (ref 31.5–35.7)
MCV RBC AUTO: 93.8 FL (ref 79–97)
MONOCYTES # BLD AUTO: 0.6 10*3/MM3 (ref 0.1–0.9)
MONOCYTES NFR BLD AUTO: 11 % (ref 5–12)
NEUTROPHILS NFR BLD AUTO: 3.07 10*3/MM3 (ref 1.7–7)
NEUTROPHILS NFR BLD AUTO: 56.2 % (ref 42.7–76)
NRBC BLD AUTO-RTO: 0 /100 WBC (ref 0–0.2)
PLATELET # BLD AUTO: 182 10*3/MM3 (ref 140–450)
PMV BLD AUTO: 9.7 FL (ref 6–12)
POTASSIUM SERPL-SCNC: 4.4 MMOL/L (ref 3.5–4.7)
PROT SERPL-MCNC: 7.1 G/DL (ref 6.3–8)
RBC # BLD AUTO: 4.19 10*6/MM3 (ref 3.77–5.28)
SODIUM SERPL-SCNC: 143 MMOL/L (ref 134–145)
WBC # BLD AUTO: 5.46 10*3/MM3 (ref 3.4–10.8)

## 2021-07-29 PROCEDURE — 80053 COMPREHEN METABOLIC PANEL: CPT

## 2021-07-29 PROCEDURE — 36415 COLL VENOUS BLD VENIPUNCTURE: CPT

## 2021-07-29 PROCEDURE — 85025 COMPLETE CBC W/AUTO DIFF WBC: CPT

## 2021-07-30 LAB
ALBUMIN SERPL ELPH-MCNC: 3.7 G/DL (ref 2.9–4.4)
ALBUMIN/GLOB SERPL: 1.2 {RATIO} (ref 0.7–1.7)
ALPHA1 GLOB SERPL ELPH-MCNC: 0.2 G/DL (ref 0–0.4)
ALPHA2 GLOB SERPL ELPH-MCNC: 0.8 G/DL (ref 0.4–1)
B-GLOBULIN SERPL ELPH-MCNC: 0.9 G/DL (ref 0.7–1.3)
GAMMA GLOB SERPL ELPH-MCNC: 1.3 G/DL (ref 0.4–1.8)
GLOBULIN SER-MCNC: 3.2 G/DL (ref 2.2–3.9)
IGA SERPL-MCNC: 136 MG/DL (ref 64–422)
IGG SERPL-MCNC: 1307 MG/DL (ref 586–1602)
IGM SERPL-MCNC: 47 MG/DL (ref 26–217)
INTERPRETATION SERPL IEP-IMP: ABNORMAL
KAPPA LC FREE SER-MCNC: 24.4 MG/L (ref 3.3–19.4)
KAPPA LC FREE/LAMBDA FREE SER: 0.5 {RATIO} (ref 0.26–1.65)
LABORATORY COMMENT REPORT: ABNORMAL
LAMBDA LC FREE SERPL-MCNC: 48.7 MG/L (ref 5.7–26.3)
M PROTEIN SERPL ELPH-MCNC: 0.7 G/DL
PROT SERPL-MCNC: 6.9 G/DL (ref 6–8.5)

## 2021-08-05 ENCOUNTER — APPOINTMENT (OUTPATIENT)
Dept: LAB | Facility: HOSPITAL | Age: 86
End: 2021-08-05

## 2021-08-05 ENCOUNTER — OFFICE VISIT (OUTPATIENT)
Dept: ONCOLOGY | Facility: CLINIC | Age: 86
End: 2021-08-05

## 2021-08-05 VITALS
DIASTOLIC BLOOD PRESSURE: 82 MMHG | OXYGEN SATURATION: 98 % | BODY MASS INDEX: 27.73 KG/M2 | SYSTOLIC BLOOD PRESSURE: 175 MMHG | HEIGHT: 63 IN | HEART RATE: 63 BPM | TEMPERATURE: 97.3 F | RESPIRATION RATE: 16 BRPM | WEIGHT: 156.5 LBS

## 2021-08-05 DIAGNOSIS — E53.8 B12 DEFICIENCY: ICD-10-CM

## 2021-08-05 DIAGNOSIS — D47.2 MGUS (MONOCLONAL GAMMOPATHY OF UNKNOWN SIGNIFICANCE): Primary | ICD-10-CM

## 2021-08-05 PROCEDURE — 99213 OFFICE O/P EST LOW 20 MIN: CPT | Performed by: INTERNAL MEDICINE

## 2021-08-05 NOTE — PROGRESS NOTES
Subjective    REASON FOR FOLLOW UP:  IgG Lambda MGUS    HISTORY OF PRESENT ILLNESS:     History of Present Illness  Shirley is a 86 y.o. 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  She had labs drawn in our office 7/29//2021 showing a stable IgG lambda monoclonal spike quantitated at 0.7 g/dL.  Her IgG level was still within normal limits at 1307.  Her blood count was unremarkable and she denies any new symptoms.    She looks amazingly well and vigorous for her age.  She is undergoing cardiac evaluation on next week for some shortness of breath and diaphoresis.    Past Medical History, Past Surgical History, Social History, Family History have been reviewed and are without significant changes except as mentioned.    Review of Systems   Constitutional: Negative for activity change, appetite change, fatigue, fever and unexpected weight change.   HENT: Negative for hearing loss, nosebleeds, trouble swallowing and voice change.    Eyes: Negative for visual disturbance.   Respiratory: Positive for cough. Negative for shortness of breath and wheezing.    Cardiovascular: Negative for chest pain and palpitations.   Gastrointestinal: Negative for abdominal pain, diarrhea, nausea and vomiting.   Genitourinary: Negative for difficulty urinating, frequency, hematuria and urgency.   Musculoskeletal: Positive for arthralgias and back pain. Negative for neck pain.   Skin: Negative for rash.   Neurological: Negative for dizziness, seizures, syncope and headaches.   Hematological: Negative for adenopathy. Does not bruise/bleed easily.   Psychiatric/Behavioral: Negative for behavioral problems. The patient is not nervous/anxious.      A comprehensive 14 point review of systems was performed and was negative except as mentioned.    Medications:  The current medication list was reviewed in the EMR    ALLERGIES:    No Known  "Allergies    Objective      Vitals:    08/05/21 1135   BP: 175/82   Pulse: 63   Resp: 16   Temp: 97.3 °F (36.3 °C)   TempSrc: Skin   SpO2: 98%   Weight: 71 kg (156 lb 8 oz)   Height: 160 cm (62.99\")   PainSc: 0-No pain     Current Status 8/6/2020   ECOG score 0     No physical exam on the visit of 2/11/2021 as this was a telemedicine visit.  Physical Exam   Constitutional: She is oriented to person, place, and time. She appears well-developed. No distress.   HENT:   Head: Normocephalic.   Eyes: Pupils are equal, round, and reactive to light. Conjunctivae are normal. No scleral icterus.   Neck: No JVD present. No thyromegaly present.   Cardiovascular: Normal rate and regular rhythm. Exam reveals no gallop and no friction rub.   No murmur heard.  Pulmonary/Chest: Effort normal and breath sounds normal. She has no wheezes. She has no rales.   Abdominal: Soft. She exhibits no distension and no mass. There is no abdominal tenderness.   Musculoskeletal: Normal range of motion. No deformity.   Lymphadenopathy:     She has no cervical adenopathy.   Neurological: She is alert and oriented to person, place, and time. She has normal reflexes. No cranial nerve deficit.   Skin: Skin is warm and dry. No rash noted. No erythema.   Psychiatric: Her behavior is normal. Judgment normal.   I have reexamined the patient and the results are consistent with the previously documented exam. King Adame MD       RECENT LABS:  Hematology WBC   Date Value Ref Range Status   07/29/2021 5.46 3.40 - 10.80 10*3/mm3 Final   07/13/2021 5.75 3.40 - 10.80 10*3/mm3 Final     RBC   Date Value Ref Range Status   07/29/2021 4.19 3.77 - 5.28 10*6/mm3 Final   07/13/2021 4.32 3.77 - 5.28 10*6/mm3 Final     Hemoglobin   Date Value Ref Range Status   07/29/2021 12.2 12.0 - 15.9 g/dL Final     Hematocrit   Date Value Ref Range Status   07/29/2021 39.3 34.0 - 46.6 % Final     Platelets   Date Value Ref Range Status   07/29/2021 182 140 - 450 10*3/mm3 Final "          Lab Results   Component Value Date    GLUCOSE 107 07/29/2021    BUN 15 07/29/2021    CREATININE 0.88 07/29/2021    EGFRIFNONA 61 07/29/2021    EGFRIFAFRI 77 07/13/2021    BCR 17.0 07/29/2021    K 4.4 07/29/2021    CO2 26.3 07/29/2021    CALCIUM 9.3 07/29/2021    PROTENTOTREF 6.9 07/29/2021    ALBUMIN 3.7 07/29/2021    ALBUMIN 4.00 07/29/2021    LABIL2 1.2 07/29/2021    AST 22 07/29/2021    ALT 10 07/29/2021       SPEP/LISA 7/29/2021  IgG   586 - 1602 mg/dL 1307    IgA   64 - 422 mg/dL 136    IgM   26 - 217 mg/dL 47    Total Protein   6.0 - 8.5 g/dL 6.9    Albumin   2.9 - 4.4 g/dL 3.7    Alpha-1-Globulin   0.0 - 0.4 g/dL 0.2    Alpha-2-Globulin   0.4 - 1.0 g/dL 0.8    Beta Globulin   0.7 - 1.3 g/dL 0.9    Gamma Globulin   0.4 - 1.8 g/dL 1.3    M-Esteban   Not Observed g/dL 0.7High     Globulin   2.2 - 3.9 g/dL 3.2    A/G Ratio   0.7 - 1.7 1.2    Immunofixation Reflex, Serum  CommentAbnormal     Comment: Immunofixation shows IgG monoclonal protein with lambda light chain   specificity.     Free Light Chain, Kappa   3.3 - 19.4 mg/L 24.4High     Free Lambda Light Chains   5.7 - 26.3 mg/L 48.7High     Kappa/Lambda Ratio   0.26 - 1.65 0.50          Lab Results   Component Value Date    DZNROWEF01 922 07/13/2021     Assessment/Plan   1.  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  2.  Chronic cough which she attributes to allergies.      Plan    1.  Return for follow-up in 12 months.  2.  Labs will again be drawn one week prior to visit including a CBC, serum chemistries, serum protein electrophoresis, immunofixation, and free serum light chain analysis.                8/5/2021      CC:

## 2021-08-06 ENCOUNTER — OFFICE VISIT (OUTPATIENT)
Dept: CARDIOLOGY | Facility: CLINIC | Age: 86
End: 2021-08-06

## 2021-08-06 VITALS
BODY MASS INDEX: 27.64 KG/M2 | SYSTOLIC BLOOD PRESSURE: 132 MMHG | WEIGHT: 156 LBS | HEIGHT: 63 IN | HEART RATE: 56 BPM | DIASTOLIC BLOOD PRESSURE: 68 MMHG

## 2021-08-06 DIAGNOSIS — R07.2 PRECORDIAL PAIN: Primary | ICD-10-CM

## 2021-08-06 DIAGNOSIS — R06.02 SHORTNESS OF BREATH: ICD-10-CM

## 2021-08-06 DIAGNOSIS — R61 DIAPHORESIS: ICD-10-CM

## 2021-08-06 PROCEDURE — 99204 OFFICE O/P NEW MOD 45 MIN: CPT | Performed by: INTERNAL MEDICINE

## 2021-08-06 RX ORDER — LORATADINE 10 MG/1
CAPSULE, LIQUID FILLED ORAL
COMMUNITY

## 2021-08-08 NOTE — PROGRESS NOTES
Date of Office Visit: 2021  Encounter Provider: Bassam Stewart MD  Place of Service: [facility] CARDIOLOGY  Patient Name: Donna M Yeoman  :1935    Chief complaint: Shortness of breath, diaphoresis, chest discomfort.    History of Present Illness:    I had the pleasure of seeing the patient in cardiology office on 2021.  She is a very pleasant 86 year-old female with a history of DVT, pulmonary emboli, prior stroke, diabetes, and obstructive sleep apnea on CPAP who presents for evaluation.  The patient has formerly followed with Dr. Pacheco in our group, but has not seen him since .  The patient does not have any previous history of coronary artery disease.  She does have a baseline abnormal EKG, dating back to at least , which showed anterior T wave changes.  She also has had diabetes for the last several years, and has a history of DVT and pulmonary emboli.    The patient presents with several complaints today.  She states that over the last several months, she has noticed that she has had a change in her shortness of breath from baseline.  She states it is more difficult going up stairs, inclines, and exerting herself with moderate or more activity.  She also has had diaphoresis with exertion, which has been severe at times.  She has also experienced random pain over her left breast area, which she describes as a dull aching sensation which also goes into her left upper back at times.  The symptoms do occur together, and mainly occur with exertion.  She did tell me that the diaphoresis was extremely bad at a store just recently, and she had to stop and rest several times.  Her EKG from 2021 does again show anterior T wave changes, although they're similar from 2017.  She has a son and daughter who have both had coronary artery bypass grafting operations.  Her daughter  from a pulmonary embolism following her CABG at age 57.    Past Medical  History:   Diagnosis Date   • Arthritis    • Colon polyp 7/1/2017   • Diabetes mellitus (CMS/HCC)    • Esophageal reflux    • GERD (gastroesophageal reflux disease)    • H/O DVT (deep venous thrombosis)    • Headache 7/1/2017   • Hiatal hernia 7/1/2017   • History of vitamin D deficiency    • Hypercholesteremia    • Hyperlipidemia    • Hypertension    • Low back pain 7/1/2017   • MGUS (monoclonal gammopathy of unknown significance)    • NICOLE on CPAP     AHI 15/h   • Osteoporosis    • Peripheral neuropathy    • Pneumonia 7/1/2017   • Pulmonary embolism (CMS/HCC)    • Pulmonary hypertension (CMS/HCC)    • Thyroid disease    • Venous thrombosis        Past Surgical History:   Procedure Laterality Date   • APPENDECTOMY  1949   • BLADDER REPAIR     • BREAST BIOPSY     • COLONOSCOPY     • CYSTOCELE REPAIR      bladder reconstruction   • HYSTERECTOMY     • SALPINGECTOMY     • SALPINGO OOPHORECTOMY      for ectopic pregnancy       Current Outpatient Medications on File Prior to Visit   Medication Sig Dispense Refill   • allopurinol (ZYLOPRIM) 100 MG tablet TAKE 1 TABLET DAILY 90 tablet 1   • aspirin 81 MG EC tablet Take 81 mg by mouth Daily.     • carvedilol (COREG) 6.25 MG tablet TAKE 1 TABLET BY MOUTH TWICE A DAY WITH MEALS 180 tablet 1   • CRANBERRY PO Take  by mouth.     • Cyanocobalamin (B-12 PO) Take  by mouth.     • ESTRACE VAGINAL 0.1 MG/GM vaginal cream INSERT 1/2 TO 1 INCH VAGINALLY ONCE WEEKLY AS DIRECTED  11   • FIBER PO Take  by mouth Daily.     • glimepiride (AMARYL) 2 MG tablet TAKE 1 TABLET BY MOUTH EVERY DAY BEFORE BREAKFAST 90 tablet 1   • levothyroxine (SYNTHROID, LEVOTHROID) 75 MCG tablet TAKE 1 TABLET DAILY 90 tablet 1   • Loratadine 10 MG capsule Take  by mouth.     • losartan (COZAAR) 50 MG tablet TAKE 1 TABLET BY MOUTH EVERY DAY 90 tablet 1   • nystatin-triamcinolone (MYCOLOG) 529570-2.1 UNIT/GM-% ointment Apply  topically to the appropriate area as directed 2 (Two) Times a Day. 30 g 0   • omeprazole  "(priLOSEC) 40 MG capsule TAKE 1 CAPSULE BY MOUTH TWICE A DAY 90 capsule 1   • ONETOUCH DELICA LANCETS 33G misc USE TO TEST BLOOD SUGAR TWICE DAILY 100 each 1   • ONETOUCH VERIO test strip TEST EVERY MORNING 100 each 5   • phenazopyridine (Pyridium) 100 MG tablet Take 1 tablet by mouth 3 (Three) Times a Day. 6 tablet 0   • Potassium 99 MG tablet Take  by mouth.     • Probiotic Product (PROBIOTIC DAILY PO) Take  by mouth Daily.     • rosuvastatin (CRESTOR) 10 MG tablet TAKE 1 TABLET BY MOUTH EVERY DAY 90 tablet 1   • VITAMIN D PO Take  by mouth.       No current facility-administered medications on file prior to visit.     Allergies as of 08/06/2021   • (No Known Allergies)     Social History     Socioeconomic History   • Marital status:      Spouse name: Not on file   • Number of children: 4   • Years of education: High School   • Highest education level: Not on file   Tobacco Use   • Smoking status: Never Smoker   • Smokeless tobacco: Never Used   Substance and Sexual Activity   • Alcohol use: No   • Drug use: No   • Sexual activity: Defer     Family History   Problem Relation Age of Onset   • Breast cancer Mother    • Colon cancer Mother    • Stroke Mother    • Heart disease Father    • Heart disease Sister    • Hypertension Sister    • Hypertension Sister    • Diabetes Sister        Review of Systems   Constitutional: Positive for diaphoresis and malaise/fatigue.   Cardiovascular: Positive for chest pain and dyspnea on exertion.   Respiratory: Positive for shortness of breath.    All other systems reviewed and are negative.     Objective:     Vitals:    08/06/21 1217 08/06/21 1227   BP: 138/72 132/68   BP Location: Left arm Right arm   Pulse: 56    Weight: 70.8 kg (156 lb)    Height: 160 cm (63\")      Body mass index is 27.63 kg/m².    Physical Exam  Lab Review:   Procedures    Lipid Panel    Lipid Panel 7/13/21   Total Cholesterol 137   Triglycerides 134   HDL Cholesterol 36 (A)   VLDL Cholesterol 24   LDL " Cholesterol  77   LDL/HDL Ratio 2.06   (A) Abnormal value       Comments are available for some flowsheets but are not being displayed.              Cardiac Procedures:      Assessment:       Diagnosis Plan   1. Precordial pain  Adult Transthoracic Echo Complete w/ Color, Spectral and Contrast if Necessary Per Protocol    Stress Test With Myocardial Perfusion One Day   2. Shortness of breath  Adult Transthoracic Echo Complete w/ Color, Spectral and Contrast if Necessary Per Protocol    Stress Test With Myocardial Perfusion One Day   3. Diaphoresis  Adult Transthoracic Echo Complete w/ Color, Spectral and Contrast if Necessary Per Protocol    Stress Test With Myocardial Perfusion One Day     Plan:       She has not had any recent cardiac testing, and is at fairly high risk for coronary artery disease.  Her risk factors include her age, diabetes, hypertension, and significant family history.  Again, 2 of her children have had coronary artery bypass grafting operations in their 50s.  She has had some leg cramping, although I do not feel this is a pulmonary embolism as this would be atypical in presentation.  I have asked her to hold her Crestor to see if this improves.    From a symptomatic standpoint, I am concerned about potential angina.  Her EKG does have anterior T wave changes, although similar to 2017.  She is already taking aspirin, carvedilol, and Crestor.  I have recommended proceeding with an echocardiogram to assess for any structural heart disease.  I also recommended a Lexiscan Myoview stress test.  She cannot exercise on the treadmill secondary to musculoskeletal issues.  I would have a low threshold for cardiac catheterization should the stress test be normal or her symptoms accelerate.  I did tell her to go to the emergency department for any severe symptoms.  Further plans will be made pending the results of the above testing.

## 2021-08-13 ENCOUNTER — HOSPITAL ENCOUNTER (OUTPATIENT)
Dept: CARDIOLOGY | Facility: HOSPITAL | Age: 86
Discharge: HOME | End: 2021-08-13

## 2021-08-13 VITALS
WEIGHT: 156.09 LBS | SYSTOLIC BLOOD PRESSURE: 140 MMHG | BODY MASS INDEX: 27.66 KG/M2 | DIASTOLIC BLOOD PRESSURE: 72 MMHG | HEART RATE: 66 BPM | HEIGHT: 63 IN

## 2021-08-13 DIAGNOSIS — R61 DIAPHORESIS: ICD-10-CM

## 2021-08-13 DIAGNOSIS — R06.02 SHORTNESS OF BREATH: ICD-10-CM

## 2021-08-13 DIAGNOSIS — R07.2 PRECORDIAL PAIN: ICD-10-CM

## 2021-08-13 LAB
BH CV NUCLEAR PRIOR STUDY: 3
BH CV REST NUCLEAR ISOTOPE DOSE: 11.5 MCI
BH CV STRESS BP STAGE 1: NORMAL
BH CV STRESS COMMENTS STAGE 1: NORMAL
BH CV STRESS DOSE REGADENOSON STAGE 1: 0.4
BH CV STRESS DURATION MIN STAGE 1: 0
BH CV STRESS DURATION SEC STAGE 1: 10
BH CV STRESS HR STAGE 1: 105
BH CV STRESS NUCLEAR ISOTOPE DOSE: 34.2 MCI
BH CV STRESS PROTOCOL 1: NORMAL
BH CV STRESS RECOVERY BP: NORMAL MMHG
BH CV STRESS RECOVERY HR: 76 BPM
BH CV STRESS STAGE 1: 1
LV EF NUC BP: 75 %
MAXIMAL PREDICTED HEART RATE: 134 BPM
PERCENT MAX PREDICTED HR: 78.36 %
STRESS BASELINE BP: NORMAL MMHG
STRESS BASELINE HR: 58 BPM
STRESS PERCENT HR: 92 %
STRESS POST EXERCISE DUR SEC: 10 SEC
STRESS POST O2 SAT PEAK: 76 %
STRESS POST PEAK BP: NORMAL MMHG
STRESS POST PEAK HR: 105 BPM
STRESS TARGET HR: 114 BPM

## 2021-08-13 PROCEDURE — 78452 HT MUSCLE IMAGE SPECT MULT: CPT | Performed by: INTERNAL MEDICINE

## 2021-08-13 PROCEDURE — 93018 CV STRESS TEST I&R ONLY: CPT | Performed by: INTERNAL MEDICINE

## 2021-08-13 PROCEDURE — 78452 HT MUSCLE IMAGE SPECT MULT: CPT

## 2021-08-13 PROCEDURE — 0 TECHNETIUM TETROFOSMIN KIT: Performed by: INTERNAL MEDICINE

## 2021-08-13 PROCEDURE — 93306 TTE W/DOPPLER COMPLETE: CPT | Performed by: INTERNAL MEDICINE

## 2021-08-13 PROCEDURE — 93017 CV STRESS TEST TRACING ONLY: CPT

## 2021-08-13 PROCEDURE — 93016 CV STRESS TEST SUPVJ ONLY: CPT | Performed by: INTERNAL MEDICINE

## 2021-08-13 PROCEDURE — 25010000002 REGADENOSON 0.4 MG/5ML SOLUTION: Performed by: INTERNAL MEDICINE

## 2021-08-13 PROCEDURE — 93306 TTE W/DOPPLER COMPLETE: CPT

## 2021-08-13 PROCEDURE — A9502 TC99M TETROFOSMIN: HCPCS | Performed by: INTERNAL MEDICINE

## 2021-08-13 RX ADMIN — TETROFOSMIN 1 DOSE: 1.38 INJECTION, POWDER, LYOPHILIZED, FOR SOLUTION INTRAVENOUS at 11:58

## 2021-08-13 RX ADMIN — REGADENOSON 0.4 MG: 0.08 INJECTION, SOLUTION INTRAVENOUS at 11:58

## 2021-08-13 RX ADMIN — TETROFOSMIN 1 DOSE: 1.38 INJECTION, POWDER, LYOPHILIZED, FOR SOLUTION INTRAVENOUS at 11:05

## 2021-08-13 NOTE — PROGRESS NOTES
Called and gave the patient the results. Moderate LVH, but no other significant pathology. Her stress test was also normal. If she continues to have worsening symptoms, she is going to call me.  FRANCINE

## 2021-08-16 LAB
AORTIC ARCH: 2 CM
ASCENDING AORTA: 3 CM
BH CV ECHO MEAS - ACS: 1.5 CM
BH CV ECHO MEAS - AO MAX PG (FULL): 0.97 MMHG
BH CV ECHO MEAS - AO MAX PG: 6.4 MMHG
BH CV ECHO MEAS - AO MEAN PG (FULL): 1 MMHG
BH CV ECHO MEAS - AO MEAN PG: 4 MMHG
BH CV ECHO MEAS - AO ROOT AREA (BSA CORRECTED): 1.6
BH CV ECHO MEAS - AO ROOT AREA: 6.2 CM^2
BH CV ECHO MEAS - AO ROOT DIAM: 2.8 CM
BH CV ECHO MEAS - AO V2 MAX: 126 CM/SEC
BH CV ECHO MEAS - AO V2 MEAN: 89.6 CM/SEC
BH CV ECHO MEAS - AO V2 VTI: 30.7 CM
BH CV ECHO MEAS - ASC AORTA: 3 CM
BH CV ECHO MEAS - AVA(I,A): 2.3 CM^2
BH CV ECHO MEAS - AVA(I,D): 2.3 CM^2
BH CV ECHO MEAS - AVA(V,A): 2.3 CM^2
BH CV ECHO MEAS - AVA(V,D): 2.3 CM^2
BH CV ECHO MEAS - BSA(HAYCOCK): 1.8 M^2
BH CV ECHO MEAS - BSA: 1.7 M^2
BH CV ECHO MEAS - BZI_BMI: 27.6 KILOGRAMS/M^2
BH CV ECHO MEAS - BZI_METRIC_HEIGHT: 160 CM
BH CV ECHO MEAS - BZI_METRIC_WEIGHT: 70.8 KG
BH CV ECHO MEAS - EDV(MOD-SP2): 47 ML
BH CV ECHO MEAS - EDV(MOD-SP4): 49 ML
BH CV ECHO MEAS - EDV(TEICH): 123.8 ML
BH CV ECHO MEAS - EF(CUBED): 70.4 %
BH CV ECHO MEAS - EF(MOD-BP): 67 %
BH CV ECHO MEAS - EF(MOD-SP2): 63.8 %
BH CV ECHO MEAS - EF(MOD-SP4): 71.4 %
BH CV ECHO MEAS - EF(TEICH): 61.7 %
BH CV ECHO MEAS - ESV(MOD-SP2): 17 ML
BH CV ECHO MEAS - ESV(MOD-SP4): 14 ML
BH CV ECHO MEAS - ESV(TEICH): 47.4 ML
BH CV ECHO MEAS - FS: 33.3 %
BH CV ECHO MEAS - IVS/LVPW: 0.92
BH CV ECHO MEAS - IVSD: 1.2 CM
BH CV ECHO MEAS - LAT PEAK E' VEL: 5.8 CM/SEC
BH CV ECHO MEAS - LV DIASTOLIC VOL/BSA (35-75): 28.2 ML/M^2
BH CV ECHO MEAS - LV MASS(C)D: 255.5 GRAMS
BH CV ECHO MEAS - LV MASS(C)DI: 146.8 GRAMS/M^2
BH CV ECHO MEAS - LV MAX PG: 5.4 MMHG
BH CV ECHO MEAS - LV MEAN PG: 3 MMHG
BH CV ECHO MEAS - LV SYSTOLIC VOL/BSA (12-30): 8 ML/M^2
BH CV ECHO MEAS - LV V1 MAX: 116 CM/SEC
BH CV ECHO MEAS - LV V1 MEAN: 76.6 CM/SEC
BH CV ECHO MEAS - LV V1 VTI: 27.3 CM
BH CV ECHO MEAS - LVIDD: 5.1 CM
BH CV ECHO MEAS - LVIDS: 3.4 CM
BH CV ECHO MEAS - LVLD AP2: 5.7 CM
BH CV ECHO MEAS - LVLD AP4: 5.4 CM
BH CV ECHO MEAS - LVLS AP2: 4.6 CM
BH CV ECHO MEAS - LVLS AP4: 4.3 CM
BH CV ECHO MEAS - LVOT AREA (M): 2.5 CM^2
BH CV ECHO MEAS - LVOT AREA: 2.5 CM^2
BH CV ECHO MEAS - LVOT DIAM: 1.8 CM
BH CV ECHO MEAS - LVPWD: 1.3 CM
BH CV ECHO MEAS - MED PEAK E' VEL: 6.9 CM/SEC
BH CV ECHO MEAS - MR MAX PG: 21.5 MMHG
BH CV ECHO MEAS - MR MAX VEL: 232 CM/SEC
BH CV ECHO MEAS - MV A DUR: 0.18 SEC
BH CV ECHO MEAS - MV A MAX VEL: 105 CM/SEC
BH CV ECHO MEAS - MV DEC SLOPE: 471 CM/SEC^2
BH CV ECHO MEAS - MV DEC TIME: 0.23 SEC
BH CV ECHO MEAS - MV E MAX VEL: 75.3 CM/SEC
BH CV ECHO MEAS - MV E/A: 0.72
BH CV ECHO MEAS - MV MAX PG: 5.1 MMHG
BH CV ECHO MEAS - MV MEAN PG: 2 MMHG
BH CV ECHO MEAS - MV P1/2T MAX VEL: 99.1 CM/SEC
BH CV ECHO MEAS - MV P1/2T: 61.6 MSEC
BH CV ECHO MEAS - MV V2 MAX: 113 CM/SEC
BH CV ECHO MEAS - MV V2 MEAN: 59.3 CM/SEC
BH CV ECHO MEAS - MV V2 VTI: 39.9 CM
BH CV ECHO MEAS - MVA P1/2T LCG: 2.2 CM^2
BH CV ECHO MEAS - MVA(P1/2T): 3.6 CM^2
BH CV ECHO MEAS - MVA(VTI): 1.7 CM^2
BH CV ECHO MEAS - PA ACC TIME: 0.15 SEC
BH CV ECHO MEAS - PA MAX PG (FULL): 2.6 MMHG
BH CV ECHO MEAS - PA MAX PG: 5.5 MMHG
BH CV ECHO MEAS - PA PR(ACCEL): 12.4 MMHG
BH CV ECHO MEAS - PA V2 MAX: 117 CM/SEC
BH CV ECHO MEAS - PULM A REVS DUR: 0.17 SEC
BH CV ECHO MEAS - PULM A REVS VEL: 24.7 CM/SEC
BH CV ECHO MEAS - PULM DIAS VEL: 41.8 CM/SEC
BH CV ECHO MEAS - PULM S/D: 1.7
BH CV ECHO MEAS - PULM SYS VEL: 70.1 CM/SEC
BH CV ECHO MEAS - PVA(V,A): 4.2 CM^2
BH CV ECHO MEAS - PVA(V,D): 4.2 CM^2
BH CV ECHO MEAS - QP/QS: 2.1
BH CV ECHO MEAS - RAP SYSTOLE: 3 MMHG
BH CV ECHO MEAS - RV MAX PG: 2.9 MMHG
BH CV ECHO MEAS - RV MEAN PG: 2 MMHG
BH CV ECHO MEAS - RV V1 MAX: 84.9 CM/SEC
BH CV ECHO MEAS - RV V1 MEAN: 58.9 CM/SEC
BH CV ECHO MEAS - RV V1 VTI: 24.9 CM
BH CV ECHO MEAS - RVOT AREA: 5.7 CM^2
BH CV ECHO MEAS - RVOT DIAM: 2.7 CM
BH CV ECHO MEAS - RVSP: 39.7 MMHG
BH CV ECHO MEAS - SI(AO): 108.6 ML/M^2
BH CV ECHO MEAS - SI(CUBED): 53.6 ML/M^2
BH CV ECHO MEAS - SI(LVOT): 39.9 ML/M^2
BH CV ECHO MEAS - SI(MOD-SP2): 17.2 ML/M^2
BH CV ECHO MEAS - SI(MOD-SP4): 20.1 ML/M^2
BH CV ECHO MEAS - SI(TEICH): 43.9 ML/M^2
BH CV ECHO MEAS - SV(AO): 189 ML
BH CV ECHO MEAS - SV(CUBED): 93.3 ML
BH CV ECHO MEAS - SV(LVOT): 69.5 ML
BH CV ECHO MEAS - SV(MOD-SP2): 30 ML
BH CV ECHO MEAS - SV(MOD-SP4): 35 ML
BH CV ECHO MEAS - SV(RVOT): 142.6 ML
BH CV ECHO MEAS - SV(TEICH): 76.4 ML
BH CV ECHO MEAS - TAPSE (>1.6): 2.1 CM
BH CV ECHO MEAS - TR MAX VEL: 303 CM/SEC
BH CV ECHO MEASUREMENTS AVERAGE E/E' RATIO: 11.86
BH CV XLRA - RV BASE: 2.8 CM
BH CV XLRA - RV LENGTH: 5.9 CM
BH CV XLRA - RV MID: 2.8 CM
BH CV XLRA - TDI S': 10.3 CM/SEC
LEFT ATRIUM VOLUME INDEX: 38 ML/M2
MAXIMAL PREDICTED HEART RATE: 134 BPM
SINUS: 2.7 CM
STJ: 2.8 CM
STRESS TARGET HR: 114 BPM

## 2021-09-27 NOTE — PROGRESS NOTES
"Chief Complaint   Patient presents with   • GI Problem     Gastroparesis & GERD         History of Present Illness  Patient for consultation.  She has a history of GERD and gastroparesis and was previously followed by Dr. Lo.  Prior gastric emptying study showed residual activity of 62% at 4 hours.  She had an EGD in June 2020 showing mild gastritis and a hiatal hernia.  Colonoscopy in 2018 showed sigmoid and descending colon diverticulosis.    She reports that through the night she experiences burning in her esophagus frequently. Symptoms do not occur during the day. At times she will have pill dysphagia in the morning. At times she will choke. She eats quickly. She denies any regurgitation. She takes omeprazole 40 mg daily in the evening with her dinner. Last EGD she recalls was performed about 1.5 years ago. She does not sleep with the HOB elevated.     Result Review :       ENDOSCOPY, INT (06/23/2020)  SCANNED - COLONOSCOPY (10/16/2018)  NM Gastric Emptying (07/16/2020 12:37)  Progress Notes by Caroline Weiner MD (07/20/2021 10:45)    Vital Signs:   /80   Pulse 60   Temp 97.3 °F (36.3 °C) (Temporal)   Resp 18   Ht 160 cm (63\")   Wt 69.9 kg (154 lb)   SpO2 96%   BMI 27.28 kg/m²     Body mass index is 27.28 kg/m².     Physical Exam  Vitals reviewed.   Constitutional:       Appearance: Normal appearance.   Abdominal:      General: Bowel sounds are normal. There is no distension.      Palpations: Abdomen is soft. Abdomen is not rigid. There is no mass or pulsatile mass.      Tenderness: There is no abdominal tenderness. There is no guarding or rebound.       Assessment and Plan    Diagnoses and all orders for this visit:    1. Gastroesophageal reflux disease, unspecified whether esophagitis present (Primary)    2. Hiatal hernia    3. Gastroparesis    4. Diverticulosis of colon    5. Oropharyngeal dysphagia       Documentation by Carolin FLOWERS acting as a scribe in the following sections (HPI, " Plan) for the undersigned provider.     BRIEF SUMMARY  Patient for consultation.  She is having chronic and worsening GERD and has had new complaints of dysphagia.  We will proceed with an EGD for further evaluation.  Also advised her regarding antireflux measures including elevating head of her bed and taking her Prilosec about 1 to 2 hours before her evening meal.    I have reviewed and confirmed the accuracy of the HPI and Assessment and Plan as documented by the APRN LIONEL Foster        Follow Up   No follow-ups on file.    Patient Instructions   1. Continue omeprazole 40 mg each evening. We recommend that you take this medication 1.5-2 hours before you last meal of the day.     2. For gastroparesis, continue to eat smaller more frequent meals and avoid foods that are high in fat and fiber.    3. For further evaluation of GERD and dysphagia we will schedule an EGD.     4. We recommend that you elevate the head of your bed up about 2 inches with either wooden blocks or bed risers. We also recommend that you try to sleep on your left side.     5. We recommend avoiding citrus fruits and juices as these can worsen GERD.

## 2021-09-29 ENCOUNTER — OFFICE VISIT (OUTPATIENT)
Dept: GASTROENTEROLOGY | Facility: CLINIC | Age: 86
End: 2021-09-29

## 2021-09-29 ENCOUNTER — PREP FOR SURGERY (OUTPATIENT)
Dept: SURGERY | Facility: SURGERY CENTER | Age: 86
End: 2021-09-29

## 2021-09-29 VITALS
SYSTOLIC BLOOD PRESSURE: 140 MMHG | OXYGEN SATURATION: 96 % | HEART RATE: 60 BPM | BODY MASS INDEX: 27.29 KG/M2 | TEMPERATURE: 97.3 F | RESPIRATION RATE: 18 BRPM | DIASTOLIC BLOOD PRESSURE: 80 MMHG | HEIGHT: 63 IN | WEIGHT: 154 LBS

## 2021-09-29 DIAGNOSIS — K31.84 GASTROPARESIS: ICD-10-CM

## 2021-09-29 DIAGNOSIS — R13.10 DYSPHAGIA, UNSPECIFIED TYPE: ICD-10-CM

## 2021-09-29 DIAGNOSIS — K44.9 HIATAL HERNIA: ICD-10-CM

## 2021-09-29 DIAGNOSIS — K21.9 GASTROESOPHAGEAL REFLUX DISEASE, UNSPECIFIED WHETHER ESOPHAGITIS PRESENT: Primary | ICD-10-CM

## 2021-09-29 DIAGNOSIS — R13.12 OROPHARYNGEAL DYSPHAGIA: ICD-10-CM

## 2021-09-29 DIAGNOSIS — K57.30 DIVERTICULOSIS OF COLON: ICD-10-CM

## 2021-09-29 PROCEDURE — 99204 OFFICE O/P NEW MOD 45 MIN: CPT | Performed by: INTERNAL MEDICINE

## 2021-09-29 RX ORDER — SODIUM CHLORIDE, SODIUM LACTATE, POTASSIUM CHLORIDE, CALCIUM CHLORIDE 600; 310; 30; 20 MG/100ML; MG/100ML; MG/100ML; MG/100ML
30 INJECTION, SOLUTION INTRAVENOUS CONTINUOUS PRN
Status: CANCELLED | OUTPATIENT
Start: 2021-09-29

## 2021-09-29 RX ORDER — SODIUM CHLORIDE 0.9 % (FLUSH) 0.9 %
10 SYRINGE (ML) INJECTION AS NEEDED
Status: CANCELLED | OUTPATIENT
Start: 2021-09-29

## 2021-09-29 RX ORDER — SODIUM CHLORIDE 0.9 % (FLUSH) 0.9 %
3 SYRINGE (ML) INJECTION EVERY 12 HOURS SCHEDULED
Status: CANCELLED | OUTPATIENT
Start: 2021-09-29

## 2021-09-29 NOTE — PATIENT INSTRUCTIONS
1. Continue omeprazole 40 mg each evening. We recommend that you take this medication 1.5-2 hours before you last meal of the day.     2. For gastroparesis, continue to eat smaller more frequent meals and avoid foods that are high in fat and fiber.    3. For further evaluation of GERD and dysphagia we will schedule an EGD.     4. We recommend that you elevate the head of your bed up about 2 inches with either wooden blocks or bed risers. We also recommend that you try to sleep on your left side.     5. We recommend avoiding citrus fruits and juices as these can worsen GERD.

## 2021-10-07 ENCOUNTER — FLU SHOT (OUTPATIENT)
Dept: FAMILY MEDICINE CLINIC | Facility: CLINIC | Age: 86
End: 2021-10-07

## 2021-10-07 DIAGNOSIS — Z23 NEED FOR VACCINATION: Primary | ICD-10-CM

## 2021-10-07 PROCEDURE — 90662 IIV NO PRSV INCREASED AG IM: CPT | Performed by: INTERNAL MEDICINE

## 2021-10-07 PROCEDURE — G0008 ADMIN INFLUENZA VIRUS VAC: HCPCS | Performed by: INTERNAL MEDICINE

## 2021-10-13 ENCOUNTER — LAB REQUISITION (OUTPATIENT)
Dept: LAB | Facility: HOSPITAL | Age: 86
End: 2021-10-13

## 2021-10-13 DIAGNOSIS — Z00.00 ENCOUNTER FOR GENERAL ADULT MEDICAL EXAMINATION WITHOUT ABNORMAL FINDINGS: ICD-10-CM

## 2021-10-13 LAB — SARS-COV-2 ORF1AB RESP QL NAA+PROBE: NOT DETECTED

## 2021-10-13 PROCEDURE — U0004 COV-19 TEST NON-CDC HGH THRU: HCPCS | Performed by: INTERNAL MEDICINE

## 2021-10-15 ENCOUNTER — OUTSIDE FACILITY SERVICE (OUTPATIENT)
Dept: GASTROENTEROLOGY | Facility: CLINIC | Age: 86
End: 2021-10-15

## 2021-10-15 PROCEDURE — 43249 ESOPH EGD DILATION <30 MM: CPT | Performed by: INTERNAL MEDICINE

## 2021-11-03 NOTE — PROGRESS NOTES
"Chief Complaint   Patient presents with   • GI Problem         History of Present Illness  Patient is a 86-year-old female who presents today for follow-up.  She was seen in the office September 2021 for GERD, and dysphagia.  EGD was performed September 2020 with a 3 cm hiatal hernia, Schatzki's ring that was dilated, otherwise normal.    Patient presents today for follow-up.  She has had resolution of dysphagia with recent dilation.    She reports a longstanding history of abdominal discomfort located to her upper abdomen and discomfort in her chest.  She reports this has been present for years.  She describes it as feeling uncomfortable.  She reports some nausea but denies any vomiting.    Denies any bowel complaints.    She had a gastric emptying study July 2020 that showed delayed emptying with 62% retained at 4 hours.         Result Review :       ENDOSCOPY, INT (10/15/2021)   Office Visit with Dhruv Riley MD (09/29/2021)   NM Gastric Emptying (07/16/2020 12:37)      Vital Signs:   /84   Pulse 57   Temp 96.3 °F (35.7 °C)   Ht 160 cm (63\")   Wt 69.9 kg (154 lb)   SpO2 97%   BMI 27.28 kg/m²     Body mass index is 27.28 kg/m².     Physical Exam  Vitals reviewed.   Constitutional:       General: She is not in acute distress.     Appearance: She is well-developed.   HENT:      Head: Normocephalic and atraumatic.   Pulmonary:      Effort: Pulmonary effort is normal. No respiratory distress.   Abdominal:      General: Bowel sounds are normal. There is distension.      Palpations: Abdomen is soft.      Tenderness: There is abdominal tenderness in the epigastric area.   Skin:     General: Skin is dry.      Coloration: Skin is not pale.   Neurological:      Mental Status: She is alert and oriented to person, place, and time.   Psychiatric:         Thought Content: Thought content normal.           Assessment and Plan    Diagnoses and all orders for this visit:    1. Gastroparesis (Primary)    2. " Gastroesophageal reflux disease without esophagitis    3. Hiatal hernia    4. Lower esophageal ring (Schatzki)         Discussion  Patient presents today for follow-up after EGD.  EGD findings reviewed at today's office visit.  Dysphagia has resolved after dilation of Schatzki's ring.  If dysphagia recurs, we can pursue repeat dilation in the future if needed.  Discussed with patient today suspect symptoms are secondary to gastroparesis.  Reviewed dietary modifications to help with this and recommended trial of gingerroot capsules.  If this does not help and symptoms persist, we could consider trial of prokinetic.          Follow Up   Return in about 3 months (around 2/9/2022).    Patient Instructions   For gastroparesis, follow a gastroparesis diet. Eat smaller more frequent meals as opposed to large meals and foods lower in fat and fiber.    For gastroparesis, recommend trial of lillie root capsules available over-the-counter. Lillie root 500 mg capsules, take 1 to 2 capsules 3 times daily before meals, max 6 capsules per day.    For trouble swallowing, improved after recent dilation of Schatzki's ring, please notify the office if this recurs and we can repeat dilation if needed.    For GERD, continue omeprazole daily as prescribed.

## 2021-11-04 DIAGNOSIS — E78.5 HYPERLIPIDEMIA: ICD-10-CM

## 2021-11-05 RX ORDER — ROSUVASTATIN CALCIUM 10 MG/1
TABLET, COATED ORAL
Qty: 90 TABLET | Refills: 1 | Status: SHIPPED | OUTPATIENT
Start: 2021-11-05 | End: 2022-01-21 | Stop reason: SDUPTHER

## 2021-11-05 NOTE — TELEPHONE ENCOUNTER
Rx Refill Note  Requested Prescriptions     Pending Prescriptions Disp Refills   • rosuvastatin (CRESTOR) 10 MG tablet [Pharmacy Med Name: ROSUVASTATIN CALCIUM 10 MG TAB] 90 tablet 1     Sig: TAKE 1 TABLET BY MOUTH EVERY DAY      Last office visit with prescribing clinician: 7/20/2021      Next office visit with prescribing clinician: 1/21/2022            Darlene Blake MA  11/05/21, 12:34 EDT

## 2021-11-08 NOTE — TELEPHONE ENCOUNTER
Rx Refill Note  Requested Prescriptions     Pending Prescriptions Disp Refills   • losartan (COZAAR) 50 MG tablet [Pharmacy Med Name: LOSARTAN POTASSIUM 50 MG TAB] 90 tablet 1     Sig: TAKE 1 TABLET BY MOUTH EVERY DAY      Last office visit with prescribing clinician: 7/20/2021      Next office visit with prescribing clinician: 1/21/2022            Pina Flores MA  11/08/21, 08:25 EST

## 2021-11-09 ENCOUNTER — OFFICE VISIT (OUTPATIENT)
Dept: GASTROENTEROLOGY | Facility: CLINIC | Age: 86
End: 2021-11-09

## 2021-11-09 VITALS
SYSTOLIC BLOOD PRESSURE: 160 MMHG | HEART RATE: 57 BPM | BODY MASS INDEX: 27.29 KG/M2 | DIASTOLIC BLOOD PRESSURE: 84 MMHG | TEMPERATURE: 96.3 F | HEIGHT: 63 IN | OXYGEN SATURATION: 97 % | WEIGHT: 154 LBS

## 2021-11-09 DIAGNOSIS — K31.84 GASTROPARESIS: Primary | ICD-10-CM

## 2021-11-09 DIAGNOSIS — K21.9 GASTROESOPHAGEAL REFLUX DISEASE WITHOUT ESOPHAGITIS: ICD-10-CM

## 2021-11-09 DIAGNOSIS — K22.2 LOWER ESOPHAGEAL RING (SCHATZKI): ICD-10-CM

## 2021-11-09 DIAGNOSIS — K44.9 HIATAL HERNIA: ICD-10-CM

## 2021-11-09 PROCEDURE — 99214 OFFICE O/P EST MOD 30 MIN: CPT | Performed by: NURSE PRACTITIONER

## 2021-11-09 RX ORDER — LOSARTAN POTASSIUM 50 MG/1
TABLET ORAL
Qty: 90 TABLET | Refills: 1 | Status: SHIPPED | OUTPATIENT
Start: 2021-11-09 | End: 2022-01-21 | Stop reason: SDUPTHER

## 2021-11-09 NOTE — PATIENT INSTRUCTIONS
For gastroparesis, follow a gastroparesis diet. Eat smaller more frequent meals as opposed to large meals and foods lower in fat and fiber.    For gastroparesis, recommend trial of lillie root capsules available over-the-counter. Lillie root 500 mg capsules, take 1 to 2 capsules 3 times daily before meals, max 6 capsules per day.    For trouble swallowing, improved after recent dilation of Schatzki's ring, please notify the office if this recurs and we can repeat dilation if needed.    For GERD, continue omeprazole daily as prescribed.

## 2021-11-11 ENCOUNTER — OFFICE VISIT (OUTPATIENT)
Dept: SLEEP MEDICINE | Facility: HOSPITAL | Age: 86
End: 2021-11-11

## 2021-11-11 VITALS
OXYGEN SATURATION: 97 % | WEIGHT: 152 LBS | HEIGHT: 63 IN | SYSTOLIC BLOOD PRESSURE: 155 MMHG | BODY MASS INDEX: 26.93 KG/M2 | HEART RATE: 60 BPM | DIASTOLIC BLOOD PRESSURE: 81 MMHG

## 2021-11-11 DIAGNOSIS — G47.33 OSA ON CPAP: Primary | ICD-10-CM

## 2021-11-11 DIAGNOSIS — Z99.89 OSA ON CPAP: Primary | ICD-10-CM

## 2021-11-11 DIAGNOSIS — J30.89 SEASONAL ALLERGIC RHINITIS DUE TO OTHER ALLERGIC TRIGGER: ICD-10-CM

## 2021-11-11 DIAGNOSIS — I10 ESSENTIAL HYPERTENSION: ICD-10-CM

## 2021-11-11 PROCEDURE — G0463 HOSPITAL OUTPT CLINIC VISIT: HCPCS

## 2021-11-11 PROCEDURE — 99213 OFFICE O/P EST LOW 20 MIN: CPT | Performed by: INTERNAL MEDICINE

## 2021-11-11 NOTE — PROGRESS NOTES
"  [facility]  [address] [facility]  3rd [place] [address]  Phone   Fax       SLEEP CLINIC FOLLOW UP PROGRESS NOTE.    Donna M Yeoman  1935  86 y.o.  female      PCP: Caroline Weiner MD      Date of visit: 11/11/2021    Chief Complaint   Patient presents with   • Sleep Apnea       HPI:  This is a 86 y.o. years old patient who has a history of obstructive sleep apnea is here for  the annual compliance follow-up.  Sleep apnea is moderate in severity with a AHI of 15/hr. Patient is using positive airway pressure therapy with CPAP 11 and the symptoms of snoring, non-restorative sleep and daytime excessive sleepiness have improved significantly on the therapy. Normally goes to bed at 10 PM and wakes up at 8 AM.  The patient wakes up 3 time(s) during the night and has no problem going back to sleep.  Feels refreshed after waking up.  Patient also denies headaches patient complains of itchy nose    Medications and allergies are reviewed by me and documented in the encounter.     SOCIAL ( habits pertaining to sleep medicine)  History tobacco use:No   History of alcohol use: 0 per week  Caffeine use: 1     REVIEW OF SYSTEMS:   [place] Sleepiness Scale :Total score: 2   Nasal congestion:Yes   Dry mouth/nose:No   Post nasal drip; No   Acid reflux/Heartburn:No   Abd bloating:No   Morning headache:No   Anxiety:No   Depression:No    PHYSICAL EXAMINATION:  CONSTITUTIONAL:  Vitals:    11/11/21 0900   BP: 155/81   Pulse: 60   SpO2: 97%   Weight: 68.9 kg (152 lb)   Height: 160 cm (62.99\")    Body mass index is 26.93 kg/m².   NOSE: nasal passages are clear, no nasal polyps, septum in the midline.  THROAT: throat is clear, oral airway Mallampati class 3  RESP SYSTEM: Breath sounds are normal, no wheezes or crackles  CARDIOVASULAR: Heart rate is regular without murmur. No edema      Data reviewed:  The Smart card downloaded on 11/11/2021 has been reviewed independently by me for " compliance and discussed the data with the patient.   Compliance; 100%  More than 4 hr use, 100%  Average use of the device 9 hours and 33-minute per night  Residual AHI: 2.1 /hr (goal < 5.0 /hr)  Mask type: Nasal mask  DME: [facility]      ASSESSMENT AND PLAN:  · Obstructive sleep apnea ( G 47.33).  The symptoms of sleep apnea have improved with the device and the treatment.  Patient's compliance with the device is excellent for treatment of sleep apnea.  I have independently reviewed the smart card down load and discussed with the patient the download data and encouarged the patient to continue to use the device.The residual AHI is acceptable. The device is benefiting the patient and the device is medically necessary.  Without proper control of sleep apnea and good compliance there is a increased risk for hypertension, diabetes mellitus and nonrestorative sleep with hypersomnia which can increase risk for motor vehicle accidents.  Untreated sleep apnea is also a risk factor for development of atrial fibrillation, pulmonary hypertension and stroke. The patient is also instructed to get the supplies from the [facility] and and change them on a regular basis.  A prescription for supplies has been sent to the [facility].  I have also discussed the good sleep hygiene habits and adequate amount of sleep needed for good health.  · Allergic rhinitis, patient is advised to use Nasonex or Flonase nasal spray  · Return in about 1 year (around 11/11/2022) for Annual visit with smartcard download. . Patient's questions were answered.        Emmy Henderson MD  Sleep Medicine.  Medical Director, [facility]  11/11/2021 ,

## 2021-12-23 RX ORDER — CARVEDILOL 6.25 MG/1
TABLET ORAL
Qty: 180 TABLET | Refills: 1 | Status: SHIPPED | OUTPATIENT
Start: 2021-12-23 | End: 2022-01-21 | Stop reason: SDUPTHER

## 2022-01-05 ENCOUNTER — TELEPHONE (OUTPATIENT)
Dept: FAMILY MEDICINE CLINIC | Facility: CLINIC | Age: 87
End: 2022-01-05

## 2022-01-05 RX ORDER — GLIMEPIRIDE 2 MG/1
2 TABLET ORAL
Qty: 90 TABLET | Refills: 1 | Status: SHIPPED | OUTPATIENT
Start: 2022-01-05 | End: 2022-01-21 | Stop reason: SDUPTHER

## 2022-01-05 NOTE — TELEPHONE ENCOUNTER
Rx Refill Note  Requested Prescriptions     Pending Prescriptions Disp Refills   • glimepiride (AMARYL) 2 MG tablet 90 tablet 1     Sig: Take 1 tablet by mouth Every Morning Before Breakfast.      Last office visit with prescribing clinician: 7/20/2021      Next office visit with prescribing clinician: 1/21/2022            Pina Flores MA  01/05/22, 15:01 EST

## 2022-01-12 ENCOUNTER — TELEPHONE (OUTPATIENT)
Dept: FAMILY MEDICINE CLINIC | Facility: CLINIC | Age: 87
End: 2022-01-12

## 2022-01-13 DIAGNOSIS — E03.9 HYPOTHYROIDISM, UNSPECIFIED TYPE: ICD-10-CM

## 2022-01-13 DIAGNOSIS — E78.5 HYPERLIPIDEMIA, UNSPECIFIED HYPERLIPIDEMIA TYPE: Primary | ICD-10-CM

## 2022-01-13 DIAGNOSIS — E11.9 TYPE 2 DIABETES MELLITUS WITHOUT COMPLICATION, WITHOUT LONG-TERM CURRENT USE OF INSULIN: ICD-10-CM

## 2022-01-13 DIAGNOSIS — E55.9 VITAMIN D DEFICIENCY: ICD-10-CM

## 2022-01-15 LAB
25(OH)D3+25(OH)D2 SERPL-MCNC: 30.9 NG/ML (ref 30–100)
ALBUMIN SERPL-MCNC: 3.9 G/DL (ref 3.6–4.6)
ALBUMIN/GLOB SERPL: 1.3 {RATIO} (ref 1.2–2.2)
ALP SERPL-CCNC: 79 IU/L (ref 44–121)
ALT SERPL-CCNC: 10 IU/L (ref 0–32)
AST SERPL-CCNC: 22 IU/L (ref 0–40)
BASOPHILS # BLD AUTO: 0 X10E3/UL (ref 0–0.2)
BASOPHILS NFR BLD AUTO: 1 %
BILIRUB SERPL-MCNC: 0.7 MG/DL (ref 0–1.2)
BUN SERPL-MCNC: 15 MG/DL (ref 8–27)
BUN/CREAT SERPL: 17 (ref 12–28)
CALCIUM SERPL-MCNC: 9.1 MG/DL (ref 8.7–10.3)
CHLORIDE SERPL-SCNC: 109 MMOL/L (ref 96–106)
CHOLEST SERPL-MCNC: 144 MG/DL (ref 100–199)
CO2 SERPL-SCNC: 22 MMOL/L (ref 20–29)
CREAT SERPL-MCNC: 0.87 MG/DL (ref 0.57–1)
EOSINOPHIL # BLD AUTO: 0.2 X10E3/UL (ref 0–0.4)
EOSINOPHIL NFR BLD AUTO: 4 %
ERYTHROCYTE [DISTWIDTH] IN BLOOD BY AUTOMATED COUNT: 13.8 % (ref 11.7–15.4)
GLOBULIN SER CALC-MCNC: 2.9 G/DL (ref 1.5–4.5)
GLUCOSE SERPL-MCNC: 109 MG/DL (ref 65–99)
HBA1C MFR BLD: 6.1 % (ref 4.8–5.6)
HCT VFR BLD AUTO: 37.7 % (ref 34–46.6)
HDLC SERPL-MCNC: 37 MG/DL
HGB BLD-MCNC: 13 G/DL (ref 11.1–15.9)
IMM GRANULOCYTES # BLD AUTO: 0 X10E3/UL (ref 0–0.1)
IMM GRANULOCYTES NFR BLD AUTO: 0 %
LDLC SERPL CALC-MCNC: 83 MG/DL (ref 0–99)
LDLC/HDLC SERPL: 2.2 RATIO (ref 0–3.2)
LYMPHOCYTES # BLD AUTO: 1.8 X10E3/UL (ref 0.7–3.1)
LYMPHOCYTES NFR BLD AUTO: 31 %
MCH RBC QN AUTO: 31.2 PG (ref 26.6–33)
MCHC RBC AUTO-ENTMCNC: 34.5 G/DL (ref 31.5–35.7)
MCV RBC AUTO: 90 FL (ref 79–97)
MONOCYTES # BLD AUTO: 0.6 X10E3/UL (ref 0.1–0.9)
MONOCYTES NFR BLD AUTO: 10 %
NEUTROPHILS # BLD AUTO: 3.3 X10E3/UL (ref 1.4–7)
NEUTROPHILS NFR BLD AUTO: 54 %
PLATELET # BLD AUTO: 186 X10E3/UL (ref 150–450)
POTASSIUM SERPL-SCNC: 3.9 MMOL/L (ref 3.5–5.2)
PROT SERPL-MCNC: 6.8 G/DL (ref 6–8.5)
RBC # BLD AUTO: 4.17 X10E6/UL (ref 3.77–5.28)
SODIUM SERPL-SCNC: 144 MMOL/L (ref 134–144)
T4 FREE SERPL-MCNC: 1.19 NG/DL (ref 0.82–1.77)
TRIGL SERPL-MCNC: 134 MG/DL (ref 0–149)
TSH SERPL DL<=0.005 MIU/L-ACNC: 1.62 UIU/ML (ref 0.45–4.5)
VLDLC SERPL CALC-MCNC: 24 MG/DL (ref 5–40)
WBC # BLD AUTO: 5.9 X10E3/UL (ref 3.4–10.8)

## 2022-01-21 ENCOUNTER — OFFICE VISIT (OUTPATIENT)
Dept: FAMILY MEDICINE CLINIC | Facility: CLINIC | Age: 87
End: 2022-01-21

## 2022-01-21 VITALS
SYSTOLIC BLOOD PRESSURE: 130 MMHG | OXYGEN SATURATION: 97 % | WEIGHT: 151.9 LBS | DIASTOLIC BLOOD PRESSURE: 70 MMHG | TEMPERATURE: 96.9 F | RESPIRATION RATE: 16 BRPM | HEIGHT: 63 IN | BODY MASS INDEX: 26.91 KG/M2 | HEART RATE: 79 BPM

## 2022-01-21 DIAGNOSIS — Z23 ENCOUNTER FOR IMMUNIZATION: Primary | ICD-10-CM

## 2022-01-21 DIAGNOSIS — E03.9 HYPOTHYROIDISM, UNSPECIFIED TYPE: ICD-10-CM

## 2022-01-21 DIAGNOSIS — Z12.31 SCREENING MAMMOGRAM, ENCOUNTER FOR: ICD-10-CM

## 2022-01-21 DIAGNOSIS — E78.5 HYPERLIPIDEMIA: ICD-10-CM

## 2022-01-21 DIAGNOSIS — E55.9 VITAMIN D DEFICIENCY: ICD-10-CM

## 2022-01-21 DIAGNOSIS — I10 ESSENTIAL HYPERTENSION: ICD-10-CM

## 2022-01-21 DIAGNOSIS — E11.9 TYPE 2 DIABETES MELLITUS WITHOUT COMPLICATION, WITHOUT LONG-TERM CURRENT USE OF INSULIN: ICD-10-CM

## 2022-01-21 PROCEDURE — 90732 PPSV23 VACC 2 YRS+ SUBQ/IM: CPT | Performed by: INTERNAL MEDICINE

## 2022-01-21 PROCEDURE — 1160F RVW MEDS BY RX/DR IN RCRD: CPT | Performed by: INTERNAL MEDICINE

## 2022-01-21 PROCEDURE — G0009 ADMIN PNEUMOCOCCAL VACCINE: HCPCS | Performed by: INTERNAL MEDICINE

## 2022-01-21 PROCEDURE — 1170F FXNL STATUS ASSESSED: CPT | Performed by: INTERNAL MEDICINE

## 2022-01-21 PROCEDURE — 96160 PT-FOCUSED HLTH RISK ASSMT: CPT | Performed by: INTERNAL MEDICINE

## 2022-01-21 PROCEDURE — 99213 OFFICE O/P EST LOW 20 MIN: CPT | Performed by: INTERNAL MEDICINE

## 2022-01-21 PROCEDURE — G0439 PPPS, SUBSEQ VISIT: HCPCS | Performed by: INTERNAL MEDICINE

## 2022-01-21 RX ORDER — ROSUVASTATIN CALCIUM 10 MG/1
10 TABLET, COATED ORAL DAILY
Qty: 90 TABLET | Refills: 1 | Status: SHIPPED | OUTPATIENT
Start: 2022-01-21 | End: 2022-07-29 | Stop reason: SDUPTHER

## 2022-01-21 RX ORDER — LEVOTHYROXINE SODIUM 0.07 MG/1
75 TABLET ORAL DAILY
Qty: 90 TABLET | Refills: 1 | Status: SHIPPED | OUTPATIENT
Start: 2022-01-21 | End: 2022-05-19

## 2022-01-21 RX ORDER — GLIMEPIRIDE 2 MG/1
2 TABLET ORAL
Qty: 90 TABLET | Refills: 1 | Status: SHIPPED | OUTPATIENT
Start: 2022-01-21 | End: 2022-03-18 | Stop reason: SDUPTHER

## 2022-01-21 RX ORDER — AZELASTINE 1 MG/ML
2 SPRAY, METERED NASAL 2 TIMES DAILY
Qty: 30 ML | Refills: 12 | Status: SHIPPED | OUTPATIENT
Start: 2022-01-21 | End: 2022-08-29

## 2022-01-21 RX ORDER — LOSARTAN POTASSIUM 50 MG/1
50 TABLET ORAL DAILY
Qty: 90 TABLET | Refills: 1 | Status: SHIPPED | OUTPATIENT
Start: 2022-01-21 | End: 2022-05-19

## 2022-01-21 RX ORDER — ALLOPURINOL 100 MG/1
100 TABLET ORAL DAILY
Qty: 90 TABLET | Refills: 1 | Status: SHIPPED | OUTPATIENT
Start: 2022-01-21 | End: 2022-05-19

## 2022-01-21 RX ORDER — CARVEDILOL 6.25 MG/1
6.25 TABLET ORAL 2 TIMES DAILY WITH MEALS
Qty: 180 TABLET | Refills: 1 | Status: SHIPPED | OUTPATIENT
Start: 2022-01-21 | End: 2022-03-18 | Stop reason: SDUPTHER

## 2022-01-21 NOTE — PROGRESS NOTES
"Chief Complaint  Diabetes and Medicare Wellness-Initial Visit    Subjective          Donna M Yeoman presents to [facility]  History of Present Illness  C/o nasal dripping for years.  Pours out and has to wipe her nose a lot.  No infection.  Needs all her meds sent to [facility]  FBS running 90 at the lowest and normal around 110.  Lost some weight.  Tries to walk in house and uses steps.   Sees eye doctor a few times year.  12/2021 was last time.    Objective   Vital Signs:   /70   Pulse 79   Temp 96.9 °F (36.1 °C) (Temporal)   Resp 16   Ht 160 cm (62.99\")   Wt 68.9 kg (151 lb 14.4 oz)   SpO2 97%   BMI 26.92 kg/m²     Physical Exam  Vitals and nursing note reviewed.   Constitutional:       Appearance: Normal appearance. She is well-developed.   HENT:      Head: Normocephalic and atraumatic.      Right Ear: External ear normal.      Left Ear: External ear normal.   Eyes:      Extraocular Movements: Extraocular movements intact.      Conjunctiva/sclera: Conjunctivae normal.   Neck:      Vascular: No carotid bruit.   Cardiovascular:      Rate and Rhythm: Normal rate and regular rhythm.      Heart sounds: Normal heart sounds.      Comments: No bruits  Pulmonary:      Effort: Pulmonary effort is normal. No respiratory distress.      Breath sounds: Normal breath sounds. No wheezing or rales.   Abdominal:      General: Bowel sounds are normal. There is no distension.      Palpations: Abdomen is soft. There is no mass.      Tenderness: There is no abdominal tenderness.   Musculoskeletal:      Cervical back: Neck supple.   Lymphadenopathy:      Cervical: No cervical adenopathy.   Skin:     General: Skin is warm.   Neurological:      General: No focal deficit present.      Mental Status: She is alert and oriented to person, place, and time.   Psychiatric:         Mood and Affect: Mood normal.         Behavior: Behavior normal.         Thought Content: Thought content normal.         " Judgment: Judgment normal.        Result Review :                Hemoglobin A1c (01/14/2022 09:31)  Vitamin D 25 Hydroxy (01/14/2022 09:31)  T4, Free (01/14/2022 09:31)  TSH (01/14/2022 09:31)  Lipid Panel With LDL / HDL Ratio (01/14/2022 09:31)  Comprehensive Metabolic Panel (01/14/2022 09:31)  CBC & Differential (01/14/2022 09:31)    Assessment and Plan    Diagnoses and all orders for this visit:    1. Encounter for immunization (Primary)  -     Pneumococcal Polysaccharide Vaccine 23-Valent (PPSV23) Greater Than or Equal To 3yo Subcutaneous / IM    2. Screening mammogram, encounter for  -     Mammo Screening Bilateral With CAD; Future    3. Hyperlipidemia  -     rosuvastatin (CRESTOR) 10 MG tablet; Take 1 tablet by mouth Daily.  Dispense: 90 tablet; Refill: 1    4. Essential hypertension    5. Type 2 diabetes mellitus without complication, without long-term current use of insulin (HCC)    6. Hypothyroidism, unspecified type    7. Vitamin D deficiency     Other orders  -     azelastine (ASTELIN) 0.1 % nasal spray; 2 sprays into the nostril(s) as directed by provider 2 (Two) Times a Day. Use in each nostril as directed  Dispense: 30 mL; Refill: 12  -     allopurinol (ZYLOPRIM) 100 MG tablet; Take 1 tablet by mouth Daily.  Dispense: 90 tablet; Refill: 1  -     carvedilol (COREG) 6.25 MG tablet; Take 1 tablet by mouth 2 (Two) Times a Day With Meals.  Dispense: 180 tablet; Refill: 1  -     levothyroxine (SYNTHROID, LEVOTHROID) 75 MCG tablet; Take 1 tablet by mouth Daily.  Dispense: 90 tablet; Refill: 1  -     losartan (COZAAR) 50 MG tablet; Take 1 tablet by mouth Daily.  Dispense: 90 tablet; Refill: 1  -     glimepiride (AMARYL) 2 MG tablet; Take 1 tablet by mouth Every Morning Before Breakfast.  Dispense: 90 tablet; Refill: 1        Follow Up   No follow-ups on file.  Patient was given instructions and counseling regarding her condition or for health maintenance advice. Please see specific information pulled into the  AVS if appropriate.     Patient request all her medications be changed to [facility] mail order pharmacy so I have refilled all of her medications.  We have reviewed her labs which look good other than her HDL is mildly low.  This is most likely due to less activity.  We have talked about increasing her physical activity throughout the day.  She is due for a screening mammogram which I have ordered.  She is also due for her pneumonia shot today.  AWV completed.  She is up-to-date on her diabetic eye exam.  We also discussed getting shingles vaccine at her pharmacy.  I recommend that she complete her living will and advanced directive and we can scan it into epic.

## 2022-01-21 NOTE — PROGRESS NOTES
The ABCs of the Annual Wellness Visit  Subsequent Medicare Wellness Visit    No chief complaint on file.     Subjective    History of Present Illness:  Donna M Yeoman is a 86 y.o. female who presents for a Subsequent Medicare Wellness Visit.    The following portions of the patient's history were reviewed and   updated as appropriate: allergies, current medications, past family history, past medical history, past social history, past surgical history and problem list.    Compared to one year ago, the patient feels her physical   health is worse.    Compared to one year ago, the patient feels her mental   health is worse.    Recent Hospitalizations:  She was not admitted to the hospital during the last year.       Current Medical Providers:  Patient Care Team:  Caroline Weiner MD as PCP - General  King Adame MD as Consulting Physician (Hematology and Oncology)  Caroline Weiner MD as Referring Physician (Internal Medicine)  Socorro Crenshaw LPN (Inactive) as Licensed Practical Nurse  Dhruv Riley MD as Consulting Physician (Gastroenterology)  Bassam Stewart MD as Consulting Physician (Cardiology)  Le Manuel APRN as Nurse Practitioner (Nurse Practitioner)    Outpatient Medications Prior to Visit   Medication Sig Dispense Refill   • allopurinol (ZYLOPRIM) 100 MG tablet TAKE 1 TABLET DAILY 90 tablet 1   • aspirin 81 MG EC tablet Take 81 mg by mouth Daily.     • carvedilol (COREG) 6.25 MG tablet TAKE 1 TABLET BY MOUTH TWICE A DAY WITH MEALS 180 tablet 1   • CRANBERRY PO Take  by mouth.     • Cyanocobalamin (B-12 PO) Take  by mouth.     • ESTRACE VAGINAL 0.1 MG/GM vaginal cream INSERT 1/2 TO 1 INCH VAGINALLY ONCE WEEKLY AS DIRECTED  11   • FIBER PO Take  by mouth Daily.     • glimepiride (AMARYL) 2 MG tablet Take 1 tablet by mouth Every Morning Before Breakfast. 90 tablet 1   • levothyroxine (SYNTHROID, LEVOTHROID) 75 MCG tablet TAKE 1 TABLET DAILY 90 tablet 1   • Loratadine 10 MG capsule  Take  by mouth.     • losartan (COZAAR) 50 MG tablet TAKE 1 TABLET BY MOUTH EVERY DAY 90 tablet 1   • nystatin-triamcinolone (MYCOLOG) 236769-1.1 UNIT/GM-% ointment Apply  topically to the appropriate area as directed 2 (Two) Times a Day. 30 g 0   • omeprazole (priLOSEC) 40 MG capsule TAKE 1 CAPSULE BY MOUTH TWICE A DAY 90 capsule 1   • ONETOUCH DELICA LANCETS 33G misc USE TO TEST BLOOD SUGAR TWICE DAILY 100 each 1   • ONETOUCH VERIO test strip TEST EVERY MORNING 100 each 5   • phenazopyridine (Pyridium) 100 MG tablet Take 1 tablet by mouth 3 (Three) Times a Day. 6 tablet 0   • Potassium 99 MG tablet Take  by mouth.     • Probiotic Product (PROBIOTIC DAILY PO) Take  by mouth Daily.     • rosuvastatin (CRESTOR) 10 MG tablet TAKE 1 TABLET BY MOUTH EVERY DAY 90 tablet 1   • VITAMIN D PO Take  by mouth.       No facility-administered medications prior to visit.       No opioid medication identified on active medication list. I have reviewed chart for other potential  high risk medication/s and harmful drug interactions in the elderly.          Aspirin is on active medication list. Aspirin use is indicated based on review of current medical condition/s. Pros and cons of this therapy have been discussed today. Benefits of this medication outweigh potential harm.  Patient has been encouraged to continue taking this medication.  .      Patient Active Problem List   Diagnosis   • Bloating   • Epigastric pain   • B12 deficiency   • Type 2 diabetes mellitus without complication (HCC)   • Essential hypertension   • MGUS (monoclonal gammopathy of unknown significance)   • Sore throat   • Gastroesophageal reflux disease with esophagitis   • Cough   • Hypothyroidism   • Hyperglycemia   • Right foot pain   • Healthcare maintenance   • Type 2 diabetes mellitus without complication, without long-term current use of insulin (HCC)   • Hyperlipidemia   • Dysuria   • Persistent cough   • Cardiomegaly   • Rib pain on left side   •  "Creatinine elevation   • Encounter for screening mammogram for breast cancer   • Abnormal serum total protein level   • Acute upper respiratory infection   • Breast asymmetry   • Breast pain   • Burning sensation   • Cerebrovascular small vessel disease   • Colon polyp   • Dense breasts   • Dizziness   • Increased glucose level   • Gastritis   • Gout   • Headache   • Hiatal hernia   • Low back pain   • Ulcer of nose   • Nausea   • Neck pain   • Osteoarthritis   • Otalgia   • Pain in thoracic spine   • Shoulder joint pain   • Back pain   • Peripheral neuropathy   • Pneumonia   • Seasonal allergic rhinitis   • Tinea corporis   • Vitamin D deficiency    • Sinus congestion   • Inclusion cyst   • Urinary frequency   • Leg edema   • Rash   • Neuritis or radiculitis due to rupture of lumbar intervertebral disc   • NICOLE on CPAP     Advance Care Planning  Advance Directive is not on file.  ACP discussion was held with the patient during this visit. Patient has an advance directive (not in EMR), copy requested. Patient does not have an advance directive, information provided.          Objective    Vitals:    01/21/22 1044   BP: 130/70   Pulse: 79   Resp: 16   Temp: 96.9 °F (36.1 °C)   TempSrc: Temporal   SpO2: 97%   Weight: 68.9 kg (151 lb 14.4 oz)   Height: 160 cm (62.99\")   PainSc: 0-No pain     BMI Readings from Last 1 Encounters:   01/21/22 26.92 kg/m²   BMI is above normal parameters. Recommendations include: none needed today    Does the patient have evidence of cognitive impairment? No    Physical Exam  Lab Results   Component Value Date    CHLPL 144 01/14/2022    TRIG 134 01/14/2022    HDL 37 (L) 01/14/2022    LDL 83 01/14/2022    VLDL 24 01/14/2022    HGBA1C 6.1 (H) 01/14/2022            HEALTH RISK ASSESSMENT    Smoking Status:  Social History     Tobacco Use   Smoking Status Never Smoker   Smokeless Tobacco Never Used     Alcohol Consumption:  Social History     Substance and Sexual Activity   Alcohol Use No "     Fall Risk Screen:    STEADI Fall Risk Assessment was completed, and patient is at LOW risk for falls.Assessment completed on:1/21/2022    Depression Screening:  PHQ-2/PHQ-9 Depression Screening 1/21/2022   Little interest or pleasure in doing things 0   Feeling down, depressed, or hopeless 0   Total Score 0       Health Habits and Functional and Cognitive Screening:  Functional & Cognitive Status 1/21/2022   Do you have difficulty preparing food and eating? -   Do you have difficulty bathing yourself, getting dressed or grooming yourself? -   Do you have difficulty using the toilet? -   Do you have difficulty moving around from place to place? -   Do you have trouble with steps or getting out of a bed or a chair? Yes   Current Diet Unhealthy Diet   Dental Exam Up to date   Eye Exam Up to date   Exercise (times per week) 7 times per week   Current Exercises Include Walking;House Cleaning   Current Exercise Activities Include -   Do you need help using the phone?  No   Are you deaf or do you have serious difficulty hearing?  Yes   Do you need help with transportation? No   Do you need help shopping? No   Do you need help preparing meals?  No   Do you need help with housework?  No   Do you need help with laundry? No   Do you need help taking your medications? No   Do you need help managing money? No   Do you ever drive or ride in a car without wearing a seat belt? No   Have you felt unusual stress, anger or loneliness in the last month? No   Who do you live with? Child   If you need help, do you have trouble finding someone available to you? No   Have you been bothered in the last four weeks by sexual problems? No   Do you have difficulty concentrating, remembering or making decisions? No       Age-appropriate Screening Schedule:  Refer to the list below for future screening recommendations based on patient's age, sex and/or medical conditions. Orders for these recommended tests are listed in the plan section. The  patient has been provided with a written plan.    Health Maintenance   Topic Date Due   • DXA SCAN  01/31/2019   • URINE MICROALBUMIN  11/11/2021   • ZOSTER VACCINE (2 of 2) 01/21/2022 (Originally 3/28/2017)   • HEMOGLOBIN A1C  07/14/2022   • DIABETIC EYE EXAM  12/10/2022   • MAMMOGRAM  01/05/2023   • LIPID PANEL  01/14/2023   • TDAP/TD VACCINES (2 - Td or Tdap) 01/31/2027   • INFLUENZA VACCINE  Completed              Assessment/Plan   CMS Preventative Services Quick Reference  Risk Factors Identified During Encounter  Chronic Pain   Hearing Problem  Immunizations Discussed/Encouraged (specific Immunizations; Shingrix  Inactivity/Sedentary  The above risks/problems have been discussed with the patient.  Follow up actions/plans if indicated are seen below in the Assessment/Plan Section.  Pertinent information has been shared with the patient in the After Visit Summary.    Diagnoses and all orders for this visit:    1. Encounter for immunization (Primary)  -     Pneumococcal Polysaccharide Vaccine 23-Valent (PPSV23) Greater Than or Equal To 1yo Subcutaneous / IM        Follow Up:   No follow-ups on file.     An After Visit Summary and PPPS were made available to the patient.        I spent 20 minutes caring for Shirley on this date of service. This time includes time spent by me in the following activities:reviewing tests, performing a medically appropriate examination and/or evaluation , counseling and educating the patient/family/caregiver, referring and communicating with other health care professionals  and documenting information in the medical record

## 2022-02-04 ENCOUNTER — APPOINTMENT (OUTPATIENT)
Dept: MAMMOGRAPHY | Facility: HOSPITAL | Age: 87
End: 2022-02-04

## 2022-02-10 ENCOUNTER — HOSPITAL ENCOUNTER (OUTPATIENT)
Dept: MAMMOGRAPHY | Facility: HOSPITAL | Age: 87
Discharge: HOME | End: 2022-02-10
Admitting: INTERNAL MEDICINE

## 2022-02-10 DIAGNOSIS — Z12.31 SCREENING MAMMOGRAM, ENCOUNTER FOR: ICD-10-CM

## 2022-02-10 PROCEDURE — 77063 BREAST TOMOSYNTHESIS BI: CPT

## 2022-02-10 PROCEDURE — 77067 SCR MAMMO BI INCL CAD: CPT

## 2022-02-18 ENCOUNTER — OFFICE VISIT (OUTPATIENT)
Dept: CARDIOLOGY | Facility: CLINIC | Age: 87
End: 2022-02-18

## 2022-02-18 VITALS
HEIGHT: 63 IN | DIASTOLIC BLOOD PRESSURE: 80 MMHG | SYSTOLIC BLOOD PRESSURE: 150 MMHG | OXYGEN SATURATION: 98 % | WEIGHT: 151 LBS | BODY MASS INDEX: 26.75 KG/M2 | HEART RATE: 57 BPM

## 2022-02-18 DIAGNOSIS — I10 PRIMARY HYPERTENSION: Primary | ICD-10-CM

## 2022-02-18 DIAGNOSIS — I51.7 MODERATE LEFT VENTRICULAR HYPERTROPHY: ICD-10-CM

## 2022-02-18 DIAGNOSIS — I51.7 LAE (LEFT ATRIAL ENLARGEMENT): ICD-10-CM

## 2022-02-18 PROCEDURE — 99214 OFFICE O/P EST MOD 30 MIN: CPT | Performed by: INTERNAL MEDICINE

## 2022-02-21 NOTE — PROGRESS NOTES
Date of Office Visit:  2022  Encounter Provider: Bassam Stewart MD  Place of Service: [facility] CARDIOLOGY  Patient Name: Donna M Yeoman  :1935    Chief complaint: Follow-up for hypertension, LVH, and left atrial enlargement.    History of Present Illness:    I again had the pleasure of seeing the patient in cardiology office on 2022.  She is a very pleasant 87 year-old female with a history of DVT, pulmonary emboli, prior stroke, diabetes, obstructive sleep apnea on CPAP, and hypertension who presents for follow-up.  I initially saw the patient on 2021.    The patient has an abnormal EKG dating back to at least , which showed anterior T wave changes.  She has also had a history of DVT and pulmonary emboli.  At her initial visit with me on 2021, she had been having increasing shortness of breath with steps, inclines, and moderate activity.  She had also had some diaphoresis with exertion.  Her family history is also significant with a son and a daughter who have both had coronary artery bypass grafting operations.  Her daughter also  from pulmonary embolism following her CABG at age 57.    After her initial visit, the patient underwent an echocardiogram and Lexiscan Myoview stress test on 2021.  The Lexiscan Myoview stress test was normal with no evidence of ischemia.  The echocardiogram showed an ejection fraction of 67%, moderate LVH, and moderate left atrial enlargement.  It was felt that her symptoms are very likely noncardiac at that time.    The patient presents today for follow-up.  She is doing better in general.  She has not had any chest pain or shortness of breath since her last visit.  She still has intermittent joint and muscle pain.  She had been advised previously to stop the Crestor for a time to see if this improves.  However, she evidently did not do that.  Her blood pressure has been high at times, although does fluctuate  significantly.    Past Medical History:   Diagnosis Date   • Arthritis    • Colon polyp 7/1/2017   • Diabetes mellitus (HCC)    • Esophageal reflux    • GERD (gastroesophageal reflux disease)    • H/O DVT (deep venous thrombosis)    • Headache 7/1/2017   • Hiatal hernia 7/1/2017   • History of vitamin D deficiency    • Hypercholesteremia    • Hyperlipidemia    • Hypertension    • Low back pain 7/1/2017   • MGUS (monoclonal gammopathy of unknown significance)    • NICOLE on CPAP     AHI 15/h   • Osteoporosis    • Peripheral neuropathy    • Pneumonia 7/1/2017   • Pulmonary embolism (HCC)    • Pulmonary hypertension (HCC)    • Thyroid disease    • Venous thrombosis        Past Surgical History:   Procedure Laterality Date   • APPENDECTOMY  1949   • BLADDER REPAIR     • BREAST BIOPSY Right     benign 2019   • COLONOSCOPY     • CYSTOCELE REPAIR      bladder reconstruction   • HYSTERECTOMY     • SALPINGECTOMY     • SALPINGO OOPHORECTOMY      for ectopic pregnancy   • UPPER GASTROINTESTINAL ENDOSCOPY         Current Outpatient Medications on File Prior to Visit   Medication Sig Dispense Refill   • allopurinol (ZYLOPRIM) 100 MG tablet Take 1 tablet by mouth Daily. 90 tablet 1   • aspirin 81 MG EC tablet Take 81 mg by mouth Daily.     • azelastine (ASTELIN) 0.1 % nasal spray 2 sprays into the nostril(s) as directed by provider 2 (Two) Times a Day. Use in each nostril as directed 30 mL 12   • carvedilol (COREG) 6.25 MG tablet Take 1 tablet by mouth 2 (Two) Times a Day With Meals. 180 tablet 1   • CRANBERRY PO Take  by mouth.     • Cyanocobalamin (B-12 PO) Take  by mouth.     • ESTRACE VAGINAL 0.1 MG/GM vaginal cream INSERT 1/2 TO 1 INCH VAGINALLY ONCE WEEKLY AS DIRECTED  11   • FIBER PO Take  by mouth Daily.     • glimepiride (AMARYL) 2 MG tablet Take 1 tablet by mouth Every Morning Before Breakfast. 90 tablet 1   • levothyroxine (SYNTHROID, LEVOTHROID) 75 MCG tablet Take 1 tablet by mouth Daily. 90 tablet 1   • Loratadine 10 MG  capsule Take  by mouth.     • losartan (COZAAR) 50 MG tablet Take 1 tablet by mouth Daily. 90 tablet 1   • omeprazole (priLOSEC) 40 MG capsule TAKE 1 CAPSULE BY MOUTH TWICE A DAY 90 capsule 1   • ONETOUCH DELICA LANCETS 33G misc USE TO TEST BLOOD SUGAR TWICE DAILY 100 each 1   • ONETOUCH VERIO test strip TEST EVERY MORNING 100 each 5   • Potassium 99 MG tablet Take  by mouth.     • Probiotic Product (PROBIOTIC DAILY PO) Take  by mouth Daily.     • rosuvastatin (CRESTOR) 10 MG tablet Take 1 tablet by mouth Daily. 90 tablet 1   • VITAMIN D PO Take  by mouth.     • nystatin-triamcinolone (MYCOLOG) 530922-8.1 UNIT/GM-% ointment Apply  topically to the appropriate area as directed 2 (Two) Times a Day. 30 g 0   • phenazopyridine (Pyridium) 100 MG tablet Take 1 tablet by mouth 3 (Three) Times a Day. 6 tablet 0     No current facility-administered medications on file prior to visit.     Allergies as of 02/18/2022   • (No Known Allergies)     Social History     Socioeconomic History   • Marital status:    • Number of children: 4   • Years of education: High School   Tobacco Use   • Smoking status: Never Smoker   • Smokeless tobacco: Never Used   Vaping Use   • Vaping Use: Never used   Substance and Sexual Activity   • Alcohol use: No     Comment: caffeine use- 1/2 cup of coffee daily, soda   • Drug use: No   • Sexual activity: Defer     Family History   Problem Relation Age of Onset   • Breast cancer Mother         70's   • Colon cancer Mother    • Stroke Mother    • Heart disease Father    • Heart disease Sister    • Hypertension Sister    • Hypertension Sister    • Diabetes Sister    • Ovarian cancer Neg Hx        Review of Systems   Constitutional: Positive for malaise/fatigue.   Cardiovascular: Positive for leg swelling.   Musculoskeletal: Positive for joint pain and joint swelling.   All other systems reviewed and are negative.     Objective:     Vitals:    02/18/22 1335   BP: 150/80   BP Location: Left arm  "  Pulse: 57   SpO2: 98%   Weight: 68.5 kg (151 lb)   Height: 160 cm (63\")     Body mass index is 26.75 kg/m².    Constitutional:       Appearance: Well-developed.   Eyes:      Conjunctiva/sclera: Conjunctivae normal.   HENT:      Head: Normocephalic and atraumatic.   Pulmonary:      Effort: Pulmonary effort is normal.      Breath sounds: Normal breath sounds.   Cardiovascular:      Normal rate. Regular rhythm.      Murmurs: There is no murmur.      No gallop.   Edema:     Peripheral edema absent.   Abdominal:      Palpations: Abdomen is soft.      Tenderness: There is no abdominal tenderness.   Musculoskeletal:      Cervical back: Neck supple. Skin:     General: Skin is warm.   Neurological:      Mental Status: Alert and oriented to person, place, and time.   Psychiatric:         Behavior: Behavior normal.       Lab Review:   Procedures    Lipid Panel    Lipid Panel 7/13/21 1/14/22   Total Cholesterol 137 144   Triglycerides 134 134   HDL Cholesterol 36 (A) 37 (A)   VLDL Cholesterol 24 24   LDL Cholesterol  77 83   LDL/HDL Ratio 2.06 2.2   (A) Abnormal value       Comments are available for some flowsheets but are not being displayed.              Cardiac Procedures:  1.  Lexiscan Myoview stress test on 8/13/2021: Normal with no evidence of ischemia.  2.  Echocardiogram on 8/13/2021: The ejection fraction was 67%.  There was moderate LVH.  The left atrium was moderately enlarged.  There was grade 1 diastolic dysfunction.  The calculated RVSP was 40 mmHg.    Assessment:       Diagnosis Plan   1. Primary hypertension     2. Moderate left ventricular hypertrophy     3. LAE (left atrial enlargement)       Plan:       She seems to be stable at this time.  Again, her echocardiogram showed moderate LVH, grade 1 diastolic dysfunction, and moderate left atrial enlargement.  However, she did not have significant valvular disease, and her ejection fraction was normal.  Her stress test was also normal.  She really has not had " any further significant shortness of breath since her last visit.  She denied any chest discomfort.  For now, I did not change any of her medications.  Her blood pressure is somewhat labile, which is fairly normal at her age.  I am going to keep her on the carvedilol at 6.25 mg twice a day, as well as the losartan.  She does have joint pain and muscle pain at times, which I felt might be partially from her statin in the past.  She has chosen not to hold this for now.  I will see her back in the office in 6 months unless other issues arise.

## 2022-03-17 RX ORDER — GLIMEPIRIDE 2 MG/1
2 TABLET ORAL
Qty: 90 TABLET | Refills: 1 | Status: CANCELLED | OUTPATIENT
Start: 2022-03-17

## 2022-03-17 RX ORDER — CARVEDILOL 6.25 MG/1
6.25 TABLET ORAL 2 TIMES DAILY WITH MEALS
Qty: 180 TABLET | Refills: 1 | Status: CANCELLED | OUTPATIENT
Start: 2022-03-17

## 2022-03-17 NOTE — TELEPHONE ENCOUNTER
Caller: Yeoman, Donna M    Relationship: Self    Best call back number: [phone] (H)    Requested Prescriptions:   Requested Prescriptions     Pending Prescriptions Disp Refills   • glimepiride (AMARYL) 2 MG tablet 90 tablet 1     Sig: Take 1 tablet by mouth Every Morning Before Breakfast.   • carvedilol (COREG) 6.25 MG tablet 180 tablet 1     Sig: Take 1 tablet by mouth 2 (Two) Times a Day With Meals.        Pharmacy where request should be sent: [facility] - [place] [id number] - [phone] [facility] [phone]      Additional details provided by patient: PATIENT CALLED UPSET THAT BOTH OF THESE MEDICATIONS WERE LEFT OFF THE LIST IN January THAT WAS SENT TO [facility] AND THEY HAVE NOT BEEN SENT YET, PLEASE ADVISE PATIENT WHEN SENT TO [facility] ASAP    Does the patient have less than a 3 day supply:  [x] Yes  [] No    Radha Ching Rep   03/17/22 15:29 EDT

## 2022-03-18 RX ORDER — GLIMEPIRIDE 2 MG/1
2 TABLET ORAL
Qty: 90 TABLET | Refills: 1 | Status: SHIPPED | OUTPATIENT
Start: 2022-03-18 | End: 2022-08-12

## 2022-03-18 RX ORDER — CARVEDILOL 6.25 MG/1
6.25 TABLET ORAL 2 TIMES DAILY WITH MEALS
Qty: 180 TABLET | Refills: 1 | Status: SHIPPED | OUTPATIENT
Start: 2022-03-18 | End: 2022-08-12

## 2022-04-01 ENCOUNTER — OFFICE VISIT (OUTPATIENT)
Dept: GASTROENTEROLOGY | Facility: CLINIC | Age: 87
End: 2022-04-01

## 2022-04-01 VITALS
OXYGEN SATURATION: 98 % | BODY MASS INDEX: 26.98 KG/M2 | HEIGHT: 63 IN | WEIGHT: 152.3 LBS | DIASTOLIC BLOOD PRESSURE: 70 MMHG | TEMPERATURE: 98.5 F | HEART RATE: 82 BPM | SYSTOLIC BLOOD PRESSURE: 120 MMHG

## 2022-04-01 DIAGNOSIS — K21.9 GASTROESOPHAGEAL REFLUX DISEASE WITHOUT ESOPHAGITIS: ICD-10-CM

## 2022-04-01 DIAGNOSIS — K31.84 GASTROPARESIS: Primary | ICD-10-CM

## 2022-04-01 DIAGNOSIS — K22.2 LOWER ESOPHAGEAL RING (SCHATZKI): ICD-10-CM

## 2022-04-01 DIAGNOSIS — K44.9 HIATAL HERNIA: ICD-10-CM

## 2022-04-01 DIAGNOSIS — K59.00 CONSTIPATION, UNSPECIFIED CONSTIPATION TYPE: ICD-10-CM

## 2022-04-01 PROCEDURE — 99213 OFFICE O/P EST LOW 20 MIN: CPT | Performed by: NURSE PRACTITIONER

## 2022-04-01 NOTE — PATIENT INSTRUCTIONS
For gastroparesis, follow a gastroparesis diet. Eat smaller more frequent meals as opposed to large meals and foods lower in fat and fiber.     If trouble swallowing returns, please notify the office and we can arrange for repeat EGD with dilation.    If needed for constipation, recommend starting a daily stool softener, such as colace, available over the counter.    If that does not help, for constipation, recommend starting MiraLAX daily available over-the-counter.  Mix 1 capful in 8 ounces of noncarbonated liquid and drink once daily.

## 2022-05-19 RX ORDER — LEVOTHYROXINE SODIUM 0.07 MG/1
TABLET ORAL
Qty: 90 TABLET | Refills: 1 | Status: SHIPPED | OUTPATIENT
Start: 2022-05-19 | End: 2023-01-31 | Stop reason: SDUPTHER

## 2022-05-19 RX ORDER — LOSARTAN POTASSIUM 50 MG/1
TABLET ORAL
Qty: 90 TABLET | Refills: 1 | Status: SHIPPED | OUTPATIENT
Start: 2022-05-19 | End: 2023-01-31 | Stop reason: SDUPTHER

## 2022-05-19 RX ORDER — ALLOPURINOL 100 MG/1
TABLET ORAL
Qty: 90 TABLET | Refills: 1 | Status: SHIPPED | OUTPATIENT
Start: 2022-05-19 | End: 2023-01-31 | Stop reason: SDUPTHER

## 2022-05-19 NOTE — TELEPHONE ENCOUNTER
Rx Refill Note  Requested Prescriptions     Pending Prescriptions Disp Refills   • levothyroxine (SYNTHROID, LEVOTHROID) 75 MCG tablet [Pharmacy Med Name: LEVOTHYROXINE SODIUM 75 MCG Tablet] 90 tablet 1     Sig: TAKE 1 TABLET EVERY DAY   • allopurinol (ZYLOPRIM) 100 MG tablet [Pharmacy Med Name: ALLOPURINOL 100 MG Tablet] 90 tablet 1     Sig: TAKE 1 TABLET EVERY DAY   • losartan (COZAAR) 50 MG tablet [Pharmacy Med Name: LOSARTAN POTASSIUM 50 MG Tablet] 90 tablet 1     Sig: TAKE 1 TABLET EVERY DAY      Last office visit with prescribing clinician: 1/21/2022      Next office visit with prescribing clinician: 7/22/2022       {TIP  Please add Last Relevant Lab Date if appropriate: 1/14/22    Pina Flores MA  05/19/22, 07:49 EDT

## 2022-06-30 ENCOUNTER — TELEPHONE (OUTPATIENT)
Dept: FAMILY MEDICINE CLINIC | Facility: CLINIC | Age: 87
End: 2022-06-30

## 2022-06-30 NOTE — TELEPHONE ENCOUNTER
Pt called in today and stated she was told by persons who came out her house from [place] she says [place] and advised her she had a irregular heart beat. Looks like she see's  her cardiologist and was seen by him in Feb, 2022. Pt has been advised to reach out to him as she has been diagnosed by him with   Moderate left ventricular hypertrophy      3. LAE (left atrial enlargement)      She was advised to reach out to her cardiologist.

## 2022-07-01 ENCOUNTER — TELEPHONE (OUTPATIENT)
Dept: CARDIOLOGY | Facility: CLINIC | Age: 87
End: 2022-07-01

## 2022-07-01 DIAGNOSIS — R00.2 PALPITATIONS: Primary | ICD-10-CM

## 2022-07-01 NOTE — TELEPHONE ENCOUNTER
Patient called and LVM stating that she had a nurse practitioner from [facility] come to her house yesterday and informed her that her heart sounded out of rhythm. It was suggested to her that she call the office this morning. Please advise.     N.M

## 2022-07-01 NOTE — TELEPHONE ENCOUNTER
I called and LVM for pt and pt's emergency contact Shahzad. Requested they call back and ask for triage. Spoke with GM per verbal who stated if it cannot happen today, Tuesday is fine. // HG

## 2022-07-01 NOTE — TELEPHONE ENCOUNTER
I ordered an EKG.  Can you please arrange for the patient to come in and have this done today?  Thanks.    GM

## 2022-07-05 ENCOUNTER — TRANSCRIBE ORDERS (OUTPATIENT)
Dept: CARDIOLOGY | Facility: CLINIC | Age: 87
End: 2022-07-05

## 2022-07-05 ENCOUNTER — OFFICE VISIT (OUTPATIENT)
Dept: CARDIOLOGY | Facility: CLINIC | Age: 87
End: 2022-07-05

## 2022-07-05 ENCOUNTER — LAB (OUTPATIENT)
Dept: LAB | Facility: HOSPITAL | Age: 87
End: 2022-07-05

## 2022-07-05 ENCOUNTER — CLINICAL SUPPORT (OUTPATIENT)
Dept: CARDIOLOGY | Facility: CLINIC | Age: 87
End: 2022-07-05

## 2022-07-05 VITALS
WEIGHT: 152.6 LBS | OXYGEN SATURATION: 96 % | HEIGHT: 63 IN | DIASTOLIC BLOOD PRESSURE: 68 MMHG | BODY MASS INDEX: 27.04 KG/M2 | HEART RATE: 36 BPM | SYSTOLIC BLOOD PRESSURE: 166 MMHG

## 2022-07-05 VITALS
HEART RATE: 36 BPM | SYSTOLIC BLOOD PRESSURE: 166 MMHG | BODY MASS INDEX: 27.04 KG/M2 | WEIGHT: 152.6 LBS | DIASTOLIC BLOOD PRESSURE: 68 MMHG | OXYGEN SATURATION: 96 % | HEIGHT: 63 IN

## 2022-07-05 DIAGNOSIS — Z01.818 OTHER SPECIFIED PRE-OPERATIVE EXAMINATION: ICD-10-CM

## 2022-07-05 DIAGNOSIS — R42 LIGHTHEADEDNESS: Primary | ICD-10-CM

## 2022-07-05 DIAGNOSIS — Z01.810 PREPROCEDURAL CARDIOVASCULAR EXAMINATION: ICD-10-CM

## 2022-07-05 DIAGNOSIS — Z13.6 SCREENING FOR CARDIOVASCULAR CONDITION: Primary | ICD-10-CM

## 2022-07-05 DIAGNOSIS — R00.1 SYMPTOMATIC BRADYCARDIA: Primary | ICD-10-CM

## 2022-07-05 DIAGNOSIS — Z13.6 SCREENING FOR CARDIOVASCULAR CONDITION: ICD-10-CM

## 2022-07-05 DIAGNOSIS — R42 LIGHTHEADEDNESS: ICD-10-CM

## 2022-07-05 DIAGNOSIS — I10 PRIMARY HYPERTENSION: ICD-10-CM

## 2022-07-05 DIAGNOSIS — I49.5 SSS (SICK SINUS SYNDROME): ICD-10-CM

## 2022-07-05 LAB
ANION GAP SERPL CALCULATED.3IONS-SCNC: 8 MMOL/L (ref 5–15)
BASOPHILS # BLD AUTO: 0.05 10*3/MM3 (ref 0–0.2)
BASOPHILS NFR BLD AUTO: 0.7 % (ref 0–1.5)
BUN SERPL-MCNC: 13 MG/DL (ref 8–23)
BUN/CREAT SERPL: 14.4 (ref 7–25)
CALCIUM SPEC-SCNC: 9.2 MG/DL (ref 8.6–10.5)
CHLORIDE SERPL-SCNC: 110 MMOL/L (ref 98–107)
CO2 SERPL-SCNC: 27 MMOL/L (ref 22–29)
CREAT SERPL-MCNC: 0.9 MG/DL (ref 0.57–1)
DEPRECATED RDW RBC AUTO: 43 FL (ref 37–54)
EGFRCR SERPLBLD CKD-EPI 2021: 62 ML/MIN/1.73
EOSINOPHIL # BLD AUTO: 0.28 10*3/MM3 (ref 0–0.4)
EOSINOPHIL NFR BLD AUTO: 4.1 % (ref 0.3–6.2)
ERYTHROCYTE [DISTWIDTH] IN BLOOD BY AUTOMATED COUNT: 13 % (ref 12.3–15.4)
GLUCOSE SERPL-MCNC: 68 MG/DL (ref 65–99)
HCT VFR BLD AUTO: 36 % (ref 34–46.6)
HGB BLD-MCNC: 12.4 G/DL (ref 12–15.9)
IMM GRANULOCYTES # BLD AUTO: 0.02 10*3/MM3 (ref 0–0.05)
IMM GRANULOCYTES NFR BLD AUTO: 0.3 % (ref 0–0.5)
LYMPHOCYTES # BLD AUTO: 2.13 10*3/MM3 (ref 0.7–3.1)
LYMPHOCYTES NFR BLD AUTO: 31.5 % (ref 19.6–45.3)
MCH RBC QN AUTO: 31.3 PG (ref 26.6–33)
MCHC RBC AUTO-ENTMCNC: 34.4 G/DL (ref 31.5–35.7)
MCV RBC AUTO: 90.9 FL (ref 79–97)
MONOCYTES # BLD AUTO: 0.61 10*3/MM3 (ref 0.1–0.9)
MONOCYTES NFR BLD AUTO: 9 % (ref 5–12)
NEUTROPHILS NFR BLD AUTO: 3.67 10*3/MM3 (ref 1.7–7)
NEUTROPHILS NFR BLD AUTO: 54.4 % (ref 42.7–76)
NRBC BLD AUTO-RTO: 0 /100 WBC (ref 0–0.2)
PLATELET # BLD AUTO: 167 10*3/MM3 (ref 140–450)
PMV BLD AUTO: 10.6 FL (ref 6–12)
POTASSIUM SERPL-SCNC: 4.7 MMOL/L (ref 3.5–5.2)
RBC # BLD AUTO: 3.96 10*6/MM3 (ref 3.77–5.28)
SODIUM SERPL-SCNC: 145 MMOL/L (ref 136–145)
WBC NRBC COR # BLD: 6.76 10*3/MM3 (ref 3.4–10.8)

## 2022-07-05 PROCEDURE — 93000 ELECTROCARDIOGRAM COMPLETE: CPT | Performed by: INTERNAL MEDICINE

## 2022-07-05 PROCEDURE — 36415 COLL VENOUS BLD VENIPUNCTURE: CPT

## 2022-07-05 PROCEDURE — 80048 BASIC METABOLIC PNL TOTAL CA: CPT

## 2022-07-05 PROCEDURE — 85025 COMPLETE CBC W/AUTO DIFF WBC: CPT

## 2022-07-05 PROCEDURE — 99214 OFFICE O/P EST MOD 30 MIN: CPT | Performed by: INTERNAL MEDICINE

## 2022-07-05 NOTE — TELEPHONE ENCOUNTER
Reviewed recommendations with Donna M Yeoman and the patient verbalized understanding of the recommendations.    Patient is scheduled for EKG today at 130pm.    Thank you,  Jalyn Smith RN  Triage Nurse ADI

## 2022-07-05 NOTE — PROGRESS NOTES
Procedure     ECG 12 Lead    Date/Time: 7/5/2022 3:04 PM  Performed by: Bassam Stewart MD  Authorized by: Bassam Stewart MD   Comparison: compared with previous ECG from 7/20/2021  Comparison to previous ECG: Compared to the prior tracing, the rate has decreased, and there appear to be blocked PACs  Rate: bradycardic  BPM: 36    Clinical impression: abnormal EKG              Patient presents today for an EKG only per . Pt was evaluated by an APRN from [facility] who listened to pt and informed her that her heart sounded out of rhythm. She was instructed to call the office, where  ordered the EKG.     Medication allergies and current medications were reviewed with the patient and updated in her chart.       Patient today states that she has been noticing some swelling in her ankles with the left being more significant. She has been experiencing abnormal fatigue, shortness of breath, occasional chest tightness, and constant lightheadedness/dizziness. She does monitor her weight and has not had any weight gain. She denies any palpitations. Her blood pressures have been running in the normal range for her. I performed and printed 2 EKG tracings.      I presented both tracings to Dr. Stewart who read these. He requested that patient be added on his schedule for today. I added pt onto his schedule. // HG

## 2022-07-06 ENCOUNTER — LAB (OUTPATIENT)
Dept: LAB | Facility: HOSPITAL | Age: 87
End: 2022-07-06

## 2022-07-06 DIAGNOSIS — Z13.6 SCREENING FOR CARDIOVASCULAR CONDITION: ICD-10-CM

## 2022-07-06 DIAGNOSIS — Z01.810 PREPROCEDURAL CARDIOVASCULAR EXAMINATION: ICD-10-CM

## 2022-07-06 DIAGNOSIS — Z01.818 OTHER SPECIFIED PRE-OPERATIVE EXAMINATION: ICD-10-CM

## 2022-07-06 PROBLEM — I49.5 SSS (SICK SINUS SYNDROME) (HCC): Status: ACTIVE | Noted: 2022-07-05

## 2022-07-06 LAB — SARS-COV-2 ORF1AB RESP QL NAA+PROBE: NOT DETECTED

## 2022-07-06 PROCEDURE — C9803 HOPD COVID-19 SPEC COLLECT: HCPCS

## 2022-07-06 PROCEDURE — U0004 COV-19 TEST NON-CDC HGH THRU: HCPCS

## 2022-07-06 NOTE — H&P (VIEW-ONLY)
"Date of Office Visit:  2022  Encounter Provider: Bassam Stewart MD  Place of Service: [facility] CARDIOLOGY  Patient Name: Donna M Yeoman  :1935    Chief complaint: Follow-up for hypertension, LVH, and left atrial enlargement.  Recent lightheadedness, near syncope, and reported irregular rhythm.    History of Present Illness:    I again had the pleasure of seeing the patient in cardiology office on 2022.  She is a very pleasant 87 year-old female with a history of DVT, pulmonary emboli, prior stroke, diabetes, obstructive sleep apnea on CPAP, and hypertension who presents for follow-up.  I initially saw the patient on 2021.    The patient has an abnormal EKG dating back to at least , which showed anterior T wave changes.  She has also had a history of DVT and pulmonary emboli.  At her initial visit with me on 2021, she had been having increasing shortness of breath with steps, inclines, and moderate activity.  She had also had some diaphoresis with exertion.  Her family history is also significant with a son and a daughter who have both had coronary artery bypass grafting operations.  Her daughter also  from pulmonary embolism following her CABG at age 57.  After her initial visit, the patient underwent an echocardiogram and Lexiscan Myoview stress test on 2021.  The Lexiscan Myoview stress test was normal with no evidence of ischemia.  The echocardiogram showed an ejection fraction of 67%, moderate LVH, and moderate left atrial enlargement.  It was felt that her symptoms were likely not cardiac at that time.    The patient presents today for follow-up.  She actually presented today for an outpatient EKG.  She recently had a home health nurse told her that her heart \"sounded out of rhythm\".  She has been having exertional chest tightness at times, and she has also felt very lightheaded at times and \"off balance\".  She has not had any syncopal " episodes, although she felt lightheaded like a prodrome before passing out.  Her EKG today shows sinus bradycardia with a rate of 36 and what appears to be potentially blocked PACs.  Her symptoms have been intermittent for over 1 month.    Past Medical History:   Diagnosis Date   • Arthritis    • Colon polyp 7/1/2017   • Diabetes mellitus (HCC)    • Esophageal reflux    • GERD (gastroesophageal reflux disease)    • H/O DVT (deep venous thrombosis)    • Headache 7/1/2017   • Hiatal hernia 7/1/2017   • History of vitamin D deficiency    • Hypercholesteremia    • Hyperlipidemia    • Hypertension    • Low back pain 7/1/2017   • MGUS (monoclonal gammopathy of unknown significance)    • NICOLE on CPAP     AHI 15/h   • Osteoporosis    • Peripheral neuropathy    • Pneumonia 7/1/2017   • Pulmonary embolism (HCC)    • Pulmonary hypertension (HCC)    • Thyroid disease    • Venous thrombosis        Past Surgical History:   Procedure Laterality Date   • APPENDECTOMY  1949   • BLADDER REPAIR     • BREAST BIOPSY Right     benign 2019   • COLONOSCOPY     • CYSTOCELE REPAIR      bladder reconstruction   • HYSTERECTOMY     • SALPINGECTOMY     • SALPINGO OOPHORECTOMY      for ectopic pregnancy   • UPPER GASTROINTESTINAL ENDOSCOPY         Current Outpatient Medications on File Prior to Visit   Medication Sig Dispense Refill   • allopurinol (ZYLOPRIM) 100 MG tablet TAKE 1 TABLET EVERY DAY 90 tablet 1   • aspirin 81 MG EC tablet Take 81 mg by mouth Daily.     • azelastine (ASTELIN) 0.1 % nasal spray 2 sprays into the nostril(s) as directed by provider 2 (Two) Times a Day. Use in each nostril as directed 30 mL 12   • carvedilol (COREG) 6.25 MG tablet Take 1 tablet by mouth 2 (Two) Times a Day With Meals. 180 tablet 1   • CRANBERRY PO Take  by mouth.     • Cyanocobalamin (B-12 PO) Take  by mouth.     • ESTRACE VAGINAL 0.1 MG/GM vaginal cream INSERT 1/2 TO 1 INCH VAGINALLY ONCE WEEKLY AS DIRECTED  11   • FIBER PO Take  by mouth Daily.     •  glimepiride (AMARYL) 2 MG tablet Take 1 tablet by mouth Every Morning Before Breakfast. 90 tablet 1   • levothyroxine (SYNTHROID, LEVOTHROID) 75 MCG tablet TAKE 1 TABLET EVERY DAY 90 tablet 1   • Loratadine 10 MG capsule Take  by mouth.     • losartan (COZAAR) 50 MG tablet TAKE 1 TABLET EVERY DAY 90 tablet 1   • nystatin-triamcinolone (MYCOLOG) 421725-9.1 UNIT/GM-% ointment Apply  topically to the appropriate area as directed 2 (Two) Times a Day. 30 g 0   • omeprazole (priLOSEC) 40 MG capsule TAKE 1 CAPSULE BY MOUTH TWICE A DAY 90 capsule 1   • ONETOUCH DELICA LANCETS 33G misc USE TO TEST BLOOD SUGAR TWICE DAILY 100 each 1   • ONETOUCH VERIO test strip TEST EVERY MORNING 100 each 5   • phenazopyridine (Pyridium) 100 MG tablet Take 1 tablet by mouth 3 (Three) Times a Day. 6 tablet 0   • Potassium 99 MG tablet Take  by mouth.     • Probiotic Product (PROBIOTIC DAILY PO) Take  by mouth Daily.     • rosuvastatin (CRESTOR) 10 MG tablet Take 1 tablet by mouth Daily. 90 tablet 1   • VITAMIN D PO Take  by mouth.       No current facility-administered medications on file prior to visit.     Allergies as of 07/05/2022   • (No Known Allergies)     Social History     Socioeconomic History   • Marital status:    • Number of children: 4   • Years of education: High School   Tobacco Use   • Smoking status: Never Smoker   • Smokeless tobacco: Never Used   Vaping Use   • Vaping Use: Never used   Substance and Sexual Activity   • Alcohol use: No     Comment: caffeine use- 1/2 cup of coffee daily, soda   • Drug use: No   • Sexual activity: Defer     Family History   Problem Relation Age of Onset   • Breast cancer Mother         70's   • Colon cancer Mother    • Stroke Mother    • Heart disease Father    • Heart disease Sister    • Hypertension Sister    • Hypertension Sister    • Diabetes Sister    • Ovarian cancer Neg Hx    • Colon polyps Neg Hx    • Crohn's disease Neg Hx    • Irritable bowel syndrome Neg Hx    • Ulcerative  "colitis Neg Hx        Review of Systems   Constitutional: Positive for malaise/fatigue.   Cardiovascular: Positive for leg swelling and near-syncope.   Musculoskeletal: Positive for joint pain and joint swelling.   Neurological: Positive for light-headedness.   All other systems reviewed and are negative.     Objective:     Vitals:    07/05/22 1415   BP: 166/68   BP Location: Right arm   Patient Position: Sitting   Cuff Size: Adult   Pulse: (!) 36   SpO2: 96%   Weight: 69.2 kg (152 lb 9.6 oz)   Height: 160 cm (63\")     Body mass index is 27.03 kg/m².    Constitutional:       Appearance: Well-developed.   Eyes:      Conjunctiva/sclera: Conjunctivae normal.   HENT:      Head: Normocephalic and atraumatic.   Pulmonary:      Effort: Pulmonary effort is normal.      Breath sounds: Normal breath sounds.   Cardiovascular:      Normal rate. Regular rhythm.      Murmurs: There is no murmur.      No gallop.   Edema:     Peripheral edema absent.   Abdominal:      Palpations: Abdomen is soft.      Tenderness: There is no abdominal tenderness.   Musculoskeletal:      Cervical back: Neck supple. Skin:     General: Skin is warm.   Neurological:      Mental Status: Alert and oriented to person, place, and time.   Psychiatric:         Behavior: Behavior normal.       Lab Review:   Procedures    Lipid Panel    Lipid Panel 7/13/21 1/14/22   Total Cholesterol 137 144   Triglycerides 134 134   HDL Cholesterol 36 (A) 37 (A)   VLDL Cholesterol 24 24   LDL Cholesterol  77 83   LDL/HDL Ratio 2.06 2.2   (A) Abnormal value       Comments are available for some flowsheets but are not being displayed.              Cardiac Procedures:  1.  Lexiscan Myoview stress test on 8/13/2021: Normal with no evidence of ischemia.  2.  Echocardiogram on 8/13/2021: The ejection fraction was 67%.  There was moderate LVH.  The left atrium was moderately enlarged.  There was grade 1 diastolic dysfunction.  The calculated RVSP was 40 mmHg.    Assessment:       " "Diagnosis Plan   1. Symptomatic bradycardia  Case Request EP Lab: Pacemaker DC new   2. Lightheadedness     3. Primary hypertension     4. SSS (sick sinus syndrome) ([facility])       Plan:       Again, she has had exertional chest tightness, lightheadedness, and a feeling of being \"off\".  She has had no syncopal episodes, although she has felt the same prodrome of presyncope.  Her EKG today shows sinus bradycardia with a rate of 36 and that appears to be potentially blocked PACs.  Additionally, on a rhythm strip which was taken after the EKG, it appears that she also has a transient left bundle branch block.  She is on a beta-blocker with carvedilol 6.25 mg twice a day.  She has been on a beta-blocker long-term for her blood pressure management.  However, I am really concerned that she has symptomatic bradycardia and high-grade conduction disease, which may be worsening over the previous month.      Given her symptoms, her age, and the bradycardia, I have recommended placement of a dual-chamber permanent pacemaker.  I discussed this with Dr. Rios from EP, and he agrees.  I am going to have the patient scheduled for later this week with him.  She will go to the ER for any severe symptoms.  I did tell her that she should not drive for now.  If she continues to have chest tightness afterwards, a more detailed ischemic assessment can be obtained at that time.    Addendum:  The patient has evidence of sick sinus syndrome.  "

## 2022-07-06 NOTE — PROGRESS NOTES
"Date of Office Visit:  2022  Encounter Provider: Bassam Stewart MD  Place of Service: [facility] CARDIOLOGY  Patient Name: Donna M Yeoman  :1935    Chief complaint: Follow-up for hypertension, LVH, and left atrial enlargement.  Recent lightheadedness, near syncope, and reported irregular rhythm.    History of Present Illness:    I again had the pleasure of seeing the patient in cardiology office on 2022.  She is a very pleasant 87 year-old female with a history of DVT, pulmonary emboli, prior stroke, diabetes, obstructive sleep apnea on CPAP, and hypertension who presents for follow-up.  I initially saw the patient on 2021.    The patient has an abnormal EKG dating back to at least , which showed anterior T wave changes.  She has also had a history of DVT and pulmonary emboli.  At her initial visit with me on 2021, she had been having increasing shortness of breath with steps, inclines, and moderate activity.  She had also had some diaphoresis with exertion.  Her family history is also significant with a son and a daughter who have both had coronary artery bypass grafting operations.  Her daughter also  from pulmonary embolism following her CABG at age 57.  After her initial visit, the patient underwent an echocardiogram and Lexiscan Myoview stress test on 2021.  The Lexiscan Myoview stress test was normal with no evidence of ischemia.  The echocardiogram showed an ejection fraction of 67%, moderate LVH, and moderate left atrial enlargement.  It was felt that her symptoms were likely not cardiac at that time.    The patient presents today for follow-up.  She actually presented today for an outpatient EKG.  She recently had a home health nurse told her that her heart \"sounded out of rhythm\".  She has been having exertional chest tightness at times, and she has also felt very lightheaded at times and \"off balance\".  She has not had any syncopal " episodes, although she felt lightheaded like a prodrome before passing out.  Her EKG today shows sinus bradycardia with a rate of 36 and what appears to be potentially blocked PACs.  Her symptoms have been intermittent for over 1 month.    Past Medical History:   Diagnosis Date   • Arthritis    • Colon polyp 7/1/2017   • Diabetes mellitus (HCC)    • Esophageal reflux    • GERD (gastroesophageal reflux disease)    • H/O DVT (deep venous thrombosis)    • Headache 7/1/2017   • Hiatal hernia 7/1/2017   • History of vitamin D deficiency    • Hypercholesteremia    • Hyperlipidemia    • Hypertension    • Low back pain 7/1/2017   • MGUS (monoclonal gammopathy of unknown significance)    • NICOLE on CPAP     AHI 15/h   • Osteoporosis    • Peripheral neuropathy    • Pneumonia 7/1/2017   • Pulmonary embolism (HCC)    • Pulmonary hypertension (HCC)    • Thyroid disease    • Venous thrombosis        Past Surgical History:   Procedure Laterality Date   • APPENDECTOMY  1949   • BLADDER REPAIR     • BREAST BIOPSY Right     benign 2019   • COLONOSCOPY     • CYSTOCELE REPAIR      bladder reconstruction   • HYSTERECTOMY     • SALPINGECTOMY     • SALPINGO OOPHORECTOMY      for ectopic pregnancy   • UPPER GASTROINTESTINAL ENDOSCOPY         Current Outpatient Medications on File Prior to Visit   Medication Sig Dispense Refill   • allopurinol (ZYLOPRIM) 100 MG tablet TAKE 1 TABLET EVERY DAY 90 tablet 1   • aspirin 81 MG EC tablet Take 81 mg by mouth Daily.     • azelastine (ASTELIN) 0.1 % nasal spray 2 sprays into the nostril(s) as directed by provider 2 (Two) Times a Day. Use in each nostril as directed 30 mL 12   • carvedilol (COREG) 6.25 MG tablet Take 1 tablet by mouth 2 (Two) Times a Day With Meals. 180 tablet 1   • CRANBERRY PO Take  by mouth.     • Cyanocobalamin (B-12 PO) Take  by mouth.     • ESTRACE VAGINAL 0.1 MG/GM vaginal cream INSERT 1/2 TO 1 INCH VAGINALLY ONCE WEEKLY AS DIRECTED  11   • FIBER PO Take  by mouth Daily.     •  glimepiride (AMARYL) 2 MG tablet Take 1 tablet by mouth Every Morning Before Breakfast. 90 tablet 1   • levothyroxine (SYNTHROID, LEVOTHROID) 75 MCG tablet TAKE 1 TABLET EVERY DAY 90 tablet 1   • Loratadine 10 MG capsule Take  by mouth.     • losartan (COZAAR) 50 MG tablet TAKE 1 TABLET EVERY DAY 90 tablet 1   • nystatin-triamcinolone (MYCOLOG) 910687-1.1 UNIT/GM-% ointment Apply  topically to the appropriate area as directed 2 (Two) Times a Day. 30 g 0   • omeprazole (priLOSEC) 40 MG capsule TAKE 1 CAPSULE BY MOUTH TWICE A DAY 90 capsule 1   • ONETOUCH DELICA LANCETS 33G misc USE TO TEST BLOOD SUGAR TWICE DAILY 100 each 1   • ONETOUCH VERIO test strip TEST EVERY MORNING 100 each 5   • phenazopyridine (Pyridium) 100 MG tablet Take 1 tablet by mouth 3 (Three) Times a Day. 6 tablet 0   • Potassium 99 MG tablet Take  by mouth.     • Probiotic Product (PROBIOTIC DAILY PO) Take  by mouth Daily.     • rosuvastatin (CRESTOR) 10 MG tablet Take 1 tablet by mouth Daily. 90 tablet 1   • VITAMIN D PO Take  by mouth.       No current facility-administered medications on file prior to visit.     Allergies as of 07/05/2022   • (No Known Allergies)     Social History     Socioeconomic History   • Marital status:    • Number of children: 4   • Years of education: High School   Tobacco Use   • Smoking status: Never Smoker   • Smokeless tobacco: Never Used   Vaping Use   • Vaping Use: Never used   Substance and Sexual Activity   • Alcohol use: No     Comment: caffeine use- 1/2 cup of coffee daily, soda   • Drug use: No   • Sexual activity: Defer     Family History   Problem Relation Age of Onset   • Breast cancer Mother         70's   • Colon cancer Mother    • Stroke Mother    • Heart disease Father    • Heart disease Sister    • Hypertension Sister    • Hypertension Sister    • Diabetes Sister    • Ovarian cancer Neg Hx    • Colon polyps Neg Hx    • Crohn's disease Neg Hx    • Irritable bowel syndrome Neg Hx    • Ulcerative  "colitis Neg Hx        Review of Systems   Constitutional: Positive for malaise/fatigue.   Cardiovascular: Positive for leg swelling and near-syncope.   Musculoskeletal: Positive for joint pain and joint swelling.   Neurological: Positive for light-headedness.   All other systems reviewed and are negative.     Objective:     Vitals:    07/05/22 1415   BP: 166/68   BP Location: Right arm   Patient Position: Sitting   Cuff Size: Adult   Pulse: (!) 36   SpO2: 96%   Weight: 69.2 kg (152 lb 9.6 oz)   Height: 160 cm (63\")     Body mass index is 27.03 kg/m².    Constitutional:       Appearance: Well-developed.   Eyes:      Conjunctiva/sclera: Conjunctivae normal.   HENT:      Head: Normocephalic and atraumatic.   Pulmonary:      Effort: Pulmonary effort is normal.      Breath sounds: Normal breath sounds.   Cardiovascular:      Normal rate. Regular rhythm.      Murmurs: There is no murmur.      No gallop.   Edema:     Peripheral edema absent.   Abdominal:      Palpations: Abdomen is soft.      Tenderness: There is no abdominal tenderness.   Musculoskeletal:      Cervical back: Neck supple. Skin:     General: Skin is warm.   Neurological:      Mental Status: Alert and oriented to person, place, and time.   Psychiatric:         Behavior: Behavior normal.       Lab Review:   Procedures    Lipid Panel    Lipid Panel 7/13/21 1/14/22   Total Cholesterol 137 144   Triglycerides 134 134   HDL Cholesterol 36 (A) 37 (A)   VLDL Cholesterol 24 24   LDL Cholesterol  77 83   LDL/HDL Ratio 2.06 2.2   (A) Abnormal value       Comments are available for some flowsheets but are not being displayed.              Cardiac Procedures:  1.  Lexiscan Myoview stress test on 8/13/2021: Normal with no evidence of ischemia.  2.  Echocardiogram on 8/13/2021: The ejection fraction was 67%.  There was moderate LVH.  The left atrium was moderately enlarged.  There was grade 1 diastolic dysfunction.  The calculated RVSP was 40 mmHg.    Assessment:       " "Diagnosis Plan   1. Symptomatic bradycardia  Case Request EP Lab: Pacemaker DC new   2. Lightheadedness     3. Primary hypertension     4. SSS (sick sinus syndrome) ([facility])       Plan:       Again, she has had exertional chest tightness, lightheadedness, and a feeling of being \"off\".  She has had no syncopal episodes, although she has felt the same prodrome of presyncope.  Her EKG today shows sinus bradycardia with a rate of 36 and that appears to be potentially blocked PACs.  Additionally, on a rhythm strip which was taken after the EKG, it appears that she also has a transient left bundle branch block.  She is on a beta-blocker with carvedilol 6.25 mg twice a day.  She has been on a beta-blocker long-term for her blood pressure management.  However, I am really concerned that she has symptomatic bradycardia and high-grade conduction disease, which may be worsening over the previous month.      Given her symptoms, her age, and the bradycardia, I have recommended placement of a dual-chamber permanent pacemaker.  I discussed this with Dr. Rios from EP, and he agrees.  I am going to have the patient scheduled for later this week with him.  She will go to the ER for any severe symptoms.  I did tell her that she should not drive for now.  If she continues to have chest tightness afterwards, a more detailed ischemic assessment can be obtained at that time.    Addendum:  The patient has evidence of sick sinus syndrome.  "

## 2022-07-08 ENCOUNTER — APPOINTMENT (OUTPATIENT)
Dept: GENERAL RADIOLOGY | Facility: HOSPITAL | Age: 87
End: 2022-07-08

## 2022-07-08 ENCOUNTER — HOSPITAL ENCOUNTER (OUTPATIENT)
Facility: HOSPITAL | Age: 87
Setting detail: HOSPITAL OUTPATIENT SURGERY
Discharge: HOME | End: 2022-07-08
Attending: INTERNAL MEDICINE | Admitting: INTERNAL MEDICINE

## 2022-07-08 VITALS
SYSTOLIC BLOOD PRESSURE: 133 MMHG | RESPIRATION RATE: 18 BRPM | OXYGEN SATURATION: 93 % | TEMPERATURE: 97.1 F | WEIGHT: 152 LBS | HEIGHT: 63 IN | DIASTOLIC BLOOD PRESSURE: 125 MMHG | HEART RATE: 65 BPM | BODY MASS INDEX: 26.93 KG/M2

## 2022-07-08 DIAGNOSIS — R00.1 SYMPTOMATIC BRADYCARDIA: ICD-10-CM

## 2022-07-08 LAB
GLUCOSE BLDC GLUCOMTR-MCNC: 103 MG/DL (ref 70–130)
QT INTERVAL: 425 MS
QT INTERVAL: 508 MS

## 2022-07-08 PROCEDURE — 93005 ELECTROCARDIOGRAM TRACING: CPT | Performed by: INTERNAL MEDICINE

## 2022-07-08 PROCEDURE — 33208 INSRT HEART PM ATRIAL & VENT: CPT | Performed by: INTERNAL MEDICINE

## 2022-07-08 PROCEDURE — C1894 INTRO/SHEATH, NON-LASER: HCPCS | Performed by: INTERNAL MEDICINE

## 2022-07-08 PROCEDURE — C1769 GUIDE WIRE: HCPCS | Performed by: INTERNAL MEDICINE

## 2022-07-08 PROCEDURE — C1898 LEAD, PMKR, OTHER THAN TRANS: HCPCS | Performed by: INTERNAL MEDICINE

## 2022-07-08 PROCEDURE — 25010000002 CEFAZOLIN IN DEXTROSE 2-4 GM/100ML-% SOLUTION: Performed by: INTERNAL MEDICINE

## 2022-07-08 PROCEDURE — C1785 PMKR, DUAL, RATE-RESP: HCPCS | Performed by: INTERNAL MEDICINE

## 2022-07-08 PROCEDURE — 25010000002 MIDAZOLAM PER 1 MG: Performed by: INTERNAL MEDICINE

## 2022-07-08 PROCEDURE — 0 IOPAMIDOL PER 1 ML: Performed by: INTERNAL MEDICINE

## 2022-07-08 PROCEDURE — 82962 GLUCOSE BLOOD TEST: CPT

## 2022-07-08 PROCEDURE — 0 LIDOCAINE 1 % SOLUTION: Performed by: INTERNAL MEDICINE

## 2022-07-08 PROCEDURE — 25010000002 FENTANYL CITRATE (PF) 50 MCG/ML SOLUTION: Performed by: INTERNAL MEDICINE

## 2022-07-08 PROCEDURE — 71045 X-RAY EXAM CHEST 1 VIEW: CPT

## 2022-07-08 PROCEDURE — 99152 MOD SED SAME PHYS/QHP 5/>YRS: CPT | Performed by: INTERNAL MEDICINE

## 2022-07-08 PROCEDURE — 99153 MOD SED SAME PHYS/QHP EA: CPT | Performed by: INTERNAL MEDICINE

## 2022-07-08 DEVICE — PACEMAKER
Type: IMPLANTABLE DEVICE | Status: FUNCTIONAL
Brand: ACCOLADE™ MRI DR

## 2022-07-08 DEVICE — FLOSEAL HEMOSTATIC MATRIX, 10ML
Type: IMPLANTABLE DEVICE | Status: FUNCTIONAL
Brand: FLOSEAL HEMOSTATIC MATRIX

## 2022-07-08 DEVICE — PACE/SENSE LEAD
Type: IMPLANTABLE DEVICE | Status: FUNCTIONAL
Brand: INGEVITY™+

## 2022-07-08 RX ORDER — ACETAMINOPHEN 325 MG/1
650 TABLET ORAL EVERY 4 HOURS PRN
Status: DISCONTINUED | OUTPATIENT
Start: 2022-07-08 | End: 2022-07-08 | Stop reason: HOSPADM

## 2022-07-08 RX ORDER — HYDROMORPHONE HYDROCHLORIDE 1 MG/ML
0.5 INJECTION, SOLUTION INTRAMUSCULAR; INTRAVENOUS; SUBCUTANEOUS
Status: DISCONTINUED | OUTPATIENT
Start: 2022-07-08 | End: 2022-07-08 | Stop reason: HOSPADM

## 2022-07-08 RX ORDER — LIDOCAINE HYDROCHLORIDE 10 MG/ML
INJECTION, SOLUTION INFILTRATION; PERINEURAL AS NEEDED
Status: DISCONTINUED | OUTPATIENT
Start: 2022-07-08 | End: 2022-07-08 | Stop reason: HOSPADM

## 2022-07-08 RX ORDER — SODIUM CHLORIDE 0.9 % (FLUSH) 0.9 %
3 SYRINGE (ML) INJECTION EVERY 12 HOURS SCHEDULED
Status: DISCONTINUED | OUTPATIENT
Start: 2022-07-08 | End: 2022-07-08 | Stop reason: HOSPADM

## 2022-07-08 RX ORDER — NALOXONE HCL 0.4 MG/ML
0.4 VIAL (ML) INJECTION
Status: DISCONTINUED | OUTPATIENT
Start: 2022-07-08 | End: 2022-07-08 | Stop reason: HOSPADM

## 2022-07-08 RX ORDER — MIDAZOLAM HYDROCHLORIDE 1 MG/ML
INJECTION INTRAMUSCULAR; INTRAVENOUS AS NEEDED
Status: DISCONTINUED | OUTPATIENT
Start: 2022-07-08 | End: 2022-07-08 | Stop reason: HOSPADM

## 2022-07-08 RX ORDER — ACETAMINOPHEN 650 MG/1
650 SUPPOSITORY RECTAL EVERY 4 HOURS PRN
Status: DISCONTINUED | OUTPATIENT
Start: 2022-07-08 | End: 2022-07-08 | Stop reason: HOSPADM

## 2022-07-08 RX ORDER — SODIUM CHLORIDE 0.9 % (FLUSH) 0.9 %
10 SYRINGE (ML) INJECTION EVERY 12 HOURS SCHEDULED
Status: DISCONTINUED | OUTPATIENT
Start: 2022-07-08 | End: 2022-07-08 | Stop reason: HOSPADM

## 2022-07-08 RX ORDER — CEFAZOLIN SODIUM 2 G/100ML
INJECTION, SOLUTION INTRAVENOUS CONTINUOUS PRN
Status: COMPLETED | OUTPATIENT
Start: 2022-07-08 | End: 2022-07-08

## 2022-07-08 RX ORDER — SODIUM CHLORIDE 9 MG/ML
75 INJECTION, SOLUTION INTRAVENOUS CONTINUOUS
Status: DISCONTINUED | OUTPATIENT
Start: 2022-07-08 | End: 2022-07-08 | Stop reason: HOSPADM

## 2022-07-08 RX ORDER — SODIUM CHLORIDE 0.9 % (FLUSH) 0.9 %
10 SYRINGE (ML) INJECTION AS NEEDED
Status: DISCONTINUED | OUTPATIENT
Start: 2022-07-08 | End: 2022-07-08 | Stop reason: HOSPADM

## 2022-07-08 RX ORDER — FENTANYL CITRATE 50 UG/ML
INJECTION, SOLUTION INTRAMUSCULAR; INTRAVENOUS AS NEEDED
Status: DISCONTINUED | OUTPATIENT
Start: 2022-07-08 | End: 2022-07-08 | Stop reason: HOSPADM

## 2022-07-08 RX ADMIN — SODIUM CHLORIDE 75 ML/HR: 9 INJECTION, SOLUTION INTRAVENOUS at 10:51

## 2022-07-08 NOTE — INTERVAL H&P NOTE
H&P reviewed. The patient was examined and there are no changes to the H&P.      Patient with symptomatic Type II block.  Discussed risk of pacemaker placement with patient, including risk of cardiac injury and infection.  She expressed understanding and would like to proceed.

## 2022-07-15 DIAGNOSIS — E03.9 HYPOTHYROIDISM, UNSPECIFIED TYPE: ICD-10-CM

## 2022-07-15 DIAGNOSIS — E78.5 HYPERLIPIDEMIA, UNSPECIFIED HYPERLIPIDEMIA TYPE: Primary | ICD-10-CM

## 2022-07-15 DIAGNOSIS — E11.9 TYPE 2 DIABETES MELLITUS WITHOUT COMPLICATION, WITHOUT LONG-TERM CURRENT USE OF INSULIN: ICD-10-CM

## 2022-07-19 ENCOUNTER — CLINICAL SUPPORT NO REQUIREMENTS (OUTPATIENT)
Dept: CARDIOLOGY | Facility: CLINIC | Age: 87
End: 2022-07-19

## 2022-07-19 ENCOUNTER — TELEPHONE (OUTPATIENT)
Dept: CARDIOLOGY | Facility: CLINIC | Age: 87
End: 2022-07-19

## 2022-07-19 DIAGNOSIS — I49.5 SSS (SICK SINUS SYNDROME): Primary | ICD-10-CM

## 2022-07-19 PROCEDURE — 93280 PM DEVICE PROGR EVAL DUAL: CPT | Performed by: INTERNAL MEDICINE

## 2022-07-19 NOTE — TELEPHONE ENCOUNTER
BRIGIDO: pt was seen in office today for her 1 week incision check after being implanted with a BSX pacemaker device on 7/8/2022 for symptomatic bradycardia and sick sinus syndrome. I have included a snapshot of her presenting rhythm below in which she was having very frequent PACs. Pt also had 4 what appears to be a.flutter events recorded since implant. The longest event occurred on 7/10/2022 and lasted 2 minutes and 32 seconds with an average v.rate of 97 bpm. I just wanted to make you aware since she was seen by you in the office on 7/5/2022 with c/o of a reported irregular rhythm. Thanks, YamelRN    Snapshot of presenting rhythm:    A.Flutter event on 7/10/2022:

## 2022-07-28 ENCOUNTER — LAB (OUTPATIENT)
Dept: LAB | Facility: HOSPITAL | Age: 87
End: 2022-07-28

## 2022-07-28 DIAGNOSIS — E53.8 B12 DEFICIENCY: ICD-10-CM

## 2022-07-28 DIAGNOSIS — D47.2 MGUS (MONOCLONAL GAMMOPATHY OF UNKNOWN SIGNIFICANCE): ICD-10-CM

## 2022-07-28 LAB
ALBUMIN SERPL-MCNC: 4 G/DL (ref 3.5–5.2)
ALBUMIN/GLOB SERPL: 1.3 G/DL (ref 1.1–2.4)
ALP SERPL-CCNC: 72 U/L (ref 38–116)
ALT SERPL W P-5'-P-CCNC: 12 U/L (ref 0–33)
ANION GAP SERPL CALCULATED.3IONS-SCNC: 8.6 MMOL/L (ref 5–15)
AST SERPL-CCNC: 20 U/L (ref 0–32)
BASOPHILS # BLD AUTO: 0.05 10*3/MM3 (ref 0–0.2)
BASOPHILS NFR BLD AUTO: 0.7 % (ref 0–1.5)
BILIRUB SERPL-MCNC: 0.8 MG/DL (ref 0.2–1.2)
BUN SERPL-MCNC: 17 MG/DL (ref 6–20)
BUN/CREAT SERPL: 16.3 (ref 7.3–30)
CALCIUM SPEC-SCNC: 9.4 MG/DL (ref 8.5–10.2)
CHLORIDE SERPL-SCNC: 111 MMOL/L (ref 98–107)
CO2 SERPL-SCNC: 25.4 MMOL/L (ref 22–29)
CREAT SERPL-MCNC: 1.04 MG/DL (ref 0.6–1.1)
DEPRECATED RDW RBC AUTO: 49.6 FL (ref 37–54)
EGFRCR SERPLBLD CKD-EPI 2021: 52.1 ML/MIN/1.73
EOSINOPHIL # BLD AUTO: 0.26 10*3/MM3 (ref 0–0.4)
EOSINOPHIL NFR BLD AUTO: 3.6 % (ref 0.3–6.2)
ERYTHROCYTE [DISTWIDTH] IN BLOOD BY AUTOMATED COUNT: 13.6 % (ref 12.3–15.4)
GLOBULIN UR ELPH-MCNC: 3 GM/DL (ref 1.8–3.5)
GLUCOSE SERPL-MCNC: 107 MG/DL (ref 74–124)
HCT VFR BLD AUTO: 39.2 % (ref 34–46.6)
HGB BLD-MCNC: 12.4 G/DL (ref 12–15.9)
IMM GRANULOCYTES # BLD AUTO: 0.03 10*3/MM3 (ref 0–0.05)
IMM GRANULOCYTES NFR BLD AUTO: 0.4 % (ref 0–0.5)
LYMPHOCYTES # BLD AUTO: 1.9 10*3/MM3 (ref 0.7–3.1)
LYMPHOCYTES NFR BLD AUTO: 26.2 % (ref 19.6–45.3)
MCH RBC QN AUTO: 30.8 PG (ref 26.6–33)
MCHC RBC AUTO-ENTMCNC: 31.6 G/DL (ref 31.5–35.7)
MCV RBC AUTO: 97.5 FL (ref 79–97)
MONOCYTES # BLD AUTO: 0.57 10*3/MM3 (ref 0.1–0.9)
MONOCYTES NFR BLD AUTO: 7.9 % (ref 5–12)
NEUTROPHILS NFR BLD AUTO: 4.44 10*3/MM3 (ref 1.7–7)
NEUTROPHILS NFR BLD AUTO: 61.2 % (ref 42.7–76)
NRBC BLD AUTO-RTO: 0 /100 WBC (ref 0–0.2)
PLATELET # BLD AUTO: 182 10*3/MM3 (ref 140–450)
PMV BLD AUTO: 9.4 FL (ref 6–12)
POTASSIUM SERPL-SCNC: 4.7 MMOL/L (ref 3.5–4.7)
PROT SERPL-MCNC: 7 G/DL (ref 6.3–8)
RBC # BLD AUTO: 4.02 10*6/MM3 (ref 3.77–5.28)
SODIUM SERPL-SCNC: 145 MMOL/L (ref 134–145)
VIT B12 BLD-MCNC: 964 PG/ML (ref 211–946)
WBC NRBC COR # BLD: 7.25 10*3/MM3 (ref 3.4–10.8)

## 2022-07-28 PROCEDURE — 36415 COLL VENOUS BLD VENIPUNCTURE: CPT

## 2022-07-28 PROCEDURE — 85025 COMPLETE CBC W/AUTO DIFF WBC: CPT

## 2022-07-28 PROCEDURE — 80053 COMPREHEN METABOLIC PANEL: CPT

## 2022-07-28 PROCEDURE — 82607 VITAMIN B-12: CPT | Performed by: INTERNAL MEDICINE

## 2022-07-29 ENCOUNTER — OFFICE VISIT (OUTPATIENT)
Dept: FAMILY MEDICINE CLINIC | Facility: CLINIC | Age: 87
End: 2022-07-29

## 2022-07-29 VITALS
DIASTOLIC BLOOD PRESSURE: 66 MMHG | SYSTOLIC BLOOD PRESSURE: 138 MMHG | RESPIRATION RATE: 12 BRPM | TEMPERATURE: 97.4 F | HEIGHT: 63 IN | BODY MASS INDEX: 26.51 KG/M2 | HEART RATE: 69 BPM | OXYGEN SATURATION: 98 % | WEIGHT: 149.6 LBS

## 2022-07-29 DIAGNOSIS — E78.5 HYPERLIPIDEMIA: ICD-10-CM

## 2022-07-29 DIAGNOSIS — Z95.0 HISTORY OF PACEMAKER: ICD-10-CM

## 2022-07-29 DIAGNOSIS — N39.0 FREQUENT UTI: ICD-10-CM

## 2022-07-29 DIAGNOSIS — E55.9 VITAMIN D DEFICIENCY: ICD-10-CM

## 2022-07-29 DIAGNOSIS — E03.9 HYPOTHYROIDISM, UNSPECIFIED TYPE: ICD-10-CM

## 2022-07-29 DIAGNOSIS — E11.9 TYPE 2 DIABETES MELLITUS WITHOUT COMPLICATION, WITHOUT LONG-TERM CURRENT USE OF INSULIN: ICD-10-CM

## 2022-07-29 DIAGNOSIS — R10.2 VAGINAL PAIN: Primary | ICD-10-CM

## 2022-07-29 DIAGNOSIS — N81.10 FEMALE BLADDER PROLAPSE: ICD-10-CM

## 2022-07-29 DIAGNOSIS — E53.8 B12 DEFICIENCY: ICD-10-CM

## 2022-07-29 LAB
ALBUMIN SERPL ELPH-MCNC: 3.8 G/DL (ref 2.9–4.4)
ALBUMIN/GLOB SERPL: 1.4 {RATIO} (ref 0.7–1.7)
ALPHA1 GLOB SERPL ELPH-MCNC: 0.2 G/DL (ref 0–0.4)
ALPHA2 GLOB SERPL ELPH-MCNC: 0.7 G/DL (ref 0.4–1)
B-GLOBULIN SERPL ELPH-MCNC: 0.8 G/DL (ref 0.7–1.3)
GAMMA GLOB SERPL ELPH-MCNC: 1.2 G/DL (ref 0.4–1.8)
GLOBULIN SER-MCNC: 2.9 G/DL (ref 2.2–3.9)
IGA SERPL-MCNC: 141 MG/DL (ref 64–422)
IGG SERPL-MCNC: 1314 MG/DL (ref 586–1602)
IGM SERPL-MCNC: 42 MG/DL (ref 26–217)
INTERPRETATION SERPL IEP-IMP: ABNORMAL
KAPPA LC FREE SER-MCNC: 24 MG/L (ref 3.3–19.4)
KAPPA LC FREE/LAMBDA FREE SER: 0.66 {RATIO} (ref 0.26–1.65)
LABORATORY COMMENT REPORT: ABNORMAL
LAMBDA LC FREE SERPL-MCNC: 36.2 MG/L (ref 5.7–26.3)
M PROTEIN SERPL ELPH-MCNC: 0.7 G/DL
PROT SERPL-MCNC: 6.7 G/DL (ref 6–8.5)

## 2022-07-29 PROCEDURE — 99214 OFFICE O/P EST MOD 30 MIN: CPT | Performed by: INTERNAL MEDICINE

## 2022-07-29 PROCEDURE — 93000 ELECTROCARDIOGRAM COMPLETE: CPT | Performed by: INTERNAL MEDICINE

## 2022-07-29 RX ORDER — ROSUVASTATIN CALCIUM 10 MG/1
10 TABLET, COATED ORAL DAILY
Qty: 90 TABLET | Refills: 1 | Status: SHIPPED | OUTPATIENT
Start: 2022-07-29 | End: 2023-02-22

## 2022-07-29 RX ORDER — NYSTATIN AND TRIAMCINOLONE ACETONIDE 100000; 1 [USP'U]/G; MG/G
1 OINTMENT TOPICAL 2 TIMES DAILY
Qty: 60 G | Refills: 0 | Status: SHIPPED | OUTPATIENT
Start: 2022-07-29

## 2022-07-29 NOTE — PROGRESS NOTES
"Chief Complaint  Follow-up    Subjective        Donna M Yeoman presents to [facility]  History of Present Illness  Here for f/u on labs.  Had pacemaker placed 3 weeks ago due to SSS that was dx through a [employer] appt in her home.  Dr. Howell is her EP doc and primary cardiologist is Dr. Stewart.  EKG showed rate of 36.  The edema in legs has resolved and her BP is more controlled.  Still feeling tired and soa with steps.  F/u with pacemaker 8/29/22 and with APRN that day as well.  Has a lot of arthritis in back and neck.  She is using heating pad and taking tylenol and wears a back brace when she does housework.    Vaginal pain and discomfort and h/o frequent UTI's.  Also c/o air escaping from vagina.  Has seen  in past but is wanting to see urogyn at [place].  Objective   Vital Signs:  /66   Pulse 69   Temp 97.4 °F (36.3 °C) (Infrared)   Resp 12   Ht 160 cm (63\")   Wt 67.9 kg (149 lb 9.6 oz)   SpO2 98%   BMI 26.50 kg/m²   Estimated body mass index is 26.5 kg/m² as calculated from the following:    Height as of this encounter: 160 cm (63\").    Weight as of this encounter: 67.9 kg (149 lb 9.6 oz).        Lipid Panel With LDL / HDL Ratio (07/22/2022 11:15)  TSH (07/22/2022 11:15)  T4, Free (07/22/2022 11:15)  Hemoglobin A1c (07/22/2022 11:15)  MicroAlbumin, Urine, Random - (07/22/2022 11:15)  Comprehensive Metabolic Panel (07/22/2022 11:15)      Physical Exam  Vitals and nursing note reviewed.   Constitutional:       Appearance: Normal appearance. She is well-developed.   HENT:      Head: Normocephalic and atraumatic.      Right Ear: External ear normal.      Left Ear: External ear normal.   Eyes:      Extraocular Movements: Extraocular movements intact.      Conjunctiva/sclera: Conjunctivae normal.   Cardiovascular:      Rate and Rhythm: Normal rate and regular rhythm.      Heart sounds: Normal heart sounds.      Comments: No bruits; occasional ectopic beat " heard  Pulmonary:      Effort: Pulmonary effort is normal. No respiratory distress.      Breath sounds: Normal breath sounds. No wheezing or rales.   Abdominal:      General: Bowel sounds are normal. There is no distension.      Palpations: Abdomen is soft. There is no mass.      Tenderness: There is no abdominal tenderness.   Musculoskeletal:      Cervical back: Neck supple.   Lymphadenopathy:      Cervical: No cervical adenopathy.   Skin:     General: Skin is warm.   Neurological:      Mental Status: She is alert and oriented to person, place, and time.   Psychiatric:         Mood and Affect: Mood normal.         Behavior: Behavior normal.         Thought Content: Thought content normal.         Judgment: Judgment normal.        Result Review :                Assessment and Plan   Diagnoses and all orders for this visit:    1. Vaginal pain (Primary)  -     Ambulatory Referral to Gynecology    2. Frequent UTI  -     Ambulatory Referral to Gynecology    3. Female bladder prolapse  -     Ambulatory Referral to Gynecology    4. Hypothyroidism, unspecified type    5. B12 deficiency    6. Type 2 diabetes mellitus without complication, without long-term current use of insulin ([facility])    7. Vitamin D deficiency     8. Hyperlipidemia  -     rosuvastatin (CRESTOR) 10 MG tablet; Take 1 tablet by mouth Daily.  Dispense: 90 tablet; Refill: 1    Other orders  -     nystatin-triamcinolone (MYCOLOG) 477419-5.1 UNIT/GM-% ointment; Apply 1 application topically to the appropriate area as directed 2 (Two) Times a Day. Don't use more than 7 days in row  Dispense: 60 g; Refill: 0    EKG appears similar to the EKG done a week after her pacemaker placed.  Rhythm is paced in rate is normal.  Rate is at 88 bpm.  I have asked her to reach out to her cardiologist and let them know that she is still feeling tired and sometimes dyspnea on exertion is present.  Her labs been reviewed with patient.  She does complain of frequent UTIs, pelvic pain  and vaginal pain and an air sensation through her vagina.  I have referred her to Dr. Starr Goss at [facility].  I have also refilled her Crestor for her hyperlipidemia.  She will continue Levoxyl 75 mcg daily for hypothyroidism and Cozaar 50 mg daily for hypertension along with Coreg 6.25 mg twice daily.  Her gout is controlled with allopurinol 100 mg daily.  I will see her back in 6 months.       Follow Up   No follow-ups on file.  Patient was given instructions and counseling regarding her condition or for health maintenance advice. Please see specific information pulled into the AVS if appropriate.

## 2022-07-29 NOTE — PROGRESS NOTES
Procedure     ECG 12 Lead    Date/Time: 7/29/2022 3:55 PM  Performed by: Caroline Weiner MD  Authorized by: aCroline Weiner MD   Comparison: compared with previous ECG   Similar to previous ECG  Rhythm: sinus rhythm  Pacing: dual chamber pacing  Clinical impression: non-specific ECG

## 2022-08-02 ENCOUNTER — TELEPHONE (OUTPATIENT)
Dept: FAMILY MEDICINE CLINIC | Facility: CLINIC | Age: 87
End: 2022-08-02

## 2022-08-02 NOTE — TELEPHONE ENCOUNTER
Dr. Starr Goss had a partner Dr. Marcus (unsure of exact spelling).  She could see her or anyone in the urogynecologist office there.

## 2022-08-02 NOTE — TELEPHONE ENCOUNTER
Caller: Yeoman, Donna M    Relationship: Self    Best call back number:     What is the best time to reach you:     Who are you requesting to speak with (clinical staff, provider,  specific staff member):     Do you know the name of the person who called:     What was the call regarding: PATIENT IS CALLING IN STATING THAT DR GEORGE WANTED HER TO SEE DR THAKKAR AT THE OFFICE AT [address] [place] [address].  SHE WAS TOLD THAT DR THAKKAR IS NO LONGER AT THE OFFICE.  PATIENT WANTS TO KNOW IF DR GEORGE WANTS HER TO SEE ANOTHER PROVIDER THERE.     Do you require a callback: YES

## 2022-08-04 ENCOUNTER — OFFICE VISIT (OUTPATIENT)
Dept: ONCOLOGY | Facility: CLINIC | Age: 87
End: 2022-08-04

## 2022-08-04 ENCOUNTER — APPOINTMENT (OUTPATIENT)
Dept: LAB | Facility: HOSPITAL | Age: 87
End: 2022-08-04

## 2022-08-04 VITALS
HEIGHT: 63 IN | DIASTOLIC BLOOD PRESSURE: 82 MMHG | HEART RATE: 70 BPM | WEIGHT: 148.6 LBS | BODY MASS INDEX: 26.33 KG/M2 | TEMPERATURE: 97.1 F | RESPIRATION RATE: 18 BRPM | SYSTOLIC BLOOD PRESSURE: 151 MMHG | OXYGEN SATURATION: 95 %

## 2022-08-04 DIAGNOSIS — D47.2 MGUS (MONOCLONAL GAMMOPATHY OF UNKNOWN SIGNIFICANCE): Primary | ICD-10-CM

## 2022-08-04 DIAGNOSIS — E53.8 B12 DEFICIENCY: ICD-10-CM

## 2022-08-04 PROCEDURE — 99213 OFFICE O/P EST LOW 20 MIN: CPT | Performed by: INTERNAL MEDICINE

## 2022-08-04 NOTE — PROGRESS NOTES
Subjective    REASON FOR FOLLOW UP:  IgG Lambda MGUS    HISTORY OF PRESENT ILLNESS:     History of Present Illness  Shirley is a 87 y.o. 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  She had labs drawn in our office 7/28/2022 showing a stable IgG lambda monoclonal spike quantitated at 0.7 g/dL.  Her IgG level was still within normal limits at 1314.  Her blood count was unremarkable and she denies any new symptoms.    She looks amazingly well and vigorous for her age.  She did require pacemaker placement last month but seems to recuperated well from that procedure.  Her only complaint today is chronic fatigue.    Past Medical History, Past Surgical History, Social History, Family History have been reviewed and are without significant changes except as mentioned.    Review of Systems   Constitutional: Negative for activity change, appetite change, fatigue, fever and unexpected weight change.   HENT: Negative for hearing loss, nosebleeds, trouble swallowing and voice change.    Eyes: Negative for visual disturbance.   Respiratory: Positive for cough. Negative for shortness of breath and wheezing.    Cardiovascular: Negative for chest pain and palpitations.   Gastrointestinal: Negative for abdominal pain, diarrhea, nausea and vomiting.   Genitourinary: Negative for difficulty urinating, frequency, hematuria and urgency.   Musculoskeletal: Positive for arthralgias and back pain. Negative for neck pain.   Skin: Negative for rash.   Neurological: Negative for dizziness, seizures, syncope and headaches.   Hematological: Negative for adenopathy. Does not bruise/bleed easily.   Psychiatric/Behavioral: Negative for behavioral problems. The patient is not nervous/anxious.      A comprehensive 14 point review of systems was performed and was negative except as mentioned.    Medications:  The current medication list was reviewed  "in the EMR    ALLERGIES:    No Known Allergies    Objective      Vitals:    08/04/22 1506   BP: 151/82   Pulse: 70   Resp: 18   Temp: 97.1 °F (36.2 °C)   TempSrc: Temporal   SpO2: 95%   Weight: 67.4 kg (148 lb 9.6 oz)   Height: 160 cm (62.99\")   PainSc: 0-No pain     Current Status 8/4/2022   ECOG score 0     No physical exam on the visit of 2/11/2021 as this was a telemedicine visit.  Physical Exam   Constitutional: She is oriented to person, place, and time. She appears well-developed. No distress.   HENT:   Head: Normocephalic.   Eyes: Pupils are equal, round, and reactive to light. Conjunctivae are normal. No scleral icterus.   Neck: No JVD present. No thyromegaly present.   Cardiovascular: Normal rate and regular rhythm. Exam reveals no gallop and no friction rub.   No murmur heard.  Permanent pacemaker in the left anterior chest wall   Pulmonary/Chest: Effort normal and breath sounds normal. She has no wheezes. She has no rales.   Abdominal: Soft. She exhibits no distension and no mass. There is no abdominal tenderness.   Musculoskeletal: Normal range of motion. No deformity.   Lymphadenopathy:     She has no cervical adenopathy.   Neurological: She is alert and oriented to person, place, and time. She has normal reflexes. No cranial nerve deficit.   Skin: Skin is warm and dry. No rash noted. No erythema.   Psychiatric: Her behavior is normal. Judgment normal.   I have reexamined the patient and the results are consistent with the previously documented exam. King Adame MD       RECENT LABS:  Hematology WBC   Date Value Ref Range Status   07/28/2022 7.25 3.40 - 10.80 10*3/mm3 Final   01/14/2022 5.9 3.4 - 10.8 x10E3/uL Final     RBC   Date Value Ref Range Status   07/28/2022 4.02 3.77 - 5.28 10*6/mm3 Final   01/14/2022 4.17 3.77 - 5.28 x10E6/uL Final     Hemoglobin   Date Value Ref Range Status   07/28/2022 12.4 12.0 - 15.9 g/dL Final     Hematocrit   Date Value Ref Range Status   07/28/2022 39.2 34.0 - " 46.6 % Final     Platelets   Date Value Ref Range Status   07/28/2022 182 140 - 450 10*3/mm3 Final          Lab Results   Component Value Date    GLUCOSE 107 07/28/2022    BUN 17 07/28/2022    CREATININE 1.04 07/28/2022    EGFRIFNONA 61 01/14/2022    EGFRIFAFRI 70 01/14/2022    BCR 16.3 07/28/2022    K 4.7 07/28/2022    CO2 25.4 07/28/2022    CALCIUM 9.4 07/28/2022    PROTENTOTREF 6.7 07/28/2022    ALBUMIN 3.8 07/28/2022    ALBUMIN 4.00 07/28/2022    LABIL2 1.4 07/28/2022    AST 20 07/28/2022    ALT 12 07/28/2022       SPEP/LISA 7/28/2022  IgG   586 - 1602 mg/dL 1314      1307     IgA   64 - 422 mg/dL 141      136     IgM   26 - 217 mg/dL 42      47     Total Protein   6.0 - 8.5 g/dL 6.7  7.0 R   7.2  6.8  6.9  7.1 R    Albumin   2.9 - 4.4 g/dL 3.8  4.00 R   4.4 R  3.9 R  3.7  4.00 R    Alpha-1-Globulin   0.0 - 0.4 g/dL 0.2      0.2     Alpha-2-Globulin   0.4 - 1.0 g/dL 0.7      0.8     Beta Globulin   0.7 - 1.3 g/dL 0.8      0.9     Gamma Globulin   0.4 - 1.8 g/dL 1.2      1.3     M-Esteban   Not Observed g/dL 0.7 High       0.7 High      Globulin   2.2 - 3.9 g/dL 2.9    2.8 R  2.9 R  3.2     A/G Ratio   0.7 - 1.7 1.4  1.3 R   1.6 R  1.3 R  1.2  1.3 R    Immunofixation Reflex, Serum  Comment Abnormal       Comment Abnormal  CM     Comment: Immunofixation shows IgG monoclonal protein with lambda light chain   specificity.     Free Light Chain, Kappa   3.3 - 19.4 mg/L 24.0 High       24.4 High      Free Lambda Light Chains   5.7 - 26.3 mg/L 36.2 High       48.7 High      Kappa/Lambda Ratio   0.26 - 1.65 0.66              Lab Results   Component Value Date    [id number] 964 (H) 07/28/2022     Assessment & Plan   1.  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  2.  History of B12 deficiency.  As noted above her B12 level was normal from 7/28/2022.  She continues to take a daily B12 supplement.  3.  Recent pacemaker  placement      Plan    1.  Return for follow-up in 12 months.  2.  Labs will again be drawn one week prior to visit including a CBC, B12, serum chemistries, serum protein electrophoresis, immunofixation, and free serum light chain analysis.                8/4/2022      CC:

## 2022-08-10 NOTE — TELEPHONE ENCOUNTER
Rx Refill Note  Requested Prescriptions     Pending Prescriptions Disp Refills   • glimepiride (AMARYL) 2 MG tablet [Pharmacy Med Name: GLIMEPIRIDE 2 MG Tablet] 90 tablet 1     Sig: TAKE 1 TABLET BY MOUTH EVERY MORNING BEFORE BREAKFAST.   • carvedilol (COREG) 6.25 MG tablet [Pharmacy Med Name: CARVEDILOL 6.25 MG Tablet] 180 tablet 1     Sig: TAKE 1 TABLET BY MOUTH 2 (TWO) TIMES A DAY WITH MEALS.      Last office visit with prescribing clinician: 7/29/2022      Next office visit with prescribing clinician: 1/31/2023       {TIP  Please add Last Relevant Lab Date if appropriate: 7/22/22    Pina Flores MA  08/10/22, 16:31 EDT

## 2022-08-12 RX ORDER — CARVEDILOL 6.25 MG/1
6.25 TABLET ORAL 2 TIMES DAILY WITH MEALS
Qty: 180 TABLET | Refills: 1 | Status: SHIPPED | OUTPATIENT
Start: 2022-08-12

## 2022-08-12 RX ORDER — GLIMEPIRIDE 2 MG/1
2 TABLET ORAL
Qty: 90 TABLET | Refills: 1 | Status: SHIPPED | OUTPATIENT
Start: 2022-08-12

## 2022-08-29 ENCOUNTER — OFFICE VISIT (OUTPATIENT)
Dept: CARDIOLOGY | Facility: CLINIC | Age: 87
End: 2022-08-29

## 2022-08-29 ENCOUNTER — CLINICAL SUPPORT NO REQUIREMENTS (OUTPATIENT)
Dept: CARDIOLOGY | Facility: CLINIC | Age: 87
End: 2022-08-29

## 2022-08-29 VITALS
HEART RATE: 70 BPM | BODY MASS INDEX: 26.75 KG/M2 | HEIGHT: 63 IN | DIASTOLIC BLOOD PRESSURE: 86 MMHG | WEIGHT: 151 LBS | SYSTOLIC BLOOD PRESSURE: 140 MMHG

## 2022-08-29 DIAGNOSIS — I49.5 SSS (SICK SINUS SYNDROME): Primary | ICD-10-CM

## 2022-08-29 DIAGNOSIS — R00.1 SYMPTOMATIC BRADYCARDIA: ICD-10-CM

## 2022-08-29 PROCEDURE — 99213 OFFICE O/P EST LOW 20 MIN: CPT

## 2022-08-29 PROCEDURE — 93000 ELECTROCARDIOGRAM COMPLETE: CPT

## 2022-08-29 NOTE — PROGRESS NOTES
"Date of Office Visit: 2022  Encounter Provider: LIONEL Merritt  Place of Service: [facility] CARDIOLOGY  Patient Name: Donna M Yeoman  :1935    Chief Complaint   Patient presents with   • SSS   • symptomatic bradycardia   • Pacemaker Check   :     HPI: Donna M Yeoman is a 87 y.o. female who is followed by Dr. Stewart, referred to Dr. Rios for pacemaker implantation.  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     She saw Dr. Stewart on 2022.  She came in for an outpatient EKG after a home health nurse told her that her heart sounded \"out of rhythm\".  She was also complaining of dizziness and feeling \"off balance\".  EKG in office revealed sinus bradycardia with a heart rate of 36bpm.  Dr. Rios reviewed the EKG and patient's chart and recommended that she undergo pacemaker placement due to symptomatic bradycardia (SSS, 2:1 block).  She had DC Bennett pacemaker placed on 2022.                  She presents today for follow up appt.       She says that she is doing well since her device was placed last month.     She says that she was pretty weak and sore for about two weeks but is starting to feel much better.     She does complain of occasional fatigue and shortness of breath with exertion but says that it continues to get better.    She has not had anymore dizzy spells or syncopal events.    Before the device she was noticing swelling in her ankles and feet but since pacemaker was placed that has subsided.     She denies any chest pain, dizziness, or syncope.     Normal device testing and function in office today.  AP:61%, :2%. She did have about 30 AF/AT events, the longest was 2 minutes and she was unaware. I did decrease her LRL to 60bpm today.     She is not on AC.      Past Medical History:   Diagnosis Date   • Arthritis    • Colon polyp 2017   • " Diabetes mellitus (HCC)    • Esophageal reflux    • GERD (gastroesophageal reflux disease)    • H/O DVT (deep venous thrombosis)    • Headache 07/01/2017   • Hiatal hernia 07/01/2017   • History of vitamin D deficiency    • Hypercholesteremia    • Hyperlipidemia    • Hypertension    • Low back pain 07/01/2017   • MGUS (monoclonal gammopathy of unknown significance)    • NICOLE on CPAP     AHI 15/h   • Osteoporosis    • Peripheral neuropathy    • Pneumonia 07/01/2017   • PONV (postoperative nausea and vomiting)    • Pulmonary embolism (HCC)    • Pulmonary hypertension (HCC)    • Thyroid disease    • Venous thrombosis        Past Surgical History:   Procedure Laterality Date   • APPENDECTOMY  1949   • BLADDER REPAIR     • BREAST BIOPSY Right     benign 2019   • CARDIAC ELECTROPHYSIOLOGY PROCEDURE N/A 7/8/2022    Procedure: Pacemaker DC new  [place];  Surgeon: Isauro Rios MD;  Location:  INVASIVE LOCATION;  Service: Cardiology;  Laterality: N/A;   • COLONOSCOPY     • CYSTOCELE REPAIR      bladder reconstruction   • HYSTERECTOMY     • SALPINGECTOMY     • SALPINGO OOPHORECTOMY      for ectopic pregnancy   • UPPER GASTROINTESTINAL ENDOSCOPY         Social History     Socioeconomic History   • Marital status:    • Number of children: 4   • Years of education: High School   Tobacco Use   • Smoking status: Never Smoker   • Smokeless tobacco: Never Used   Vaping Use   • Vaping Use: Never used   Substance and Sexual Activity   • Alcohol use: No     Comment: caffeine use- 1/2 cup of coffee daily, soda   • Drug use: No   • Sexual activity: Defer       Family History   Problem Relation Age of Onset   • Breast cancer Mother         70's   • Colon cancer Mother    • Stroke Mother    • Heart disease Father    • Heart disease Sister    • Hypertension Sister    • Hypertension Sister    • Diabetes Sister    • Ovarian cancer Neg Hx    • Colon polyps Neg Hx    • Crohn's disease Neg Hx    • Irritable bowel syndrome  Neg Hx    • Ulcerative colitis Neg Hx        Review of Systems   Constitutional: Negative for chills, fever and malaise/fatigue.   Cardiovascular: Negative for chest pain, dyspnea on exertion, leg swelling, near-syncope, orthopnea, palpitations, paroxysmal nocturnal dyspnea and syncope.   Respiratory: Positive for shortness of breath. Negative for cough.    Hematologic/Lymphatic: Negative.    Musculoskeletal: Negative for joint pain, joint swelling and myalgias.   Gastrointestinal: Negative for abdominal pain, diarrhea, melena, nausea and vomiting.   Genitourinary: Negative for frequency and hematuria.   Neurological: Negative for light-headedness, numbness, paresthesias and seizures.   Allergic/Immunologic: Negative.    All other systems reviewed and are negative.      No Known Allergies      Current Outpatient Medications:   •  allopurinol (ZYLOPRIM) 100 MG tablet, TAKE 1 TABLET EVERY DAY, Disp: 90 tablet, Rfl: 1  •  aspirin 81 MG EC tablet, Take 81 mg by mouth Daily., Disp: , Rfl:   •  Calcium Carbonate-Vitamin D (CALTRATE 600+D PO), Take  by mouth., Disp: , Rfl:   •  carvedilol (COREG) 6.25 MG tablet, TAKE 1 TABLET BY MOUTH 2 (TWO) TIMES A DAY WITH MEALS., Disp: 180 tablet, Rfl: 1  •  CRANBERRY PO, Take  by mouth., Disp: , Rfl:   •  Cyanocobalamin (B-12 PO), Take 1,000 mg by mouth., Disp: , Rfl:   •  ESTRACE VAGINAL 0.1 MG/GM vaginal cream, INSERT 1/2 TO 1 INCH VAGINALLY ONCE WEEKLY AS DIRECTED, Disp: , Rfl: 11  •  glimepiride (AMARYL) 2 MG tablet, TAKE 1 TABLET BY MOUTH EVERY MORNING BEFORE BREAKFAST., Disp: 90 tablet, Rfl: 1  •  levothyroxine (SYNTHROID, LEVOTHROID) 75 MCG tablet, TAKE 1 TABLET EVERY DAY, Disp: 90 tablet, Rfl: 1  •  Loratadine 10 MG capsule, Take  by mouth., Disp: , Rfl:   •  losartan (COZAAR) 50 MG tablet, TAKE 1 TABLET EVERY DAY, Disp: 90 tablet, Rfl: 1  •  nystatin-triamcinolone (MYCOLOG) 184054-0.1 UNIT/GM-% ointment, Apply 1 application topically to the appropriate area as directed 2  "(Two) Times a Day. Don't use more than 7 days in row, Disp: 60 g, Rfl: 0  •  omeprazole (priLOSEC) 40 MG capsule, TAKE 1 CAPSULE BY MOUTH TWICE A DAY (Patient taking differently: Take 40 mg by mouth As Needed.), Disp: 90 capsule, Rfl: 1  •  ONETOUCH DELICA LANCETS 33G misc, USE TO TEST BLOOD SUGAR TWICE DAILY, Disp: 100 each, Rfl: 1  •  ONETOUCH VERIO test strip, TEST EVERY MORNING, Disp: 100 each, Rfl: 5  •  Potassium 99 MG tablet, Take  by mouth., Disp: , Rfl:   •  Probiotic Product (PROBIOTIC DAILY PO), Take  by mouth Daily., Disp: , Rfl:   •  rosuvastatin (CRESTOR) 10 MG tablet, Take 1 tablet by mouth Daily., Disp: 90 tablet, Rfl: 1  •  VITAMIN D PO, Take  by mouth., Disp: , Rfl:       Objective:     Vitals:    08/29/22 1153   BP: 140/86   Pulse: 70   Weight: 68.5 kg (151 lb)   Height: 160 cm (63\")     Body mass index is 26.75 kg/m².    PHYSICAL EXAM:    Vitals Reviewed.   General Appearance: No acute distress, well developed and well nourished.   Eyes: Conjunctiva and lids: No erythema, swelling, or discharge. Sclera non-icteric.   HENT: Atraumatic, normocephalic. External eyes, ears, and nose normal.   Respiratory: No signs of respiratory distress. Respiration rhythm and depth normal.   Clear to auscultation. No rales, crackles, rhonchi, or wheezing auscultated.   Cardiovascular:  Heart Rate and Rhythm: Normal, Heart Sounds: Normal S1 and S2. No S3 or S4 noted.  Gastrointestinal:  Abdomen soft, non-distended, non-tender.   Musculoskeletal: Normal movement of extremities  Skin: Warm and dry.   Psychiatric: Patient alert and oriented to person, place, and time. Speech and behavior appropriate. Normal mood and affect.       ECG 12 Lead    Date/Time: 8/29/2022 12:45 PM  Performed by: Andrew Hand APRN  Authorized by: Andrew Hand APRN   Comparison: compared with previous ECG   Similar to previous ECG  Rhythm: paced  Rhythm comments: AP  BPM: 70                Assessment:       Diagnosis Plan   1. SSS (sick " sinus syndrome) (HCC)     2. Symptomatic bradycardia            Plan:   1-2. SSS, 2:1 HB,---s/p DC PPM (7/8/2022)-- she has done well since device was placed a month ago.  She still has some fatigue but it is less than before pacemaker and she thinks it is getting better.  Normal device testing and function in office today.  AP:61%, :2%.         3. Shortness of breath-- she has occasional shortness of breath with exertion and this has been ongoing. She thinks it is better since device.  Consider turning on rate response at next follow up appt.        4. Atrial fibrillation-- She did have some episodes of AF, longest was 2 minutes and she was unaware.  The AF is new for her, not on AC due to length of episodes. We will continue to monitor at this time.  [id number] is a 5.           She will follow up with Dr. Rios in 4-6 weeks or sooner if she has issues.          As always, it has been a pleasure to participate in your patient's care.      Sincerely,         LIONEL Merritt

## 2022-11-10 ENCOUNTER — OFFICE VISIT (OUTPATIENT)
Dept: SLEEP MEDICINE | Facility: HOSPITAL | Age: 87
End: 2022-11-10

## 2022-11-10 VITALS
HEIGHT: 63 IN | WEIGHT: 150 LBS | SYSTOLIC BLOOD PRESSURE: 146 MMHG | OXYGEN SATURATION: 98 % | DIASTOLIC BLOOD PRESSURE: 68 MMHG | BODY MASS INDEX: 26.58 KG/M2 | HEART RATE: 73 BPM

## 2022-11-10 DIAGNOSIS — Z99.89 OSA ON CPAP: Primary | ICD-10-CM

## 2022-11-10 DIAGNOSIS — G47.33 OSA ON CPAP: Primary | ICD-10-CM

## 2022-11-10 PROCEDURE — G0463 HOSPITAL OUTPT CLINIC VISIT: HCPCS

## 2022-11-10 PROCEDURE — 99213 OFFICE O/P EST LOW 20 MIN: CPT | Performed by: INTERNAL MEDICINE

## 2022-11-10 NOTE — PROGRESS NOTES
"  [facility]  [address]  [address]  [place] [address]  Phone   Fax       SLEEP CLINIC FOLLOW UP PROGRESS NOTE.    Donna M Yeoman  [id number]   1935  87 y.o.  female      PCP: Caroline Weiner MD      Date of visit: 11/10/2022    Chief Complaint   Patient presents with   • Sleep Apnea       HPI:  This is a 87 y.o. years old patient is here for the management of obstructive sleep apnea.  Sleep apnea is moderate in severity with a AHI of 15/hr. Patient is using positive airway pressure therapy with CPAP 11 and the symptoms of snoring, non-restorative sleep and daytime excessive sleepiness have improved significantly on the therapy. Normally goes to bed at 10 PM and wakes up at 8 AM.  The patient wakes up 3 time(s) during the night and has no problem going back to sleep.  Feels refreshed after waking up.  Patient also denies headaches and nasal congestion.  Recently she had a pacemaker put in    Medications and allergies are reviewed by me and documented in the encounter.     SOCIAL (habits pertaining to sleep medicine)  History tobacco use:No   History of alcohol use: 0 per week  Caffeine use: 1     REVIEW OF SYSTEMS:   [place] Sleepiness Scale :Total score: 1   Nasal congestion:No   Dry mouth/nose:No   Post nasal drip; No   Acid reflux/Heartburn:Yes   Abd bloating:No   Morning headache:No   Anxiety:No   Depression:No    PHYSICAL EXAMINATION:  CONSTITUTIONAL:  Vitals:    11/10/22 0957   BP: 146/68   Pulse: 73   SpO2: 98%   Weight: 68 kg (150 lb)   Height: 160 cm (63\")    Body mass index is 26.57 kg/m².   NOSE: nasal passages are clear, No deformities noted   RESP SYSTEM: Not in any respiratory distress, no chest deformities noted,   CARDIOVASULAR: No edema noted  NEURO: Oriented x 3, gait normal,  Mood and affect appeared appropriate      Data reviewed:  The Smart card downloaded on 11/10/2022 has been reviewed independently by me for compliance and discussed " the data with the patient.   Compliance; 99%  More than 4 hr use, 99%  Average use of the device 9 hours and 58 per night  Residual AHI: Two-point /hr (goal < 5.0 /hr)  Mask type: Nasal mask  Device: DreamStation 2  DME: [employer]  Patient also has a pacemaker      ASSESSMENT AND PLAN:  · Obstructive sleep apnea ( G 47.33).  The symptoms of sleep apnea have improved with the device and the treatment.  Patient's compliance with the device is excellent for treatment of sleep apnea.  I have independently reviewed the smart card down load and discussed with the patient the download data and encouarged the patient to continue to use the device.The residual AHI is acceptable. The device is benefiting the patient and the device is medically necessary.  Without proper control of sleep apnea and good compliance there is a increased risk for hypertension, diabetes mellitus and nonrestorative sleep with hypersomnia which can increase risk for motor vehicle accidents.  Untreated sleep apnea is also a risk factor for development of atrial fibrillation, pulmonary hypertension and stroke. The patient is also instructed to get the supplies from the [employer] and and change them on a regular basis.  A prescription for supplies has been sent to the [employer] company.  I have also discussed the good sleep hygiene habits and adequate amount of sleep needed for good health.  Advised not to use mask with magnets  · Return in about 1 year (around 11/10/2023) for with smart card down load. . Patient's questions were answered.      Emmy Henderson MD  Sleep Medicine.  Medical Director, [facility]  11/10/2022 ,

## 2022-11-22 ENCOUNTER — TELEPHONE (OUTPATIENT)
Dept: FAMILY MEDICINE CLINIC | Facility: CLINIC | Age: 87
End: 2022-11-22

## 2022-11-22 NOTE — TELEPHONE ENCOUNTER
Hub staff attempted to follow warm transfer process and was unsuccessful after calling 3 times with no answer    Caller: Yeoman, Donna M    Relationship to patient: Self    Best call back number: [phone]    Patient is needing: PATIENT HAS SORE THROAT AND CONGESTION, NO SAME DAY APPOINTMENTS AVAILABLE. [facility] CANNOT REFER PATIENTS TO URGENT CAR.    PLEASE ADVISE

## 2022-12-05 PROCEDURE — 93296 REM INTERROG EVL PM/IDS: CPT | Performed by: INTERNAL MEDICINE

## 2022-12-05 PROCEDURE — 93294 REM INTERROG EVL PM/LDLS PM: CPT | Performed by: INTERNAL MEDICINE

## 2022-12-06 ENCOUNTER — OFFICE VISIT (OUTPATIENT)
Dept: CARDIOLOGY | Facility: CLINIC | Age: 87
End: 2022-12-06

## 2022-12-06 ENCOUNTER — CLINICAL SUPPORT NO REQUIREMENTS (OUTPATIENT)
Dept: CARDIOLOGY | Facility: CLINIC | Age: 87
End: 2022-12-06

## 2022-12-06 VITALS
HEIGHT: 63 IN | DIASTOLIC BLOOD PRESSURE: 76 MMHG | HEART RATE: 84 BPM | BODY MASS INDEX: 26.58 KG/M2 | SYSTOLIC BLOOD PRESSURE: 144 MMHG | WEIGHT: 150 LBS

## 2022-12-06 DIAGNOSIS — I49.5 SSS (SICK SINUS SYNDROME): Primary | ICD-10-CM

## 2022-12-06 PROCEDURE — 93280 PM DEVICE PROGR EVAL DUAL: CPT | Performed by: INTERNAL MEDICINE

## 2022-12-06 PROCEDURE — 93000 ELECTROCARDIOGRAM COMPLETE: CPT | Performed by: INTERNAL MEDICINE

## 2022-12-06 PROCEDURE — 99213 OFFICE O/P EST LOW 20 MIN: CPT | Performed by: INTERNAL MEDICINE

## 2022-12-06 NOTE — PROGRESS NOTES
Date of Office Visit: 2022  Encounter Provider: Isauro Rios MD  Place of Service: [facility] CARDIOLOGY  Patient Name: Donna M Yeoman  :1935    Chief Complaint   Patient presents with   • SSS   • symtomatic bradycardia   • Pacemaker Check   :     HPI: Donna M Yeoman is a 87 y.o. female who presents today for follow-up for pacemaker implant, 2:1 heart block    She notes improved fatigue since pacemaker implant.  She has a slight amount of pain at the implant site, but only notes it very occasionally.     Device interrogation shows normal function, frequent premature atrial beats.     RV pacing is rare, only 4 %.  59% atrial pacing.     She has frequent premature atrial contractions.           Past Medical History:   Diagnosis Date   • Arthritis    • Colon polyp 2017   • Diabetes mellitus (HCC)    • Esophageal reflux    • GERD (gastroesophageal reflux disease)    • H/O DVT (deep venous thrombosis)    • Headache 2017   • Hiatal hernia 2017   • History of vitamin D deficiency    • Hypercholesteremia    • Hyperlipidemia    • Hypertension    • Low back pain 2017   • MGUS (monoclonal gammopathy of unknown significance)    • NICOLE on CPAP     AHI 15/h   • Osteoporosis    • Peripheral neuropathy    • Pneumonia 2017   • PONV (postoperative nausea and vomiting)    • Pulmonary embolism (HCC)    • Pulmonary hypertension (HCC)    • Thyroid disease    • Venous thrombosis        Past Surgical History:   Procedure Laterality Date   • APPENDECTOMY     • BLADDER REPAIR     • BREAST BIOPSY Right     benign 2019   • CARDIAC ELECTROPHYSIOLOGY PROCEDURE N/A 2022    Procedure: Pacemaker DC Smiley;  Surgeon: Isauro Rios MD;  Location:  INVASIVE LOCATION;  Service: Cardiology;  Laterality: N/A;   • COLONOSCOPY     • CYSTOCELE REPAIR      bladder reconstruction   • HYSTERECTOMY     • SALPINGECTOMY     • SALPINGO OOPHORECTOMY      for  ectopic pregnancy   • UPPER GASTROINTESTINAL ENDOSCOPY         Social History     Socioeconomic History   • Marital status:    • Number of children: 4   • Years of education: High School   Tobacco Use   • Smoking status: Never   • Smokeless tobacco: Never   Vaping Use   • Vaping Use: Never used   Substance and Sexual Activity   • Alcohol use: No     Comment: caffeine use- 1/2 cup of coffee daily, soda   • Drug use: No   • Sexual activity: Defer       Family History   Problem Relation Age of Onset   • Breast cancer Mother         70's   • Colon cancer Mother    • Stroke Mother    • Heart disease Father    • Heart disease Sister    • Hypertension Sister    • Hypertension Sister    • Diabetes Sister    • Ovarian cancer Neg Hx    • Colon polyps Neg Hx    • Crohn's disease Neg Hx    • Irritable bowel syndrome Neg Hx    • Ulcerative colitis Neg Hx        Review of Systems   Constitutional: Positive for malaise/fatigue.   Cardiovascular: Negative.    Respiratory: Negative.    Gastrointestinal: Negative.        No Known Allergies      Current Outpatient Medications:   •  allopurinol (ZYLOPRIM) 100 MG tablet, TAKE 1 TABLET EVERY DAY, Disp: 90 tablet, Rfl: 1  •  aspirin 81 MG EC tablet, Take 81 mg by mouth Daily., Disp: , Rfl:   •  Calcium Carbonate-Vitamin D (CALTRATE 600+D PO), Take  by mouth., Disp: , Rfl:   •  carvedilol (COREG) 6.25 MG tablet, TAKE 1 TABLET BY MOUTH 2 (TWO) TIMES A DAY WITH MEALS., Disp: 180 tablet, Rfl: 1  •  CRANBERRY PO, Take  by mouth., Disp: , Rfl:   •  Cyanocobalamin (B-12 PO), Take 1,000 mg by mouth., Disp: , Rfl:   •  ESTRACE VAGINAL 0.1 MG/GM vaginal cream, INSERT 1/2 TO 1 INCH VAGINALLY ONCE WEEKLY AS DIRECTED, Disp: , Rfl: 11  •  glimepiride (AMARYL) 2 MG tablet, TAKE 1 TABLET BY MOUTH EVERY MORNING BEFORE BREAKFAST., Disp: 90 tablet, Rfl: 1  •  levothyroxine (SYNTHROID, LEVOTHROID) 75 MCG tablet, TAKE 1 TABLET EVERY DAY, Disp: 90 tablet, Rfl: 1  •  Loratadine 10 MG capsule, Take  by  "mouth., Disp: , Rfl:   •  losartan (COZAAR) 50 MG tablet, TAKE 1 TABLET EVERY DAY, Disp: 90 tablet, Rfl: 1  •  nystatin-triamcinolone (MYCOLOG) 883121-3.1 UNIT/GM-% ointment, Apply 1 application topically to the appropriate area as directed 2 (Two) Times a Day. Don't use more than 7 days in row, Disp: 60 g, Rfl: 0  •  omeprazole (priLOSEC) 40 MG capsule, TAKE 1 CAPSULE BY MOUTH TWICE A DAY (Patient taking differently: Take 40 mg by mouth As Needed.), Disp: 90 capsule, Rfl: 1  •  ONETOUCH DELICA LANCETS 33G misc, USE TO TEST BLOOD SUGAR TWICE DAILY, Disp: 100 each, Rfl: 1  •  ONETOUCH VERIO test strip, TEST EVERY MORNING, Disp: 100 each, Rfl: 5  •  Potassium 99 MG tablet, Take  by mouth., Disp: , Rfl:   •  Probiotic Product (PROBIOTIC DAILY PO), Take  by mouth Daily., Disp: , Rfl:   •  rosuvastatin (CRESTOR) 10 MG tablet, Take 1 tablet by mouth Daily., Disp: 90 tablet, Rfl: 1  •  VITAMIN D PO, Take  by mouth., Disp: , Rfl:       Objective:     Vitals:    12/06/22 1252   BP: 144/76   Pulse: 84   Weight: 68 kg (150 lb)   Height: 160 cm (63\")     Body mass index is 26.57 kg/m².    PHYSICAL EXAM:    Vitals and nursing note reviewed.   Constitutional:       General: Not in acute distress.     Appearance: Healthy appearance.   Pulmonary:      Effort: Pulmonary effort is normal. No respiratory distress.   Cardiovascular:      Normal rate. Regular rhythm.   Edema:     Peripheral edema absent.   Skin:     General: Skin is warm and dry.   Neurological:      General: No focal deficit present.      Mental Status: Alert, oriented to person, place, and time and oriented to person, place and time.   Psychiatric:         Attention and Perception: Attention and perception normal.         Mood and Affect: Mood and affect normal.         Behavior: Behavior normal.         Thought Content: Thought content normal.         Judgment: Judgment normal.             ECG 12 Lead    Date/Time: 12/6/2022 2:26 PM  Performed by: Isauor Rios " MD Boubacar  Authorized by: Isauro Rios MD   Comparison: compared with previous ECG from 8/29/2022  Similar to previous ECG  Rhythm: sinus rhythm              Assessment:       Diagnosis Plan   1. SSS (sick sinus syndrome) ([facility])               Plan:       Normal pacemaker function, pocket appears ok, no evidence for infection.  Educated on signs of pocket infection.      Minimal amount of atrial fibrillation with episodes less than 2 minutes.  No anticoagulation at this time    She has frequent PAC.  I think some of her heart block may have been non conducted PACs    Continue remote monitoring and follow-up in one year.     As always, it has been a pleasure to participate in your patient's care.      Sincerely,         Isauro Rios MD

## 2022-12-15 ENCOUNTER — TELEPHONE (OUTPATIENT)
Dept: FAMILY MEDICINE CLINIC | Facility: CLINIC | Age: 87
End: 2022-12-15

## 2022-12-15 DIAGNOSIS — E78.5 HYPERLIPIDEMIA, UNSPECIFIED HYPERLIPIDEMIA TYPE: ICD-10-CM

## 2022-12-15 DIAGNOSIS — E03.9 HYPOTHYROIDISM, UNSPECIFIED TYPE: Primary | ICD-10-CM

## 2022-12-15 DIAGNOSIS — E11.9 TYPE 2 DIABETES MELLITUS WITHOUT COMPLICATION, WITHOUT LONG-TERM CURRENT USE OF INSULIN: ICD-10-CM

## 2023-01-06 ENCOUNTER — OFFICE VISIT (OUTPATIENT)
Dept: FAMILY MEDICINE CLINIC | Facility: CLINIC | Age: 88
End: 2023-01-06
Payer: MEDICARE

## 2023-01-06 ENCOUNTER — HOSPITAL ENCOUNTER (OUTPATIENT)
Dept: GENERAL RADIOLOGY | Facility: HOSPITAL | Age: 88
Discharge: HOME | End: 2023-01-06
Admitting: INTERNAL MEDICINE
Payer: MEDICARE

## 2023-01-06 VITALS
OXYGEN SATURATION: 99 % | HEIGHT: 63 IN | WEIGHT: 148.7 LBS | SYSTOLIC BLOOD PRESSURE: 116 MMHG | TEMPERATURE: 97.2 F | BODY MASS INDEX: 26.35 KG/M2 | HEART RATE: 63 BPM | DIASTOLIC BLOOD PRESSURE: 68 MMHG

## 2023-01-06 DIAGNOSIS — R05.8 DRY COUGH: ICD-10-CM

## 2023-01-06 DIAGNOSIS — K13.79 MOUTH PAIN: ICD-10-CM

## 2023-01-06 DIAGNOSIS — R68.83 CHILLS: ICD-10-CM

## 2023-01-06 DIAGNOSIS — R53.81 MALAISE: ICD-10-CM

## 2023-01-06 DIAGNOSIS — R05.8 DRY COUGH: Primary | ICD-10-CM

## 2023-01-06 LAB
EXPIRATION DATE: NORMAL
FLUAV AG UPPER RESP QL IA.RAPID: NOT DETECTED
FLUBV AG UPPER RESP QL IA.RAPID: NOT DETECTED
INTERNAL CONTROL: NORMAL
Lab: NORMAL
SARS-COV-2 AG UPPER RESP QL IA.RAPID: NOT DETECTED

## 2023-01-06 PROCEDURE — 99214 OFFICE O/P EST MOD 30 MIN: CPT | Performed by: INTERNAL MEDICINE

## 2023-01-06 PROCEDURE — 71046 X-RAY EXAM CHEST 2 VIEWS: CPT

## 2023-01-06 PROCEDURE — 87428 SARSCOV & INF VIR A&B AG IA: CPT | Performed by: INTERNAL MEDICINE

## 2023-01-06 RX ORDER — AZITHROMYCIN 250 MG/1
TABLET, FILM COATED ORAL
Qty: 6 TABLET | Refills: 0 | Status: SHIPPED | OUTPATIENT
Start: 2023-01-06 | End: 2023-01-13

## 2023-01-06 RX ORDER — FLUTICASONE PROPIONATE AND SALMETEROL 250; 50 UG/1; UG/1
1 POWDER RESPIRATORY (INHALATION)
Qty: 60 EACH | Refills: 0 | Status: SHIPPED | OUTPATIENT
Start: 2023-01-06

## 2023-01-06 RX ORDER — DEXTROMETHORPHAN HYDROBROMIDE AND PROMETHAZINE HYDROCHLORIDE 15; 6.25 MG/5ML; MG/5ML
5 SYRUP ORAL 4 TIMES DAILY PRN
Qty: 118 ML | Refills: 0 | Status: SHIPPED | OUTPATIENT
Start: 2023-01-06

## 2023-01-06 NOTE — PROGRESS NOTES
Chief Complaint  Hospital Follow Up Visit (ED F/U on 12/30 for acute URI.  She states ongoing dry non-productive cough.  She feels congestion is deep within chest.  They prescribed Benzonatate but she finds it non-effective.  ) and Mouth Lesions (She states for a month now, she has had a sore above upper lip that has been slightly painful and scabbing over and intermittent burning.  She also has had intermittent burning on roof of her mouth.  When brushing her teeth, she states an increased burning sensation and she reports no change in tooth paste/current diet. )    Subjective Donna M Yeoman presents to [facility]  History of Present Illness  C/o 2 weeks of mouth and lip feeling 'on fire'  And then a week later, started with a ST, HA and ears feeling off.  Went to  and was dx with URI and given tessalon perles and keflex.  Her food tastes terrible.  She doesn't feel any better.  +nausea is off and on.  Has had diarrhea but not sure if from keflex.  No fever.  + chills.  On 12/30/22 covid and flu were negative.  She was also sick in November and doctored herself and thought she got over it.  Then on 12/26/22 she started again with ST and HA.  Son has pneumonia from the same illness  BP and Blood sugars running normally.    Objective   Vital Signs:  /68 (BP Location: Left arm, Patient Position: Sitting, Cuff Size: Adult)   Pulse 63   Temp 97.2 °F (36.2 °C) (Temporal)   Ht 160 cm (63\")   Wt 67.4 kg (148 lb 11.2 oz)   SpO2 99%   BMI 26.34 kg/m²   Estimated body mass index is 26.34 kg/m² as calculated from the following:    Height as of this encounter: 160 cm (63\").    Weight as of this encounter: 67.4 kg (148 lb 11.2 oz).          Physical Exam  Vitals and nursing note reviewed.   Constitutional:       Appearance: Normal appearance. She is well-developed.   HENT:      Head: Normocephalic and atraumatic.      Right Ear: Tympanic membrane, ear canal and external ear  normal.      Left Ear: Tympanic membrane, ear canal and external ear normal.      Mouth/Throat:      Mouth: Mucous membranes are moist.      Pharynx: No oropharyngeal exudate or posterior oropharyngeal erythema.      Comments: Mouth tongue lips mucosa are all normal  Eyes:      Extraocular Movements: Extraocular movements intact.      Conjunctiva/sclera: Conjunctivae normal.   Cardiovascular:      Rate and Rhythm: Normal rate and regular rhythm.      Heart sounds: Normal heart sounds.      Comments: No bruits  Pulmonary:      Effort: Pulmonary effort is normal. No respiratory distress.      Breath sounds: Normal breath sounds. No wheezing or rales.   Abdominal:      General: Bowel sounds are normal. There is no distension.      Palpations: Abdomen is soft. There is no mass.      Tenderness: There is no abdominal tenderness. There is no guarding or rebound.      Hernia: No hernia is present.   Musculoskeletal:      Cervical back: Neck supple.   Lymphadenopathy:      Cervical: No cervical adenopathy.   Skin:     General: Skin is warm.   Neurological:      General: No focal deficit present.      Mental Status: She is alert and oriented to person, place, and time.   Psychiatric:         Mood and Affect: Mood normal.         Behavior: Behavior normal.         Thought Content: Thought content normal.         Judgment: Judgment normal.        Result Review :                Assessment and Plan   Diagnoses and all orders for this visit:    1. Dry cough (Primary)  -     XR Chest PA & Lateral; Future  -     POCT SARS-CoV-2 Antigen JAKOB + Flu    2. Mouth pain    3. Malaise    4. Chills    Other orders  -     azithromycin (Zithromax Z-Rogelio) 250 MG tablet; Take 2 tablets by mouth on day 1, then 1 tablet daily on days 2-5  Dispense: 6 tablet; Refill: 0  -     promethazine-dextromethorphan (PROMETHAZINE-DM) 6.25-15 MG/5ML syrup; Take 5 mL by mouth 4 (Four) Times a Day As Needed for Cough.  Dispense: 118 mL; Refill: 0  -      Fluticasone-Salmeterol (ADVAIR/WIXELA) 250-50 MCG/ACT DISKUS; Inhale 1 puff 2 (Two) Times a Day.  Dispense: 60 each; Refill: 0    Handwritten prescription for [employer]'s mouthwash provided for patient which contains Benadryl, viscous lidocaine, and Maalox  Stop Keflex and start Z-Rogelio to cover atypical bacteria  Phenergan DM for nausea and cough suppression  Advair inhaler 1 puff twice daily with rinsing mouth out after each use.  Patient to reach out to me on Monday or Tuesday if she is not improving and we will consider stopping Advair and sending in a prescription for steroids.  Since she is a diabetic I would like to avoid steroids at this time.  She also has a normal lung exam without wheezes and great oxygen saturations I do not feel that they are indicated at this time.  I am not certain what the cause of her mouth burning is.  She may have burning tongue syndrome.  This would be more of a chronic condition.  Patient can be in touch with me in a week or 2 let me know how she is doing regarding mouth pain.  Has seen her dentist recently and had a normal exam there as well.         Follow Up   Return if symptoms worsen or fail to improve.  Patient was given instructions and counseling regarding her condition or for health maintenance advice. Please see specific information pulled into the AVS if appropriate.

## 2023-01-13 RX ORDER — METHYLPREDNISOLONE 4 MG/1
TABLET ORAL
Qty: 21 TABLET | Refills: 0 | Status: SHIPPED | OUTPATIENT
Start: 2023-01-13 | End: 2023-01-31

## 2023-01-24 DIAGNOSIS — E03.9 HYPOTHYROIDISM, UNSPECIFIED TYPE: ICD-10-CM

## 2023-01-24 DIAGNOSIS — E11.9 TYPE 2 DIABETES MELLITUS WITHOUT COMPLICATION, WITHOUT LONG-TERM CURRENT USE OF INSULIN: ICD-10-CM

## 2023-01-24 DIAGNOSIS — E78.5 HYPERLIPIDEMIA, UNSPECIFIED HYPERLIPIDEMIA TYPE: ICD-10-CM

## 2023-01-25 LAB
ALBUMIN SERPL-MCNC: 3.8 G/DL (ref 3.5–5.2)
ALBUMIN/GLOB SERPL: 1.5 G/DL
ALP SERPL-CCNC: 62 U/L (ref 39–117)
ALT SERPL-CCNC: 23 U/L (ref 1–33)
AST SERPL-CCNC: 23 U/L (ref 1–32)
BASOPHILS # BLD AUTO: 0.03 10*3/MM3 (ref 0–0.2)
BASOPHILS NFR BLD AUTO: 0.4 % (ref 0–1.5)
BILIRUB SERPL-MCNC: 0.7 MG/DL (ref 0–1.2)
BUN SERPL-MCNC: 22 MG/DL (ref 8–23)
BUN/CREAT SERPL: 21.2 (ref 7–25)
CALCIUM SERPL-MCNC: 8.9 MG/DL (ref 8.6–10.5)
CHLORIDE SERPL-SCNC: 108 MMOL/L (ref 98–107)
CHOLEST SERPL-MCNC: 133 MG/DL (ref 0–200)
CO2 SERPL-SCNC: 30.1 MMOL/L (ref 22–29)
CREAT SERPL-MCNC: 1.04 MG/DL (ref 0.57–1)
EGFRCR SERPLBLD CKD-EPI 2021: 52.1 ML/MIN/1.73
EOSINOPHIL # BLD AUTO: 0.3 10*3/MM3 (ref 0–0.4)
EOSINOPHIL NFR BLD AUTO: 3.6 % (ref 0.3–6.2)
ERYTHROCYTE [DISTWIDTH] IN BLOOD BY AUTOMATED COUNT: 13.9 % (ref 12.3–15.4)
GLOBULIN SER CALC-MCNC: 2.5 GM/DL
GLUCOSE SERPL-MCNC: 107 MG/DL (ref 65–99)
HBA1C MFR BLD: 6.4 % (ref 4.8–5.6)
HCT VFR BLD AUTO: 42.3 % (ref 34–46.6)
HDLC SERPL-MCNC: 50 MG/DL (ref 40–60)
HGB BLD-MCNC: 13.8 G/DL (ref 12–15.9)
IMM GRANULOCYTES # BLD AUTO: 0.02 10*3/MM3 (ref 0–0.05)
IMM GRANULOCYTES NFR BLD AUTO: 0.2 % (ref 0–0.5)
LDLC SERPL CALC-MCNC: 65 MG/DL (ref 0–100)
LDLC/HDLC SERPL: 1.29 {RATIO}
LYMPHOCYTES # BLD AUTO: 2.48 10*3/MM3 (ref 0.7–3.1)
LYMPHOCYTES NFR BLD AUTO: 29.7 % (ref 19.6–45.3)
MCH RBC QN AUTO: 30.5 PG (ref 26.6–33)
MCHC RBC AUTO-ENTMCNC: 32.6 G/DL (ref 31.5–35.7)
MCV RBC AUTO: 93.6 FL (ref 79–97)
MONOCYTES # BLD AUTO: 0.87 10*3/MM3 (ref 0.1–0.9)
MONOCYTES NFR BLD AUTO: 10.4 % (ref 5–12)
NEUTROPHILS # BLD AUTO: 4.64 10*3/MM3 (ref 1.7–7)
NEUTROPHILS NFR BLD AUTO: 55.7 % (ref 42.7–76)
NRBC BLD AUTO-RTO: 0 /100 WBC (ref 0–0.2)
PLATELET # BLD AUTO: 177 10*3/MM3 (ref 140–450)
POTASSIUM SERPL-SCNC: 4.6 MMOL/L (ref 3.5–5.2)
PROT SERPL-MCNC: 6.3 G/DL (ref 6–8.5)
RBC # BLD AUTO: 4.52 10*6/MM3 (ref 3.77–5.28)
SODIUM SERPL-SCNC: 145 MMOL/L (ref 136–145)
T4 FREE SERPL-MCNC: 1.21 NG/DL (ref 0.93–1.7)
TRIGL SERPL-MCNC: 93 MG/DL (ref 0–150)
TSH SERPL DL<=0.005 MIU/L-ACNC: 2.63 UIU/ML (ref 0.27–4.2)
VLDLC SERPL CALC-MCNC: 18 MG/DL (ref 5–40)
WBC # BLD AUTO: 8.34 10*3/MM3 (ref 3.4–10.8)

## 2023-01-31 ENCOUNTER — OFFICE VISIT (OUTPATIENT)
Dept: FAMILY MEDICINE CLINIC | Facility: CLINIC | Age: 88
End: 2023-01-31
Payer: MEDICARE

## 2023-01-31 ENCOUNTER — TELEPHONE (OUTPATIENT)
Dept: FAMILY MEDICINE CLINIC | Facility: CLINIC | Age: 88
End: 2023-01-31

## 2023-01-31 VITALS
BODY MASS INDEX: 26.36 KG/M2 | OXYGEN SATURATION: 99 % | WEIGHT: 148.8 LBS | HEIGHT: 63 IN | RESPIRATION RATE: 16 BRPM | SYSTOLIC BLOOD PRESSURE: 128 MMHG | DIASTOLIC BLOOD PRESSURE: 82 MMHG | HEART RATE: 76 BPM | TEMPERATURE: 96.9 F

## 2023-01-31 DIAGNOSIS — E11.9 TYPE 2 DIABETES MELLITUS WITHOUT COMPLICATION, WITHOUT LONG-TERM CURRENT USE OF INSULIN: ICD-10-CM

## 2023-01-31 DIAGNOSIS — I10 ESSENTIAL HYPERTENSION: ICD-10-CM

## 2023-01-31 DIAGNOSIS — Z12.31 ENCOUNTER FOR SCREENING MAMMOGRAM FOR BREAST CANCER: Primary | ICD-10-CM

## 2023-01-31 DIAGNOSIS — Z78.0 MENOPAUSE: ICD-10-CM

## 2023-01-31 DIAGNOSIS — E78.5 HYPERLIPIDEMIA, UNSPECIFIED HYPERLIPIDEMIA TYPE: Primary | ICD-10-CM

## 2023-01-31 DIAGNOSIS — H91.8X3 OTHER SPECIFIED HEARING LOSS OF BOTH EARS: ICD-10-CM

## 2023-01-31 DIAGNOSIS — E78.00 PURE HYPERCHOLESTEROLEMIA: ICD-10-CM

## 2023-01-31 DIAGNOSIS — E03.9 HYPOTHYROIDISM, UNSPECIFIED TYPE: ICD-10-CM

## 2023-01-31 DIAGNOSIS — Z00.00 MEDICARE ANNUAL WELLNESS VISIT, SUBSEQUENT: ICD-10-CM

## 2023-01-31 PROCEDURE — 1170F FXNL STATUS ASSESSED: CPT | Performed by: INTERNAL MEDICINE

## 2023-01-31 PROCEDURE — 96160 PT-FOCUSED HLTH RISK ASSMT: CPT | Performed by: INTERNAL MEDICINE

## 2023-01-31 PROCEDURE — G0439 PPPS, SUBSEQ VISIT: HCPCS | Performed by: INTERNAL MEDICINE

## 2023-01-31 PROCEDURE — 1160F RVW MEDS BY RX/DR IN RCRD: CPT | Performed by: INTERNAL MEDICINE

## 2023-01-31 RX ORDER — ALLOPURINOL 100 MG/1
100 TABLET ORAL DAILY
Qty: 90 TABLET | Refills: 1 | Status: SHIPPED | OUTPATIENT
Start: 2023-01-31

## 2023-01-31 RX ORDER — LEVOTHYROXINE SODIUM 0.07 MG/1
75 TABLET ORAL DAILY
Qty: 90 TABLET | Refills: 1 | Status: SHIPPED | OUTPATIENT
Start: 2023-01-31

## 2023-01-31 RX ORDER — LOSARTAN POTASSIUM 50 MG/1
50 TABLET ORAL DAILY
Qty: 90 TABLET | Refills: 1 | Status: SHIPPED | OUTPATIENT
Start: 2023-01-31

## 2023-01-31 NOTE — PROGRESS NOTES
The ABCs of the Annual Wellness Visit  Subsequent Medicare Wellness Visit    Subjective      Donna M Yeoman is a 88 y.o. female who presents for a Subsequent Medicare Wellness Visit.    The following portions of the patient's history were reviewed and   updated as appropriate: allergies, current medications, past family history, past medical history, past social history, past surgical history and problem list.    Compared to one year ago, the patient feels her physical   health is worse.    Compared to one year ago, the patient feels her mental   health is the same.    Recent Hospitalizations:  She was not admitted to the hospital during the last year.     CBC & Differential (01/24/2023 08:34)  Comprehensive Metabolic Panel (01/24/2023 08:34)  Lipid Panel With LDL / HDL Ratio (01/24/2023 08:34)  TSH (01/24/2023 08:34)  T4, Free (01/24/2023 08:34)  Hemoglobin A1c (01/24/2023 08:34)    Current Medical Providers:  Patient Care Team:  Caroline Weiner MD as PCP - General  King Adame MD as Consulting Physician (Hematology and Oncology)  Caroline Weiner MD as Referring Physician (Internal Medicine)  Socorro Crenshaw LPN (Inactive) as Licensed Practical Nurse  Dhruv Riley MD as Consulting Physician (Gastroenterology)  Bassam Stewart MD as Consulting Physician (Cardiology)  Le Manuel APRN as Nurse Practitioner (Nurse Practitioner)    Outpatient Medications Prior to Visit   Medication Sig Dispense Refill   • aspirin 81 MG EC tablet Take 81 mg by mouth Daily.     • Calcium Carbonate-Vitamin D (CALTRATE 600+D PO) Take  by mouth.     • carvedilol (COREG) 6.25 MG tablet TAKE 1 TABLET BY MOUTH 2 (TWO) TIMES A DAY WITH MEALS. 180 tablet 1   • CRANBERRY PO Take  by mouth.     • Cyanocobalamin (B-12 PO) Take 1,000 mg by mouth.     • ESTRACE VAGINAL 0.1 MG/GM vaginal cream INSERT 1/2 TO 1 INCH VAGINALLY ONCE WEEKLY AS DIRECTED  11   • Fluticasone-Salmeterol (ADVAIR/WIXELA) 250-50 MCG/ACT DISKUS Inhale  1 puff 2 (Two) Times a Day. 60 each 0   • glimepiride (AMARYL) 2 MG tablet TAKE 1 TABLET BY MOUTH EVERY MORNING BEFORE BREAKFAST. 90 tablet 1   • Loratadine 10 MG capsule Take  by mouth.     • nystatin-triamcinolone (MYCOLOG) 880811-3.1 UNIT/GM-% ointment Apply 1 application topically to the appropriate area as directed 2 (Two) Times a Day. Don't use more than 7 days in row 60 g 0   • omeprazole (priLOSEC) 40 MG capsule TAKE 1 CAPSULE BY MOUTH TWICE A DAY (Patient taking differently: Take 40 mg by mouth As Needed.) 90 capsule 1   • ONETOUCH DELICA LANCETS 33G misc USE TO TEST BLOOD SUGAR TWICE DAILY 100 each 1   • ONETOUCH VERIO test strip TEST EVERY MORNING 100 each 5   • Potassium 99 MG tablet Take  by mouth.     • Probiotic Product (PROBIOTIC DAILY PO) Take  by mouth Daily.     • promethazine-dextromethorphan (PROMETHAZINE-DM) 6.25-15 MG/5ML syrup Take 5 mL by mouth 4 (Four) Times a Day As Needed for Cough. 118 mL 0   • rosuvastatin (CRESTOR) 10 MG tablet Take 1 tablet by mouth Daily. 90 tablet 1   • VITAMIN D PO Take  by mouth.     • allopurinol (ZYLOPRIM) 100 MG tablet TAKE 1 TABLET EVERY DAY 90 tablet 1   • levothyroxine (SYNTHROID, LEVOTHROID) 75 MCG tablet TAKE 1 TABLET EVERY DAY 90 tablet 1   • losartan (COZAAR) 50 MG tablet TAKE 1 TABLET EVERY DAY 90 tablet 1   • methylPREDNISolone (MEDROL) 4 MG dose pack Take as directed on package instructions. 21 tablet 0     No facility-administered medications prior to visit.       No opioid medication identified on active medication list. I have reviewed chart for other potential  high risk medication/s and harmful drug interactions in the elderly.          Aspirin is on active medication list. Aspirin use is indicated based on review of current medical condition/s. Pros and cons of this therapy have been discussed today. Benefits of this medication outweigh potential harm.  Patient has been encouraged to continue taking this medication.  .      Patient Active  Problem List   Diagnosis   • Bloating   • Epigastric pain   • B12 deficiency   • Type 2 diabetes mellitus without complication ([facility])   • Essential hypertension   • MGUS (monoclonal gammopathy of unknown significance)   • Sore throat   • Gastroesophageal reflux disease with esophagitis   • Cough   • Hypothyroidism   • Hyperglycemia   • Right foot pain   • Healthcare maintenance   • Type 2 diabetes mellitus without complication, without long-term current use of insulin ([facility])   • Hyperlipidemia   • Dysuria   • Persistent cough   • Cardiomegaly   • Rib pain on left side   • Creatinine elevation   • Encounter for screening mammogram for breast cancer   • Abnormal serum total protein level   • Acute upper respiratory infection   • Breast asymmetry   • Breast pain   • Burning sensation   • Cerebrovascular small vessel disease   • Colon polyp   • Dense breasts   • Dizziness   • Increased glucose level   • Gastritis   • Gout   • Headache   • Hiatal hernia   • Low back pain   • Ulcer of nose   • Nausea   • Neck pain   • Osteoarthritis   • Otalgia   • Pain in thoracic spine   • Shoulder joint pain   • Back pain   • Peripheral neuropathy   • Pneumonia   • Seasonal allergic rhinitis   • Tinea corporis   • Vitamin D deficiency    • Sinus congestion   • Inclusion cyst   • Urinary frequency   • Leg edema   • Rash   • Neuritis or radiculitis due to rupture of lumbar intervertebral disc   • NICOLE on CPAP   • Symptomatic bradycardia   • SSS (sick sinus syndrome) ([facility])     Advance Care Planning  Advance Directive is not on file.  ACP discussion was held with the patient during this visit. Patient has an advance directive (not in EMR), copy requested. Patient does not have an advance directive, information provided.     Objective    Vitals:    01/31/23 0956   BP: 128/82   BP Location: Right arm   Patient Position: Sitting   Cuff Size: Small Adult   Pulse: 76   Resp: 16   Temp: 96.9 °F (36.1 °C)   TempSrc: Temporal   SpO2: 99%   Weight:  "67.5 kg (148 lb 12.8 oz)   Height: 160 cm (63\")   PainSc: 0-No pain     Estimated body mass index is 26.36 kg/m² as calculated from the following:    Height as of this encounter: 160 cm (63\").    Weight as of this encounter: 67.5 kg (148 lb 12.8 oz).    BMI is >= 25 and <30. (Overweight) The following options were offered after discussion;: patient is doing well with diet and weight      Does the patient have evidence of cognitive impairment?   No    Lab Results   Component Value Date    CHLPL 133 01/24/2023    TRIG 93 01/24/2023    HDL 50 01/24/2023    LDL 65 01/24/2023    VLDL 18 01/24/2023    HGBA1C 6.40 (H) 01/24/2023          HEALTH RISK ASSESSMENT    Smoking Status:  Social History     Tobacco Use   Smoking Status Never   Smokeless Tobacco Never     Alcohol Consumption:  Social History     Substance and Sexual Activity   Alcohol Use No    Comment: caffeine use- 1/2 cup of coffee daily, soda     Fall Risk Screen:    STEADI Fall Risk Assessment was completed, and patient is at LOW risk for falls.Assessment completed on:1/31/2023    Depression Screening:  PHQ-2/PHQ-9 Depression Screening 1/31/2023   Little Interest or Pleasure in Doing Things 0-->not at all   Feeling Down, Depressed or Hopeless 0-->not at all   PHQ-9: Brief Depression Severity Measure Score 0       Health Habits and Functional and Cognitive Screening:  Functional & Cognitive Status 1/31/2023   Do you have difficulty preparing food and eating? No   Do you have difficulty bathing yourself, getting dressed or grooming yourself? No   Do you have difficulty using the toilet? No   Do you have difficulty moving around from place to place? No   Do you have trouble with steps or getting out of a bed or a chair? No   Current Diet Well Balanced Diet   Dental Exam Up to date   Eye Exam Up to date   Exercise (times per week) 7 times per week   Current Exercises Include House Cleaning;Aerobics   Current Exercise Activities Include -   Do you need help using " the phone?  No   Are you deaf or do you have serious difficulty hearing?  No   Do you need help with transportation? No   Do you need help shopping? No   Do you need help preparing meals?  No   Do you need help with housework?  No   Do you need help with laundry? No   Do you need help taking your medications? No   Do you need help managing money? No   Do you ever drive or ride in a car without wearing a seat belt? No   Have you felt unusual stress, anger or loneliness in the last month? No   Who do you live with? Child   If you need help, do you have trouble finding someone available to you? No   Have you been bothered in the last four weeks by sexual problems? No   Do you have difficulty concentrating, remembering or making decisions? No       Age-appropriate Screening Schedule:  Refer to the list below for future screening recommendations based on patient's age, sex and/or medical conditions. Orders for these recommended tests are listed in the plan section. The patient has been provided with a written plan.    Health Maintenance   Topic Date Due   • DXA SCAN  01/31/2019   • ZOSTER VACCINE (2 of 2) 01/31/2023 (Originally 3/28/2017)   • URINE MICROALBUMIN  07/22/2023   • HEMOGLOBIN A1C  07/24/2023   • DIABETIC EYE EXAM  12/06/2023   • LIPID PANEL  01/24/2024   • MAMMOGRAM  02/10/2024   • TDAP/TD VACCINES (2 - Td or Tdap) 01/31/2027   • INFLUENZA VACCINE  Completed                CMS Preventative Services Quick Reference  Risk Factors Identified During Encounter:    Chronic Pain: Natural history and expected course discussed. Questions answered.  Hearing Problem: Referral to ENT ordered  Immunizations Discussed/Encouraged: Shingrix  Dental Screening Recommended  Vision Screening Recommended    The above risks/problems have been discussed with the patient.  Pertinent information has been shared with the patient in the After Visit Summary.    Diagnoses and all orders for this visit:    1. Encounter for screening  mammogram for breast cancer (Primary)  -     Mammo Screening Bilateral With CAD; Future    2. Menopause  -     DEXA Bone Density Axial; Future    3. Medicare annual wellness visit, subsequent    4. Essential hypertension    5. Pure hypercholesterolemia    6. Type 2 diabetes mellitus without complication, without long-term current use of insulin (HCC)    7. Other specified hearing loss of both ears    Other orders  -     levothyroxine (SYNTHROID, LEVOTHROID) 75 MCG tablet; Take 1 tablet by mouth Daily.  Dispense: 90 tablet; Refill: 1  -     allopurinol (ZYLOPRIM) 100 MG tablet; Take 1 tablet by mouth Daily.  Dispense: 90 tablet; Refill: 1  -     losartan (COZAAR) 50 MG tablet; Take 1 tablet by mouth Daily.  Dispense: 90 tablet; Refill: 1        Follow Up:   Next Medicare Wellness visit to be scheduled in 1 year.      An After Visit Summary and PPPS were made available to the patient.      Mammo and dexa ordered  Vaccinations reviewed and meds refilled  Labs reviewed.  a1c increase most likely secondary to her recent prolonged respiratory illness and sedentary lifestyle  Copy of living will information provided  Sees ENT in a few weeks and have them check hearing; consider hearing aids.

## 2023-02-03 ENCOUNTER — TRANSCRIBE ORDERS (OUTPATIENT)
Dept: ADMINISTRATIVE | Facility: HOSPITAL | Age: 88
End: 2023-02-03
Payer: MEDICARE

## 2023-02-03 DIAGNOSIS — Z12.31 ENCOUNTER FOR SCREENING MAMMOGRAM FOR BREAST CANCER: ICD-10-CM

## 2023-02-03 DIAGNOSIS — Z12.31 ENCOUNTER FOR SCREENING MAMMOGRAM FOR HIGH-RISK PATIENT: Primary | ICD-10-CM

## 2023-02-21 ENCOUNTER — HOSPITAL ENCOUNTER (OUTPATIENT)
Dept: BONE DENSITY | Facility: HOSPITAL | Age: 88
Discharge: HOME | End: 2023-02-21
Payer: MEDICARE

## 2023-02-21 ENCOUNTER — HOSPITAL ENCOUNTER (OUTPATIENT)
Dept: MAMMOGRAPHY | Facility: HOSPITAL | Age: 88
Discharge: HOME | End: 2023-02-21
Payer: MEDICARE

## 2023-02-21 DIAGNOSIS — Z78.0 MENOPAUSE: ICD-10-CM

## 2023-02-21 DIAGNOSIS — Z12.31 ENCOUNTER FOR SCREENING MAMMOGRAM FOR BREAST CANCER: ICD-10-CM

## 2023-02-21 PROCEDURE — 77067 SCR MAMMO BI INCL CAD: CPT

## 2023-02-21 PROCEDURE — 77080 DXA BONE DENSITY AXIAL: CPT

## 2023-02-21 PROCEDURE — 77063 BREAST TOMOSYNTHESIS BI: CPT

## 2023-02-22 DIAGNOSIS — E78.5 HYPERLIPIDEMIA: ICD-10-CM

## 2023-02-22 RX ORDER — ROSUVASTATIN CALCIUM 10 MG/1
TABLET, COATED ORAL
Qty: 90 TABLET | Refills: 1 | Status: SHIPPED | OUTPATIENT
Start: 2023-02-22

## 2023-02-22 NOTE — TELEPHONE ENCOUNTER
Rx Refill Note  Requested Prescriptions     Pending Prescriptions Disp Refills   • rosuvastatin (CRESTOR) 10 MG tablet [Pharmacy Med Name: ROSUVASTATIN CALCIUM 10 MG Tablet] 90 tablet 1     Sig: TAKE 1 TABLET EVERY DAY      Last office visit with prescribing clinician: 1/31/2023   Last telemedicine visit with prescribing clinician: 8/1/2023   Next office visit with prescribing clinician: 8/1/2023       {TIP  Please add Last Relevant Lab Date if appropriate: 1/24/23                   Would you like a call back once the refill request has been completed: [] Yes [] No    If the office needs to give you a call back, can they leave a voicemail: [] Yes [] No    Pina Flores MA  02/22/23, 14:54 EST

## 2023-02-23 ENCOUNTER — OFFICE VISIT (OUTPATIENT)
Dept: CARDIOLOGY | Facility: CLINIC | Age: 88
End: 2023-02-23
Payer: MEDICARE

## 2023-02-23 VITALS
SYSTOLIC BLOOD PRESSURE: 120 MMHG | HEART RATE: 87 BPM | BODY MASS INDEX: 26.58 KG/M2 | DIASTOLIC BLOOD PRESSURE: 74 MMHG | WEIGHT: 150 LBS | RESPIRATION RATE: 16 BRPM | OXYGEN SATURATION: 97 % | HEIGHT: 63 IN

## 2023-02-23 DIAGNOSIS — I10 ESSENTIAL HYPERTENSION: ICD-10-CM

## 2023-02-23 DIAGNOSIS — Z95.0 PRESENCE OF CARDIAC PACEMAKER: ICD-10-CM

## 2023-02-23 DIAGNOSIS — I49.5 SSS (SICK SINUS SYNDROME): Primary | ICD-10-CM

## 2023-02-23 DIAGNOSIS — E78.2 MIXED HYPERLIPIDEMIA: ICD-10-CM

## 2023-02-23 PROCEDURE — 93000 ELECTROCARDIOGRAM COMPLETE: CPT | Performed by: INTERNAL MEDICINE

## 2023-02-23 PROCEDURE — 99213 OFFICE O/P EST LOW 20 MIN: CPT | Performed by: INTERNAL MEDICINE

## 2023-02-23 RX ORDER — NITROFURANTOIN 25; 75 MG/1; MG/1
1 CAPSULE ORAL EVERY 12 HOURS SCHEDULED
COMMUNITY
Start: 2023-02-16

## 2023-02-23 RX ORDER — LANOLIN ALCOHOL/MO/W.PET/CERES
1000 CREAM (GRAM) TOPICAL 3 TIMES WEEKLY
COMMUNITY

## 2023-02-23 NOTE — PROGRESS NOTES
"      CARDIOLOGY    Carolin Dhillon MD    ENCOUNTER DATE:  2023    Donna M Yeoman / 88 y.o. / female        CHIEF COMPLAINT / REASON FOR OFFICE VISIT     Heart Problem (6 Month follow up /SSS /Former [facility] patient, Sees [facility]/CR )      HISTORY OF PRESENT ILLNESS       HPI    Donna M Yeoman is a 88 y.o. female     This patient previously followed with Dr. Stewart. She has a history of an abnormal EKG with anterior T-wave inversion. She has a history of DVT and PE. Her family history is significant for a son and daughter who both had bypass surgeries. Her daughter  from a pulmonary embolism following her bypass at the age of 57. She saw Dr. Stewart in 2021 complaining of shortness of breath. Lexiscan stress test showed no ischemia. Echo showed normal LV function, ejection fraction 67% with moderate left ventricular hypertrophy.     In 2022, she was diagnosed with symptomatic bradycardia. She had a dual chamber pacemaker placed with a [employer] device.         REVIEW OF SYSTEMS     Review of Systems   Constitutional: Negative for chills, fever, weight gain and weight loss.   Cardiovascular: Negative for leg swelling.   Respiratory: Negative for cough, snoring and wheezing.    Hematologic/Lymphatic: Negative for bleeding problem. Does not bruise/bleed easily.   Skin: Negative for color change.   Musculoskeletal: Negative for falls, joint pain and myalgias.   Gastrointestinal: Negative for melena.   Genitourinary: Negative for hematuria.   Neurological: Negative for excessive daytime sleepiness.   Psychiatric/Behavioral: Negative for depression. The patient is not nervous/anxious.          VITAL SIGNS     Visit Vitals  /74 (BP Location: Left arm, Patient Position: Sitting, Cuff Size: Adult)   Pulse 87   Resp 16   Ht 160 cm (63\")   Wt 68 kg (150 lb)   LMP  (LMP Unknown)   SpO2 97%   BMI 26.57 kg/m²         Wt Readings from Last 3 Encounters:   23 68 kg (150 lb)   23 " 67.5 kg (148 lb 12.8 oz)   01/06/23 67.4 kg (148 lb 11.2 oz)     Body mass index is 26.57 kg/m².      PHYSICAL EXAMINATION     Constitutional:       General: Not in acute distress.  Neck:      Vascular: No carotid bruit or JVD.   Pulmonary:      Effort: Pulmonary effort is normal.      Breath sounds: Normal breath sounds.   Cardiovascular:      Normal rate. Regular rhythm.      Murmurs: There is no murmur.   Psychiatric:         Mood and Affect: Mood and affect normal.           REVIEWED DATA       ECG 12 Lead    Date/Time: 2/23/2023 1:55 PM  Performed by: Carolin Dhillon MD  Authorized by: Carolin Dhillon MD   Comparison: compared with previous ECG from 12/6/2022  Similar to previous ECG  Rhythm: sinus rhythm  Ectopy: atrial premature contractions  BPM: 87  Conduction: conduction normal  ST Segments: ST segments normal  T Waves: T waves normal    Clinical impression: normal ECG              Lipid Panel    Lipid Panel 7/22/22 1/24/23   Total Cholesterol 138 133   Triglycerides 128 93   HDL Cholesterol 40 50   VLDL Cholesterol 23 18   LDL Cholesterol  75 65   LDL/HDL Ratio 1.9 1.29      Comments are available for some flowsheets but are not being displayed.             Lab Results   Component Value Date    GLUCOSE 107 (H) 01/24/2023    BUN 22 01/24/2023    CREATININE 1.04 (H) 01/24/2023    EGFRRESULT 52.1 (L) 01/24/2023    EGFR 52.1 (L) 07/28/2022    BCR 21.2 01/24/2023    K 4.6 01/24/2023    CO2 30.1 (H) 01/24/2023    CALCIUM 8.9 01/24/2023    PROTENTOTREF 6.3 01/24/2023    ALBUMIN 3.8 01/24/2023    LABIL2 1.5 01/24/2023    AST 23 01/24/2023    ALT 23 01/24/2023       ASSESSMENT & PLAN      Diagnosis Plan   1. SSS (sick sinus syndrome) (HCC)        2. Essential hypertension        3. Presence of cardiac pacemaker        4. Mixed hyperlipidemia            1.  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  2.  Hyperlipidemia.  On Crestor  3.  Hypertension.  On losartan and  carvedilol.    Follow-up in 1 year unless her symptoms change sooner.      Orders Placed This Encounter   Procedures   • ECG 12 Lead     This order was created via procedure documentation     Order Specific Question:   Release to patient     Answer:   Routine Release           MEDICATIONS         Discharge Medications          Accurate as of February 23, 2023  2:05 PM. If you have any questions, ask your nurse or doctor.            Changes to Medications      Instructions Start Date   omeprazole 40 MG capsule  Commonly known as: priLOSEC  What changed:   · when to take this  · reasons to take this   TAKE 1 CAPSULE BY MOUTH TWICE A DAY         Continue These Medications      Instructions Start Date   allopurinol 100 MG tablet  Commonly known as: ZYLOPRIM   100 mg, Oral, Daily      aspirin 81 MG EC tablet   81 mg, Oral, Daily      CALTRATE 600+D PO   Oral      carvedilol 6.25 MG tablet  Commonly known as: COREG   6.25 mg, Oral, 2 Times Daily With Meals      CRANBERRY PO   Oral      ESTRACE VAGINAL 0.1 MG/GM vaginal cream  Generic drug: estradiol   INSERT 1/2 TO 1 INCH VAGINALLY ONCE WEEKLY AS DIRECTED      Fluticasone-Salmeterol 250-50 MCG/ACT DISKUS  Commonly known as: ADVAIR/WIXELA   1 puff, Inhalation, 2 Times Daily - RT      glimepiride 2 MG tablet  Commonly known as: AMARYL   2 mg, Oral, Every Morning Before Breakfast      levothyroxine 75 MCG tablet  Commonly known as: SYNTHROID, LEVOTHROID   75 mcg, Oral, Daily      Loratadine 10 MG capsule   Oral      losartan 50 MG tablet  Commonly known as: COZAAR   50 mg, Oral, Daily      nitrofurantoin (macrocrystal-monohydrate) 100 MG capsule  Commonly known as: MACROBID   1 capsule, Oral, Every 12 Hours Scheduled      nystatin-triamcinolone 119671-6.1 UNIT/GM-% ointment  Commonly known as: MYCOLOG   1 application, Topical, 2 Times Daily, Don't use more than 7 days in row      OneTouch Delica Lancets 33G misc   USE TO TEST BLOOD SUGAR TWICE DAILY      OneTouch Verio test  strip  Generic drug: glucose blood   TEST EVERY MORNING      Potassium 99 MG tablet   Oral      PROBIOTIC DAILY PO   Oral, Daily      promethazine-dextromethorphan 6.25-15 MG/5ML syrup  Commonly known as: PROMETHAZINE-DM   5 mL, Oral, 4 Times Daily PRN      rosuvastatin 10 MG tablet  Commonly known as: CRESTOR   TAKE 1 TABLET EVERY DAY      vitamin B-12 1000 MCG tablet  Commonly known as: CYANOCOBALAMIN   1,000 mcg, Oral, 3 Times Weekly      VITAMIN D PO   Oral               Carolin Dhillon MD  02/23/23  14:05 EST    Part of this note may be an electronic transcription/translation of spoken language to printed text using the Dragon dictation system.

## 2023-03-06 PROCEDURE — 93294 REM INTERROG EVL PM/LDLS PM: CPT | Performed by: INTERNAL MEDICINE

## 2023-03-06 PROCEDURE — 93296 REM INTERROG EVL PM/IDS: CPT | Performed by: INTERNAL MEDICINE

## 2023-03-09 ENCOUNTER — TELEPHONE (OUTPATIENT)
Dept: CARDIOLOGY | Facility: CLINIC | Age: 88
End: 2023-03-09
Payer: MEDICARE

## 2023-03-09 NOTE — TELEPHONE ENCOUNTER
Patient with DDDR pacer, remote transmission reviewed today documented the following AF/AFL events:     108 AT/AF events on the counters since 12/6/22 with 10 events on the logbook and 3 EGM's for review    all EGM's show AFL 1 second-52 seconds long    summary report documents longest event 1 hour 22 minutes on 1/19/23  Logbook documents the following events:  91 events < 1 minute  16 events 1 minute- <1 hour  1 event 1 hour- < 24 hours    these events are known to patient, not on OAC, AF burden 1 %      2/27/23 Event:

## 2023-04-11 RX ORDER — GLIMEPIRIDE 2 MG/1
2 TABLET ORAL
Qty: 90 TABLET | Refills: 1 | Status: SHIPPED | OUTPATIENT
Start: 2023-04-11

## 2023-04-11 NOTE — TELEPHONE ENCOUNTER
Rx Refill Note  Requested Prescriptions     Pending Prescriptions Disp Refills   • glimepiride (AMARYL) 2 MG tablet 90 tablet 1     Sig: Take 1 tablet by mouth Every Morning Before Breakfast.      Last office visit with prescribing clinician: 1/31/2023   Last telemedicine visit with prescribing clinician: 8/1/2023   Next office visit with prescribing clinician: 8/1/2023       {TIP  Please add Last Relevant Lab Date if appropriate: 1/24/23                 Would you like a call back once the refill request has been completed: [] Yes [] No    If the office needs to give you a call back, can they leave a voicemail: [] Yes [] No    Pina Flores MA  04/11/23, 16:03 EDT

## 2023-05-08 RX ORDER — GLIMEPIRIDE 2 MG/1
2 TABLET ORAL
Qty: 90 TABLET | Refills: 1 | Status: SHIPPED | OUTPATIENT
Start: 2023-05-08

## 2023-05-08 RX ORDER — CARVEDILOL 6.25 MG/1
6.25 TABLET ORAL 2 TIMES DAILY WITH MEALS
Qty: 180 TABLET | Refills: 1 | Status: SHIPPED | OUTPATIENT
Start: 2023-05-08

## 2023-05-08 NOTE — TELEPHONE ENCOUNTER
Rx Refill Note  Requested Prescriptions     Pending Prescriptions Disp Refills   • carvedilol (COREG) 6.25 MG tablet [Pharmacy Med Name: CARVEDILOL 6.25 MG Tablet] 180 tablet 1     Sig: TAKE 1 TABLET BY MOUTH 2 (TWO) TIMES A DAY WITH MEALS.   • glimepiride (AMARYL) 2 MG tablet [Pharmacy Med Name: GLIMEPIRIDE 2 MG Tablet] 90 tablet 1     Sig: TAKE 1 TABLET BY MOUTH EVERY MORNING BEFORE BREAKFAST.      Last office visit with prescribing clinician: 1/31/2023   Last telemedicine visit with prescribing clinician: 8/1/2023   Next office visit with prescribing clinician: 8/1/2023                         Would you like a call back once the refill request has been completed: [] Yes [] No    If the office needs to give you a call back, can they leave a voicemail: [] Yes [] No    Santiago Mccann MA  05/08/23, 10:02 EDT

## 2023-05-12 ENCOUNTER — TELEPHONE (OUTPATIENT)
Dept: FAMILY MEDICINE CLINIC | Facility: CLINIC | Age: 88
End: 2023-05-12

## 2023-05-12 NOTE — TELEPHONE ENCOUNTER
Caller: Yeoman, Donna M    Relationship to patient: Self    Best call back number: [phone]    COVID19 symptoms: ACHES, NAUSEOUS, COUGH, SWEATS, FATIGUE    Additional information or concerns: PATIENT STATED HER SON, WHOM SHE LIVES WITH, HAS COVID.    SHE STATED SHE HAS NOT TAKEN A COVID TEST.    SHE WOULD LIKE TO KNOW WHAT SHE SHOULD DO, AND IF THERE IS A MEDICATION THAT CAN BE CALLED IN TO HELP WITH SYMPTOMS.    PLEASE CALL.    What is the patients preferred pharmacy: [facility]/pharmacy #[id number] - [place], [place] - [address] AT [facility] [phone] [facility] [phone]

## 2023-05-15 ENCOUNTER — OFFICE VISIT (OUTPATIENT)
Dept: FAMILY MEDICINE CLINIC | Facility: CLINIC | Age: 88
End: 2023-05-15
Payer: MEDICARE

## 2023-05-15 VITALS
WEIGHT: 149.1 LBS | TEMPERATURE: 96.6 F | BODY MASS INDEX: 26.42 KG/M2 | DIASTOLIC BLOOD PRESSURE: 80 MMHG | HEART RATE: 48 BPM | HEIGHT: 63 IN | OXYGEN SATURATION: 92 % | SYSTOLIC BLOOD PRESSURE: 130 MMHG

## 2023-05-15 DIAGNOSIS — U07.1 COVID-19: Primary | ICD-10-CM

## 2023-05-15 LAB
EXPIRATION DATE: ABNORMAL
INTERNAL CONTROL: ABNORMAL
Lab: ABNORMAL
SARS-COV-2 AG UPPER RESP QL IA.RAPID: DETECTED

## 2023-05-15 PROCEDURE — 87426 SARSCOV CORONAVIRUS AG IA: CPT | Performed by: NURSE PRACTITIONER

## 2023-05-15 PROCEDURE — 1160F RVW MEDS BY RX/DR IN RCRD: CPT | Performed by: NURSE PRACTITIONER

## 2023-05-15 PROCEDURE — 99213 OFFICE O/P EST LOW 20 MIN: CPT | Performed by: NURSE PRACTITIONER

## 2023-05-15 PROCEDURE — 1159F MED LIST DOCD IN RCRD: CPT | Performed by: NURSE PRACTITIONER

## 2023-05-15 RX ORDER — DEXTROMETHORPHAN HYDROBROMIDE AND PROMETHAZINE HYDROCHLORIDE 15; 6.25 MG/5ML; MG/5ML
5 SYRUP ORAL 4 TIMES DAILY PRN
Qty: 118 ML | Refills: 0 | Status: SHIPPED | OUTPATIENT
Start: 2023-05-15

## 2023-05-15 NOTE — PROGRESS NOTES
"Chief Complaint  Cough (Pt son has covid/pneumonia  /Onset symptoms x 1 week today ) and Fatigue    Subjective        Donna M Yeoman presents to [facility]  History of Present Illness new pt to me.  Here for suspected covid.  Son became ill last Sunday and pt became ill last Monday.   Son was covid + and they had one friend who visited over [place] who has become ill with recently diagnosed COVID-pneumonia.    She denies asthma.  She has pacer and controlled HTN, T2DM.  She has had appr 1 yr of sinus congestion-was referred to ENT without further tx recommended.      Feels like she is getting a little better.  On Friday was too weak to leave home.  She has not tested at home.  She has HA, fatigue, decreased appetite.  No fever or chills.  She has been getting up and moving around the house.  She had 1 day of GI symptoms-diarrhea but now resolved.    Patient vaccinated and boosted with all but most recent COVID-vaccine.    Has controlled diabetes.          Objective   Vital Signs:  /80   Pulse (!) 48   Temp 96.6 °F (35.9 °C) (Temporal)   Ht 160 cm (63\")   Wt 67.6 kg (149 lb 1.6 oz)   SpO2 92%   BMI 26.41 kg/m²   Estimated body mass index is 26.41 kg/m² as calculated from the following:    Height as of this encounter: 160 cm (63\").    Weight as of this encounter: 67.6 kg (149 lb 1.6 oz).             Physical Exam  Vitals and nursing note reviewed.   Constitutional:       General: She is not in acute distress.     Appearance: She is well-developed. She is not ill-appearing or diaphoretic.      Comments: Well-dressed, well-appearing   HENT:      Head: Normocephalic and atraumatic.      Right Ear: Tympanic membrane, ear canal and external ear normal.      Left Ear: Tympanic membrane, ear canal and external ear normal.      Mouth/Throat:      Mouth: Mucous membranes are moist.      Pharynx: No posterior oropharyngeal erythema.   Eyes:      General:         Right eye: No discharge.  "        Left eye: No discharge.      Conjunctiva/sclera: Conjunctivae normal.   Cardiovascular:      Rate and Rhythm: Normal rate and regular rhythm.      Heart sounds: Normal heart sounds.   Pulmonary:      Effort: Pulmonary effort is normal.      Breath sounds: Normal breath sounds. No wheezing or rales.   Abdominal:      General: Bowel sounds are normal.      Palpations: Abdomen is soft.      Tenderness: There is no abdominal tenderness.   Musculoskeletal:         General: No deformity.      Cervical back: Neck supple.      Comments: Gait smooth and steady   Lymphadenopathy:      Cervical: No cervical adenopathy.   Skin:     General: Skin is warm and dry.   Neurological:      Mental Status: She is alert and oriented to person, place, and time.   Psychiatric:         Mood and Affect: Mood normal.         Behavior: Behavior normal.        Result Review :                   Assessment and Plan   Diagnoses and all orders for this visit:    1. COVID-19 (Primary)  -     promethazine-dextromethorphan (PROMETHAZINE-DM) 6.25-15 MG/5ML syrup; Take 5 mL by mouth 4 (Four) Times a Day As Needed for Cough.  Dispense: 118 mL; Refill: 0  -     POCT SARS-CoV-2 Antigen JAKOB    Other orders  -     Cancel: POCT SARS-CoV-2 Antigen JAKOB + Flu      Patient is positive for COVID.  She is at day 7 of onset of her symptoms and as we discussed this is late to start antiviral medication which likely will not be helpful.  Since she is improving and her lungs sound really good today and she is well-appearing I do not think a steroid needed at this point.  We will treat symptomatically with Promethazine DM.  Side effects reviewed.  If she is worsening or not improving as discussed she will need urgent care or follow-up with primary care.       Follow Up   Return if symptoms worsen or fail to improve.  Patient was given instructions and counseling regarding her condition or for health maintenance advice. Please see specific information pulled into  the AVS if appropriate.

## 2023-07-25 DIAGNOSIS — E11.9 TYPE 2 DIABETES MELLITUS WITHOUT COMPLICATION, WITHOUT LONG-TERM CURRENT USE OF INSULIN: ICD-10-CM

## 2023-07-25 DIAGNOSIS — E03.9 HYPOTHYROIDISM, UNSPECIFIED TYPE: ICD-10-CM

## 2023-07-25 DIAGNOSIS — E78.5 HYPERLIPIDEMIA, UNSPECIFIED HYPERLIPIDEMIA TYPE: ICD-10-CM

## 2023-07-26 LAB
ALBUMIN SERPL-MCNC: 4.2 G/DL (ref 3.5–5.2)
ALBUMIN/GLOB SERPL: 2.2 G/DL
ALP SERPL-CCNC: 71 U/L (ref 39–117)
ALT SERPL-CCNC: 31 U/L (ref 1–33)
AST SERPL-CCNC: 36 U/L (ref 1–32)
BASOPHILS # BLD AUTO: 0.04 10*3/MM3 (ref 0–0.2)
BASOPHILS NFR BLD AUTO: 0.6 % (ref 0–1.5)
BILIRUB SERPL-MCNC: 0.9 MG/DL (ref 0–1.2)
BUN SERPL-MCNC: 11 MG/DL (ref 8–23)
BUN/CREAT SERPL: 11.6 (ref 7–25)
CALCIUM SERPL-MCNC: 9 MG/DL (ref 8.6–10.5)
CHLORIDE SERPL-SCNC: 108 MMOL/L (ref 98–107)
CHOLEST SERPL-MCNC: 132 MG/DL (ref 0–200)
CO2 SERPL-SCNC: 26.9 MMOL/L (ref 22–29)
CREAT SERPL-MCNC: 0.95 MG/DL (ref 0.57–1)
EGFRCR SERPLBLD CKD-EPI 2021: 57.7 ML/MIN/1.73
EOSINOPHIL # BLD AUTO: 0.25 10*3/MM3 (ref 0–0.4)
EOSINOPHIL NFR BLD AUTO: 3.8 % (ref 0.3–6.2)
ERYTHROCYTE [DISTWIDTH] IN BLOOD BY AUTOMATED COUNT: 13.9 % (ref 12.3–15.4)
GLOBULIN SER CALC-MCNC: 1.9 GM/DL
GLUCOSE SERPL-MCNC: 107 MG/DL (ref 65–99)
HBA1C MFR BLD: 6.1 % (ref 4.8–5.6)
HCT VFR BLD AUTO: 40.2 % (ref 34–46.6)
HDLC SERPL-MCNC: 39 MG/DL (ref 40–60)
HGB BLD-MCNC: 13.1 G/DL (ref 12–15.9)
IMM GRANULOCYTES # BLD AUTO: 0.02 10*3/MM3 (ref 0–0.05)
IMM GRANULOCYTES NFR BLD AUTO: 0.3 % (ref 0–0.5)
LDLC SERPL CALC-MCNC: 71 MG/DL (ref 0–100)
LDLC/HDLC SERPL: 1.76 {RATIO}
LYMPHOCYTES # BLD AUTO: 1.88 10*3/MM3 (ref 0.7–3.1)
LYMPHOCYTES NFR BLD AUTO: 28.6 % (ref 19.6–45.3)
MCH RBC QN AUTO: 30.5 PG (ref 26.6–33)
MCHC RBC AUTO-ENTMCNC: 32.6 G/DL (ref 31.5–35.7)
MCV RBC AUTO: 93.7 FL (ref 79–97)
MONOCYTES # BLD AUTO: 0.49 10*3/MM3 (ref 0.1–0.9)
MONOCYTES NFR BLD AUTO: 7.4 % (ref 5–12)
NEUTROPHILS # BLD AUTO: 3.9 10*3/MM3 (ref 1.7–7)
NEUTROPHILS NFR BLD AUTO: 59.3 % (ref 42.7–76)
NRBC BLD AUTO-RTO: 0 /100 WBC (ref 0–0.2)
PLATELET # BLD AUTO: 174 10*3/MM3 (ref 140–450)
POTASSIUM SERPL-SCNC: 4.3 MMOL/L (ref 3.5–5.2)
PROT SERPL-MCNC: 6.1 G/DL (ref 6–8.5)
RBC # BLD AUTO: 4.29 10*6/MM3 (ref 3.77–5.28)
SODIUM SERPL-SCNC: 142 MMOL/L (ref 136–145)
T4 FREE SERPL-MCNC: 1.15 NG/DL (ref 0.93–1.7)
TRIGL SERPL-MCNC: 122 MG/DL (ref 0–150)
TSH SERPL DL<=0.005 MIU/L-ACNC: 1.77 UIU/ML (ref 0.27–4.2)
VLDLC SERPL CALC-MCNC: 22 MG/DL (ref 5–40)
WBC # BLD AUTO: 6.58 10*3/MM3 (ref 3.4–10.8)

## 2023-08-01 ENCOUNTER — OFFICE VISIT (OUTPATIENT)
Dept: FAMILY MEDICINE CLINIC | Facility: CLINIC | Age: 88
End: 2023-08-01
Payer: MEDICARE

## 2023-08-01 VITALS
OXYGEN SATURATION: 94 % | WEIGHT: 152 LBS | SYSTOLIC BLOOD PRESSURE: 140 MMHG | BODY MASS INDEX: 26.93 KG/M2 | HEART RATE: 55 BPM | DIASTOLIC BLOOD PRESSURE: 70 MMHG | TEMPERATURE: 97.6 F | HEIGHT: 63 IN

## 2023-08-01 DIAGNOSIS — R26.89 LOSS OF BALANCE: Primary | ICD-10-CM

## 2023-08-01 DIAGNOSIS — E78.5 HYPERLIPIDEMIA: ICD-10-CM

## 2023-08-01 DIAGNOSIS — R26.9 GAIT DISTURBANCE: ICD-10-CM

## 2023-08-01 DIAGNOSIS — I10 ESSENTIAL HYPERTENSION: ICD-10-CM

## 2023-08-01 DIAGNOSIS — E55.9 VITAMIN D DEFICIENCY: ICD-10-CM

## 2023-08-01 DIAGNOSIS — E11.9 TYPE 2 DIABETES MELLITUS WITHOUT COMPLICATION, WITHOUT LONG-TERM CURRENT USE OF INSULIN: ICD-10-CM

## 2023-08-01 DIAGNOSIS — E53.8 B12 DEFICIENCY: ICD-10-CM

## 2023-08-01 PROCEDURE — 99214 OFFICE O/P EST MOD 30 MIN: CPT | Performed by: INTERNAL MEDICINE

## 2023-08-01 PROCEDURE — 1159F MED LIST DOCD IN RCRD: CPT | Performed by: INTERNAL MEDICINE

## 2023-08-01 PROCEDURE — 1160F RVW MEDS BY RX/DR IN RCRD: CPT | Performed by: INTERNAL MEDICINE

## 2023-08-01 RX ORDER — ROSUVASTATIN CALCIUM 10 MG/1
10 TABLET, COATED ORAL DAILY
Qty: 90 TABLET | Refills: 1 | Status: SHIPPED | OUTPATIENT
Start: 2023-08-01

## 2023-08-03 ENCOUNTER — LAB (OUTPATIENT)
Dept: LAB | Facility: HOSPITAL | Age: 88
End: 2023-08-03
Payer: MEDICARE

## 2023-08-03 DIAGNOSIS — D47.2 MGUS (MONOCLONAL GAMMOPATHY OF UNKNOWN SIGNIFICANCE): ICD-10-CM

## 2023-08-03 DIAGNOSIS — E53.8 B12 DEFICIENCY: ICD-10-CM

## 2023-08-03 LAB
ALBUMIN SERPL-MCNC: 3.9 G/DL (ref 3.5–5.2)
ALBUMIN/GLOB SERPL: 1.7 G/DL
ALP SERPL-CCNC: 62 U/L (ref 39–117)
ALT SERPL W P-5'-P-CCNC: 8 U/L (ref 1–33)
ANION GAP SERPL CALCULATED.3IONS-SCNC: 11.1 MMOL/L (ref 5–15)
AST SERPL-CCNC: 28 U/L (ref 1–32)
BASOPHILS # BLD AUTO: 0.03 10*3/MM3 (ref 0–0.2)
BASOPHILS NFR BLD AUTO: 0.5 % (ref 0–1.5)
BILIRUB SERPL-MCNC: 1 MG/DL (ref 0–1.2)
BUN SERPL-MCNC: 12 MG/DL (ref 8–23)
BUN/CREAT SERPL: 13.3 (ref 7–25)
CALCIUM SPEC-SCNC: 8.9 MG/DL (ref 8.6–10.5)
CHLORIDE SERPL-SCNC: 107 MMOL/L (ref 98–107)
CO2 SERPL-SCNC: 24.9 MMOL/L (ref 22–29)
CREAT SERPL-MCNC: 0.9 MG/DL (ref 0.6–1.1)
DEPRECATED RDW RBC AUTO: 49.1 FL (ref 37–54)
EGFRCR SERPLBLD CKD-EPI 2021: 61.6 ML/MIN/1.73
EOSINOPHIL # BLD AUTO: 0.36 10*3/MM3 (ref 0–0.4)
EOSINOPHIL NFR BLD AUTO: 5.6 % (ref 0.3–6.2)
ERYTHROCYTE [DISTWIDTH] IN BLOOD BY AUTOMATED COUNT: 13.9 % (ref 12.3–15.4)
GLOBULIN UR ELPH-MCNC: 2.3 GM/DL
GLUCOSE SERPL-MCNC: 86 MG/DL (ref 65–99)
HCT VFR BLD AUTO: 39.2 % (ref 34–46.6)
HGB BLD-MCNC: 12.7 G/DL (ref 12–15.9)
IMM GRANULOCYTES # BLD AUTO: 0.02 10*3/MM3 (ref 0–0.05)
IMM GRANULOCYTES NFR BLD AUTO: 0.3 % (ref 0–0.5)
LYMPHOCYTES # BLD AUTO: 1.91 10*3/MM3 (ref 0.7–3.1)
LYMPHOCYTES NFR BLD AUTO: 29.7 % (ref 19.6–45.3)
MCH RBC QN AUTO: 31.1 PG (ref 26.6–33)
MCHC RBC AUTO-ENTMCNC: 32.4 G/DL (ref 31.5–35.7)
MCV RBC AUTO: 96.1 FL (ref 79–97)
MONOCYTES # BLD AUTO: 0.61 10*3/MM3 (ref 0.1–0.9)
MONOCYTES NFR BLD AUTO: 9.5 % (ref 5–12)
NEUTROPHILS NFR BLD AUTO: 3.51 10*3/MM3 (ref 1.7–7)
NEUTROPHILS NFR BLD AUTO: 54.4 % (ref 42.7–76)
NRBC BLD AUTO-RTO: 0 /100 WBC (ref 0–0.2)
PLATELET # BLD AUTO: 170 10*3/MM3 (ref 140–450)
PMV BLD AUTO: 9.8 FL (ref 6–12)
POTASSIUM SERPL-SCNC: 4.5 MMOL/L (ref 3.5–5.2)
PROT SERPL-MCNC: 6.2 G/DL (ref 6–8.5)
RBC # BLD AUTO: 4.08 10*6/MM3 (ref 3.77–5.28)
SODIUM SERPL-SCNC: 143 MMOL/L (ref 136–145)
VIT B12 BLD-MCNC: 1571 PG/ML (ref 211–946)
WBC NRBC COR # BLD: 6.44 10*3/MM3 (ref 3.4–10.8)

## 2023-08-03 PROCEDURE — 36415 COLL VENOUS BLD VENIPUNCTURE: CPT

## 2023-08-03 PROCEDURE — 82607 VITAMIN B-12: CPT | Performed by: INTERNAL MEDICINE

## 2023-08-03 PROCEDURE — 85025 COMPLETE CBC W/AUTO DIFF WBC: CPT

## 2023-08-03 PROCEDURE — 80053 COMPREHEN METABOLIC PANEL: CPT

## 2023-08-07 LAB
ALBUMIN SERPL ELPH-MCNC: 3.4 G/DL (ref 2.9–4.4)
ALBUMIN/GLOB SERPL: 1.1 {RATIO} (ref 0.7–1.7)
ALPHA1 GLOB SERPL ELPH-MCNC: 0.3 G/DL (ref 0–0.4)
ALPHA2 GLOB SERPL ELPH-MCNC: 0.7 G/DL (ref 0.4–1)
B-GLOBULIN SERPL ELPH-MCNC: 0.8 G/DL (ref 0.7–1.3)
GAMMA GLOB SERPL ELPH-MCNC: 1.3 G/DL (ref 0.4–1.8)
GLOBULIN SER-MCNC: 3.1 G/DL (ref 2.2–3.9)
IGA SERPL-MCNC: 131 MG/DL (ref 64–422)
IGG SERPL-MCNC: 1334 MG/DL (ref 586–1602)
IGM SERPL-MCNC: 41 MG/DL (ref 26–217)
INTERPRETATION SERPL IEP-IMP: ABNORMAL
KAPPA LC FREE SER-MCNC: 28 MG/L (ref 3.3–19.4)
KAPPA LC FREE/LAMBDA FREE SER: 0.7 {RATIO} (ref 0.26–1.65)
LABORATORY COMMENT REPORT: ABNORMAL
LAMBDA LC FREE SERPL-MCNC: 39.8 MG/L (ref 5.7–26.3)
M PROTEIN SERPL ELPH-MCNC: 0.7 G/DL
PROT SERPL-MCNC: 6.5 G/DL (ref 6–8.5)

## 2023-08-15 ENCOUNTER — OFFICE VISIT (OUTPATIENT)
Dept: ONCOLOGY | Facility: CLINIC | Age: 88
End: 2023-08-15
Payer: MEDICARE

## 2023-08-15 VITALS
RESPIRATION RATE: 16 BRPM | DIASTOLIC BLOOD PRESSURE: 79 MMHG | WEIGHT: 149.1 LBS | OXYGEN SATURATION: 98 % | BODY MASS INDEX: 26.42 KG/M2 | TEMPERATURE: 95.5 F | SYSTOLIC BLOOD PRESSURE: 183 MMHG | HEART RATE: 58 BPM | HEIGHT: 63 IN

## 2023-08-15 DIAGNOSIS — D47.2 MGUS (MONOCLONAL GAMMOPATHY OF UNKNOWN SIGNIFICANCE): Primary | ICD-10-CM

## 2023-08-15 DIAGNOSIS — E53.8 B12 DEFICIENCY: ICD-10-CM

## 2023-08-15 NOTE — PROGRESS NOTES
Subjective    REASON FOR FOLLOW UP:  IgG Lambda MGUS    HISTORY OF PRESENT ILLNESS:     History of Present Illness  Shirley is a 88 y.o. 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  She had labs drawn in our office 8/3/2023 showing a stable IgG lambda monoclonal spike quantitated at 0.7 g/dL.  Her IgG level was still within normal limits at 1334 milligrams per deciliter.  Her CBC was normal and she denies any new symptoms.    She continues to be very vigorous for her age. Her only complaint today is chronic fatigue and some issues with her balance.  She is going to be having some physical therapy to try to address this.  She has not had any recent falls and currently is walking without a cane or walker.    Past Medical History, Past Surgical History, Social History, Family History have been reviewed and are without significant changes except as mentioned.    Review of Systems   Constitutional:  Negative for activity change, appetite change, fatigue, fever and unexpected weight change.   HENT:  Negative for hearing loss, nosebleeds, trouble swallowing and voice change.    Eyes:  Negative for visual disturbance.   Respiratory:  Positive for cough. Negative for shortness of breath and wheezing.    Cardiovascular:  Negative for chest pain and palpitations.   Gastrointestinal:  Negative for abdominal pain, diarrhea, nausea and vomiting.   Genitourinary:  Negative for difficulty urinating, frequency, hematuria and urgency.   Musculoskeletal:  Positive for arthralgias and back pain. Negative for neck pain.   Skin:  Negative for rash.   Neurological:  Negative for dizziness, seizures, syncope and headaches.   Hematological:  Negative for adenopathy. Does not bruise/bleed easily.   Psychiatric/Behavioral:  Negative for behavioral problems. The patient is not nervous/anxious.    A comprehensive 14 point review of systems was performed and was negative  "except as mentioned.    Medications:  The current medication list was reviewed in the EMR    ALLERGIES:    No Known Allergies    Objective      Vitals:    08/15/23 1135   BP: (!) 183/79   Pulse: 58   Resp: 16   Temp: 95.5 øF (35.3 øC)   TempSrc: Temporal   SpO2: 98%   Weight: 67.6 kg (149 lb 1.6 oz)   Height: 160 cm (62.99\")   PainSc: 0-No pain         8/15/2023    11:37 AM   Current Status   ECOG score 0     No physical exam on the visit of 2/11/2021 as this was a telemedicine visit.  Physical Exam   Constitutional: She is oriented to person, place, and time. She appears well-developed. No distress.   HENT:   Head: Normocephalic.   Eyes: Pupils are equal, round, and reactive to light. Conjunctivae are normal. No scleral icterus.   Neck: No JVD present. No thyromegaly present.   Cardiovascular: Normal rate and regular rhythm. Exam reveals no gallop and no friction rub.   No murmur heard.  Permanent pacemaker in the left anterior chest wall   Pulmonary/Chest: Effort normal and breath sounds normal. She has no wheezes. She has no rales.   Abdominal: Soft. She exhibits no distension and no mass. There is no abdominal tenderness.   Musculoskeletal: Normal range of motion. No deformity.   Lymphadenopathy:     She has no cervical adenopathy.   Neurological: She is alert and oriented to person, place, and time. She has normal reflexes. No cranial nerve deficit.   Skin: Skin is warm and dry. No rash noted. No erythema.   Psychiatric: Her behavior is normal. Judgment normal. I have reexamined the patient and the results are consistent with the previously documented exam. King Adame MD       RECENT LABS:  Hematology WBC   Date Value Ref Range Status   08/03/2023 6.44 3.40 - 10.80 10*3/mm3 Final   07/25/2023 6.58 3.40 - 10.80 10*3/mm3 Final     RBC   Date Value Ref Range Status   08/03/2023 4.08 3.77 - 5.28 10*6/mm3 Final   07/25/2023 4.29 3.77 - 5.28 10*6/mm3 Final     Hemoglobin   Date Value Ref Range Status "   08/03/2023 12.7 12.0 - 15.9 g/dL Final     Hematocrit   Date Value Ref Range Status   08/03/2023 39.2 34.0 - 46.6 % Final     Platelets   Date Value Ref Range Status   08/03/2023 170 140 - 450 10*3/mm3 Final          Lab Results   Component Value Date    GLUCOSE 86 08/03/2023    BUN 12 08/03/2023    CREATININE 0.90 08/03/2023    EGFRIFNONA 61 01/14/2022    EGFRIFAFRI 70 01/14/2022    BCR 13.3 08/03/2023    K 4.5 08/03/2023    CO2 24.9 08/03/2023    CALCIUM 8.9 08/03/2023    PROTENTOTREF 6.5 08/03/2023    ALBUMIN 3.4 08/03/2023    ALBUMIN 3.9 08/03/2023    LABIL2 1.1 08/03/2023    AST 28 08/03/2023    ALT 8 08/03/2023       SPEP/LISA 8/3/2023  IgG  586 - 1602 mg/dL 1334    1314      IgA  64 - 422 mg/dL 131    141      IgM  26 - 217 mg/dL 41    42      Total Protein  6.0 - 8.5 g/dL 6.5 6.2 6.1 6.3 6.7 7.0 R  7.2   Albumin  2.9 - 4.4 g/dL 3.4 3.9 R 4.2 R 3.8 R 3.8 4.00 R  4.4 R   Alpha-1-Globulin  0.0 - 0.4 g/dL 0.3    0.2      Alpha-2-Globulin  0.4 - 1.0 g/dL 0.7    0.7      Beta Globulin  0.7 - 1.3 g/dL 0.8    0.8      Gamma Globulin  0.4 - 1.8 g/dL 1.3    1.2      M-Esteban  Not Observed g/dL 0.7 High     0.7 High       Globulin  2.2 - 3.9 g/dL 3.1  1.9 R 2.5 R 2.9   2.8 R   A/G Ratio  0.7 - 1.7 1.1 1.7 R 2.2 R 1.5 R 1.4 1.3 R  1.6 R   Immunofixation Reflex, Serum Comment Abnormal     Comment Abnormal  CM      Comment: Immunofixation shows IgG monoclonal protein with lambda light chain  specificity.                Free Light Chain, Kappa  3.3 - 19.4 mg/L 28.0 High     24.0 High       Free Lambda Light Chains  5.7 - 26.3 mg/L 39.8 High     36.2 High       Kappa/Lambda Ratio  0.26 - 1.65 0.70    0.66            Lab Results   Component Value Date    VRKJAJLB66 1,571 (H) 08/03/2023     Assessment & Plan   1.  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  2.  History of B12 deficiency.  As noted above her B12 level  was excellent.  She continues to take a daily B12 supplement.  3.  Permanent pacemaker       Plan    1.  Return for follow-up in 12 months.  2.  Labs will again be drawn one week prior to visit including a CBC, B12, serum chemistries, serum protein electrophoresis, immunofixation, and free serum light chain analysis.                8/15/2023      CC:

## 2023-11-21 ENCOUNTER — TELEPHONE (OUTPATIENT)
Dept: CARDIOLOGY | Facility: CLINIC | Age: 88
End: 2023-11-21

## 2023-11-21 NOTE — TELEPHONE ENCOUNTER
Caller: Yeoman, Donna M    Relationship to patient: Self    Best call back number: [phone] (home)      Type of visit: DEVICE CHECK AND FOLLOW UP     Requested date: FEB      If rescheduling, when is the original appointment: 12.7.23     Additional notes:PT STATES SHE RECEIVED A CALL YESTERDAY TO CHANGE HER APPT ON 12.7.23, NO MESSAGE IN THE CHART. PLEASE ADVISE IF APPT NEEDS TO BE RESCHEDULED AND CALL PT TO RESCHEDULE. PT WOULD PREFER A DATE IN FEB IF NEEDED TO R/S

## 2023-12-18 RX ORDER — CARVEDILOL 6.25 MG/1
6.25 TABLET ORAL 2 TIMES DAILY WITH MEALS
Qty: 180 TABLET | Refills: 3 | Status: SHIPPED | OUTPATIENT
Start: 2023-12-18

## 2023-12-21 ENCOUNTER — OFFICE VISIT (OUTPATIENT)
Dept: SLEEP MEDICINE | Facility: HOSPITAL | Age: 88
End: 2023-12-21
Payer: MEDICARE

## 2023-12-21 VITALS
OXYGEN SATURATION: 97 % | BODY MASS INDEX: 26.22 KG/M2 | WEIGHT: 148 LBS | HEIGHT: 63 IN | DIASTOLIC BLOOD PRESSURE: 81 MMHG | SYSTOLIC BLOOD PRESSURE: 138 MMHG | HEART RATE: 79 BPM

## 2023-12-21 DIAGNOSIS — G47.33 OSA ON CPAP: Primary | ICD-10-CM

## 2023-12-21 PROCEDURE — G0463 HOSPITAL OUTPT CLINIC VISIT: HCPCS

## 2023-12-21 NOTE — PROGRESS NOTES
"  [facility]  [address]  [address]  [place] [address]  Phone   Fax       SLEEP CLINIC FOLLOW UP PROGRESS NOTE.    Donna M Yeoman  [id number]   1935  88 y.o.  female      PCP: Carloine Weiner MD      Date of visit: 12/21/2023    Chief Complaint   Patient presents with    Sleep Apnea       HPI:  This is a 88 y.o. years old patient is here for the management of obstructive sleep apnea.  Sleep apnea is moderate in severity with a AHI of 15/hr. Patient is using positive airway pressure therapy with CPAP 11 and the symptoms of sleep apnea have improved significantly on the therapy. Normally patient goes to bed at 10 PM and wakes up at 730 AM .  The patient wakes up 2 time(s) during the night and has no problem going back to sleep.  Feels refreshed after waking up.  She is having some memory problems    Medications and allergies are reviewed by me and documented in the encounter.     SOCIAL (habits pertaining to sleep medicine)  History tobacco use:No   History of alcohol use: 0 per week  Caffeine use: 1     REVIEW OF SYSTEMS:   Pertaining positive symptoms are:  Dimmitt Sleepiness Scale :Total score: 3       PHYSICAL EXAMINATION:  CONSTITUTIONAL:  Vitals:    12/21/23 0900   BP: 138/81   Pulse: 79   SpO2: 97%   Weight: 67.1 kg (148 lb)   Height: 160 cm (62.99\")    Body mass index is 26.22 kg/m².   NOSE: nasal passages are clear, No deformities noted   RESP SYSTEM: Not in any respiratory distress, no chest deformities noted,   CARDIOVASULAR: No edema noted  NEURO: Oriented x 3, gait normal,  Mood and affect appeared appropriate      Data reviewed:  The Smart card downloaded on 12/21/2023 has been reviewed independently by me for compliance and discussed the data with the patient.   Compliance; 97%  More than 4 hr use, 97%  Average use of the device 10 hours per night  Residual AHI: 3.5 /hr (goal < 5.0 /hr)  Mask type: Nasal mask  Device: DreamStation  DME: [facility]      ASSESSMENT AND PLAN:  Obstructive sleep apnea ( G 47.33).  The symptoms of sleep apnea have improved with the device and the treatment.  Patient's compliance with the device is excellent for treatment of sleep apnea.  I have independently reviewed the smart card down load and discussed with the patient the download data and encouarged the patient to continue to use the device.The residual AHI is acceptable. The device is benefiting the patient and the device is medically necessary.  Without proper control of sleep apnea and good compliance there is a increased risk for hypertension, diabetes mellitus and nonrestorative sleep with hypersomnia which can increase risk for motor vehicle accidents.  Untreated sleep apnea is also a risk factor for development of atrial fibrillation, pulmonary hypertension, insulin resistance and stroke. The patient is also instructed to get the supplies from the DME company and and change them on a regular basis.  A prescription for supplies has been sent to the DME company.  I have also discussed the good sleep hygiene habits and adequate amount of sleep needed for good health.  Patient is having some memory problems  Return in about 1 year (around 12/21/2024) for with smart card down load. . Patient's questions were answered.    12/21/2023  Emmy Henderson MD  Sleep Medicine.  Medical Director,   [facility] [place] [facility].

## 2024-02-12 DIAGNOSIS — E78.5 HYPERLIPIDEMIA: ICD-10-CM

## 2024-02-12 RX ORDER — ROSUVASTATIN CALCIUM 10 MG/1
10 TABLET, COATED ORAL DAILY
Qty: 90 TABLET | Refills: 3 | Status: SHIPPED | OUTPATIENT
Start: 2024-02-12

## 2024-02-15 RX ORDER — GLIMEPIRIDE 2 MG/1
2 TABLET ORAL
Qty: 90 TABLET | Refills: 3 | Status: SHIPPED | OUTPATIENT
Start: 2024-02-15

## 2024-03-06 RX ORDER — ALLOPURINOL 100 MG/1
TABLET ORAL
Qty: 90 TABLET | Refills: 3 | Status: SHIPPED | OUTPATIENT
Start: 2024-03-06

## 2024-03-06 RX ORDER — LEVOTHYROXINE SODIUM 0.07 MG/1
TABLET ORAL
Qty: 90 TABLET | Refills: 3 | Status: SHIPPED | OUTPATIENT
Start: 2024-03-06

## 2024-03-06 RX ORDER — LOSARTAN POTASSIUM 50 MG/1
TABLET ORAL
Qty: 90 TABLET | Refills: 3 | Status: SHIPPED | OUTPATIENT
Start: 2024-03-06

## 2024-03-06 NOTE — TELEPHONE ENCOUNTER
Rx Refill Note  Requested Prescriptions     Pending Prescriptions Disp Refills    allopurinol (ZYLOPRIM) 100 MG tablet [Pharmacy Med Name: ALLOPURINOL 100 MG Tablet] 90 tablet 3     Sig: TAKE 1 TABLET EVERY DAY    losartan (COZAAR) 50 MG tablet [Pharmacy Med Name: LOSARTAN POTASSIUM 50 MG Tablet] 90 tablet 3     Sig: TAKE 1 TABLET EVERY DAY    levothyroxine (SYNTHROID, LEVOTHROID) 75 MCG tablet [Pharmacy Med Name: LEVOTHYROXINE SODIUM 75 MCG Tablet] 90 tablet 3     Sig: TAKE 1 TABLET EVERY DAY      Last office visit with prescribing clinician: 8/1/2023   Last telemedicine visit with prescribing clinician: Visit date not found   Next office visit with prescribing clinician: 4/30/2024                         Would you like a call back once the refill request has been completed: [] Yes [] No    If the office needs to give you a call back, can they leave a voicemail: [] Yes [] No    Mike Lisa MA  03/06/24, 14:20 EST

## 2024-03-26 ENCOUNTER — TRANSCRIBE ORDERS (OUTPATIENT)
Dept: ADMINISTRATIVE | Facility: HOSPITAL | Age: 89
End: 2024-03-26
Payer: MEDICARE

## 2024-03-26 DIAGNOSIS — Z12.31 ENCOUNTER FOR SCREENING MAMMOGRAM FOR BREAST CANCER: Primary | ICD-10-CM

## 2024-04-08 ENCOUNTER — HOSPITAL ENCOUNTER (OUTPATIENT)
Dept: MAMMOGRAPHY | Facility: HOSPITAL | Age: 89
Discharge: HOME | End: 2024-04-08
Payer: MEDICARE

## 2024-04-08 DIAGNOSIS — N64.4 BREAST PAIN: Primary | ICD-10-CM

## 2024-04-08 DIAGNOSIS — Z12.31 ENCOUNTER FOR SCREENING MAMMOGRAM FOR BREAST CANCER: ICD-10-CM

## 2024-04-26 ENCOUNTER — HOSPITAL ENCOUNTER (OUTPATIENT)
Dept: GENERAL RADIOLOGY | Facility: HOSPITAL | Age: 89
Discharge: HOME | End: 2024-04-26
Payer: MEDICARE

## 2024-04-26 ENCOUNTER — OFFICE VISIT (OUTPATIENT)
Dept: FAMILY MEDICINE CLINIC | Facility: CLINIC | Age: 89
End: 2024-04-26
Payer: MEDICARE

## 2024-04-26 VITALS
HEIGHT: 63 IN | SYSTOLIC BLOOD PRESSURE: 122 MMHG | OXYGEN SATURATION: 98 % | WEIGHT: 144.1 LBS | BODY MASS INDEX: 25.53 KG/M2 | HEART RATE: 102 BPM | TEMPERATURE: 97.1 F | RESPIRATION RATE: 18 BRPM | DIASTOLIC BLOOD PRESSURE: 72 MMHG

## 2024-04-26 DIAGNOSIS — M25.551 RIGHT HIP PAIN: ICD-10-CM

## 2024-04-26 DIAGNOSIS — I10 ESSENTIAL HYPERTENSION: ICD-10-CM

## 2024-04-26 DIAGNOSIS — N30.00 ACUTE CYSTITIS WITHOUT HEMATURIA: ICD-10-CM

## 2024-04-26 DIAGNOSIS — R30.0 DYSURIA: ICD-10-CM

## 2024-04-26 DIAGNOSIS — E78.2 MIXED HYPERLIPIDEMIA: Primary | ICD-10-CM

## 2024-04-26 DIAGNOSIS — E11.9 TYPE 2 DIABETES MELLITUS WITHOUT COMPLICATION, WITHOUT LONG-TERM CURRENT USE OF INSULIN: ICD-10-CM

## 2024-04-26 LAB
BILIRUB BLD-MCNC: NEGATIVE MG/DL
CLARITY, POC: CLEAR
COLOR UR: YELLOW
EXPIRATION DATE: ABNORMAL
GLUCOSE UR STRIP-MCNC: NEGATIVE MG/DL
KETONES UR QL: NEGATIVE
LEUKOCYTE EST, POC: ABNORMAL
Lab: ABNORMAL
NITRITE UR-MCNC: POSITIVE MG/ML
PH UR: 7 [PH] (ref 5–8)
PROT UR STRIP-MCNC: NEGATIVE MG/DL
RBC # UR STRIP: NEGATIVE /UL
SP GR UR: 1.02 (ref 1–1.03)
UROBILINOGEN UR QL: NORMAL

## 2024-04-26 PROCEDURE — 73502 X-RAY EXAM HIP UNI 2-3 VIEWS: CPT

## 2024-04-26 RX ORDER — BLOOD SUGAR DIAGNOSTIC
STRIP MISCELLANEOUS
Qty: 100 EACH | Refills: 5 | Status: SHIPPED | OUTPATIENT
Start: 2024-04-26

## 2024-04-26 RX ORDER — LANCETS 33 GAUGE
EACH MISCELLANEOUS
Qty: 100 EACH | Refills: 1 | Status: SHIPPED | OUTPATIENT
Start: 2024-04-26

## 2024-04-26 RX ORDER — CEPHALEXIN 250 MG/1
250 CAPSULE ORAL 3 TIMES DAILY
Qty: 21 CAPSULE | Refills: 0 | Status: SHIPPED | OUTPATIENT
Start: 2024-04-26

## 2024-04-26 NOTE — PROGRESS NOTES
Chief Complaint  Hypertension, Hypothyroidism, and Diabetes    Subjective        Donna M Yeoman presents to [facility]  History of Present Illness    Donna Yeoman is an 89-year-old female who presents for diabetes mellitus, hypothyroidism, and hypertension follow-up.    The patient experienced a fall in 01/2024, during which she landed on her right side on the steps. Following the incident, she experienced persistent pain, which had since resolved. Despite falling, she did not seek immediate medical attention. However, she reports difficulty in ambulating today, with pain worse in her right leg than hip. She describes it as pain and stiffness. She has a history of multiple falls in the past few years, but without any prior injuries. She is concerned about a possible fracture. She has been diagnosed with osteoporosis.    The patient is requesting prescription refills for her glucose meter supplies. She has not been able to check her glucose due to lack of supplies. She usually just checks her blood glucose intermittently. According to her lab work completed last week her glucose levels are stable at 104 mg/dL and A1c was 6.2 percent. She does not recall ever leaving a urine sample for microalbumin testing.     The patient has an upcoming appointment with her urologist next month, however, she is suspecting a potential urinary tract infection. She does not normally urinate very often and has been previously diagnosed with dehydration. Her recent lab work showed her sodium level was elevated. She has eliminated coffee and diet pop from her diet.    In early 02/2024, the patient fell severely ill and was unable to eat or drink, she could not take her medications, and she lost 5 pounds. Her son, who resides with her, was also sick but tested negative several times for COVID-19. It took her approximately 3 weeks to recover.     Her recently completed lab work was reviewed with her today. She  "has been thinking about getting the shingles vaccine as she has not had any. Her eye examination is up-to-date. She underwent a colonoscopy in 2018 at which time she was informed no others were warranted. She has been having breast problems and has a consultation scheduled in 05/2024. She already experiences pain in her left breast but now complains of pain in her bilateral nipples. She denies them being chafed and they do not look different. She has an upcoming mammogram. She had to cancel her pacemaker and cardiology appointment in 02/2024 because she was sick and needs to reschedule. She follows both cardiologist Dr. Dhillon and Dr. Rios, electrophysiologist. She feels depressed not because of her age, but because her vision is not good, and her hearing is \"terrible\" and she cannot afford hearing aids. Her balance is bad, and she has tried physical therapy which was somewhat beneficial.     The patient reports that her mother had breast cancer and underwent a unilateral mastectomy, and then passed away from colon cancer.     She is up to date on her influenza vaccine. She has received the latest COVID-19 booster.       Objective   Vital Signs:  /72 (BP Location: Left arm, Patient Position: Sitting, Cuff Size: Adult)   Pulse 102   Temp 97.1 °F (36.2 °C) (Temporal)   Resp 18   Ht 160 cm (62.99\")   Wt 65.4 kg (144 lb 1.6 oz)   SpO2 98%   BMI 25.53 kg/m²   Estimated body mass index is 25.53 kg/m² as calculated from the following:    Height as of this encounter: 160 cm (62.99\").    Weight as of this encounter: 65.4 kg (144 lb 1.6 oz).            Physical Exam  Constitutional:       General: She is not in acute distress.     Appearance: She is well-developed. She is not diaphoretic.   Cardiovascular:      Rate and Rhythm: Normal rate and regular rhythm.      Heart sounds: Normal heart sounds. No murmur heard.     No friction rub. No gallop.   Pulmonary:      Effort: Pulmonary effort is normal. No " respiratory distress.      Breath sounds: Normal breath sounds. No wheezing or rales.   Abdominal:      General: Bowel sounds are normal. There is no distension.      Palpations: Abdomen is soft.      Tenderness: There is no abdominal tenderness.   Musculoskeletal:      Cervical back: Neck supple.   Skin:     General: Skin is warm and dry.   Neurological:      Mental Status: She is alert and oriented to person, place, and time.        Result Review :    Lab work completed on 04/23/2024 was reviewed. CMP showed glucose is stable at 104 mg/dL and sodium was elevated to 147 mmol/L. Kidney function was normal. TSH and free T4 were both normal. Lipid panel showed cholesterol levels were normal. A1c was 6.2 percent. CBC is normal.                   Assessment and Plan   Diagnoses and all orders for this visit:    1. Mixed hyperlipidemia (Primary)    2. Type 2 diabetes mellitus without complication, without long-term current use of insulin  -     POC Urinalysis Dipstick, Automated  -     Microalbumin / Creatinine Urine Ratio - Urine, Clean Catch    3. Essential hypertension    4. Right hip pain  -     XR Hip With or Without Pelvis 2 - 3 View Right; Future  -     Urine Culture - Urine, Urine, Clean Catch; Future    5. Dysuria  -     Urine Culture - Urine, Urine, Clean Catch; Future    Other orders  -     glucose blood (OneTouch Verio) test strip; Check glucose once daily and as needed DX E11.9  Dispense: 100 each; Refill: 5  -     OneTouch Delica Lancets 33G misc; Check glucose once daily and as needed dx E11.9  Dispense: 100 each; Refill: 1        1.Right hip pain.  - Patient suffered a fall in 01/2024 for which she did not seek medical attention as the pain had resolved. However, she had a flareup of right hip and right leg pain this morning.   - An x-ray of the patient's right hip will be conducted today for further evaluation.     2. Diabetes mellitus.  - The patient's recent lab work showed glucose stable at 104 mg/dL  and A1c was 6.2 percent.  - A prescription for blood glucose supplies including lancets will be provided. The patient is advised to monitor her blood glucose levels once daily and as needed.  -  A urine specimen will be collected today for microalbumin testing.     3. Hypertension.  - Her blood pressure is well controlled today. She will continue current medication regimen.     4. Hypothyroidism.  - Recent lab work showed TSH and free T4 were both jam.  - She will continue the current medication regimen.     5. Dysuria.  - An order has been placed for POC urinalysis dipstick, as well as urine culture. Treatment pending test results.     The patient is recommended to receive the shingles vaccine at her pharmacy and the most recent COVID-19 and RSV vaccine in the fall. A colonoscopy will be deferred to Dr. Weiner.    .           Follow Up   The patient will follow-up with Dr. Weiner in approximately 6 months  Patient was given instructions and counseling regarding her condition or for health maintenance advice. Please see specific information pulled into the AVS if appropriate.       Transcribed from ambient dictation for LIONEL Bell by Gia Yin.  04/26/24   12:34 EDT    Patient or patient representative verbalized consent to the visit recording.  I have personally performed the services described in this document as transcribed by the above individual, and it is both accurate and complete.

## 2024-04-26 NOTE — PROGRESS NOTES
"Chief Complaint  Hypertension, Hypothyroidism, and Diabetes    Subjective    {Problem List  Visit Diagnosis   Encounters  Notes  Medications  Labs  Result Review Imaging  Media :23}    Donna M Yeoman presents to [facility] PRIMARY [facility]ARE  History of Present Illness  History of Present Illness  The patient presents for follow-up on blood pressure, high cholesterol, and diabetes.    The patient reports overall well-being, albeit with occasional fatigue. She does not require any medication refills at this time.    Objective   Vital Signs:  /72 (BP Location: Left arm, Patient Position: Sitting, Cuff Size: Adult)   Pulse 102   Temp 97.1 °F (36.2 °C) (Temporal)   Resp 18   Ht 160 cm (62.99\")   Wt 65.4 kg (144 lb 1.6 oz)   SpO2 98%   BMI 25.53 kg/m²   Estimated body mass index is 25.53 kg/m² as calculated from the following:    Height as of this encounter: 160 cm (62.99\").    Weight as of this encounter: 65.4 kg (144 lb 1.6 oz).               Physical Exam  Physical Exam      Result Review :{Labs  Result Review  Imaging  Med Tab  Media  Procedures :23}    {The following data was reviewed by (Optional):07486}  {Ambulatory Labs (Optional):58071}  {Data reviewed (Optional):20426:::1}  Results  Laboratory Studies  Thyroid level is normal at 2.35. Cholesterol level is stable. Triglycerides are 160. Hemoglobin A1c is 6.2. Sodium levels are increased.    Assessment & Plan  1. Hypertension.    2. Hypercholesterolemia.  The patient's cholesterol levels are well-managed.    3. Diabetes.  The Hemoglobin A1c level is within the desired range at 6.2. However, there is a noted increase in sodium levels, which suggests a possible dehydration. A urine microalbumin test will be conducted today to investigate potential early kidney disease associated with her diabetes.    Follow-up  The patient is scheduled for a follow-up visit with Dr. Weiner in 6 months, with labs to be conducted prior to the " appointment.           Assessment and Plan {CC Problem List  Visit Diagnosis   ROS  Review (Popup)  [facility] Maintenance  Quality  BestPractice  Medications  SmartSets  SnapShot Encounters  Media :23}    Diagnoses and all orders for this visit:    1. Mixed hyperlipidemia (Primary)    2. Type 2 diabetes mellitus without complication, without long-term current use of insulin  -     POC Urinalysis Dipstick, Automated  -     Microalbumin / Creatinine Urine Ratio - Urine, Clean Catch    3. Essential hypertension    4. Right hip pain  -     XR Hip With or Without Pelvis 2 - 3 View Right; Future  -     Urine Culture - Urine, Urine, Clean Catch; Future    5. Dysuria  -     Urine Culture - Urine, Urine, Clean Catch; Future    6. Acute cystitis without hematuria  -     Urine Culture - Urine, Urine, Clean Catch    Other orders  -     glucose blood (OneTouch Verio) test strip; Check glucose once daily and as needed DX E11.9  Dispense: 100 each; Refill: 5  -     OneTouch Delica Lancets 33G misc; Check glucose once daily and as needed dx E11.9  Dispense: 100 each; Refill: 1           {Time Spent (Optional):00219}  Follow Up {Instructions Charge Capture  Follow-up Communications :23}    No follow-ups on file.  Patient was given instructions and counseling regarding her condition or for health maintenance advice. Please see specific information pulled into the AVS if appropriate.       {KRISTIE [employer] Provider Statement:29179}

## 2024-04-27 LAB
ALBUMIN/CREAT UR: 11 MG/G CREAT (ref 0–29)
CREAT UR-MCNC: 128.7 MG/DL
MICROALBUMIN UR-MCNC: 13.8 UG/ML

## 2024-04-30 LAB
BACTERIA UR CULT: ABNORMAL
BACTERIA UR CULT: ABNORMAL
OTHER ANTIBIOTIC SUSC ISLT: ABNORMAL

## 2024-05-08 ENCOUNTER — HOSPITAL ENCOUNTER (OUTPATIENT)
Dept: ULTRASOUND IMAGING | Facility: HOSPITAL | Age: 89
Discharge: HOME | End: 2024-05-08
Payer: MEDICARE

## 2024-05-08 ENCOUNTER — HOSPITAL ENCOUNTER (OUTPATIENT)
Dept: MAMMOGRAPHY | Facility: HOSPITAL | Age: 89
Discharge: HOME | End: 2024-05-08
Payer: MEDICARE

## 2024-05-08 DIAGNOSIS — N64.4 BREAST PAIN: ICD-10-CM

## 2024-05-08 PROCEDURE — 77066 DX MAMMO INCL CAD BI: CPT

## 2024-05-08 PROCEDURE — 76642 ULTRASOUND BREAST LIMITED: CPT

## 2024-05-08 PROCEDURE — G0279 TOMOSYNTHESIS, MAMMO: HCPCS

## 2024-05-09 ENCOUNTER — TELEPHONE (OUTPATIENT)
Dept: FAMILY MEDICINE CLINIC | Facility: CLINIC | Age: 89
End: 2024-05-09

## 2024-05-09 NOTE — TELEPHONE ENCOUNTER
Caller: Yeoman, Donna M    Relationship: Self    Best call back number: [phone]    What was the call regarding: PATIENT WANTED TO LET DR GEORGE KNOW SHE HAS DECIDED SHE DOES NOT WANT TO GO TO A SURGEON.

## 2024-06-18 ENCOUNTER — TELEPHONE (OUTPATIENT)
Dept: FAMILY MEDICINE CLINIC | Facility: CLINIC | Age: 89
End: 2024-06-18

## 2024-06-18 NOTE — TELEPHONE ENCOUNTER
Caller: Yeoman, Donna M    Relationship: Self    Best call back number: [phone]     What medication are you requesting: N\A    What are your current symptoms: WHITE TOUNGE     How long have you been experiencing symptoms: 2-3 DAYS     Have you had these symptoms before:    [] Yes  [x] No    Have you been treated for these symptoms before:   [] Yes  [x] No    If a prescription is needed, what is your preferred pharmacy and phone number: [id number] [place], [place] - [address] AT [facility] [phone] [facility] [phone]      Additional notes:  PATIENT STATES SHE THINK THIS IS FROM THE ANTIBIOTIC

## 2024-06-26 ENCOUNTER — TELEPHONE (OUTPATIENT)
Dept: FAMILY MEDICINE CLINIC | Facility: CLINIC | Age: 89
End: 2024-06-26

## 2024-06-26 ENCOUNTER — OFFICE VISIT (OUTPATIENT)
Dept: FAMILY MEDICINE CLINIC | Facility: CLINIC | Age: 89
End: 2024-06-26
Payer: MEDICARE

## 2024-06-26 VITALS
RESPIRATION RATE: 18 BRPM | HEART RATE: 64 BPM | BODY MASS INDEX: 25.69 KG/M2 | WEIGHT: 145 LBS | HEIGHT: 63 IN | SYSTOLIC BLOOD PRESSURE: 140 MMHG | DIASTOLIC BLOOD PRESSURE: 86 MMHG | OXYGEN SATURATION: 96 %

## 2024-06-26 DIAGNOSIS — K14.3 HYPERTROPHY, PAPILLAE, TONGUE: Primary | ICD-10-CM

## 2024-06-26 PROCEDURE — 1126F AMNT PAIN NOTED NONE PRSNT: CPT | Performed by: NURSE PRACTITIONER

## 2024-06-26 PROCEDURE — 99213 OFFICE O/P EST LOW 20 MIN: CPT | Performed by: NURSE PRACTITIONER

## 2024-06-26 NOTE — TELEPHONE ENCOUNTER
Called to schedule same da with another provider since pcp is booked out for this week. No answer lvm for pt to call when available    Hub to relay and schedule

## 2024-06-26 NOTE — TELEPHONE ENCOUNTER
Caller: Yeoman, Donna M    Relationship: Self    Best call back number:[phone]     Which medication are you concerned about: nystatin (MYCOSTATIN) 100,000 unit/mL suspension     Who prescribed you this medication: CLARITZA GRULLON    When did you start taking this medication: ABOUT A WEEK     What are your concerns: PATIENT CALLING STATING THAT THIS MEDICATION ISN'T HELPING SHE HAS NOW DEVELOPED A SORE THROAT AND COUGH, SNEEZING.    How long have you had these concerns: PATIENT STATES THAT THE COUGH,SNEEZING AND SORE THROAT STARTED WITHIN THE LAST DAY OR SO

## 2024-06-26 NOTE — TELEPHONE ENCOUNTER
Name: Yeoman, Donna M    Relationship: Self    Best Callback Number:     [phone] (Home)       HUB PROVIDED THE RELAY MESSAGE FROM THE OFFICE   PATIENT SCHEDULED AS REQUESTED    ADDITIONAL INFORMATION:

## 2024-06-26 NOTE — PATIENT INSTRUCTIONS
Discharge instruction, no evidence of thrush at this time and he already had nystatin    You have more of an inflamed irritated papillated superficial layer of your tongue, and some geographical lines, which are essentially benign    Should you have increased pain fever chills difficulty swallowing severely painful tongue or swollen tongue emergency room    You can gently scrape away any of the excess tissue from your tongue if it does not cause pain once a day is okay or such as a gentle scrubbing with your toothbrush but otherwise, just good oral care, update me your condition in a couple weeks if not resolved over the next 3 to 6 weeks follow-up with ENT for second opinion

## 2024-06-27 NOTE — PROGRESS NOTES
"Chief Complaint  Oral Swelling (Pt notice some swelling and spots on tongue)    Subjective        Donna M Yeoman presents to [facility] PRIMARY [facility]ARE  History of Present Illness  Pleasant patient over the last couple weeks have noticed tongue irritation and discoloration, without any significant pain,  Had called here, received some nystatin may have helped some but still not a big difference, no difficulty swallowing  No ulcerations or canker sores, no pharyngitis with pain fever chills no lymphadenopathy      Oral Swelling        Objective   Vital Signs:  /86   Pulse 64   Resp 18   Ht 160 cm (62.99\")   Wt 65.8 kg (145 lb)   SpO2 96%   BMI 25.69 kg/m²   Estimated body mass index is 25.69 kg/m² as calculated from the following:    Height as of this encounter: 160 cm (62.99\").    Weight as of this encounter: 65.8 kg (145 lb).            Physical Exam  Constitutional:       General: She is not in acute distress.     Appearance: Normal appearance. She is not ill-appearing, toxic-appearing or diaphoretic.      Comments: Quite pleasant appears well   HENT:      Mouth/Throat:      Comments: Oropharynx and mucosa all normal tongue,  Mildly to moderately enlarged Leg, with a whitish-gray discoloration, elongated A  With as well a brownish discoloration and a crevice or groove who appears to be a normal variation which is more pronounced with irritation of her tongue.  Tongue midline, no mass, nontender no pustules or deep redness or tenderness  Relatively mild and benign appearing  Eyes:      Conjunctiva/sclera: Conjunctivae normal.      Pupils: Pupils are equal, round, and reactive to light.   Pulmonary:      Effort: Pulmonary effort is normal. No respiratory distress.   Musculoskeletal:         General: No swelling.      Cervical back: Neck supple.   Lymphadenopathy:      Cervical: No cervical adenopathy.   Neurological:      General: No focal deficit present.      Mental Status: She is alert. " Mental status is at baseline.   Psychiatric:         Mood and Affect: Mood normal.         Behavior: Behavior normal.         Thought Content: Thought content normal.         Judgment: Judgment normal.        Result Review :                Assessment and Plan   Diagnoses and all orders for this visit:    1. Hypertrophy, papillae, tongue (Primary)  Comments:  Acute mild irritation             Follow Up   Return for Print discharge instructions/educational handouts.  Patient was given instructions and counseling regarding her condition or for health maintenance advice. Please see specific information pulled into the AVS if appropriate.     Patient Instructions   Discharge instruction, no evidence of thrush at this time and he already had nystatin    You have more of an inflamed irritated papillated superficial layer of your tongue, and some geographical lines, which are essentially benign    Should you have increased pain fever chills difficulty swallowing severely painful tongue or swollen tongue emergency room    You can gently scrape away any of the excess tissue from your tongue if it does not cause pain once a day is okay or such as a gentle scrubbing with your toothbrush but otherwise, just good oral care, update me your condition in a couple weeks if not resolved over the next 3 to 6 weeks follow-up with ENT for second opinion               Patient simply has some mild papillary inflammation of her tongue without pain or other difficulties, likely this will resolve over the next several weeks or a month or 2 she can gently scrape once a day without causing increased pain or bleeding    If not improved she will see ENT and should she have increased pain fever chills difficulty swallowing worsening symptoms emergency room  Unlikely any yeast involvement is already finished nystatin  Will withhold any further nystatin for now.  Good oral care,  Over-the-counter oral Medications as needed

## 2024-07-11 ENCOUNTER — OFFICE VISIT (OUTPATIENT)
Dept: FAMILY MEDICINE CLINIC | Facility: CLINIC | Age: 89
End: 2024-07-11
Payer: MEDICARE

## 2024-07-11 VITALS
OXYGEN SATURATION: 93 % | BODY MASS INDEX: 25.69 KG/M2 | HEIGHT: 63 IN | DIASTOLIC BLOOD PRESSURE: 82 MMHG | SYSTOLIC BLOOD PRESSURE: 135 MMHG | RESPIRATION RATE: 18 BRPM | WEIGHT: 145 LBS | HEART RATE: 68 BPM

## 2024-07-11 DIAGNOSIS — R13.10 DYSPHAGIA, UNSPECIFIED TYPE: ICD-10-CM

## 2024-07-11 DIAGNOSIS — K14.3 TONGUE COATING: ICD-10-CM

## 2024-07-11 DIAGNOSIS — J06.9 VIRAL UPPER RESPIRATORY TRACT INFECTION: Primary | ICD-10-CM

## 2024-07-11 DIAGNOSIS — K22.2 ESOPHAGEAL STRICTURE: ICD-10-CM

## 2024-07-11 DIAGNOSIS — J32.9 CHRONIC CONGESTION OF PARANASAL SINUS: ICD-10-CM

## 2024-07-11 LAB
EXPIRATION DATE: NORMAL
INTERNAL CONTROL: NORMAL
Lab: NORMAL
S PYO AG THROAT QL: NEGATIVE

## 2024-07-11 PROCEDURE — 87880 STREP A ASSAY W/OPTIC: CPT | Performed by: NURSE PRACTITIONER

## 2024-07-11 PROCEDURE — 1126F AMNT PAIN NOTED NONE PRSNT: CPT | Performed by: NURSE PRACTITIONER

## 2024-07-11 PROCEDURE — 99214 OFFICE O/P EST MOD 30 MIN: CPT | Performed by: NURSE PRACTITIONER

## 2024-07-11 NOTE — PROGRESS NOTES
"Chief Complaint  Sore Throat (Pt here for sore throat and white tongue ) and Cough (Pt has cough and congestion )    Subjective        Donna M Yeoman presents to [facility]  History of Present Illness  Very pleasant patient here today, I saw her recently for a coating on her tongue, but apparently she was developing a cold almost immediately after that she developed more of a sore throat cough congestion, her throat is really been sore the last few days, her tongue is not worse it is not better still has a coating  But present no fever chest pain or increased shortness of breath, she does have some chronic sinus congestion well left side chronic drippy nose since having COVID in the past,    She has some white specks above her dentures but otherwise no white exudate of her posterior pharynx,    Sore Throat   Associated symptoms include coughing.   Cough  Associated symptoms include a sore throat.       Objective   Vital Signs:  /82   Pulse 68   Resp 18   Ht 160 cm (62.99\")   Wt 65.8 kg (145 lb)   SpO2 93%   BMI 25.69 kg/m²   Estimated body mass index is 25.69 kg/m² as calculated from the following:    Height as of this encounter: 160 cm (62.99\").    Weight as of this encounter: 65.8 kg (145 lb).            Physical Exam  Vitals reviewed.   Constitutional:       General: She is not in acute distress.     Appearance: Normal appearance. She is well-developed. She is not ill-appearing, toxic-appearing or diaphoretic.   HENT:      Head: Normocephalic.      Ears:      Comments: Turbinates are congested TMs are dull pharynx is clear posterior pharynx, buccal mucosa gums appear normal  Tongue, whitish-brownish and mildly enlarged papilla  No evidence of any tongue swelling otherwise or other abnormality or abscess, ulceration    No cervical adenopathy       Nose: Nose normal.   Eyes:      General: No scleral icterus.     Conjunctiva/sclera: Conjunctivae normal.      Pupils: Pupils " are equal, round, and reactive to light.   Neck:      Thyroid: No thyromegaly.      Vascular: No JVD.      Comments: Neck supple speech clear  Cardiovascular:      Rate and Rhythm: Normal rate and regular rhythm.      Heart sounds: Normal heart sounds. No murmur heard.     No friction rub. No gallop.   Pulmonary:      Effort: Pulmonary effort is normal. No respiratory distress.      Breath sounds: Normal breath sounds. No stridor. No wheezing or rales.   Abdominal:      General: Bowel sounds are normal. There is no distension.      Palpations: Abdomen is soft.      Tenderness: There is no abdominal tenderness.      Comments: No hepatosplenomegaly, no ascites,   Musculoskeletal:         General: No tenderness.      Cervical back: Neck supple.   Lymphadenopathy:      Cervical: No cervical adenopathy.   Skin:     General: Skin is warm and dry.      Findings: No erythema or rash.   Neurological:      General: No focal deficit present.      Mental Status: She is alert and oriented to person, place, and time. Mental status is at baseline.      Deep Tendon Reflexes: Reflexes are normal and symmetric.   Psychiatric:         Mood and Affect: Mood normal.         Behavior: Behavior normal.         Thought Content: Thought content normal.         Judgment: Judgment normal.        Result Review :                Assessment and Plan   Diagnoses and all orders for this visit:    1. Viral upper respiratory tract infection (Primary)    2. Chronic congestion of paranasal sinus  -     Ambulatory Referral to ENT (Otolaryngology)    3. Tongue coating  -     Ambulatory Referral to ENT (Otolaryngology)    4. Esophageal stricture  -     Ambulatory Referral to Gastroenterology    5. Dysphagia, unspecified type  -     Ambulatory Referral to Gastroenterology  -     POC Rapid Strep A    Other orders  -     nystatin (MYCOSTATIN) 100,000 unit/mL suspension; Swish and swallow 5 mL 4 (Four) Times a Day for 5 days.  Dispense: 120 mL; Refill:  0             Follow Up   Return for Print discharge instructions/educational handouts.  Patient was given instructions and counseling regarding her condition or for health maintenance advice. Please see specific information pulled into the AVS if appropriate.     Patient Instructions   Discharge instructions,    I suspect you had a recent viral upper restaurant infection sore throat symptoms of a viral infection and this made everything worse, your strep test is negative you have resolved from your virus, no viral activity at this time in my opinion    Lets send you to ENT for chronic nasal obstruction chronic nasal discharge separate than the recent visit    Also the coating on her tongue, while likely benign, and likely no further evaluation needed nonetheless for second opinion as well as good care and follow-up in ENT to evaluate this to make sure there is nothing else needs to be done, otherwise gently scrape off any excess tissue, should she have increased pain redness swelling urgent recheck ER      In general for allergic rhinitis  Year-round is usually dust mites and mold   With a seasonal exacerbation is mostly with ragweed pollen excetra    For allergies chronic rhinitis start out with Flonase it or any type of nasal steroid chronically try this for quite a few months and see how you do    If needed over-the-counter previously prescription and expensive now cheaper  $30 for 30 mL Astepro which is a nasal decongestion typically does not go to the rest of the body and can help with allergies  This would be the basic starting point of allergy care, but some people can benefit from shots or other avenues or further medications potentially and should you would like to see allergy and asthma simply let me know    I do recommend the Shingrix vaccine x 2  If you have not had RSV vaccine is probably a good vaccine recommended    If new COVID vaccines come out flu shots recommend staying on top of this    Should  you get a febrile illness and cough check a COVID and flu test urgent care if you have cough fever probably good idea to be seen watch you closely make sure you are doing well and we can check you for COVID and treat accordingly if needed       I saw patient recently I suspect she was getting a upper respite infection very early into this as well she already had the tongue irritation and discoloration and had failed nystatin  Nonetheless she does report some whitish substance up under the roof of her mouth when she removed sore dentures, and for this reason it could be a yeast and low threshold will try nystatin 1 more time  But otherwise her strep test is negative and she simply had a recent upper restaurant infection she does have some chronic sinus congestion not associated with recent upper restaurant infection more so on the left chronic and postnasal drip since having COVID and for this reason I refer her onto ENT evaluation    For routine nasal allergies seasonal, Flonase and occasional add on of Astelin OTC may be quite beneficial certainly if she would like to see allergy and asthma will be happy to refer her on as well as she did mention about not seen allergist in the past surprisingly  At this time she will see ENT first and should she still have the need for allergy discussions she will let me know I will refer her to allergy and asthma or she will have this discussion with Dr. Weiner and her future follow-ups.    She will update me 1 week  She is without any severe complaints today without fever chest pain shortness of breath or other worrisome findings.

## 2024-07-11 NOTE — PATIENT INSTRUCTIONS
Discharge instructions,    I suspect you had a recent viral upper restaurant infection sore throat symptoms of a viral infection and this made everything worse, your strep test is negative you have resolved from your virus, no viral activity at this time in my opinion    Lets send you to ENT for chronic nasal obstruction chronic nasal discharge separate than the recent visit    Also the coating on her tongue, while likely benign, and likely no further evaluation needed nonetheless for second opinion as well as good care and follow-up in ENT to evaluate this to make sure there is nothing else needs to be done, otherwise gently scrape off any excess tissue, should she have increased pain redness swelling urgent recheck ER      In general for allergic rhinitis  Year-round is usually dust mites and mold   With a seasonal exacerbation is mostly with ragweed pollen excetra    For allergies chronic rhinitis start out with Flonase it or any type of nasal steroid chronically try this for quite a few months and see how you do    If needed over-the-counter previously prescription and expensive now cheaper  $30 for 30 mL Astepro which is a nasal decongestion typically does not go to the rest of the body and can help with allergies  This would be the basic starting point of allergy care, but some people can benefit from shots or other avenues or further medications potentially and should you would like to see allergy and asthma simply let me know    I do recommend the Shingrix vaccine x 2  If you have not had RSV vaccine is probably a good vaccine recommended    If new COVID vaccines come out flu shots recommend staying on top of this    Should you get a febrile illness and cough check a COVID and flu test urgent care if you have cough fever probably good idea to be seen watch you closely make sure you are doing well and we can check you for COVID and treat accordingly if needed

## 2024-07-12 ENCOUNTER — TELEPHONE (OUTPATIENT)
Dept: FAMILY MEDICINE CLINIC | Facility: CLINIC | Age: 89
End: 2024-07-12

## 2024-07-12 NOTE — TELEPHONE ENCOUNTER
Caller: Yeoman, Donna M    Relationship: Self    Best call back number: [phone]     Which medication are you concerned about: nystatin (MYCOSTATIN) 100,000 unit/mL suspension     Who prescribed you this medication: JAMES EPLEY      What are your concerns: THE [facility] INTO DOESN'T HAVE THE MEDICATION   PATIENT WANTS TO KNOW IF YOU CAN TRY THE ONE ON [address].  PLEASE CALL AND LET HER KNOW.    [facility] [id number] - [facility] [address] AT [facility] [phone] [facility] [phone] FX

## 2024-07-12 NOTE — TELEPHONE ENCOUNTER
Caller: Yeoman, Donna M    Relationship: Self    Best call back number: [phone]     What form or medical record are you requesting: OFFICE VISIT OF 7.11.24    Who is requesting this form or medical record from you: PATIENT     How would you like to receive the form or medical records (pick-up, mail, fax): MAIL     If mail, what is the address: [address] [address]       Timeframe paperwork needed: AS SOON AS POSSIBLE     Additional notes: PATIENT WOULD LIKE A COPY OF OFFICE VISIT NOTES OF 7.11.24

## 2024-08-06 ENCOUNTER — LAB (OUTPATIENT)
Dept: OTHER | Facility: HOSPITAL | Age: 89
End: 2024-08-06
Payer: MEDICARE

## 2024-08-06 DIAGNOSIS — E53.8 B12 DEFICIENCY: ICD-10-CM

## 2024-08-06 DIAGNOSIS — D47.2 MGUS (MONOCLONAL GAMMOPATHY OF UNKNOWN SIGNIFICANCE): ICD-10-CM

## 2024-08-06 LAB
ALBUMIN SERPL-MCNC: 3.8 G/DL (ref 3.5–5.2)
ALBUMIN/GLOB SERPL: 1.2 G/DL
ALP SERPL-CCNC: 64 U/L (ref 39–117)
ALT SERPL W P-5'-P-CCNC: 13 U/L (ref 1–33)
ANION GAP SERPL CALCULATED.3IONS-SCNC: 6.9 MMOL/L (ref 5–15)
AST SERPL-CCNC: 25 U/L (ref 1–32)
BASOPHILS # BLD AUTO: 0.04 10*3/MM3 (ref 0–0.2)
BASOPHILS NFR BLD AUTO: 0.6 % (ref 0–1.5)
BILIRUB SERPL-MCNC: 0.8 MG/DL (ref 0–1.2)
BUN SERPL-MCNC: 18 MG/DL (ref 8–23)
BUN/CREAT SERPL: 17.3 (ref 7–25)
CALCIUM SPEC-SCNC: 10.1 MG/DL (ref 8.6–10.5)
CHLORIDE SERPL-SCNC: 109 MMOL/L (ref 98–107)
CO2 SERPL-SCNC: 30.1 MMOL/L (ref 22–29)
CREAT SERPL-MCNC: 1.04 MG/DL (ref 0.57–1)
DEPRECATED RDW RBC AUTO: 48.7 FL (ref 37–54)
EGFRCR SERPLBLD CKD-EPI 2021: 51.5 ML/MIN/1.73
EOSINOPHIL # BLD AUTO: 0.26 10*3/MM3 (ref 0–0.4)
EOSINOPHIL NFR BLD AUTO: 4.1 % (ref 0.3–6.2)
ERYTHROCYTE [DISTWIDTH] IN BLOOD BY AUTOMATED COUNT: 14 % (ref 12.3–15.4)
GLOBULIN UR ELPH-MCNC: 3.1 GM/DL
GLUCOSE SERPL-MCNC: 123 MG/DL (ref 65–99)
HCT VFR BLD AUTO: 40.5 % (ref 34–46.6)
HGB BLD-MCNC: 13.1 G/DL (ref 12–15.9)
IMM GRANULOCYTES # BLD AUTO: 0.04 10*3/MM3 (ref 0–0.05)
IMM GRANULOCYTES NFR BLD AUTO: 0.6 % (ref 0–0.5)
LYMPHOCYTES # BLD AUTO: 1.83 10*3/MM3 (ref 0.7–3.1)
LYMPHOCYTES NFR BLD AUTO: 28.6 % (ref 19.6–45.3)
MCH RBC QN AUTO: 30.8 PG (ref 26.6–33)
MCHC RBC AUTO-ENTMCNC: 32.3 G/DL (ref 31.5–35.7)
MCV RBC AUTO: 95.3 FL (ref 79–97)
MONOCYTES # BLD AUTO: 0.53 10*3/MM3 (ref 0.1–0.9)
MONOCYTES NFR BLD AUTO: 8.3 % (ref 5–12)
NEUTROPHILS NFR BLD AUTO: 3.69 10*3/MM3 (ref 1.7–7)
NEUTROPHILS NFR BLD AUTO: 57.8 % (ref 42.7–76)
NRBC BLD AUTO-RTO: 0 /100 WBC (ref 0–0.2)
PLATELET # BLD AUTO: 163 10*3/MM3 (ref 140–450)
PMV BLD AUTO: 10.4 FL (ref 6–12)
POTASSIUM SERPL-SCNC: 4 MMOL/L (ref 3.5–5.2)
PROT SERPL-MCNC: 6.9 G/DL (ref 6–8.5)
RBC # BLD AUTO: 4.25 10*6/MM3 (ref 3.77–5.28)
SODIUM SERPL-SCNC: 146 MMOL/L (ref 136–145)
VIT B12 BLD-MCNC: 1090 PG/ML (ref 211–946)
WBC NRBC COR # BLD AUTO: 6.39 10*3/MM3 (ref 3.4–10.8)

## 2024-08-06 PROCEDURE — 82607 VITAMIN B-12: CPT | Performed by: INTERNAL MEDICINE

## 2024-08-06 PROCEDURE — 83521 IG LIGHT CHAINS FREE EACH: CPT | Performed by: INTERNAL MEDICINE

## 2024-08-06 PROCEDURE — 80053 COMPREHEN METABOLIC PANEL: CPT | Performed by: INTERNAL MEDICINE

## 2024-08-06 PROCEDURE — 85025 COMPLETE CBC W/AUTO DIFF WBC: CPT | Performed by: INTERNAL MEDICINE

## 2024-08-06 PROCEDURE — 36415 COLL VENOUS BLD VENIPUNCTURE: CPT

## 2024-08-06 PROCEDURE — 84165 PROTEIN E-PHORESIS SERUM: CPT | Performed by: INTERNAL MEDICINE

## 2024-08-06 PROCEDURE — 82784 ASSAY IGA/IGD/IGG/IGM EACH: CPT | Performed by: INTERNAL MEDICINE

## 2024-08-06 PROCEDURE — 86334 IMMUNOFIX E-PHORESIS SERUM: CPT | Performed by: INTERNAL MEDICINE

## 2024-08-07 LAB
ALBUMIN SERPL ELPH-MCNC: 3.6 G/DL (ref 2.9–4.4)
ALBUMIN/GLOB SERPL: 1.3 {RATIO} (ref 0.7–1.7)
ALPHA1 GLOB SERPL ELPH-MCNC: 0.2 G/DL (ref 0–0.4)
ALPHA2 GLOB SERPL ELPH-MCNC: 0.6 G/DL (ref 0.4–1)
B-GLOBULIN SERPL ELPH-MCNC: 0.8 G/DL (ref 0.7–1.3)
GAMMA GLOB SERPL ELPH-MCNC: 1.3 G/DL (ref 0.4–1.8)
GLOBULIN SER-MCNC: 2.9 G/DL (ref 2.2–3.9)
IGA SERPL-MCNC: 154 MG/DL (ref 64–422)
IGG SERPL-MCNC: 1394 MG/DL (ref 586–1602)
IGM SERPL-MCNC: 43 MG/DL (ref 26–217)
INTERPRETATION SERPL IEP-IMP: ABNORMAL
KAPPA LC FREE SER-MCNC: 28.7 MG/L (ref 3.3–19.4)
KAPPA LC FREE/LAMBDA FREE SER: 0.61 {RATIO} (ref 0.26–1.65)
LABORATORY COMMENT REPORT: ABNORMAL
LAMBDA LC FREE SERPL-MCNC: 47.4 MG/L (ref 5.7–26.3)
M PROTEIN SERPL ELPH-MCNC: 0.7 G/DL
PROT SERPL-MCNC: 6.5 G/DL (ref 6–8.5)

## 2024-08-12 NOTE — PROGRESS NOTES
.     REASONS FOR FOLLOWUP: IgG lambda MGUS    HISTORY OF PRESENT ILLNESS:  The patient is a 89 y.o. year old female  who is here for follow-up with the above-mentioned history.    No new problems.  No fever, chills, weight loss or night sweats.    Past Medical History:   Diagnosis Date    Arthritis     Colon polyp 07/01/2017    Diabetes mellitus     Esophageal reflux     GERD (gastroesophageal reflux disease)     H/O DVT (deep venous thrombosis)     Headache 07/01/2017    Hiatal hernia 07/01/2017    History of vitamin D deficiency     Hypercholesteremia     Hyperlipidemia     Hypertension     Low back pain 07/01/2017    MGUS (monoclonal gammopathy of unknown significance)     NICOLE on CPAP     AHI 15/h    Osteoporosis     Peripheral neuropathy     Pneumonia 07/01/2017    PONV (postoperative nausea and vomiting)     Pulmonary embolism     Pulmonary hypertension     Thyroid disease     Venous thrombosis      Past Surgical History:   Procedure Laterality Date    APPENDECTOMY  1949    BLADDER REPAIR      BREAST BIOPSY Right     benign 2019    CARDIAC ELECTROPHYSIOLOGY PROCEDURE N/A 7/8/2022    Procedure: Pacemaker DC new  Comfort;  Surgeon: Isauro Rios MD;  Location: [facility] INVASIVE LOCATION;  Service: Cardiology;  Laterality: N/A;    COLONOSCOPY      CYSTOCELE REPAIR      bladder reconstruction    HYSTERECTOMY      SALPINGECTOMY      SALPINGO OOPHORECTOMY      for ectopic pregnancy    UPPER GASTROINTESTINAL ENDOSCOPY         MEDICATIONS    Current Outpatient Medications:     allopurinol (ZYLOPRIM) 100 MG tablet, TAKE 1 TABLET EVERY DAY, Disp: 90 tablet, Rfl: 3    aspirin 81 MG EC tablet, Take 1 tablet by mouth Daily., Disp: , Rfl:     Calcium Carbonate-Vitamin D (CALTRATE 600+D PO), Take  by mouth., Disp: , Rfl:     carvedilol (COREG) 6.25 MG tablet, TAKE 1 TABLET TWICE DAILY WITH MEALS, Disp: 180 tablet, Rfl: 3    cephalexin (Keflex) 250 MG capsule, Take 1 capsule by mouth 3 (Three) Times a Day.,  Disp: 21 capsule, Rfl: 0    CRANBERRY PO, Take  by mouth., Disp: , Rfl:     ESTRACE VAGINAL 0.1 MG/GM vaginal cream, INSERT 1/2 TO 1 INCH VAGINALLY ONCE WEEKLY AS DIRECTED, Disp: , Rfl: 11    glimepiride (AMARYL) 2 MG tablet, TAKE 1 TABLET EVERY MORNING BEFORE BREAKFAST, Disp: 90 tablet, Rfl: 3    glucose blood (OneTouch Verio) test strip, Check glucose once daily and as needed DX E11.9, Disp: 100 each, Rfl: 5    levothyroxine (SYNTHROID, LEVOTHROID) 75 MCG tablet, TAKE 1 TABLET EVERY DAY, Disp: 90 tablet, Rfl: 3    Loratadine 10 MG capsule, Take  by mouth., Disp: , Rfl:     losartan (COZAAR) 50 MG tablet, TAKE 1 TABLET EVERY DAY, Disp: 90 tablet, Rfl: 3    mupirocin (BACTROBAN) 2 % ointment, APPLY TO NASAL PASSAGE TWICE DAILY AS DIRECTED FOR 7 DAYS, Disp: , Rfl:     nystatin (MYCOSTATIN) 100,000 unit/mL suspension, Swish and swallow 5 mL 4 (Four) Times a Day., Disp: 240 mL, Rfl: 0    nystatin-triamcinolone (MYCOLOG) 020258-1.1 UNIT/GM-% ointment, Apply 1 application topically to the appropriate area as directed 2 (Two) Times a Day. Don't use more than 7 days in row, Disp: 60 g, Rfl: 0    omeprazole (priLOSEC) 40 MG capsule, TAKE 1 CAPSULE BY MOUTH TWICE A DAY (Patient taking differently: Take 1 capsule by mouth As Needed.), Disp: 90 capsule, Rfl: 1    OneTouch Delica Lancets 33G misc, Check glucose once daily and as needed dx E11.9, Disp: 100 each, Rfl: 1    Potassium 99 MG tablet, Take  by mouth., Disp: , Rfl:     Probiotic Product (PROBIOTIC DAILY PO), Take  by mouth Daily., Disp: , Rfl:     rosuvastatin (CRESTOR) 10 MG tablet, TAKE 1 TABLET EVERY DAY, Disp: 90 tablet, Rfl: 3    vitamin B-12 (CYANOCOBALAMIN) 1000 MCG tablet, Take 1 tablet by mouth 3 (Three) Times a Week., Disp: , Rfl:     VITAMIN D PO, Take  by mouth., Disp: , Rfl:     Wheat Dextrin (BENEFIBER DRINK MIX PO), Take  by mouth., Disp: , Rfl:     ALLERGIES:   No Known Allergies    SOCIAL HISTORY:       Social History     Socioeconomic History     Marital status:     Number of children: 4    Years of education: High School   Tobacco Use    Smoking status: Never    Smokeless tobacco: Never   Vaping Use    Vaping status: Never Used   Substance and Sexual Activity    Alcohol use: No     Comment: caffeine use- 1/2 cup of coffee daily, soda    Drug use: No    Sexual activity: Defer         FAMILY HISTORY:  Family History   Problem Relation Age of Onset    Breast cancer Mother         70's    Colon cancer Mother     Stroke Mother     Heart disease Father     Heart disease Sister     Hypertension Sister     Hypertension Sister     Diabetes Sister     Ovarian cancer Neg Hx     Colon polyps Neg Hx     Crohn's disease Neg Hx     Irritable bowel syndrome Neg Hx     Ulcerative colitis Neg Hx        REVIEW OF SYSTEMS:  Review of Systems   Constitutional:  Negative for activity change.   HENT:  Negative for nosebleeds and trouble swallowing.    Respiratory:  Negative for shortness of breath and wheezing.    Cardiovascular:  Negative for chest pain and palpitations.   Gastrointestinal:  Negative for constipation, diarrhea and nausea.   Genitourinary:  Negative for dysuria and hematuria.   Musculoskeletal:  Negative for arthralgias and myalgias.   Skin:  Negative for rash and wound.   Neurological:  Negative for seizures and syncope.   Hematological:  Negative for adenopathy. Does not bruise/bleed easily.   Psychiatric/Behavioral:  Negative for confusion.             Vitals:    08/13/24 1026   BP: 160/77   Pulse: 52   Temp: 98.1 °F (36.7 °C)   SpO2: 98%   Weight: 65.7 kg (144 lb 12.8 oz)   PainSc: 0-No pain         8/15/2023    11:37 AM   Current Status   ECOG score 0        PHYSICAL EXAM:        CONSTITUTIONAL:  Vital signs reviewed.  No distress, looks comfortable.  EYES:  Conjunctiva and lids unremarkable.  PERRLA  EARS,NOSE,MOUTH,THROAT:  Ears and nose appear unremarkable.  Lips, teeth, gums appear unremarkable.  RESPIRATORY:  Normal respiratory effort.  Lungs  clear to auscultation bilaterally.  CARDIOVASCULAR:  Normal S1, S2.  No murmurs rubs or gallops.  No significant lower extremity edema.  GASTROINTESTINAL: Abdomen appears unremarkable.  Nontender.  No hepatomegaly.  No splenomegaly.  LYMPHATIC:  No cervical, supraclavicular, axillary lymphadenopathy.  SKIN:  Warm.  No rashes.  PSYCHIATRIC:  Normal judgment and insight.  Normal mood and affect.         RECENT LABS:        WBC   Date/Time Value Ref Range Status   08/06/2024 11:00 AM 6.39 3.40 - 10.80 10*3/mm3 Final   04/23/2024 09:38 AM 6.48 3.40 - 10.80 10*3/mm3 Final     Hemoglobin   Date/Time Value Ref Range Status   08/06/2024 11:00 AM 13.1 12.0 - 15.9 g/dL Final     Platelets   Date/Time Value Ref Range Status   08/06/2024 11:00  140 - 450 10*3/mm3 Final       Assessment & Plan   B12 deficiency  - Vitamin B12    MGUS (monoclonal gammopathy of unknown significance)  - LISA, PE & Free LT Chains, Ser  - CBC & Differential  - Basic Metabolic Panel        Shirley M Yeoman   *IgG lambda MGUS  8/26/2015 (earliest lab I have for review): M spike 0.4.  4/5/2016: Serum free kappa 23.5.  Serum free lambda 26.8, K/L ratio 0.88  8/6/2024: M spike 0.7.  Serum free kappa 28.7, serum free lambda 47.4, K/L ratio 0.61.  Creatinine 1.04, calcium 10.1, Hb 13.1.  Therefore, appears stable.    *B12 deficiency  8/6/2024: B12 1090  Continue daily oral B12.  I told her she could drop her B12 dose but if there is any alarming the B12 level.  She is fatigued so she will maintain B12 at current dose.    *8/12/2024: Initial visit with me.  (Former patient of Dr. Gross)    Plan  MD 1 year.  2 weeks prior: SPEP, SIFE, serum free light chains, CBC, BMP, B12 level    I am following her longitudinally

## 2024-08-13 ENCOUNTER — OFFICE VISIT (OUTPATIENT)
Dept: ONCOLOGY | Facility: CLINIC | Age: 89
End: 2024-08-13
Payer: MEDICARE

## 2024-08-13 VITALS
TEMPERATURE: 98.1 F | WEIGHT: 144.8 LBS | SYSTOLIC BLOOD PRESSURE: 160 MMHG | HEART RATE: 52 BPM | BODY MASS INDEX: 25.66 KG/M2 | DIASTOLIC BLOOD PRESSURE: 77 MMHG | OXYGEN SATURATION: 98 %

## 2024-08-13 DIAGNOSIS — E53.8 B12 DEFICIENCY: Primary | ICD-10-CM

## 2024-08-13 DIAGNOSIS — D47.2 MGUS (MONOCLONAL GAMMOPATHY OF UNKNOWN SIGNIFICANCE): ICD-10-CM

## 2024-08-13 PROCEDURE — 1126F AMNT PAIN NOTED NONE PRSNT: CPT | Performed by: INTERNAL MEDICINE

## 2024-08-13 PROCEDURE — G2211 COMPLEX E/M VISIT ADD ON: HCPCS | Performed by: INTERNAL MEDICINE

## 2024-08-13 PROCEDURE — 99214 OFFICE O/P EST MOD 30 MIN: CPT | Performed by: INTERNAL MEDICINE

## 2024-08-15 ENCOUNTER — OFFICE VISIT (OUTPATIENT)
Dept: CARDIOLOGY | Facility: CLINIC | Age: 89
End: 2024-08-15
Payer: MEDICARE

## 2024-08-15 VITALS
HEART RATE: 89 BPM | RESPIRATION RATE: 16 BRPM | DIASTOLIC BLOOD PRESSURE: 72 MMHG | HEIGHT: 62 IN | SYSTOLIC BLOOD PRESSURE: 120 MMHG | WEIGHT: 146 LBS | OXYGEN SATURATION: 97 % | BODY MASS INDEX: 26.87 KG/M2

## 2024-08-15 DIAGNOSIS — G89.29 CHRONIC CHEST PAIN WITH LOW TO MODERATE RISK FOR CAD: ICD-10-CM

## 2024-08-15 DIAGNOSIS — E78.2 MIXED HYPERLIPIDEMIA: ICD-10-CM

## 2024-08-15 DIAGNOSIS — Z95.0 PRESENCE OF CARDIAC PACEMAKER: ICD-10-CM

## 2024-08-15 DIAGNOSIS — I49.5 SSS (SICK SINUS SYNDROME): ICD-10-CM

## 2024-08-15 DIAGNOSIS — Z91.89 CHRONIC CHEST PAIN WITH LOW TO MODERATE RISK FOR CAD: ICD-10-CM

## 2024-08-15 DIAGNOSIS — R00.1 SYMPTOMATIC BRADYCARDIA: ICD-10-CM

## 2024-08-15 DIAGNOSIS — I10 ESSENTIAL HYPERTENSION: ICD-10-CM

## 2024-08-15 DIAGNOSIS — R07.9 CHRONIC CHEST PAIN WITH LOW TO MODERATE RISK FOR CAD: ICD-10-CM

## 2024-08-15 DIAGNOSIS — I67.9 CEREBROVASCULAR SMALL VESSEL DISEASE: Primary | ICD-10-CM

## 2024-08-15 NOTE — PROGRESS NOTES
"      CARDIOLOGY    Carolin Dhillon MD    ENCOUNTER DATE:  08/15/2024    Donna M Yeoman / 89 y.o. / female        CHIEF COMPLAINT / REASON FOR OFFICE VISIT     Follow-up (Yearly follow up/MGUS, SSS, Mixed hld, pacemaker (KP) /Symptomatic bradycardia)      HISTORY OF PRESENT ILLNESS       HPI    Donna M Yeoman is a 89 y.o. female     This patient previously followed with Dr. Stewart. She has a history of an abnormal EKG with anterior T-wave inversion. She has a history of DVT and PE. Her family history is significant for a son and daughter who both had bypass surgeries. Her daughter  from a pulmonary embolism following her bypass at the age of 57. She saw Dr. Stewart in 2021 complaining of shortness of breath. Lexiscan stress test showed no ischemia. Echo showed normal LV function, ejection fraction 67% with moderate left ventricular hypertrophy.      In 2022, she was diagnosed with symptomatic bradycardia. She had a dual chamber pacemaker placed with a [employer] device.     She comes in today and complains that she has been feeling more fatigued.  She had a fall about 6 months ago and fell on her chest.  She has been having chest discomfort.  She is noticing shortness of breath when she tries to exert herself such as climbing steps or cleaning the house.    VITAL SIGNS     Visit Vitals  /72 (BP Location: Left arm, Patient Position: Sitting, Cuff Size: Adult)   Pulse 89   Resp 16   Ht 157.5 cm (62\")   Wt 66.2 kg (146 lb)   LMP  (LMP Unknown)   SpO2 97%   BMI 26.70 kg/m²         Wt Readings from Last 3 Encounters:   08/15/24 66.2 kg (146 lb)   24 65.7 kg (144 lb 12.8 oz)   24 65.8 kg (145 lb)     Body mass index is 26.7 kg/m².      PHYSICAL EXAMINATION     Constitutional:       General: Not in acute distress.  Neck:      Vascular: No carotid bruit or JVD.   Pulmonary:      Effort: Pulmonary effort is normal.      Breath sounds: Normal breath sounds. "   Cardiovascular:      Normal rate. Regular rhythm.      Murmurs: There is no murmur.   Psychiatric:         Mood and Affect: Mood and affect normal.           REVIEWED DATA       ECG 12 Lead    Date/Time: 8/15/2024 10:06 AM  Performed by: Carolin Dhillon MD    Authorized by: Carolin Dhillon MD  Comparison: compared with previous ECG from 2/23/2023  Similar to previous ECG  Rhythm: sinus rhythm  Ectopy: bigeminy  BPM: 89  Conduction: conduction normal  ST Segments: ST segments normal    Clinical impression: abnormal EKG            Lipid Panel          4/23/2024    09:38   Lipid Panel   Total Cholesterol 159    Triglycerides 160    HDL Cholesterol 41    VLDL Cholesterol 28    LDL Cholesterol  90    LDL/HDL Ratio 2.10        Lab Results   Component Value Date    GLUCOSE 123 (H) 08/06/2024    BUN 18 08/06/2024    CREATININE 1.04 (H) 08/06/2024     (H) 08/06/2024    K 4.0 08/06/2024     (H) 08/06/2024    CALCIUM 10.1 08/06/2024    PROTEINTOT 6.9 08/06/2024    ALBUMIN 3.8 08/06/2024    ALBUMIN 3.6 08/06/2024    ALT 13 08/06/2024    AST 25 08/06/2024    ALKPHOS 64 08/06/2024    BILITOT 0.8 08/06/2024    GLOB 3.1 08/06/2024    AGRATIO 1.2 08/06/2024    BCR 17.3 08/06/2024    ANIONGAP 6.9 08/06/2024    EGFR 51.5 (L) 08/06/2024       ASSESSMENT & PLAN      Diagnosis Plan   1. Cerebrovascular small vessel disease  Stress Test With Myocardial Perfusion One Day    Adult Transthoracic Echo Complete W/ Cont if Necessary Per Protocol      2. Essential hypertension  Stress Test With Myocardial Perfusion One Day    Adult Transthoracic Echo Complete W/ Cont if Necessary Per Protocol      3. Mixed hyperlipidemia  Stress Test With Myocardial Perfusion One Day    Adult Transthoracic Echo Complete W/ Cont if Necessary Per Protocol      4. Presence of cardiac pacemaker  Stress Test With Myocardial Perfusion One Day    Adult Transthoracic Echo Complete W/ Cont if Necessary Per Protocol      5. SSS (sick sinus syndrome)   Stress Test With Myocardial Perfusion One Day    Adult Transthoracic Echo Complete W/ Cont if Necessary Per Protocol      6. Symptomatic bradycardia  Stress Test With Myocardial Perfusion One Day    Adult Transthoracic Echo Complete W/ Cont if Necessary Per Protocol      7. Chronic chest pain with low to moderate risk for CAD  Stress Test With Myocardial Perfusion One Day    Adult Transthoracic Echo Complete W/ Cont if Necessary Per Protocol          1.  Symptomatic sinus bradycardia status post dual-chamber [employer] pacemaker.  This was last checked in June and was functioning normally.  2.  Hyperlipidemia.  On Crestor.  Lipid panel reviewed as above.  3.  Hypertension.  On losartan and carvedilol.  Blood pressure controlled.  4.  Fatigue and dyspnea on exertion.  I recommend that we check an echo as well as a nuclear stress test.  If those look okay, continue current medications  5.  Bigeminy    If her stress test and her echo looked good, follow-up with Kimberley in a year at [place] as this is her preferred location.        Orders Placed This Encounter   Procedures    Stress Test With Myocardial Perfusion One Day     Standing Status:   Future     Standing Expiration Date:   8/15/2025     Order Specific Question:   Rest/stress, rest only or stress only?     Answer:   Rest/Stress     Order Specific Question:   What stress agent will be used?     Answer:   Exercise with possible pharmacologic     Order Specific Question:   Reason for exam?     Answer:   Chest Pain     Order Specific Question:   Release to patient     Answer:   Routine Release [[id number]]    ECG 12 Lead     This order was created via procedure documentation     Order Specific Question:   Release to patient     Answer:   Routine Release [[id number]]    Adult Transthoracic Echo Complete W/ Cont if Necessary Per Protocol     Standing Status:   Future     Standing Expiration Date:   8/15/2025     Order Specific Question:   Reason for exam?      Answer:   Chest Pain     Order Specific Question:   Release to patient     Answer:   Routine Release [[id number]]           MEDICATIONS         Discharge Medications            Accurate as of August 15, 2024 10:08 AM. If you have any questions, ask your nurse or doctor.                Continue These Medications        Instructions Start Date   allopurinol 100 MG tablet  Commonly known as: ZYLOPRIM   TAKE 1 TABLET EVERY DAY      aspirin 81 MG EC tablet   81 mg, Oral, Daily      BENEFIBER DRINK MIX PO   Oral      CALTRATE 600+D PO   Oral      carvedilol 6.25 MG tablet  Commonly known as: COREG   6.25 mg, Oral, 2 Times Daily With Meals      cephalexin 250 MG capsule  Commonly known as: Keflex   250 mg, Oral, 3 Times Daily      CRANBERRY PO   Oral      ESTRACE VAGINAL 0.1 MG/GM vaginal cream  Generic drug: estradiol   INSERT 1/2 TO 1 INCH VAGINALLY ONCE WEEKLY AS DIRECTED      glimepiride 2 MG tablet  Commonly known as: AMARYL   2 mg, Oral, Every Morning Before Breakfast      levothyroxine 75 MCG tablet  Commonly known as: SYNTHROID, LEVOTHROID   TAKE 1 TABLET EVERY DAY      Loratadine 10 MG capsule   Oral      losartan 50 MG tablet  Commonly known as: COZAAR   TAKE 1 TABLET EVERY DAY      mupirocin 2 % ointment  Commonly known as: BACTROBAN   APPLY TO NASAL PASSAGE TWICE DAILY AS DIRECTED FOR 7 DAYS      nystatin 100,000 unit/mL suspension  Commonly known as: MYCOSTATIN   500,000 Units, Swish & Swallow, 4 Times Daily      nystatin-triamcinolone 779203-1.1 UNIT/GM-% ointment  Commonly known as: MYCOLOG   1 application , Topical, 2 Times Daily, Don't use more than 7 days in row      OneTouch Delica Lancets 33G misc   Check glucose once daily and as needed dx E11.9      OneTouch Verio test strip  Generic drug: glucose blood   Check glucose once daily and as needed DX E11.9      Potassium 99 MG tablet   Oral      PROBIOTIC DAILY PO   Oral, Daily      rosuvastatin 10 MG tablet  Commonly known as: CRESTOR   10 mg, Oral,  Daily      vitamin B-12 1000 MCG tablet  Commonly known as: CYANOCOBALAMIN   1,000 mcg, Oral, 3 Times Weekly      VITAMIN D PO   Oral             Stop These Medications      omeprazole 40 MG capsule  Commonly known as: priLOSEC  Stopped by: Carolin Dhillon MD  08/15/24  10:08 EDT    Part of this note may be an electronic transcription/translation of spoken language to printed text using the Dragon dictation system.

## 2024-08-29 ENCOUNTER — PREP FOR SURGERY (OUTPATIENT)
Dept: SURGERY | Facility: SURGERY CENTER | Age: 89
End: 2024-08-29
Payer: MEDICARE

## 2024-08-29 ENCOUNTER — OFFICE VISIT (OUTPATIENT)
Dept: GASTROENTEROLOGY | Facility: CLINIC | Age: 89
End: 2024-08-29
Payer: MEDICARE

## 2024-08-29 VITALS
OXYGEN SATURATION: 96 % | WEIGHT: 144.3 LBS | BODY MASS INDEX: 26.55 KG/M2 | SYSTOLIC BLOOD PRESSURE: 120 MMHG | DIASTOLIC BLOOD PRESSURE: 80 MMHG | HEIGHT: 62 IN | HEART RATE: 69 BPM | TEMPERATURE: 96.9 F

## 2024-08-29 DIAGNOSIS — R10.84 GENERALIZED ABDOMINAL PAIN: ICD-10-CM

## 2024-08-29 DIAGNOSIS — R13.19 ESOPHAGEAL DYSPHAGIA: Primary | ICD-10-CM

## 2024-08-29 DIAGNOSIS — R14.0 ABDOMINAL BLOATING: ICD-10-CM

## 2024-08-29 PROCEDURE — 99214 OFFICE O/P EST MOD 30 MIN: CPT | Performed by: NURSE PRACTITIONER

## 2024-08-29 RX ORDER — SODIUM CHLORIDE 0.9 % (FLUSH) 0.9 %
10 SYRINGE (ML) INJECTION AS NEEDED
OUTPATIENT
Start: 2024-08-29

## 2024-08-29 RX ORDER — SODIUM CHLORIDE, SODIUM LACTATE, POTASSIUM CHLORIDE, CALCIUM CHLORIDE 600; 310; 30; 20 MG/100ML; MG/100ML; MG/100ML; MG/100ML
30 INJECTION, SOLUTION INTRAVENOUS CONTINUOUS PRN
OUTPATIENT
Start: 2024-08-29

## 2024-08-29 RX ORDER — SODIUM CHLORIDE 0.9 % (FLUSH) 0.9 %
3 SYRINGE (ML) INJECTION EVERY 12 HOURS SCHEDULED
OUTPATIENT
Start: 2024-08-29

## 2024-08-29 NOTE — PROGRESS NOTES
"Chief Complaint   Patient presents with    Difficulty Swallowing         History of Present Illness  Patient is an 89-year-old female who presents today for Evaluation, referred for dysphagia and esophageal stricture.  She has a history of hiatal hernia and Schatzki's ring that has been dilated, most recently in October 2021.    Patient presents today for evaluation with concerns about recurrent dysphagia.  This has been present for several months.  It is primarily with pills or solid foods.  She has had no forced regurgitation.  She also at times at night notices that she is unable to swallow when she is lying down.  She reports a globus sensation.  She recently saw ENT with no findings to explain symptoms.    She reports frequent heartburn and reflux, takes an over-the-counter acid reducer for this.    Over the last year she has also been experiencing some abdominal pain.  Most recently this has been prior more pronounced to the left upper quadrant but at times it has been generalized.  Reports tenderness to the area.  She has noted abdominal bloating and feels she has lost weight and her abdomen is becoming more distended.     Result Review :       Comprehensive Metabolic Panel (08/06/2024 11:00)    CBC & Differential (08/06/2024 11:00)    Office Visit with Epley, James, APRN (07/11/2024)    NM Gastric Emptying (07/16/2020 12:37)     ENDOSCOPY, INT (10/15/2021)     Vital Signs:   /80   Pulse 69   Temp 96.9 °F (36.1 °C)   Ht 157.5 cm (62.01\")   Wt 65.5 kg (144 lb 4.8 oz)   SpO2 96%   BMI 26.39 kg/m²     Body mass index is 26.39 kg/m².     Physical Exam  Vitals reviewed.   Constitutional:       General: She is not in acute distress.     Appearance: She is well-developed.   HENT:      Head: Normocephalic and atraumatic.   Pulmonary:      Effort: Pulmonary effort is normal. No respiratory distress.   Abdominal:      General: Bowel sounds are normal. There is distension.      Palpations: Abdomen is soft. "      Tenderness: There is generalized abdominal tenderness.   Skin:     General: Skin is dry.      Coloration: Skin is not pale.   Neurological:      Mental Status: She is alert and oriented to person, place, and time.   Psychiatric:         Thought Content: Thought content normal.           Assessment and Plan    Diagnoses and all orders for this visit:    1. Esophageal dysphagia (Primary)    2. Abdominal bloating  -     CT Abdomen Pelvis With Contrast; Future    3. Generalized abdominal pain  -     CT Abdomen Pelvis With Contrast; Future         Discussion  Patient presents today for evaluation with concerns about worsening dysphagia.  Recommended updated EGD for further evaluation.  Suspect she will likely require repeat dilation of Schatzki's ring.    Patient also with concerns about abdominal pain and distention.  Will schedule CT scan for further evaluation, assess for any evidence of obstructive or inflammatory issue that could be contributing to symptoms as well as rule out any intra-abdominal lesion that could be contributing to distention.    Will provide further treat recommendations dependent upon test findings and clinical course.          Follow Up   Return for Follow up to review results after testing complete.    Patient Instructions   Schedule EGD for further evaluation of symptoms.     Schedule CT scan for further evaluation.

## 2024-09-09 ENCOUNTER — TELEPHONE (OUTPATIENT)
Dept: CARDIOLOGY | Facility: CLINIC | Age: 89
End: 2024-09-09
Payer: MEDICARE

## 2024-09-10 ENCOUNTER — HOSPITAL ENCOUNTER (OUTPATIENT)
Dept: CARDIOLOGY | Facility: HOSPITAL | Age: 89
Discharge: HOME | End: 2024-09-10
Admitting: INTERNAL MEDICINE
Payer: MEDICARE

## 2024-09-10 ENCOUNTER — HOSPITAL ENCOUNTER (OUTPATIENT)
Dept: CARDIOLOGY | Facility: HOSPITAL | Age: 89
Discharge: HOME | End: 2024-09-10
Payer: MEDICARE

## 2024-09-10 VITALS
BODY MASS INDEX: 26.5 KG/M2 | HEIGHT: 62 IN | HEART RATE: 70 BPM | SYSTOLIC BLOOD PRESSURE: 124 MMHG | WEIGHT: 144 LBS | DIASTOLIC BLOOD PRESSURE: 76 MMHG

## 2024-09-10 VITALS — BODY MASS INDEX: 26.49 KG/M2 | HEIGHT: 62 IN | WEIGHT: 143.96 LBS

## 2024-09-10 DIAGNOSIS — E78.2 MIXED HYPERLIPIDEMIA: ICD-10-CM

## 2024-09-10 DIAGNOSIS — I49.5 SSS (SICK SINUS SYNDROME): ICD-10-CM

## 2024-09-10 DIAGNOSIS — Z91.89 CHRONIC CHEST PAIN WITH LOW TO MODERATE RISK FOR CAD: ICD-10-CM

## 2024-09-10 DIAGNOSIS — I67.9 CEREBROVASCULAR SMALL VESSEL DISEASE: ICD-10-CM

## 2024-09-10 DIAGNOSIS — I10 ESSENTIAL HYPERTENSION: ICD-10-CM

## 2024-09-10 DIAGNOSIS — R07.9 CHRONIC CHEST PAIN WITH LOW TO MODERATE RISK FOR CAD: ICD-10-CM

## 2024-09-10 DIAGNOSIS — R00.1 SYMPTOMATIC BRADYCARDIA: ICD-10-CM

## 2024-09-10 DIAGNOSIS — G89.29 CHRONIC CHEST PAIN WITH LOW TO MODERATE RISK FOR CAD: ICD-10-CM

## 2024-09-10 DIAGNOSIS — Z95.0 PRESENCE OF CARDIAC PACEMAKER: ICD-10-CM

## 2024-09-10 LAB
AORTIC DIMENSIONLESS INDEX: 0.5 (DI)
ASCENDING AORTA: 3.4 CM
BH CV ECHO MEAS - ACS: 1.63 CM
BH CV ECHO MEAS - AI P1/2T: 1430 MSEC
BH CV ECHO MEAS - AO MAX PG: 10.5 MMHG
BH CV ECHO MEAS - AO MEAN PG: 5.6 MMHG
BH CV ECHO MEAS - AO ROOT DIAM: 2.9 CM
BH CV ECHO MEAS - AO V2 MAX: 161.9 CM/SEC
BH CV ECHO MEAS - AO V2 VTI: 37.1 CM
BH CV ECHO MEAS - AVA(I,D): 1.71 CM2
BH CV ECHO MEAS - EDV(MOD-SP2): 81 ML
BH CV ECHO MEAS - EDV(MOD-SP4): 102 ML
BH CV ECHO MEAS - EF(MOD-BP): 59.2 %
BH CV ECHO MEAS - EF(MOD-SP2): 58 %
BH CV ECHO MEAS - EF(MOD-SP4): 60.8 %
BH CV ECHO MEAS - ESV(MOD-SP2): 34 ML
BH CV ECHO MEAS - ESV(MOD-SP4): 40 ML
BH CV ECHO MEAS - IVSD: 0.8 CM
BH CV ECHO MEAS - LAT PEAK E' VEL: 6.1 CM/SEC
BH CV ECHO MEAS - LV DIASTOLIC VOL/BSA (35-75): 61.4 CM2
BH CV ECHO MEAS - LV MAX PG: 3.3 MMHG
BH CV ECHO MEAS - LV MEAN PG: 1.58 MMHG
BH CV ECHO MEAS - LV SYSTOLIC VOL/BSA (12-30): 24.1 CM2
BH CV ECHO MEAS - LV V1 MAX: 90.7 CM/SEC
BH CV ECHO MEAS - LV V1 VTI: 20.1 CM
BH CV ECHO MEAS - LVIDD: 4 CM
BH CV ECHO MEAS - LVIDS: 2.9 CM
BH CV ECHO MEAS - LVOT AREA: 3.2 CM2
BH CV ECHO MEAS - LVOT DIAM: 2 CM
BH CV ECHO MEAS - LVPWD: 0.8 CM
BH CV ECHO MEAS - MED PEAK E' VEL: 6.2 CM/SEC
BH CV ECHO MEAS - MR MAX PG: 100.7 MMHG
BH CV ECHO MEAS - MR MAX VEL: 501.7 CM/SEC
BH CV ECHO MEAS - MV A DUR: 0.11 SEC
BH CV ECHO MEAS - MV A MAX VEL: 99.8 CM/SEC
BH CV ECHO MEAS - MV DEC SLOPE: 418.2 CM/SEC2
BH CV ECHO MEAS - MV DEC TIME: 0.2 SEC
BH CV ECHO MEAS - MV E MAX VEL: 77.7 CM/SEC
BH CV ECHO MEAS - MV E/A: 0.78
BH CV ECHO MEAS - MV MAX PG: 4.3 MMHG
BH CV ECHO MEAS - MV MEAN PG: 1.33 MMHG
BH CV ECHO MEAS - MV P1/2T: 62.5 MSEC
BH CV ECHO MEAS - MV V2 VTI: 32.5 CM
BH CV ECHO MEAS - MVA(P1/2T): 3.5 CM2
BH CV ECHO MEAS - MVA(VTI): 1.95 CM2
BH CV ECHO MEAS - PA V2 MAX: 108.6 CM/SEC
BH CV ECHO MEAS - PULM A REVS DUR: 0.13 SEC
BH CV ECHO MEAS - PULM A REVS VEL: 14.8 CM/SEC
BH CV ECHO MEAS - PULM DIAS VEL: 17.5 CM/SEC
BH CV ECHO MEAS - PULM S/D: 1.09
BH CV ECHO MEAS - PULM SYS VEL: 19.1 CM/SEC
BH CV ECHO MEAS - QP/QS: 0.39
BH CV ECHO MEAS - RAP SYSTOLE: 3 MMHG
BH CV ECHO MEAS - RV MAX PG: 0.61 MMHG
BH CV ECHO MEAS - RV V1 MAX: 39 CM/SEC
BH CV ECHO MEAS - RV V1 VTI: 6.4 CM
BH CV ECHO MEAS - RVOT DIAM: 2.2 CM
BH CV ECHO MEAS - RVSP: 39.7 MMHG
BH CV ECHO MEAS - SV(LVOT): 63.4 ML
BH CV ECHO MEAS - SV(MOD-SP2): 47 ML
BH CV ECHO MEAS - SV(MOD-SP4): 62 ML
BH CV ECHO MEAS - SV(RVOT): 24.4 ML
BH CV ECHO MEAS - SVI(LVOT): 38.1 ML/M2
BH CV ECHO MEAS - SVI(MOD-SP2): 28.3 ML/M2
BH CV ECHO MEAS - SVI(MOD-SP4): 37.3 ML/M2
BH CV ECHO MEAS - TAPSE (>1.6): 2.12 CM
BH CV ECHO MEAS - TR MAX PG: 36.7 MMHG
BH CV ECHO MEAS - TR MAX VEL: 302.9 CM/SEC
BH CV ECHO MEASUREMENTS AVERAGE E/E' RATIO: 12.63
BH CV NUCLEAR PRIOR STUDY: 1
BH CV REST NUCLEAR ISOTOPE DOSE: 11.6 MCI
BH CV STRESS BP STAGE 1: NORMAL
BH CV STRESS COMMENTS STAGE 1: NORMAL
BH CV STRESS DOSE REGADENOSON STAGE 1: 0.4
BH CV STRESS DURATION MIN STAGE 1: 0
BH CV STRESS DURATION SEC STAGE 1: 10
BH CV STRESS HR STAGE 1: 81
BH CV STRESS NUCLEAR ISOTOPE DOSE: 33.6 MCI
BH CV STRESS PROTOCOL 1: NORMAL
BH CV STRESS RECOVERY BP: NORMAL MMHG
BH CV STRESS RECOVERY HR: 68 BPM
BH CV STRESS STAGE 1: 1
BH CV XLRA - RV BASE: 3.2 CM
BH CV XLRA - RV LENGTH: 6.6 CM
BH CV XLRA - RV MID: 2.8 CM
BH CV XLRA - TDI S': 8.3 CM/SEC
LEFT ATRIUM VOLUME INDEX: 33.2 ML/M2
LV EF NUC BP: 50 %
MAXIMAL PREDICTED HEART RATE: 131 BPM
PERCENT MAX PREDICTED HR: 61.83 %
SINUS: 3.5 CM
STJ: 3.1 CM
STRESS BASELINE BP: NORMAL MMHG
STRESS BASELINE HR: 77 BPM
STRESS PERCENT HR: 73 %
STRESS POST EXERCISE DUR SEC: 10 SEC
STRESS POST PEAK BP: NORMAL MMHG
STRESS POST PEAK HR: 81 BPM
STRESS TARGET HR: 111 BPM

## 2024-09-10 PROCEDURE — 78452 HT MUSCLE IMAGE SPECT MULT: CPT

## 2024-09-10 PROCEDURE — 25010000002 REGADENOSON 0.4 MG/5ML SOLUTION: Performed by: INTERNAL MEDICINE

## 2024-09-10 PROCEDURE — 25010000002 AMINOPHYLLINE PER 250 MG: Performed by: INTERNAL MEDICINE

## 2024-09-10 PROCEDURE — A9502 TC99M TETROFOSMIN: HCPCS | Performed by: INTERNAL MEDICINE

## 2024-09-10 PROCEDURE — 93017 CV STRESS TEST TRACING ONLY: CPT

## 2024-09-10 PROCEDURE — 25510000001 PERFLUTREN 6.52 MG/ML SUSPENSION 2 ML VIAL: Performed by: INTERNAL MEDICINE

## 2024-09-10 PROCEDURE — 0 TECHNETIUM TETROFOSMIN KIT: Performed by: INTERNAL MEDICINE

## 2024-09-10 PROCEDURE — 93306 TTE W/DOPPLER COMPLETE: CPT

## 2024-09-10 RX ORDER — REGADENOSON 0.08 MG/ML
0.4 INJECTION, SOLUTION INTRAVENOUS
Status: COMPLETED | OUTPATIENT
Start: 2024-09-10 | End: 2024-09-10

## 2024-09-10 RX ORDER — AMINOPHYLLINE 25 MG/ML
125 INJECTION, SOLUTION INTRAVENOUS ONCE
Status: COMPLETED | OUTPATIENT
Start: 2024-09-10 | End: 2024-09-10

## 2024-09-10 RX ADMIN — PERFLUTREN 1 ML: 6.52 INJECTION, SUSPENSION INTRAVENOUS at 11:12

## 2024-09-10 RX ADMIN — AMINOPHYLLINE 125 MG: 25 INJECTION, SOLUTION INTRAVENOUS at 12:20

## 2024-09-10 RX ADMIN — REGADENOSON 0.4 MG: 0.08 INJECTION, SOLUTION INTRAVENOUS at 12:17

## 2024-09-10 RX ADMIN — TETROFOSMIN 1 DOSE: 1.38 INJECTION, POWDER, LYOPHILIZED, FOR SOLUTION INTRAVENOUS at 12:17

## 2024-09-10 RX ADMIN — TETROFOSMIN 1 DOSE: 1.38 INJECTION, POWDER, LYOPHILIZED, FOR SOLUTION INTRAVENOUS at 11:20

## 2024-09-11 ENCOUNTER — HOSPITAL ENCOUNTER (OUTPATIENT)
Dept: CT IMAGING | Facility: HOSPITAL | Age: 89
Discharge: HOME | End: 2024-09-11
Admitting: NURSE PRACTITIONER
Payer: MEDICARE

## 2024-09-11 ENCOUNTER — TELEPHONE (OUTPATIENT)
Age: 89
End: 2024-09-11
Payer: MEDICARE

## 2024-09-11 DIAGNOSIS — R10.84 GENERALIZED ABDOMINAL PAIN: ICD-10-CM

## 2024-09-11 DIAGNOSIS — R14.0 ABDOMINAL BLOATING: ICD-10-CM

## 2024-09-11 PROCEDURE — 74177 CT ABD & PELVIS W/CONTRAST: CPT

## 2024-09-11 PROCEDURE — 25510000001 IOPAMIDOL 61 % SOLUTION: Performed by: NURSE PRACTITIONER

## 2024-09-11 PROCEDURE — 0 DIATRIZOATE MEGLUMINE & SODIUM PER 1 ML: Performed by: NURSE PRACTITIONER

## 2024-09-11 RX ORDER — IOPAMIDOL 612 MG/ML
100 INJECTION, SOLUTION INTRAVASCULAR
Status: COMPLETED | OUTPATIENT
Start: 2024-09-11 | End: 2024-09-11

## 2024-09-11 RX ADMIN — IOPAMIDOL 85 ML: 612 INJECTION, SOLUTION INTRAVENOUS at 14:52

## 2024-09-11 RX ADMIN — DIATRIZOATE MEGLUMINE AND DIATRIZOATE SODIUM 30 ML: 660; 100 LIQUID ORAL; RECTAL at 13:30

## 2024-09-11 NOTE — TELEPHONE ENCOUNTER
Called and left a VM. Will continue to try to reach pt.    Lucía Nguyen RN  Triage [facility]  09/11/24 09:35 EDT

## 2024-09-11 NOTE — TELEPHONE ENCOUNTER
Please let her know that I reviewed her studies.  Her stress test is normal.  I compared her echo to an echo in 2021 and there is no significant changes.    She has an upcoming appointment with Dr. Rios and then with Kimberley Avila in August.  Keep those appointments.    Future Appointments           Provider Department Center     9/11/2024 3:00 PM (Arrive by 1:45 PM) [address]     9/27/2024 11:30 AM Dhruv Riley MD [facility] GASTROENTEROLOGY [place]     10/1/2024 10:00 AM [facility] PRIMARY CARE [facility]     10/8/2024 1:45 PM Caroline Weiner MD [facility] PRIMARY CARE [facility]     10/22/2024 11:30 AM [facility] [place] DEVICE CHECK [facility] CARDIOLOGY      10/22/2024 11:45 AM Isauro Rios MD [facility] CARDIOLOGY      12/19/2024 11:00 AM Emmy Henderson MD [facility] SLEEP MEDICINE      8/12/2025 11:00 AM LAB CHAIR 1 CBC LAB [facility] LILIANA HAYES ONC CBC LAB EP [facility]     8/19/2025 9:30 AM Kimberley Avila APRN [facility] CARDIOLOGY [facility]     8/26/2025 11:00 AM VITALS ONLY CBC LAB [facility] LILIANA HAYES ONC CBC LAB EP [facility]     8/26/2025 11:20 AM Cresencio Gastelum II, MD [facility] HEMATOLOGY & ONCOLOGY [facility]

## 2024-09-11 NOTE — TELEPHONE ENCOUNTER
Pt called back. Went over results and recommendations from Dr. Dhillon. Pt verbalized understanding.    Thank you,    Lucía Nguyen, RN  [place] [facility]  09/11/24 10:29 EDT

## 2024-09-17 LAB — CREAT BLDA-MCNC: 1 MG/DL (ref 0.6–1.3)

## 2024-10-01 ENCOUNTER — TELEPHONE (OUTPATIENT)
Dept: FAMILY MEDICINE CLINIC | Facility: CLINIC | Age: 89
End: 2024-10-01
Payer: MEDICARE

## 2024-10-01 DIAGNOSIS — E78.2 MIXED HYPERLIPIDEMIA: Primary | ICD-10-CM

## 2024-10-01 DIAGNOSIS — I10 ESSENTIAL HYPERTENSION: ICD-10-CM

## 2024-10-01 DIAGNOSIS — E11.9 TYPE 2 DIABETES MELLITUS WITHOUT COMPLICATION, WITHOUT LONG-TERM CURRENT USE OF INSULIN: ICD-10-CM

## 2024-10-07 RX ORDER — CARVEDILOL 6.25 MG/1
6.25 TABLET ORAL 2 TIMES DAILY WITH MEALS
Qty: 180 TABLET | Refills: 3 | Status: SHIPPED | OUTPATIENT
Start: 2024-10-07

## 2024-10-08 ENCOUNTER — OFFICE VISIT (OUTPATIENT)
Dept: FAMILY MEDICINE CLINIC | Facility: CLINIC | Age: 89
End: 2024-10-08
Payer: MEDICARE

## 2024-10-08 VITALS
OXYGEN SATURATION: 95 % | BODY MASS INDEX: 26.04 KG/M2 | DIASTOLIC BLOOD PRESSURE: 76 MMHG | HEIGHT: 62 IN | TEMPERATURE: 97.6 F | HEART RATE: 39 BPM | WEIGHT: 141.5 LBS | SYSTOLIC BLOOD PRESSURE: 126 MMHG

## 2024-10-08 DIAGNOSIS — E53.8 B12 DEFICIENCY: ICD-10-CM

## 2024-10-08 DIAGNOSIS — Z23 FLU VACCINE NEED: ICD-10-CM

## 2024-10-08 DIAGNOSIS — L98.9 SKIN LESION: ICD-10-CM

## 2024-10-08 DIAGNOSIS — E11.9 TYPE 2 DIABETES MELLITUS WITHOUT COMPLICATION, WITHOUT LONG-TERM CURRENT USE OF INSULIN: ICD-10-CM

## 2024-10-08 DIAGNOSIS — K21.00 GASTROESOPHAGEAL REFLUX DISEASE WITH ESOPHAGITIS, UNSPECIFIED WHETHER HEMORRHAGE: ICD-10-CM

## 2024-10-08 DIAGNOSIS — E78.2 MIXED HYPERLIPIDEMIA: Primary | ICD-10-CM

## 2024-10-08 DIAGNOSIS — E03.8 OTHER SPECIFIED HYPOTHYROIDISM: ICD-10-CM

## 2024-10-08 DIAGNOSIS — E55.9 VITAMIN D DEFICIENCY: ICD-10-CM

## 2024-10-08 PROCEDURE — 90662 IIV NO PRSV INCREASED AG IM: CPT | Performed by: INTERNAL MEDICINE

## 2024-10-08 PROCEDURE — 99213 OFFICE O/P EST LOW 20 MIN: CPT | Performed by: INTERNAL MEDICINE

## 2024-10-08 PROCEDURE — 1125F AMNT PAIN NOTED PAIN PRSNT: CPT | Performed by: INTERNAL MEDICINE

## 2024-10-08 PROCEDURE — G0008 ADMIN INFLUENZA VIRUS VAC: HCPCS | Performed by: INTERNAL MEDICINE

## 2024-10-08 PROCEDURE — G0439 PPPS, SUBSEQ VISIT: HCPCS | Performed by: INTERNAL MEDICINE

## 2024-10-08 PROCEDURE — 1170F FXNL STATUS ASSESSED: CPT | Performed by: INTERNAL MEDICINE

## 2024-10-08 NOTE — PROGRESS NOTES
Chief Complaint  Rash, Dizziness, and Medicare Wellness-subsequent    Subjective        Donna M Yeoman presents to [facility]    History of Present Illness  The patient is an 89-year-old female who presents today due to complaints of dizziness and a rash. She is also due for her adult wellness visit.    She has a past medical history of type 2 diabetes, hyperlipidemia, gout, hypertension, and hypothyroidism. Her current medications include glimepiride 2 mg in the morning before breakfast, Crestor 10 mg daily, allopurinol 100 mg daily, losartan 50 mg daily, Levoxyl 75 mcg daily, carvedilol 6.25 mg twice daily, and a baby aspirin.    She reports that her urine is malodorous and cloudy, and requests a urinalysis. She believes her diabetes and blood pressure are well-managed, and she has lost some weight with a desire to lose more. She feels her physical health has declined over the past year, but her mental health remains stable. She has not been hospitalized this year.    She has recently seen her cardiologist, with tests showing no issues. She has a pacemaker and will see her EP cardiologist again in a week or two. She reports no falls this year. She has hearing difficulties and uses hearing aids. She typically receives her COVID-19 and influenza vaccines at [facility].    She used to frequently get urinary tract infections but has not had one since starting D-mannose. She experiences mild incontinence and has tried to see a urogynecologist in the past but has not had luck scheduling with urogyn. She admits to not drinking enough water. She does not need any medication refills.    She has a spot on her back and is considering seeing a dermatologist. It is flat and is not bleeding or itching.  She has bowel movement issues (hard stools) and takes fiber every other day. She was scheduled for an upper endoscopy to check her esophagus and hiatal hernia due to swallowing difficulties, but it was  "canceled. She has difficulty swallowing pills and sometimes chokes on water. She feels fine in the morning, but by evening, she feels like something is hanging in her throat, making it hard to breathe.  She was to do EGD with Dr. Mac--maybe cx due to her pacemaker    Earlier this year, had diagnostic MMG due to bilateral nipple pain.  She was told her breasts were fine, but received a call from this office suggesting she see a surgeon. She initially agreed, but then declined as she was not experiencing much pain. No  more nipple pain and no nipple d/c.  She is concerned about breast cancer due to her family history.      She has a sleep medicine appointment in 12/2024. She sees Dr. Gastelum, hematologist, in 08/2025.    SOCIAL HISTORY  She does not drink alcohol. She lives at home on her own.    FAMILY HISTORY  Her oldest sister is 97 years old and her middle sister is going to be 92 in 04/2024. Her mother lived to be 94. She has a family history of glaucoma. Her mother had breast cancer.       Objective   Vital Signs:  /76   Pulse (!) 39   Temp 97.6 °F (36.4 °C) (Oral)   Ht 157.5 cm (62.01\")   Wt 64.2 kg (141 lb 8 oz)   SpO2 95%   BMI 25.87 kg/m²   Estimated body mass index is 25.87 kg/m² as calculated from the following:    Height as of this encounter: 157.5 cm (62.01\").    Weight as of this encounter: 64.2 kg (141 lb 8 oz).    BMI is >= 25 and <30. (Overweight) The following options were offered after discussion;: weight loss educational material (shared in after visit summary), exercise counseling/recommendations, and nutrition counseling/recommendations      Physical Exam  Vitals and nursing note reviewed.   Constitutional:       Appearance: Normal appearance. She is well-developed.   HENT:      Head: Normocephalic and atraumatic.      Right Ear: External ear normal.      Left Ear: External ear normal.   Eyes:      Extraocular Movements: Extraocular movements intact.      Conjunctiva/sclera: " Conjunctivae normal.   Neck:      Vascular: No carotid bruit.   Cardiovascular:      Rate and Rhythm: Normal rate and regular rhythm.      Heart sounds: Normal heart sounds.      Comments: No bruits  Pulmonary:      Effort: Pulmonary effort is normal. No respiratory distress.      Breath sounds: Normal breath sounds. No stridor. No wheezing, rhonchi or rales.   Chest:      Chest wall: No tenderness.   Abdominal:      General: Bowel sounds are normal. There is no distension.      Palpations: Abdomen is soft. There is no mass.      Tenderness: There is no abdominal tenderness. There is no guarding or rebound.      Hernia: No hernia is present.   Musculoskeletal:      Cervical back: Neck supple.      Right lower leg: No edema.      Left lower leg: No edema.   Lymphadenopathy:      Cervical: No cervical adenopathy.   Skin:     General: Skin is warm.      Findings: Lesion (flat hyperpigmented circular area on back--most likely an age spot) present.   Neurological:      General: No focal deficit present.      Mental Status: She is alert and oriented to person, place, and time. Mental status is at baseline.      Gait: Gait normal.   Psychiatric:         Mood and Affect: Mood normal.         Behavior: Behavior normal.         Thought Content: Thought content normal.         Judgment: Judgment normal.        Result Review :                Assessment and Plan   Diagnoses and all orders for this visit:    1. Mixed hyperlipidemia (Primary)    2. B12 deficiency    3. Type 2 diabetes mellitus without complication, without long-term current use of insulin    4. Other specified hypothyroidism    5. Vitamin D deficiency     6. Gastroesophageal reflux disease with esophagitis, unspecified whether hemorrhage    7. Skin lesion             Assessment & Plan  1. Dizziness.  The patient reports dizziness. Differential diagnosis includes dehydration, medication side effects, and cardiovascular issues. She is advised to increase her water  intake to improve hydration.    2. Rash.  She presents with a rash. Differential diagnosis includes allergic reaction, dermatitis, or infection. No specific treatment was discussed during the visit.    3. Type 2 Diabetes Mellitus.  Her diabetes appears to be better controlled, and she has lost some weight. Labs will be conducted today to monitor her diabetes. She is currently on glimepiride 2 mg in the morning before breakfast.    4. Hyperlipidemia.  She is on Crestor 10 mg daily for hyperlipidemia. No changes to her medication were discussed.    5. Gout.  She is on allopurinol 100 mg daily for gout. No changes to her medication were discussed.    6. Hypertension.  Her blood pressure readings are within the normal range. She is on losartan 50 mg daily and carvedilol 6.25 mg twice daily. She is also taking a baby aspirin.    7. Hypothyroidism.  She is on Levoxyl 75 mcg daily for hypothyroidism. No changes to her medication were discussed.    8. Urinary Symptoms.  She reports cloudy and smelly urine. A urinalysis will be conducted today to check for possible infection or other issues. She is advised to increase her water intake to improve the clarity of her urine.  Will await results of UA before referral to urology placed.  It may not be necessary    9. Breast Pain.  She had a diagnostic evaluation on 05/08/2024 for bilateral nipple pain lasting 3 to 4 months, which revealed benign calcifications, a partially imaged left chest pacemaker, a benign but complicated cyst behind the right nipple, and multiple dilated ducts. No suspicious cysts or solid masses were identified, and there was no evidence of malignancy in either breast. If she experiences recurrent breast pain, a consultation with a surgeon is recommended.    10. Difficulty Swallowing.  She reports difficulty swallowing pills and sometimes water. A message will be sent to Dr. Riley's office to schedule an EGD due to her symptoms.    11. Skin Lesion.  She has  an irregular spot on her back. She is advised to consult a dermatologist for further evaluation.  Looks like age spot but will be certain with referral to derm    12. Health Maintenance.  She will receive her influenza vaccine during this visit.    Follow-up  Return in 6 months for follow-up.         Follow Up   No follow-ups on file.  Patient was given instructions and counseling regarding her condition or for health maintenance advice. Please see specific information pulled into the AVS if appropriate.         Patient or patient representative verbalized consent for the use of Ambient Listening during the visit with  Caroline Weiner MD for chart documentation. 10/8/2024  15:29 EDT    Caroline Weiner MD   15:28 EDT   [unfilled]

## 2024-10-08 NOTE — PROGRESS NOTES
Subjective   The ABCs of the Annual Wellness Visit  Medicare Wellness Visit      Donna M Yeoman is a 89 y.o. patient who presents for a Medicare Wellness Visit.    The following portions of the patient's history were reviewed and   updated as appropriate: allergies, current medications, past family history, past medical history, past social history, past surgical history, and problem list.    Compared to one year ago, the patient's physical   health is worse.  Compared to one year ago, the patient's mental   health is worse.    Recent Hospitalizations:  She was not admitted to the hospital during the last year.     Current Medical Providers:  Patient Care Team:  Caroline Weiner MD as PCP - General  Caroline Weiner MD as Referring Physician (Internal Medicine)  Socorro Crenshaw LPN (Inactive) as Licensed Practical Nurse  Dhruv Riley MD as Consulting Physician (Gastroenterology)  Bassam Stewart MD as Consulting Physician (Cardiology)  Le Manuel APRN as Nurse Practitioner (Nurse Practitioner)  Cresencio Gastelum II, MD as Consulting Physician (Hematology and Oncology)    Outpatient Medications Prior to Visit   Medication Sig Dispense Refill    allopurinol (ZYLOPRIM) 100 MG tablet TAKE 1 TABLET EVERY DAY 90 tablet 3    aspirin 81 MG EC tablet Take 1 tablet by mouth Daily.      Calcium Carbonate-Vitamin D (CALTRATE 600+D PO) Take  by mouth.      carvedilol (COREG) 6.25 MG tablet TAKE 1 TABLET TWICE DAILY WITH MEALS 180 tablet 3    CRANBERRY PO Take  by mouth.      ESTRACE VAGINAL 0.1 MG/GM vaginal cream INSERT 1/2 TO 1 INCH VAGINALLY ONCE WEEKLY AS DIRECTED  11    glimepiride (AMARYL) 2 MG tablet TAKE 1 TABLET EVERY MORNING BEFORE BREAKFAST 90 tablet 3    glucose blood (OneTouch Verio) test strip Check glucose once daily and as needed DX E11.9 100 each 5    levothyroxine (SYNTHROID, LEVOTHROID) 75 MCG tablet TAKE 1 TABLET EVERY DAY 90 tablet 3    Loratadine 10 MG capsule Take  by mouth.       losartan (COZAAR) 50 MG tablet TAKE 1 TABLET EVERY DAY 90 tablet 3    mupirocin (BACTROBAN) 2 % ointment APPLY TO NASAL PASSAGE TWICE DAILY AS DIRECTED FOR 7 DAYS      nystatin (MYCOSTATIN) 100,000 unit/mL suspension Swish and swallow 5 mL 4 (Four) Times a Day. 240 mL 0    nystatin-triamcinolone (MYCOLOG) 835846-3.1 UNIT/GM-% ointment Apply 1 application topically to the appropriate area as directed 2 (Two) Times a Day. Don't use more than 7 days in row 60 g 0    OneTouch Delica Lancets 33G misc Check glucose once daily and as needed dx E11.9 100 each 1    Potassium 99 MG tablet Take  by mouth.      Probiotic Product (PROBIOTIC DAILY PO) Take  by mouth Daily.      rosuvastatin (CRESTOR) 10 MG tablet TAKE 1 TABLET EVERY DAY 90 tablet 3    vitamin B-12 (CYANOCOBALAMIN) 1000 MCG tablet Take 1 tablet by mouth 3 (Three) Times a Week.      Wheat Dextrin (BENEFIBER DRINK MIX PO) Take  by mouth Daily As Needed.      cephalexin (Keflex) 250 MG capsule Take 1 capsule by mouth 3 (Three) Times a Day. 21 capsule 0    VITAMIN D PO Take  by mouth. (Patient not taking: Reported on 8/29/2024)       No facility-administered medications prior to visit.     No opioid medication identified on active medication list. I have reviewed chart for other potential  high risk medication/s and harmful drug interactions in the elderly.      Aspirin is on active medication list. Aspirin use is indicated based on review of current medical condition/s. Pros and cons of this therapy have been discussed today. Benefits of this medication outweigh potential harm.  Patient has been encouraged to continue taking this medication.  .      Patient Active Problem List   Diagnosis    Bloating    Epigastric pain    B12 deficiency    Type 2 diabetes mellitus without complication    Essential hypertension    MGUS (monoclonal gammopathy of unknown significance)    Sore throat    Gastroesophageal reflux disease with esophagitis    Cough    Hypothyroidism     "Hyperglycemia    Right foot pain    Healthcare maintenance    Type 2 diabetes mellitus without complication, without long-term current use of insulin    Mixed hyperlipidemia    Dysuria    Persistent cough    Cardiomegaly    Rib pain on left side    Creatinine elevation    Encounter for screening mammogram for breast cancer    Abnormal serum total protein level    Acute upper respiratory infection    Breast asymmetry    Breast pain    Burning sensation    Cerebrovascular small vessel disease    Colon polyp    Dense breasts    Dizziness    Increased glucose level    Gastritis    Gout    Headache    Hiatal hernia    Low back pain    Ulcer of nose    Nausea    Neck pain    Osteoarthritis    Otalgia    Pain in thoracic spine    Shoulder joint pain    Back pain    Peripheral neuropathy    Pneumonia    Seasonal allergic rhinitis    Tinea corporis    Vitamin D deficiency     Sinus congestion    Inclusion cyst    Urinary frequency    Leg edema    Rash    Neuritis or radiculitis due to rupture of lumbar intervertebral disc    NICOLE on CPAP    Symptomatic bradycardia    SSS (sick sinus syndrome)    Presence of cardiac pacemaker     Advance Care Planning Advance Directive is not on file.  ACP discussion was held with the patient during this visit. Patient does not have an advance directive, declines further assistance.            Objective   Vitals:    10/08/24 1340   BP: 126/76   Pulse: (!) 39   Temp: 97.6 °F (36.4 °C)   TempSrc: Oral   SpO2: 95%   Weight: 64.2 kg (141 lb 8 oz)   Height: 157.5 cm (62.01\")   PainSc:   6       Estimated body mass index is 25.87 kg/m² as calculated from the following:    Height as of this encounter: 157.5 cm (62.01\").    Weight as of this encounter: 64.2 kg (141 lb 8 oz).    BMI is >= 25 and <30. (Overweight) The following options were offered after discussion;: nutrition counseling/recommendations       Does the patient have evidence of cognitive impairment? No                                      "                                                          Health  Risk Assessment    Smoking Status:  Social History     Tobacco Use   Smoking Status Never    Passive exposure: Never   Smokeless Tobacco Never     Alcohol Consumption:  Social History     Substance and Sexual Activity   Alcohol Use No    Comment: caffeine use- 1/2 cup of coffee daily, soda       Fall Risk Screen  STEADI Fall Risk Assessment was completed, and patient is at LOW risk for falls.Assessment completed on:10/8/2024    Depression Screenin/13/2024    10:30 AM   PHQ-2/PHQ-9 Depression Screening   Little Interest or Pleasure in Doing Things 0-->not at all   Feeling Down, Depressed or Hopeless 0-->not at all   PHQ-9: Brief Depression Severity Measure Score 0     Health Habits and Functional and Cognitive Screening:      10/8/2024     1:00 PM   Functional & Cognitive Status   Do you have difficulty preparing food and eating? No   Do you have difficulty bathing yourself, getting dressed or grooming yourself? No   Do you have difficulty using the toilet? No   Do you have difficulty moving around from place to place? No   Do you have trouble with steps or getting out of a bed or a chair? Yes   Current Diet Well Balanced Diet   Dental Exam Up to date   Eye Exam Up to date   Exercise (times per week) 3 times per week   Current Exercises Include Walking   Do you need help using the phone?  Yes   Are you deaf or do you have serious difficulty hearing?  Yes   Do you need help to go to places out of walking distance? No   Do you need help shopping? No   Do you need help preparing meals?  No   Do you need help with housework?  No   Do you need help with laundry? No   Do you need help taking your medications? No   Do you need help managing money? No   Do you ever drive or ride in a car without wearing a seat belt? No   Have you felt unusual stress, anger or loneliness in the last month? Yes   Who do you live with? Child   If you need help, do you  have trouble finding someone available to you? No   Have you been bothered in the last four weeks by sexual problems? No   Do you have difficulty concentrating, remembering or making decisions? Yes           Age-appropriate Screening Schedule:  Refer to the list below for future screening recommendations based on patient's age, sex and/or medical conditions. Orders for these recommended tests are listed in the plan section. The patient has been provided with a written plan.    Health Maintenance List  Health Maintenance   Topic Date Due    INFLUENZA VACCINE  08/01/2024    COVID-19 Vaccine (7 - 2023-24 season) 09/01/2024    HEMOGLOBIN A1C  10/23/2024    ZOSTER VACCINE (2 of 2) 03/31/2025 (Originally 3/28/2017)    RSV Vaccine - Adults (1 - 1-dose 60+ series) 03/30/2026 (Originally 1/30/1995)    DXA SCAN  02/21/2025    DIABETIC EYE EXAM  04/03/2025    LIPID PANEL  04/23/2025    URINE MICROALBUMIN  04/26/2025    ANNUAL WELLNESS VISIT  10/08/2025    BMI FOLLOWUP  10/08/2025    MAMMOGRAM  05/08/2026    TDAP/TD VACCINES (2 - Td or Tdap) 01/31/2027    COLORECTAL CANCER SCREENING  10/16/2028    Pneumococcal Vaccine 65+  Completed                                                                                                                                                CMS Preventative Services Quick Reference  Risk Factors Identified During Encounter  Hearing Problem:  can't afford hearing aids  Immunizations Discussed/Encouraged: Influenza  Urinary Incontinence: Referred to Urology  Dental Screening Recommended  Vision Screening Recommended    The above risks/problems have been discussed with the patient.  Pertinent information has been shared with the patient in the After Visit Summary.  An After Visit Summary and PPPS were made available to the patient.    Follow Up:   Next Medicare Wellness visit to be scheduled in 1 year.     Assessment & Plan               Follow Up:   No follow-ups on file.

## 2024-10-11 LAB
ALBUMIN SERPL-MCNC: 4.3 G/DL (ref 3.5–5.2)
ALBUMIN/GLOB SERPL: 1.6 G/DL
ALP SERPL-CCNC: 60 U/L (ref 39–117)
ALT SERPL-CCNC: 14 U/L (ref 1–33)
APPEARANCE UR: ABNORMAL
AST SERPL-CCNC: 28 U/L (ref 1–32)
BACTERIA #/AREA URNS HPF: ABNORMAL /[HPF]
BACTERIA UR CULT: ABNORMAL
BACTERIA UR CULT: ABNORMAL
BASOPHILS # BLD AUTO: 0.04 10*3/MM3 (ref 0–0.2)
BASOPHILS NFR BLD AUTO: 0.5 % (ref 0–1.5)
BILIRUB SERPL-MCNC: 1 MG/DL (ref 0–1.2)
BILIRUB UR QL STRIP: NEGATIVE
BUN SERPL-MCNC: 15 MG/DL (ref 8–23)
BUN/CREAT SERPL: 16.9 (ref 7–25)
CALCIUM SERPL-MCNC: 9 MG/DL (ref 8.6–10.5)
CASTS URNS QL MICRO: ABNORMAL /LPF
CHLORIDE SERPL-SCNC: 107 MMOL/L (ref 98–107)
CHOLEST SERPL-MCNC: 152 MG/DL (ref 0–200)
CO2 SERPL-SCNC: 27.2 MMOL/L (ref 22–29)
COLOR UR: YELLOW
CREAT SERPL-MCNC: 0.89 MG/DL (ref 0.57–1)
EGFRCR SERPLBLD CKD-EPI 2021: 62.1 ML/MIN/1.73
EOSINOPHIL # BLD AUTO: 0.35 10*3/MM3 (ref 0–0.4)
EOSINOPHIL NFR BLD AUTO: 4.3 % (ref 0.3–6.2)
EPI CELLS #/AREA URNS HPF: ABNORMAL /HPF (ref 0–10)
ERYTHROCYTE [DISTWIDTH] IN BLOOD BY AUTOMATED COUNT: 13.4 % (ref 12.3–15.4)
GLOBULIN SER CALC-MCNC: 2.7 GM/DL
GLUCOSE SERPL-MCNC: 84 MG/DL (ref 65–99)
GLUCOSE UR QL STRIP: NEGATIVE
HBA1C MFR BLD: 5.7 % (ref 4.8–5.6)
HCT VFR BLD AUTO: 42.1 % (ref 34–46.6)
HDLC SERPL-MCNC: 42 MG/DL (ref 40–60)
HGB BLD-MCNC: 13.6 G/DL (ref 12–15.9)
HGB UR QL STRIP: NEGATIVE
IMM GRANULOCYTES # BLD AUTO: 0.03 10*3/MM3 (ref 0–0.05)
IMM GRANULOCYTES NFR BLD AUTO: 0.4 % (ref 0–0.5)
KETONES UR QL STRIP: NEGATIVE
LDLC SERPL CALC-MCNC: 85 MG/DL (ref 0–100)
LDLC/HDLC SERPL: 1.95 {RATIO}
LEUKOCYTE ESTERASE UR QL STRIP: ABNORMAL
LYMPHOCYTES # BLD AUTO: 2.16 10*3/MM3 (ref 0.7–3.1)
LYMPHOCYTES NFR BLD AUTO: 26.7 % (ref 19.6–45.3)
MCH RBC QN AUTO: 30.6 PG (ref 26.6–33)
MCHC RBC AUTO-ENTMCNC: 32.3 G/DL (ref 31.5–35.7)
MCV RBC AUTO: 94.8 FL (ref 79–97)
MICRO URNS: ABNORMAL
MONOCYTES # BLD AUTO: 0.77 10*3/MM3 (ref 0.1–0.9)
MONOCYTES NFR BLD AUTO: 9.5 % (ref 5–12)
NEUTROPHILS # BLD AUTO: 4.75 10*3/MM3 (ref 1.7–7)
NEUTROPHILS NFR BLD AUTO: 58.6 % (ref 42.7–76)
NITRITE UR QL STRIP: POSITIVE
NRBC BLD AUTO-RTO: 0 /100 WBC (ref 0–0.2)
OTHER ANTIBIOTIC SUSC ISLT: ABNORMAL
PH UR STRIP: 6.5 [PH] (ref 5–7.5)
PLATELET # BLD AUTO: 177 10*3/MM3 (ref 140–450)
POTASSIUM SERPL-SCNC: 4.2 MMOL/L (ref 3.5–5.2)
PROT SERPL-MCNC: 7 G/DL (ref 6–8.5)
PROT UR QL STRIP: NEGATIVE
RBC # BLD AUTO: 4.44 10*6/MM3 (ref 3.77–5.28)
RBC #/AREA URNS HPF: ABNORMAL /HPF (ref 0–2)
SODIUM SERPL-SCNC: 143 MMOL/L (ref 136–145)
SP GR UR STRIP: 1.02 (ref 1–1.03)
T4 FREE SERPL-MCNC: 1.18 NG/DL (ref 0.92–1.68)
TRIGL SERPL-MCNC: 140 MG/DL (ref 0–150)
TSH SERPL DL<=0.005 MIU/L-ACNC: 1.38 UIU/ML (ref 0.27–4.2)
URINALYSIS REFLEX: ABNORMAL
UROBILINOGEN UR STRIP-MCNC: 0.2 MG/DL (ref 0.2–1)
VLDLC SERPL CALC-MCNC: 25 MG/DL (ref 5–40)
WBC # BLD AUTO: 8.1 10*3/MM3 (ref 3.4–10.8)
WBC #/AREA URNS HPF: >30 /HPF (ref 0–5)

## 2024-10-15 NOTE — TELEPHONE ENCOUNTER
Caller: Yeoman, Donna M    Relationship: Self    Best call back number: [phone]     Caller requesting test results: URINALYSIS, BLOODWORK     What test was performed: BLOODWORK, URINALYSIS     When was the test performed:11/11/2020    Where was the test performed: [facility]    Additional notes: MS. YEOMAN WOULD LIKE A CALL BACK WITH RESULTS         No

## 2024-10-22 ENCOUNTER — OFFICE VISIT (OUTPATIENT)
Age: 89
End: 2024-10-22
Payer: MEDICARE

## 2024-10-22 ENCOUNTER — CLINICAL SUPPORT NO REQUIREMENTS (OUTPATIENT)
Age: 89
End: 2024-10-22
Payer: MEDICARE

## 2024-10-22 VITALS
SYSTOLIC BLOOD PRESSURE: 132 MMHG | DIASTOLIC BLOOD PRESSURE: 82 MMHG | HEIGHT: 62 IN | BODY MASS INDEX: 26.5 KG/M2 | WEIGHT: 144 LBS | HEART RATE: 86 BPM

## 2024-10-22 DIAGNOSIS — Z95.0 PRESENCE OF CARDIAC PACEMAKER: ICD-10-CM

## 2024-10-22 DIAGNOSIS — R00.1 SYMPTOMATIC BRADYCARDIA: Primary | ICD-10-CM

## 2024-10-22 DIAGNOSIS — I49.1 ATRIAL PREMATURE BEATS: ICD-10-CM

## 2024-10-22 DIAGNOSIS — I49.5 SSS (SICK SINUS SYNDROME): Primary | ICD-10-CM

## 2024-10-22 NOTE — PROGRESS NOTES
Date of Office Visit: 10/22/2024  Encounter Provider: Isauro Rios MD  Place of Service: [facility] CARDIOLOGY  Patient Name: Donna M Yeoman  :1935    Chief Complaint   Patient presents with    Pacemaker Check     Follow up LOV 2022   :     HPI: Donna M Yeoman is a 89 y.o. female who presents today for dual-chamber pacemaker placed for symptomatic pauses.    She still has occasional episodes of dizziness.  EKG shows PACs in bigeminal pattern.  She also has a small amount of atrial fibrillation.    She has no complaints directly related to the pacemaker.    I reviewed her pacemaker interrogation.  She has had brief very brief episodes of atrial fibrillation.  No direct symptoms from the A-fib.  There is only been 1 episode that even approached 1 hour in length.          Past Medical History:   Diagnosis Date    Arthritis     Colon polyp 2017    Diabetes mellitus     Esophageal reflux     GERD (gastroesophageal reflux disease)     H/O DVT (deep venous thrombosis)     Headache 2017    Hiatal hernia 2017    History of vitamin D deficiency     Hypercholesteremia     Hyperlipidemia     Hypertension     Low back pain 2017    MGUS (monoclonal gammopathy of unknown significance)     NICOLE on CPAP     AHI 15/h    Osteoporosis     Peripheral neuropathy     Pneumonia 2017    PONV (postoperative nausea and vomiting)     Pulmonary embolism     Pulmonary hypertension     Thyroid disease     Venous thrombosis        Past Surgical History:   Procedure Laterality Date    APPENDECTOMY  194    BLADDER REPAIR      BREAST BIOPSY Right     benign 2019    CARDIAC ELECTROPHYSIOLOGY PROCEDURE N/A 2022    Procedure: Pacemaker DC new  [place];  Surgeon: Isauro Rios MD;  Location: [facility] INVASIVE LOCATION;  Service: Cardiology;  Laterality: N/A;    COLONOSCOPY      CYSTOCELE REPAIR      bladder reconstruction    HYSTERECTOMY      SALPINGECTOMY       SALPINGO OOPHORECTOMY      for ectopic pregnancy    UPPER GASTROINTESTINAL ENDOSCOPY         Social History     Socioeconomic History    Marital status:     Number of children: 4    Years of education: High School   Tobacco Use    Smoking status: Never     Passive exposure: Never    Smokeless tobacco: Never   Vaping Use    Vaping status: Never Used   Substance and Sexual Activity    Alcohol use: No     Comment: soda    Drug use: No    Sexual activity: Defer       Family History   Problem Relation Age of Onset    Breast cancer Mother         70's    Colon cancer Mother     Stroke Mother     Heart disease Father     Heart disease Sister     Hypertension Sister     Hypertension Sister     Diabetes Sister     Ovarian cancer Neg Hx     Colon polyps Neg Hx     Crohn's disease Neg Hx     Irritable bowel syndrome Neg Hx     Ulcerative colitis Neg Hx        Review of Systems   Constitutional: Negative.   Cardiovascular: Negative.    Respiratory: Negative.     Gastrointestinal: Negative.        No Known Allergies      Current Outpatient Medications:     allopurinol (ZYLOPRIM) 100 MG tablet, TAKE 1 TABLET EVERY DAY, Disp: 90 tablet, Rfl: 3    amoxicillin-clavulanate (AUGMENTIN) 875-125 MG per tablet, Take 1 tablet by mouth 2 (Two) Times a Day., Disp: 14 tablet, Rfl: 0    aspirin 81 MG EC tablet, Take 1 tablet by mouth Daily., Disp: , Rfl:     Calcium Carbonate-Vitamin D (CALTRATE 600+D PO), Take  by mouth., Disp: , Rfl:     carvedilol (COREG) 6.25 MG tablet, TAKE 1 TABLET TWICE DAILY WITH MEALS, Disp: 180 tablet, Rfl: 3    CRANBERRY PO, Take  by mouth., Disp: , Rfl:     ESTRACE VAGINAL 0.1 MG/GM vaginal cream, INSERT 1/2 TO 1 INCH VAGINALLY ONCE WEEKLY AS DIRECTED, Disp: , Rfl: 11    glimepiride (AMARYL) 2 MG tablet, TAKE 1 TABLET EVERY MORNING BEFORE BREAKFAST, Disp: 90 tablet, Rfl: 3    glucose blood (OneTouch Verio) test strip, Check glucose once daily and as needed DX E11.9, Disp: 100 each, Rfl: 5    levothyroxine  "(SYNTHROID, LEVOTHROID) 75 MCG tablet, TAKE 1 TABLET EVERY DAY, Disp: 90 tablet, Rfl: 3    Loratadine 10 MG capsule, Take  by mouth., Disp: , Rfl:     losartan (COZAAR) 50 MG tablet, TAKE 1 TABLET EVERY DAY, Disp: 90 tablet, Rfl: 3    mupirocin (BACTROBAN) 2 % ointment, APPLY TO NASAL PASSAGE TWICE DAILY AS DIRECTED FOR 7 DAYS, Disp: , Rfl:     nystatin (MYCOSTATIN) 100,000 unit/mL suspension, Swish and swallow 5 mL 4 (Four) Times a Day., Disp: 240 mL, Rfl: 0    nystatin-triamcinolone (MYCOLOG) 716830-0.1 UNIT/GM-% ointment, Apply 1 application topically to the appropriate area as directed 2 (Two) Times a Day. Don't use more than 7 days in row, Disp: 60 g, Rfl: 0    OneTouch Delica Lancets 33G misc, Check glucose once daily and as needed dx E11.9, Disp: 100 each, Rfl: 1    Potassium 99 MG tablet, Take  by mouth., Disp: , Rfl:     Probiotic Product (PROBIOTIC DAILY PO), Take  by mouth Daily., Disp: , Rfl:     rosuvastatin (CRESTOR) 10 MG tablet, TAKE 1 TABLET EVERY DAY, Disp: 90 tablet, Rfl: 3    vitamin B-12 (CYANOCOBALAMIN) 1000 MCG tablet, Take 1 tablet by mouth 3 (Three) Times a Week., Disp: , Rfl:     Wheat Dextrin (BENEFIBER DRINK MIX PO), Take  by mouth Daily As Needed., Disp: , Rfl:       Objective:     Vitals:    10/22/24 1136   BP: 132/82   Pulse: 86   Weight: 65.3 kg (144 lb)   Height: 157.5 cm (62\")     Body mass index is 26.34 kg/m².    PHYSICAL EXAM:    Vitals and nursing note reviewed.   Constitutional:       General: Not in acute distress.     Appearance: Well-developed. Not diaphoretic.   Eyes:      General:         Right eye: No discharge.         Left eye: No discharge.   HENT:      Head: Normocephalic and atraumatic.    Mouth/Throat:      Pharynx: No oropharyngeal exudate.   Neck:      Thyroid: No thyromegaly.   Pulmonary:      Effort: Pulmonary effort is normal. No respiratory distress.      Breath sounds: Normal breath sounds.   Cardiovascular:      Normal rate. Regular rhythm.   Edema:     " Peripheral edema absent.   Abdominal:      General: There is no distension.      Palpations: Abdomen is soft.      Tenderness: There is no abdominal tenderness.   Musculoskeletal:         General: No deformity.      Cervical back: Normal range of motion and neck supple. Skin:     General: Skin is warm and dry.   Neurological:      Mental Status: Alert and oriented to person, place, and time.      Deep Tendon Reflexes: Reflexes are normal and symmetric.   Psychiatric:         Behavior: Behavior normal.         Thought Content: Thought content normal.         Judgment: Judgment normal.             ECG 12 Lead    Date/Time: 10/22/2024 3:21 PM  Performed by: Isauro Rois MD    Authorized by: Isauro Rios MD  Comparison: compared with previous ECG   Rhythm: sinus rhythm  Ectopy: atrial premature contractions and bigeminy            Assessment:       Diagnosis Plan   1. Symptomatic bradycardia        2. Atrial premature beats        3. Presence of cardiac pacemaker               Plan:       I think some of the issue may be her premature atrial contractions.  I asked her if her symptoms were ever bad enough which she wanted ablation, and she declined any kind of invasive treatment at this time.  Beyond that I do not think there really is anything I can offer.    We discussed her small amount of atrial fibrillation, and the uncertainty about anticoagulation in this device detected atrial fibrillation.  I favor not anticoagulating given the very brief nature of her A-fib at that time, we can continue remote monitoring and reconsider if the A-fib burden becomes more.      As always, it has been a pleasure to participate in your patient's care.      Sincerely,         Isauro Rios MD

## 2024-10-23 PROBLEM — R13.19 ESOPHAGEAL DYSPHAGIA: Status: ACTIVE | Noted: 2024-08-29

## 2024-10-23 PROBLEM — R10.84 GENERALIZED ABDOMINAL PAIN: Status: ACTIVE | Noted: 2024-08-29

## 2024-11-04 ENCOUNTER — TELEPHONE (OUTPATIENT)
Age: 89
End: 2024-11-04
Payer: MEDICARE

## 2024-11-06 ENCOUNTER — TELEPHONE (OUTPATIENT)
Dept: GASTROENTEROLOGY | Facility: CLINIC | Age: 89
End: 2024-11-06
Payer: MEDICARE

## 2024-11-11 ENCOUNTER — ANESTHESIA EVENT (OUTPATIENT)
Dept: PERIOP | Facility: HOSPITAL | Age: 89
End: 2024-11-11
Payer: MEDICARE

## 2024-11-13 ENCOUNTER — ANESTHESIA (OUTPATIENT)
Dept: PERIOP | Facility: HOSPITAL | Age: 89
End: 2024-11-13
Payer: MEDICARE

## 2024-11-13 ENCOUNTER — HOSPITAL ENCOUNTER (OUTPATIENT)
Facility: HOSPITAL | Age: 89
Setting detail: HOSPITAL OUTPATIENT SURGERY
Discharge: HOME | End: 2024-11-13
Attending: INTERNAL MEDICINE | Admitting: INTERNAL MEDICINE
Payer: MEDICARE

## 2024-11-13 VITALS
BODY MASS INDEX: 26.12 KG/M2 | TEMPERATURE: 97.9 F | OXYGEN SATURATION: 95 % | SYSTOLIC BLOOD PRESSURE: 135 MMHG | HEART RATE: 60 BPM | WEIGHT: 142.8 LBS | DIASTOLIC BLOOD PRESSURE: 72 MMHG | RESPIRATION RATE: 20 BRPM

## 2024-11-13 DIAGNOSIS — R13.19 ESOPHAGEAL DYSPHAGIA: ICD-10-CM

## 2024-11-13 DIAGNOSIS — R10.84 GENERALIZED ABDOMINAL PAIN: ICD-10-CM

## 2024-11-13 LAB — GLUCOSE BLDC GLUCOMTR-MCNC: 111 MG/DL (ref 70–130)

## 2024-11-13 PROCEDURE — 82948 REAGENT STRIP/BLOOD GLUCOSE: CPT

## 2024-11-13 PROCEDURE — C1726 CATH, BAL DIL, NON-VASCULAR: HCPCS | Performed by: INTERNAL MEDICINE

## 2024-11-13 PROCEDURE — 25010000002 PROPOFOL 200 MG/20ML EMULSION

## 2024-11-13 PROCEDURE — 25010000002 LIDOCAINE 2% SOLUTION

## 2024-11-13 PROCEDURE — 43249 ESOPH EGD DILATION <30 MM: CPT | Performed by: INTERNAL MEDICINE

## 2024-11-13 RX ORDER — SODIUM CHLORIDE 0.9 % (FLUSH) 0.9 %
10 SYRINGE (ML) INJECTION AS NEEDED
Status: DISCONTINUED | OUTPATIENT
Start: 2024-11-13 | End: 2024-11-13 | Stop reason: HOSPADM

## 2024-11-13 RX ORDER — SODIUM CHLORIDE, SODIUM LACTATE, POTASSIUM CHLORIDE, CALCIUM CHLORIDE 600; 310; 30; 20 MG/100ML; MG/100ML; MG/100ML; MG/100ML
100 INJECTION, SOLUTION INTRAVENOUS ONCE
Status: DISCONTINUED | OUTPATIENT
Start: 2024-11-13 | End: 2024-11-13 | Stop reason: HOSPADM

## 2024-11-13 RX ORDER — LIDOCAINE HYDROCHLORIDE 20 MG/ML
INJECTION, SOLUTION INFILTRATION; PERINEURAL AS NEEDED
Status: DISCONTINUED | OUTPATIENT
Start: 2024-11-13 | End: 2024-11-13 | Stop reason: SURG

## 2024-11-13 RX ORDER — SODIUM CHLORIDE 0.9 % (FLUSH) 0.9 %
3 SYRINGE (ML) INJECTION EVERY 12 HOURS SCHEDULED
Status: DISCONTINUED | OUTPATIENT
Start: 2024-11-13 | End: 2024-11-13 | Stop reason: HOSPADM

## 2024-11-13 RX ORDER — SODIUM CHLORIDE 9 MG/ML
40 INJECTION, SOLUTION INTRAVENOUS AS NEEDED
Status: DISCONTINUED | OUTPATIENT
Start: 2024-11-13 | End: 2024-11-13 | Stop reason: HOSPADM

## 2024-11-13 RX ORDER — ONDANSETRON 2 MG/ML
4 INJECTION INTRAMUSCULAR; INTRAVENOUS ONCE AS NEEDED
Status: DISCONTINUED | OUTPATIENT
Start: 2024-11-13 | End: 2024-11-13 | Stop reason: HOSPADM

## 2024-11-13 RX ORDER — SODIUM CHLORIDE, SODIUM LACTATE, POTASSIUM CHLORIDE, CALCIUM CHLORIDE 600; 310; 30; 20 MG/100ML; MG/100ML; MG/100ML; MG/100ML
30 INJECTION, SOLUTION INTRAVENOUS CONTINUOUS PRN
Status: DISCONTINUED | OUTPATIENT
Start: 2024-11-13 | End: 2024-11-13 | Stop reason: HOSPADM

## 2024-11-13 RX ORDER — PROPOFOL 10 MG/ML
INJECTION, EMULSION INTRAVENOUS AS NEEDED
Status: DISCONTINUED | OUTPATIENT
Start: 2024-11-13 | End: 2024-11-13 | Stop reason: SURG

## 2024-11-13 RX ORDER — SODIUM CHLORIDE 0.9 % (FLUSH) 0.9 %
10 SYRINGE (ML) INJECTION EVERY 12 HOURS SCHEDULED
Status: DISCONTINUED | OUTPATIENT
Start: 2024-11-13 | End: 2024-11-13 | Stop reason: HOSPADM

## 2024-11-13 RX ADMIN — LIDOCAINE HYDROCHLORIDE 60 MG: 20 INJECTION, SOLUTION INFILTRATION; PERINEURAL at 12:16

## 2024-11-13 RX ADMIN — PROPOFOL 120 MG: 10 INJECTION, EMULSION INTRAVENOUS at 12:16

## 2024-11-13 RX ADMIN — Medication 10 ML: at 12:24

## 2024-11-13 NOTE — H&P
No chief complaint on file.      HPI  Patient today for an EGD due to dysphagia and abdominal pain.         Problem List:    Patient Active Problem List   Diagnosis    Bloating    Epigastric pain    B12 deficiency    Type 2 diabetes mellitus without complication    Essential hypertension    MGUS (monoclonal gammopathy of unknown significance)    Sore throat    Gastroesophageal reflux disease with esophagitis    Cough    Hypothyroidism    Hyperglycemia    Right foot pain    Healthcare maintenance    Type 2 diabetes mellitus without complication, without long-term current use of insulin    Mixed hyperlipidemia    Dysuria    Persistent cough    Cardiomegaly    Rib pain on left side    Creatinine elevation    Encounter for screening mammogram for breast cancer    Abnormal serum total protein level    Acute upper respiratory infection    Breast asymmetry    Breast pain    Burning sensation    Cerebrovascular small vessel disease    Colon polyp    Dense breasts    Dizziness    Increased glucose level    Gastritis    Gout    Headache    Hiatal hernia    Low back pain    Ulcer of nose    Nausea    Neck pain    Osteoarthritis    Otalgia    Pain in thoracic spine    Shoulder joint pain    Back pain    Peripheral neuropathy    Pneumonia    Seasonal allergic rhinitis    Tinea corporis    Vitamin D deficiency     Sinus congestion    Inclusion cyst    Urinary frequency    Leg edema    Rash    Neuritis or radiculitis due to rupture of lumbar intervertebral disc    NICOLE on CPAP    Symptomatic bradycardia    SSS (sick sinus syndrome)    Presence of cardiac pacemaker    Atrial premature beats    Esophageal dysphagia    Generalized abdominal pain       Medical History:    Past Medical History:   Diagnosis Date    Arthritis     Colon polyp 07/01/2017    Diabetes mellitus     Esophageal reflux     GERD (gastroesophageal reflux disease)     H/O DVT (deep venous thrombosis)     Headache 07/01/2017    Hiatal hernia 07/01/2017    History of  vitamin D deficiency     Hypercholesteremia     Hyperlipidemia     Hypertension     Low back pain 07/01/2017    MGUS (monoclonal gammopathy of unknown significance)     NICOLE on CPAP     AHI 15/h    Osteoporosis     Peripheral neuropathy     Pneumonia 07/01/2017    PONV (postoperative nausea and vomiting)     Pulmonary embolism     Pulmonary hypertension     Thyroid disease     Venous thrombosis         Social History:    Social History     Socioeconomic History    Marital status:     Number of children: 4    Years of education: High School   Tobacco Use    Smoking status: Never     Passive exposure: Never    Smokeless tobacco: Never   Vaping Use    Vaping status: Never Used   Substance and Sexual Activity    Alcohol use: No     Comment: soda    Drug use: No    Sexual activity: Defer       Family History:   Family History   Problem Relation Age of Onset    Breast cancer Mother         70's    Colon cancer Mother     Stroke Mother     Heart disease Father     Heart disease Sister     Hypertension Sister     Hypertension Sister     Diabetes Sister     Ovarian cancer Neg Hx     Colon polyps Neg Hx     Crohn's disease Neg Hx     Irritable bowel syndrome Neg Hx     Ulcerative colitis Neg Hx        Surgical History:   Past Surgical History:   Procedure Laterality Date    APPENDECTOMY  1949    BLADDER REPAIR      BREAST BIOPSY Right     benign 2019    CARDIAC ELECTROPHYSIOLOGY PROCEDURE N/A 7/8/2022    Procedure: Pacemaker DC new  [place];  Surgeon: Isauro Rios MD;  Location: [facility] INVASIVE LOCATION;  Service: Cardiology;  Laterality: N/A;    COLONOSCOPY      CYSTOCELE REPAIR      bladder reconstruction    HYSTERECTOMY      SALPINGECTOMY      SALPINGO OOPHORECTOMY      for ectopic pregnancy    UPPER GASTROINTESTINAL ENDOSCOPY           Current Facility-Administered Medications:     lactated ringers infusion, 30 mL/hr, Intravenous, Continuous PRN, Kaleb, Le G, APRN    sodium chloride 0.9 % flush 10  mL, 10 mL, Intravenous, Q12H, Marley, Lester M, CRNA    sodium chloride 0.9 % flush 10 mL, 10 mL, Intravenous, PRN, Marley, Lester M, CRNA    sodium chloride 0.9 % flush 10 mL, 10 mL, Intravenous, PRN, Kaleb, Le G, APRN    sodium chloride 0.9 % flush 3 mL, 3 mL, Intravenous, Q12H, Kaleb, Le G, APRN    sodium chloride 0.9 % infusion 40 mL, 40 mL, Intravenous, PRN, Marley, Lester M, CRNA    Allergies: No Known Allergies       Review of Systems   Pertinent items are noted in HPI      The following portions of the patient's history were reviewed by me and updated as appropriate: review of systems, allergies, current medications, past family history, past medical history, past social history, past surgical history and problem list.    Pulse 60   Temp 97.7 °F (36.5 °C) (Oral)   Wt 64.8 kg (142 lb 12.8 oz)   LMP  (LMP Unknown)   SpO2 97%   BMI 26.12 kg/m²     PHYSICAL EXAM:    CONSTITUTIONAL:  today's vital signs reviewed by me  GASTROINTESTINAL: abdomen is soft nontender nondistended with normal active bowel sounds, no masses are appreciated    Debilities: None  Emotional Behavior: Appropriate    Assessment/ Plan  We will proceed today with EGD.    Risks and benefits as well as alternatives to endoscopic evaluation were explained to the patient and they voiced understanding and wish to proceed.  These risks include but are not limited to the risk of bleeding, perforation, adverse reaction to sedation, and missed lesions.  The patient was given the opportunity to ask questions prior to the endoscopic procedure.

## 2024-11-13 NOTE — ANESTHESIA PREPROCEDURE EVALUATION
Anesthesia Evaluation     Patient summary reviewed and Nursing notes reviewed   history of anesthetic complications:  PONV  NPO Solid Status: > 8 hours  NPO Liquid Status: > 8 hours           Airway   Mallampati: II  TM distance: >3 FB  Neck ROM: full  No difficulty expected  Dental    (+) poor dentition and upper dentures    Pulmonary - normal exam    breath sounds clear to auscultation  (+) pulmonary embolism (20 years ago),recent URI resolved, sleep apnea on CPAP  Cardiovascular - normal exam  Exercise tolerance: poor (<4 METS)    ECG reviewed  Rhythm: regular  Rate: normal    (+) pacemaker pacemaker interrogated <1 month ago, hypertension well controlled less than 2 medications, DVT resolved, hyperlipidemia      Neuro/Psych  (+) CVA, headaches, dizziness/light headedness, numbness  GI/Hepatic/Renal/Endo    (+) hiatal hernia, GERD well controlled, diabetes mellitus type 2 well controlled, thyroid problem hypothyroidism    Musculoskeletal     (+) back pain, neck pain  Abdominal  - normal exam   Substance History      OB/GYN          Other   arthritis,                       Anesthesia Plan    ASA 3     MAC     intravenous induction     Anesthetic plan, risks, benefits, and alternatives have been provided, discussed and informed consent has been obtained with: patient.  Pre-procedure education provided  Use of blood products discussed with patient  Consented to blood products.        CODE STATUS:

## 2024-11-13 NOTE — ANESTHESIA POSTPROCEDURE EVALUATION
Patient: Donna M Yeoman    Procedure Summary       Date: 11/13/24 Room / Location: [facility] ENDOSCOPY 2 /  [facility] OR    Anesthesia Start: 1212 Anesthesia Stop: 1227    Procedure: ESOPHAGOGASTRODUODENOSCOPY WITH DILATATION Diagnosis:       Esophageal dysphagia      Generalized abdominal pain      (Esophageal dysphagia [R13.19])      (Generalized abdominal pain [R10.84])    Surgeons: Dhruv Riley MD Provider: Jalyn Sutton CRNA    Anesthesia Type: MAC ASA Status: 3            Anesthesia Type: MAC    Vitals  Vitals Value Taken Time   BP 99/69 11/13/24 1250   Temp 97.9 °F (36.6 °C) 11/13/24 1230   Pulse 60 11/13/24 1252   Resp 17 11/13/24 1240   SpO2 96 % 11/13/24 1252   Vitals shown include unfiled device data.        Post Anesthesia Care and Evaluation    Patient location during evaluation: bedside  Patient participation: complete - patient participated  Level of consciousness: awake and alert  Pain score: 0  Pain management: adequate    Airway patency: patent  Anesthetic complications: No anesthetic complications  PONV Status: none  Cardiovascular status: acceptable  Respiratory status: acceptable  Hydration status: acceptable  No anesthesia care post op

## 2024-12-19 ENCOUNTER — OFFICE VISIT (OUTPATIENT)
Dept: SLEEP MEDICINE | Facility: HOSPITAL | Age: 89
End: 2024-12-19
Payer: MEDICARE

## 2024-12-19 VITALS
HEIGHT: 62 IN | HEART RATE: 76 BPM | BODY MASS INDEX: 26.5 KG/M2 | OXYGEN SATURATION: 95 % | WEIGHT: 144 LBS | SYSTOLIC BLOOD PRESSURE: 137 MMHG | DIASTOLIC BLOOD PRESSURE: 73 MMHG

## 2024-12-19 DIAGNOSIS — G47.33 OSA ON CPAP: Primary | ICD-10-CM

## 2024-12-19 PROCEDURE — G0463 HOSPITAL OUTPT CLINIC VISIT: HCPCS

## 2024-12-19 PROCEDURE — 1160F RVW MEDS BY RX/DR IN RCRD: CPT | Performed by: INTERNAL MEDICINE

## 2024-12-19 PROCEDURE — 1159F MED LIST DOCD IN RCRD: CPT | Performed by: INTERNAL MEDICINE

## 2024-12-19 PROCEDURE — 99213 OFFICE O/P EST LOW 20 MIN: CPT | Performed by: INTERNAL MEDICINE

## 2024-12-19 NOTE — PROGRESS NOTES
"  [facility]  Sleep medicine  [address]  [place] [address]  Phone   Fax       SLEEP CLINIC FOLLOW UP PROGRESS NOTE.    Donna M Yeoman  [id number]   1935  89 y.o.  female      PCP: Caroline Weiner MD      Date of visit: 12/19/2024    Chief Complaint   Patient presents with    Sleep Apnea       HPI:  This is a 89 y.o. years old patient is here for the management of obstructive sleep apnea.  Sleep apnea is moderate in severity with a AHI of 15/hr. Patient is using positive airway pressure therapy with CPAP 11 and the symptoms of sleep apnea have improved significantly on the therapy. Normally patient goes to bed at 10 PM and wakes up at 730 AM .  The patient wakes up 2 time(s) during the night and has no problem going back to sleep.  Feels refreshed after waking up.  She is having some memory problems.  She will be 89 years next month.    Medications and allergies are reviewed by me and documented in the encounter.     SOCIAL (habits pertaining to sleep medicine)  History tobacco use:No   History of alcohol use: 0 per week  Caffeine use: 1     REVIEW OF SYSTEMS:   Pertaining positive symptoms are:  Hercules Sleepiness Scale :Total score: 4       PHYSICAL EXAMINATION:  CONSTITUTIONAL:  Vitals:    12/19/24 1058   BP: 137/73   Pulse: 76   SpO2: 95%   Weight: 65.3 kg (144 lb)   Height: 157.5 cm (62\")    Body mass index is 26.34 kg/m².   NOSE: nasal passages are clear, No deformities noted   RESP SYSTEM: Not in any respiratory distress, no chest deformities noted,   CARDIOVASULAR: No edema noted  NEURO: Oriented x 3, gait normal,  Mood and affect appeared appropriate      Data reviewed:  The Smart card downloaded on 12/19/2024 has been reviewed independently by me for compliance and discussed the data with the patient.   Compliance; 99%  More than 4 hr use, 99%  Average use of the device 10 hours per night  Residual AHI: 5 /hr (goal < 5.0 /hr)  Mask " type: Nasal mask  Device: DreamStation  DME: [employer]      ASSESSMENT AND PLAN:  Obstructive sleep apnea ( G 47.33).  The symptoms of sleep apnea have improved with the device and the treatment.  Patient's compliance with the device is excellent for treatment of sleep apnea.  I have independently reviewed the smart card down load and discussed with the patient the download data and encouarged the patient to continue to use the device.The residual AHI is acceptable. The device is benefiting the patient and the device is medically necessary.  Without proper control of sleep apnea and good compliance there is a increased risk for hypertension, diabetes mellitus and nonrestorative sleep with hypersomnia which can increase risk for motor vehicle accidents.  Untreated sleep apnea is also a risk factor for development of atrial fibrillation, pulmonary hypertension, insulin resistance and stroke. The patient is also instructed to get the supplies from the DME company and and change them on a regular basis.  A prescription for supplies has been sent to the DME company.  I have also discussed the good sleep hygiene habits and adequate amount of sleep needed for good health.  Patient is having some memory problems  Return in about 1 year (around 12/19/2025) for with smart card down load. . Patient's questions were answered.    12/19/2024  Emmy Henderson MD  Sleep Medicine.  Medical Director,   [facility] sleep [facility].

## 2025-03-17 DIAGNOSIS — E78.5 HYPERLIPIDEMIA: ICD-10-CM

## 2025-03-17 RX ORDER — LEVOTHYROXINE SODIUM 75 UG/1
75 TABLET ORAL DAILY
Qty: 90 TABLET | Refills: 1 | Status: SHIPPED | OUTPATIENT
Start: 2025-03-17

## 2025-03-17 RX ORDER — GLIMEPIRIDE 2 MG/1
2 TABLET ORAL
Qty: 90 TABLET | Refills: 1 | Status: SHIPPED | OUTPATIENT
Start: 2025-03-17

## 2025-03-17 RX ORDER — LOSARTAN POTASSIUM 50 MG/1
50 TABLET ORAL DAILY
Qty: 90 TABLET | Refills: 1 | Status: SHIPPED | OUTPATIENT
Start: 2025-03-17

## 2025-03-17 RX ORDER — ROSUVASTATIN CALCIUM 10 MG/1
10 TABLET, COATED ORAL DAILY
Qty: 90 TABLET | Refills: 1 | Status: SHIPPED | OUTPATIENT
Start: 2025-03-17

## 2025-03-17 RX ORDER — ALLOPURINOL 100 MG/1
100 TABLET ORAL DAILY
Qty: 90 TABLET | Refills: 1 | Status: SHIPPED | OUTPATIENT
Start: 2025-03-17

## 2025-03-17 NOTE — TELEPHONE ENCOUNTER
Rx Refill Note  Requested Prescriptions     Pending Prescriptions Disp Refills    losartan (COZAAR) 50 MG tablet [Pharmacy Med Name: Losartan Potassium Oral Tablet 50 MG] 90 tablet 1     Sig: TAKE 1 TABLET EVERY DAY    levothyroxine (SYNTHROID, LEVOTHROID) 75 MCG tablet [Pharmacy Med Name: Levothyroxine Sodium Oral Tablet 75 MCG] 90 tablet 1     Sig: TAKE 1 TABLET EVERY DAY    allopurinol (ZYLOPRIM) 100 MG tablet [Pharmacy Med Name: Allopurinol Oral Tablet 100 MG] 90 tablet 1     Sig: TAKE 1 TABLET EVERY DAY    glimepiride (AMARYL) 2 MG tablet [Pharmacy Med Name: Glimepiride Oral Tablet 2 MG] 90 tablet 1     Sig: TAKE 1 TABLET EVERY MORNING BEFORE BREAKFAST    rosuvastatin (CRESTOR) 10 MG tablet [Pharmacy Med Name: Rosuvastatin Calcium Oral Tablet 10 MG] 90 tablet 1     Sig: TAKE 1 TABLET EVERY DAY      Last office visit with prescribing clinician: 10/8/2024   Last telemedicine visit with prescribing clinician: Visit date not found   Next office visit with prescribing clinician: 4/8/2025                         Would you like a call back once the refill request has been completed: [] Yes [] No    If the office needs to give you a call back, can they leave a voicemail: [] Yes [] No    Emmanuelle Platt MA  03/17/25, 10:35 EDT

## 2025-03-24 ENCOUNTER — OFFICE VISIT (OUTPATIENT)
Dept: GASTROENTEROLOGY | Facility: CLINIC | Age: OVER 89
End: 2025-03-24
Payer: MEDICARE

## 2025-03-24 VITALS
DIASTOLIC BLOOD PRESSURE: 82 MMHG | OXYGEN SATURATION: 98 % | HEART RATE: 63 BPM | BODY MASS INDEX: 26.87 KG/M2 | HEIGHT: 62 IN | TEMPERATURE: 97.8 F | SYSTOLIC BLOOD PRESSURE: 138 MMHG | WEIGHT: 146 LBS

## 2025-03-24 DIAGNOSIS — K22.2 SCHATZKI'S RING: ICD-10-CM

## 2025-03-24 DIAGNOSIS — K44.9 HIATAL HERNIA: ICD-10-CM

## 2025-03-24 DIAGNOSIS — K59.00 CONSTIPATION, UNSPECIFIED CONSTIPATION TYPE: Primary | ICD-10-CM

## 2025-03-24 DIAGNOSIS — R13.10 DYSPHAGIA, UNSPECIFIED TYPE: ICD-10-CM

## 2025-03-24 PROCEDURE — 99213 OFFICE O/P EST LOW 20 MIN: CPT | Performed by: NURSE PRACTITIONER

## 2025-03-24 PROCEDURE — 1160F RVW MEDS BY RX/DR IN RCRD: CPT | Performed by: NURSE PRACTITIONER

## 2025-03-24 PROCEDURE — 1159F MED LIST DOCD IN RCRD: CPT | Performed by: NURSE PRACTITIONER

## 2025-03-24 NOTE — PATIENT INSTRUCTIONS
For constipation, recommend starting MiraLAX daily available over-the-counter.  Mix 1 capful in 8 ounces of noncarbonated liquid and drink once daily.     For hiatal hernia, follow antireflux precautions.  Recommend avoiding eating within 3 to 4 hours of bedtime.  Avoid foods that can trigger symptoms which may include citrus fruits, spicy foods, tomatoes and red sauces, chocolate, coffee/tea, caffeinated or carbonated beverages, alcohol.     If trouble swallowing worsens, please notify the office.

## 2025-03-24 NOTE — PROGRESS NOTES
"Chief Complaint   Patient presents with    Difficulty Swallowing    GI Problem         History of Present Illness  Patient is a 89-year-old female who presents today for Follow-up.  She has a history of hiatal hernia and Schatzki's ring status post dilation, most recent dilation November 2024, dilated to 18 mm.    Patient presents today for follow-up.  She has had some improvement in dysphagia since EGD with dilation.  She has had a few episodes of choking, reports this generally occurs if she drinks cold water or takes large pills.  She reports she frequently has a sore throat and a globus sensation and reports frequent postnasal drainage.    She denies any heartburn or reflux.  Does at times have increased gas.  Denies any nausea or vomiting.    Reports she has been experiencing some issues with constipation.  Reports she generally has 2 small bowel movements per day but does not always feel that she empties completely.  She takes fiber around 3 times per week.  Reports she does not drink much water.    She has episodes of left upper quadrant pain that come and go.  Is not experiencing this at this time.  Prior CT with no findings to explain this.     Result Review :       Upper GI Endoscopy (11/13/2024 12:09)    CT Abdomen Pelvis With Contrast (09/11/2024 14:51)    Office Visit with Le Manuel APRN (08/29/2024)    NM Gastric Emptying (07/16/2020 12:37)     Vital Signs:   /82   Pulse 63   Temp 97.8 °F (36.6 °C)   Ht 157.5 cm (62\")   Wt 66.2 kg (146 lb)   SpO2 98%   BMI 26.70 kg/m²     Body mass index is 26.7 kg/m².     Physical Exam  Vitals reviewed.   Constitutional:       General: She is not in acute distress.     Appearance: She is well-developed.   HENT:      Head: Normocephalic and atraumatic.   Pulmonary:      Effort: Pulmonary effort is normal. No respiratory distress.   Skin:     General: Skin is dry.      Coloration: Skin is not pale.   Neurological:      Mental Status: She is alert and " oriented to person, place, and time.   Psychiatric:         Thought Content: Thought content normal.           Assessment and Plan    Diagnoses and all orders for this visit:    1. Constipation, unspecified constipation type (Primary)    2. Dysphagia, unspecified type    3. Hiatal hernia    4. Schatzki's ring         Discussion  Patient presents today for follow-up after CT and EGD.  Reviewed test findings at today's office visit.  EGD with evidence of Schatzki's ring that was dilated.  This was likely contributing to some degree to her dysphagia.  We discussed consideration for video swallow study with speech therapy for further evaluation, patient feels overall symptoms are relatively mild at this time and would prefer to continue to monitor.    CT with no findings to explain left upper quadrant pain.  Discussed this could potentially be secondary to constipation and stool buildup in the splenic flexure.  Recommended starting MiraLAX to promote more regular and complete bowel movements and see if that would lead to improvement in symptoms.  Encouraged increasing water consumption and continuing fiber.    Patient may follow-up with our office on an as-needed basis and was instructed to call for any new or worsening symptoms.          Follow Up   Return if symptoms worsen or fail to improve.    Patient Instructions   For constipation, recommend starting MiraLAX daily available over-the-counter.  Mix 1 capful in 8 ounces of noncarbonated liquid and drink once daily.     For hiatal hernia, follow antireflux precautions.  Recommend avoiding eating within 3 to 4 hours of bedtime.  Avoid foods that can trigger symptoms which may include citrus fruits, spicy foods, tomatoes and red sauces, chocolate, coffee/tea, caffeinated or carbonated beverages, alcohol.     If trouble swallowing worsens, please notify the office.

## 2025-03-25 RX ORDER — CARVEDILOL 6.25 MG/1
6.25 TABLET ORAL 2 TIMES DAILY WITH MEALS
Qty: 180 TABLET | Refills: 1 | Status: SHIPPED | OUTPATIENT
Start: 2025-03-25

## 2025-03-25 NOTE — TELEPHONE ENCOUNTER
Rx Refill Note  Requested Prescriptions     Pending Prescriptions Disp Refills    carvedilol (COREG) 6.25 MG tablet [Pharmacy Med Name: Carvedilol Oral Tablet 6.25 MG] 180 tablet 3     Sig: TAKE 1 TABLET TWICE DAILY WITH MEALS      Last office visit with prescribing clinician: 10/8/2024   Last telemedicine visit with prescribing clinician: Visit date not found   Next office visit with prescribing clinician: 4/8/2025                         Would you like a call back once the refill request has been completed: [] Yes [] No    If the office needs to give you a call back, can they leave a voicemail: [] Yes [] No    Emmanuelle Platt MA  03/25/25, 11:49 EDT

## 2025-03-28 ENCOUNTER — TELEPHONE (OUTPATIENT)
Dept: FAMILY MEDICINE CLINIC | Facility: CLINIC | Age: OVER 89
End: 2025-03-28
Payer: MEDICARE

## 2025-03-28 DIAGNOSIS — E78.2 MIXED HYPERLIPIDEMIA: Primary | ICD-10-CM

## 2025-03-28 DIAGNOSIS — E03.9 HYPOTHYROIDISM, UNSPECIFIED TYPE: ICD-10-CM

## 2025-03-28 DIAGNOSIS — E11.9 TYPE 2 DIABETES MELLITUS WITHOUT COMPLICATION, WITHOUT LONG-TERM CURRENT USE OF INSULIN: ICD-10-CM

## 2025-03-28 DIAGNOSIS — I10 ESSENTIAL HYPERTENSION: ICD-10-CM

## 2025-03-28 NOTE — TELEPHONE ENCOUNTER
Pt is sajan for lab appt on 4/1/25. Please place lab orders if appropriate. Active-future is not this office.

## 2025-04-08 ENCOUNTER — OFFICE VISIT (OUTPATIENT)
Dept: FAMILY MEDICINE CLINIC | Facility: CLINIC | Age: OVER 89
End: 2025-04-08
Payer: MEDICARE

## 2025-04-08 VITALS
SYSTOLIC BLOOD PRESSURE: 130 MMHG | HEIGHT: 62 IN | WEIGHT: 146.6 LBS | HEART RATE: 82 BPM | DIASTOLIC BLOOD PRESSURE: 74 MMHG | BODY MASS INDEX: 26.98 KG/M2 | OXYGEN SATURATION: 98 %

## 2025-04-08 DIAGNOSIS — E53.8 B12 DEFICIENCY: ICD-10-CM

## 2025-04-08 DIAGNOSIS — N39.498 OTHER URINARY INCONTINENCE: Primary | ICD-10-CM

## 2025-04-08 DIAGNOSIS — E11.9 TYPE 2 DIABETES MELLITUS WITHOUT COMPLICATION, WITHOUT LONG-TERM CURRENT USE OF INSULIN: ICD-10-CM

## 2025-04-08 DIAGNOSIS — N89.8 VAGINAL DRYNESS: ICD-10-CM

## 2025-04-08 DIAGNOSIS — I10 ESSENTIAL HYPERTENSION: ICD-10-CM

## 2025-04-08 DIAGNOSIS — I49.5 SSS (SICK SINUS SYNDROME): ICD-10-CM

## 2025-04-08 DIAGNOSIS — Z95.0 PRESENCE OF CARDIAC PACEMAKER: ICD-10-CM

## 2025-04-08 DIAGNOSIS — Z12.31 ENCOUNTER FOR SCREENING MAMMOGRAM FOR BREAST CANCER: ICD-10-CM

## 2025-04-08 DIAGNOSIS — E55.9 VITAMIN D DEFICIENCY: ICD-10-CM

## 2025-04-08 DIAGNOSIS — M10.00 IDIOPATHIC GOUT, UNSPECIFIED CHRONICITY, UNSPECIFIED SITE: ICD-10-CM

## 2025-04-08 DIAGNOSIS — N95.2 VAGINAL ATROPHY: ICD-10-CM

## 2025-04-08 NOTE — PROGRESS NOTES
Chief Complaint  Follow-up (6 mo)    Patient or patient representative verbalized consent for the use of Ambient Listening during the visit with  Caroline Weiner MD for chart documentation. 4/9/2025  14:06 EDT    Subjective        Donna M Yeoman presents to [facility]    History of Present Illness  The patient is a 89-year-old woman here for her 6-month follow-up on type 2 diabetes, hyperlipidemia, hypertension, hypothyroidism, vitamin D deficiency, acid reflux without gastroesophageal reflux disease with esophagitis, MGUS, gout, peripheral neuropathy, small vessel cerebrovascular disease, and obstructive sleep apnea on CPAP.    She reports experiencing some discomfort in suprapubic area at times and has been dealing with urinary incontinence, necessitating the use of a pad at all times. She also mentions persistent pain and discomfort in the vaginal area. She has previously consulted with a specialist but is currently without one. She recalls a consultation with a physician Dr. Allan,  but subsequent visits were with nurse practitioners. She expresses a preference for consistent care from a single physician. She experiences severe vaginal dryness, which was managed with a prescription that took 2 to 3 weeks to be filled.  She has attempted to schedule appointments with recommended specialists but faced a wait time of approximately one year. She expresses a desire to consult with a physician regarding her vaginal issues. She believes she should have her urine checked today as she is very uncomfortable all the time down there. She saw Dr. Allan for urology back in 2019.    She monitors her blood glucose levels sporadically, with recent readings in the 90s. She does not adhere to a strict sugar-free diet.    She is currently on baby aspirin and has a history of blood clot in both lungs and leg when she was 70 years old, which was attributed to hormones. She was hospitalized for an  Patient is returning call, please see message below.     Callback Number: [phone]  Best Availability: after 3PM today  Can A Detailed Message Be Left? yes  Did you confirm the message with the caller?: yes     Pt called back and asked that you try to call her after 3PM today.     Thank you,  Michela Contreras       "extended period following this incident. She also has a history of stroke, which was discovered incidentally on imaging. She reports no recent falls or instability but uses a cane for ambulation over long distances. She does not use the cane within her home. She reports a decrease in falls compared to 2 to 3 years ago, with only one fall in the past year.    She is due for MMG in may 2025.        Supplemental Information  She has a lot of issues with her stomach.  Neg CT in 2024    FAMILY HISTORY  Her daughter had a blood clot after heart surgery.    MEDICATIONS  Current: Baby aspirin, topical estrogen.      Objective   Vital Signs:  /74 (BP Location: Left arm, Patient Position: Sitting, Cuff Size: Adult)   Pulse 82   Ht 157.5 cm (62.01\")   Wt 66.5 kg (146 lb 9.6 oz)   SpO2 98%   BMI 26.81 kg/m²   Estimated body mass index is 26.81 kg/m² as calculated from the following:    Height as of this encounter: 157.5 cm (62.01\").    Weight as of this encounter: 66.5 kg (146 lb 9.6 oz).      CBC & Differential (04/01/2025 10:11)  Comprehensive Metabolic Panel (04/01/2025 10:11)  Hemoglobin A1c (04/01/2025 10:11)  TSH (04/01/2025 10:11)  T4, Free (04/01/2025 10:11)  Lipid Panel With LDL / HDL Ratio (04/01/2025 10:11)      Physical Exam  Vitals and nursing note reviewed.   Constitutional:       Appearance: Normal appearance. She is well-developed.   HENT:      Head: Normocephalic and atraumatic.      Right Ear: External ear normal.      Left Ear: External ear normal.   Eyes:      Extraocular Movements: Extraocular movements intact.      Conjunctiva/sclera: Conjunctivae normal.   Neck:      Vascular: No carotid bruit.   Cardiovascular:      Rate and Rhythm: Normal rate and regular rhythm.      Heart sounds: Normal heart sounds.      Comments: No bruits  Pulmonary:      Effort: Pulmonary effort is normal. No respiratory distress.      Breath sounds: Normal breath sounds. No stridor. No wheezing, rhonchi or rales. "   Chest:      Chest wall: No tenderness.   Abdominal:      General: Bowel sounds are normal. There is no distension.      Palpations: Abdomen is soft. There is no mass.      Tenderness: There is no abdominal tenderness. There is no guarding or rebound.      Hernia: No hernia is present.   Musculoskeletal:      Cervical back: Neck supple.      Right lower leg: No edema.      Left lower leg: No edema.   Lymphadenopathy:      Cervical: No cervical adenopathy.   Skin:     General: Skin is warm.   Neurological:      Mental Status: She is alert and oriented to person, place, and time. Mental status is at baseline.   Psychiatric:         Mood and Affect: Mood normal.         Behavior: Behavior normal.         Thought Content: Thought content normal.         Judgment: Judgment normal.        Result Review :                   Assessment and Plan   Diagnoses and all orders for this visit:    1. Other urinary incontinence (Primary)  -     Ambulatory Referral to Gynecologic Urology  -     Urinalysis With Culture If Indicated -  -     Microalbumin / Creatinine Urine Ratio - Urine, Clean Catch    2. Vaginal dryness  -     Ambulatory Referral to Gynecologic Urology    3. Vaginal atrophy  -     Ambulatory Referral to Gynecologic Urology    4. Encounter for screening mammogram for breast cancer  -     Mammo screening digital tomosynthesis bilateral w CAD; Future    5. Essential hypertension  -     CK; Future  -     CBC & Differential; Future  -     Comprehensive Metabolic Panel; Future  -     T4, Free; Future  -     TSH; Future  -     Uric Acid; Future  -     Hemoglobin A1c; Future  -     Lipid Panel With LDL / HDL Ratio; Future    6. SSS (sick sinus syndrome)    7. Presence of cardiac pacemaker    8. B12 deficiency  -     CK; Future  -     CBC & Differential; Future  -     Comprehensive Metabolic Panel; Future  -     T4, Free; Future  -     TSH; Future  -     Uric Acid; Future  -     Hemoglobin A1c; Future  -     Lipid Panel With  LDL / HDL Ratio; Future    9. Type 2 diabetes mellitus without complication, without long-term current use of insulin  -     Microalbumin / Creatinine Urine Ratio - Urine, Clean Catch  -     CK; Future  -     CBC & Differential; Future  -     Comprehensive Metabolic Panel; Future  -     T4, Free; Future  -     TSH; Future  -     Uric Acid; Future  -     Hemoglobin A1c; Future  -     Lipid Panel With LDL / HDL Ratio; Future    10. Vitamin D deficiency   -     CK; Future  -     CBC & Differential; Future  -     Comprehensive Metabolic Panel; Future  -     T4, Free; Future  -     TSH; Future  -     Uric Acid; Future  -     Hemoglobin A1c; Future  -     Lipid Panel With LDL / HDL Ratio; Future    11. Idiopathic gout, unspecified chronicity, unspecified site  -     Uric Acid; Future             Assessment & Plan  1. Urinary incontinence.  She reports significant discomfort and loss of bladder control, requiring the use of pads 24/7. She experiences vaginal dryness and has been using a compounded topical estrogen. A referral to Dr. Starr Goss, a specialist in women's urology and gynecology, has been made for further evaluation and management. A urine test will be conducted today to check for infection and protein levels due to her diabetes. Continue vaginal estrogen 2x week    2. Type 2 diabetes mellitus.  Her A1c has increased to 6.4% from 5.7%. She usually tests her blood sugar levels, which are typically in the 90s. She is advised to continue her current diabetes management plan, amaryl 2 mg qd and watch dietary carb intake    3. Episodes of Atrial fibrillation on pacemaker interrogation.  She has occasional episodes of dizziness and a small amount of atrial fibrillation detected by her pacemaker. She declined invasive treatment like ablation. The risks and benefits of anticoagulation therapy were discussed, and it was decided to continue with baby aspirin due to her history of blood clots and the brief nature of her  atrial fibrillation. Her cardiologist will be informed about her history of blood clots and stroke to see if he recommends changing to eliquis.  Cost may be an issue with the medication.      4.  GERD continue omeprazole 20 mg qd.  EGD recently with Dr. Riley.    5.  HL continue crestor 10 mg qd.  Labs reviewed and are done q 6 mo  6.  HTN continue losartan 50 mg qd and coreg 6.25 mg bid  7.  HT continue levoxyl 75 mcg qd.  Labs reviewed with patient  8. Gout continue allopurinol 100 mg qd.  Check uric acid level in 6 mo    PROCEDURE  The patient had a dual chamber pacemaker placed approximately 2.5 years ago.         Follow Up   No follow-ups on file.  Patient was given instructions and counseling regarding her condition or for health maintenance advice. Please see specific information pulled into the AVS if appropriate.           Caroline Weiner MD   07:00 EDT   [unfilled]

## 2025-04-11 ENCOUNTER — TELEPHONE (OUTPATIENT)
Dept: FAMILY MEDICINE CLINIC | Facility: CLINIC | Age: OVER 89
End: 2025-04-11

## 2025-04-11 LAB
ALBUMIN/CREAT UR: 15 MG/G CREAT (ref 0–29)
APPEARANCE UR: ABNORMAL
BACTERIA #/AREA URNS HPF: ABNORMAL /[HPF]
BACTERIA UR CULT: ABNORMAL
BACTERIA UR CULT: ABNORMAL
BILIRUB UR QL STRIP: NEGATIVE
CASTS URNS QL MICRO: ABNORMAL /LPF
COLOR UR: YELLOW
CREAT UR-MCNC: 136.1 MG/DL
CRYSTALS URNS MICRO: ABNORMAL
EPI CELLS #/AREA URNS HPF: ABNORMAL /HPF (ref 0–10)
GLUCOSE UR QL STRIP: NEGATIVE
HGB UR QL STRIP: NEGATIVE
KETONES UR QL STRIP: NEGATIVE
LEUKOCYTE ESTERASE UR QL STRIP: ABNORMAL
MICRO URNS: ABNORMAL
MICROALBUMIN UR-MCNC: 20.3 UG/ML
NITRITE UR QL STRIP: NEGATIVE
OTHER ANTIBIOTIC SUSC ISLT: ABNORMAL
PH UR STRIP: 5.5 [PH] (ref 5–7.5)
PROT UR QL STRIP: ABNORMAL
RBC #/AREA URNS HPF: ABNORMAL /HPF (ref 0–2)
SP GR UR STRIP: 1.02 (ref 1–1.03)
UNIDENT CRYS URNS QL MICRO: PRESENT
URINALYSIS REFLEX: ABNORMAL
UROBILINOGEN UR STRIP-MCNC: 1 MG/DL (ref 0.2–1)
WBC #/AREA URNS HPF: >30 /HPF (ref 0–5)

## 2025-04-11 NOTE — TELEPHONE ENCOUNTER
----- Message from Caroline Weiner sent at 2025  2:49 PM EDT -----  Regarding: FW: anticoagulation/a fib  Please call pt and let her know that Dr. Rios did not think there was enough AF episodes to warrant anticoagulation at this time.  Continue her baby aspirin.  ----- Message -----  From: Isauro Rios MD  Sent: 2025   7:53 AM EDT  To: Caroline Weiner MD  Subject: RE: anticoagulation/a fib                        Device detected atrial fibrillation is a difficult subject.  We are not sure what the threshold for anticoagulation is.  I reviewed her device interrogations again.  Based on the burden of atrial fibrillation she is having, I wouldn't start anticoagulation at this time for atrial fibrillation.  ----- Message -----  From: Caroline Weiner MD  Sent: 2025   2:35 PM EDT  To: Isauro Rios MD  Subject: anticoagulation/a fib                            Hey I just wanted to touch base with you about our mutual patient.  She has had PE and DVT in her early 70's and her daughter  of a blood clot after heart surgery a few years ago. She herself has a h/o stroke.  She is on a baby aspirin  I saw in your note that she has episodes of AF and I wondered about anticoagulation versus full dose aspirin versus baby aspirin. I think she sees your APRN in August.  Thanks for your help!

## 2025-04-28 ENCOUNTER — TELEPHONE (OUTPATIENT)
Dept: FAMILY MEDICINE CLINIC | Facility: CLINIC | Age: OVER 89
End: 2025-04-28

## 2025-04-28 NOTE — TELEPHONE ENCOUNTER
Caller: Yeoman Shirley M    Relationship: Self    Best call back number: [phone]       What was the call regarding: PATIENT REPORTING HER UTI HAS GOTTEN WORSE AFTER TAKING THE AUGMENTIN.  SHE REPORTS. FREQUENT URGE TO URINATE WITH LITTLE OR NO OUTPUT.  ALSO PAIN IN SIDE AND LOWER ABDOMEN AND AN INCREASED BURNING EVEN WHEN NOT URINATING.  HUB ATTEMPTED WARM TRANSFER AND NCC WITH NO SUCCESS.  PATIENT DECIDED TO GO TO URGENT TREATMENT.

## 2025-05-12 ENCOUNTER — HOSPITAL ENCOUNTER (OUTPATIENT)
Dept: MAMMOGRAPHY | Facility: HOSPITAL | Age: OVER 89
Discharge: HOME | End: 2025-05-12
Admitting: INTERNAL MEDICINE
Payer: MEDICARE

## 2025-05-12 DIAGNOSIS — Z12.31 ENCOUNTER FOR SCREENING MAMMOGRAM FOR BREAST CANCER: ICD-10-CM

## 2025-05-12 PROCEDURE — 77067 SCR MAMMO BI INCL CAD: CPT

## 2025-05-12 PROCEDURE — 77063 BREAST TOMOSYNTHESIS BI: CPT

## 2025-05-14 ENCOUNTER — TELEPHONE (OUTPATIENT)
Dept: FAMILY MEDICINE CLINIC | Facility: CLINIC | Age: OVER 89
End: 2025-05-14

## 2025-05-14 DIAGNOSIS — R32 URINARY INCONTINENCE, UNSPECIFIED TYPE: Primary | ICD-10-CM

## 2025-05-14 NOTE — TELEPHONE ENCOUNTER
Caller: Yeoman Shirley MOJGAN    Relationship: Self    Best call back number: [phone]         Who are you requesting to speak with (clinical staff, provider,  specific staff member):  OR MA        What was the call regarding: PATIENT CALLED AND WANTED TO LET YOU KNOW THAT THE REFERRAL YOU HAD SENT TO  THEY ARE UNABLE TO SEE PATIENT BECAUSE SHE HAD SEEN ANOTHER PROVIDER AT THAT PRACTICE. THEY TOLD PATIENT IF YOU CAN REACH OUT TO THEM THEY COULD PROBABLY RESOLVE THE ISSUE.    ALSO PATIENT WANTED TO LET YOU KNOW THAT SHE WENT TO URGENT CARE 2 WEEKS AFTER SHE SEEN YOU. PATIENT IS LOSING CONTROL OF BLADDER, AND BACK PAIN, AND PAIN IN GENERAL IN THAT AREA.   PATIENT IS REQUESTING ADVICE. PLEASE CALL.

## 2025-05-15 DIAGNOSIS — R32 URINARY INCONTINENCE, UNSPECIFIED TYPE: ICD-10-CM

## 2025-05-15 NOTE — TELEPHONE ENCOUNTER
Called and spoke to pt to inform of Dr. Weiner message. Pt voiced understanding and no further questions. Pt will complete ua c/s today scheduled on lab schedule. Pt stated she would see previous doctor.

## 2025-05-19 LAB
APPEARANCE UR: CLEAR
BACTERIA #/AREA URNS HPF: ABNORMAL /[HPF]
BACTERIA UR CULT: ABNORMAL
BACTERIA UR CULT: ABNORMAL
BILIRUB UR QL STRIP: NEGATIVE
CASTS URNS QL MICRO: ABNORMAL /LPF
COLOR UR: YELLOW
EPI CELLS #/AREA URNS HPF: ABNORMAL /HPF (ref 0–10)
GLUCOSE UR QL STRIP: NEGATIVE
HGB UR QL STRIP: NEGATIVE
KETONES UR QL STRIP: NEGATIVE
LEUKOCYTE ESTERASE UR QL STRIP: ABNORMAL
MICRO URNS: ABNORMAL
NITRITE UR QL STRIP: NEGATIVE
OTHER ANTIBIOTIC SUSC ISLT: ABNORMAL
PH UR STRIP: 6.5 [PH] (ref 5–7.5)
PROT UR QL STRIP: NEGATIVE
RBC #/AREA URNS HPF: ABNORMAL /HPF (ref 0–2)
SP GR UR STRIP: 1.02 (ref 1–1.03)
URINALYSIS REFLEX: ABNORMAL
UROBILINOGEN UR STRIP-MCNC: 0.2 MG/DL (ref 0.2–1)
WBC #/AREA URNS HPF: >30 /HPF (ref 0–5)

## 2025-05-20 RX ORDER — AMOXICILLIN 875 MG/1
875 TABLET, COATED ORAL 2 TIMES DAILY
Qty: 14 TABLET | Refills: 0 | Status: SHIPPED | OUTPATIENT
Start: 2025-05-20

## 2025-08-11 DIAGNOSIS — E78.5 HYPERLIPIDEMIA: ICD-10-CM

## 2025-08-11 RX ORDER — ALLOPURINOL 100 MG/1
100 TABLET ORAL DAILY
Qty: 90 TABLET | Refills: 3 | OUTPATIENT
Start: 2025-08-11

## 2025-08-11 RX ORDER — LEVOTHYROXINE SODIUM 75 UG/1
75 TABLET ORAL DAILY
Qty: 90 TABLET | Refills: 3 | OUTPATIENT
Start: 2025-08-11

## 2025-08-11 RX ORDER — LOSARTAN POTASSIUM 50 MG/1
50 TABLET ORAL DAILY
Qty: 90 TABLET | Refills: 3 | OUTPATIENT
Start: 2025-08-11

## 2025-08-11 RX ORDER — GLIMEPIRIDE 2 MG/1
2 TABLET ORAL
Qty: 90 TABLET | Refills: 3 | OUTPATIENT
Start: 2025-08-11

## 2025-08-11 RX ORDER — ROSUVASTATIN CALCIUM 10 MG/1
10 TABLET, COATED ORAL DAILY
Qty: 90 TABLET | Refills: 3 | OUTPATIENT
Start: 2025-08-11

## 2025-08-12 ENCOUNTER — LAB (OUTPATIENT)
Dept: OTHER | Facility: HOSPITAL | Age: OVER 89
End: 2025-08-12
Payer: MEDICARE

## 2025-08-12 DIAGNOSIS — D47.2 MGUS (MONOCLONAL GAMMOPATHY OF UNKNOWN SIGNIFICANCE): ICD-10-CM

## 2025-08-12 DIAGNOSIS — E53.8 B12 DEFICIENCY: ICD-10-CM

## 2025-08-12 LAB
ANION GAP SERPL CALCULATED.3IONS-SCNC: 8.4 MMOL/L (ref 5–15)
BASOPHILS # BLD AUTO: 0.03 10*3/MM3 (ref 0–0.2)
BASOPHILS NFR BLD AUTO: 0.5 % (ref 0–1.5)
BUN SERPL-MCNC: 14 MG/DL (ref 8–23)
BUN/CREAT SERPL: 12.6 (ref 7–25)
CALCIUM SPEC-SCNC: 9.3 MG/DL (ref 8.2–9.6)
CHLORIDE SERPL-SCNC: 107 MMOL/L (ref 98–107)
CO2 SERPL-SCNC: 28.6 MMOL/L (ref 22–29)
CREAT SERPL-MCNC: 1.11 MG/DL (ref 0.57–1)
DEPRECATED RDW RBC AUTO: 46.5 FL (ref 37–54)
EGFRCR SERPLBLD CKD-EPI 2021: 47.3 ML/MIN/1.73
EOSINOPHIL # BLD AUTO: 0.21 10*3/MM3 (ref 0–0.4)
EOSINOPHIL NFR BLD AUTO: 3.3 % (ref 0.3–6.2)
ERYTHROCYTE [DISTWIDTH] IN BLOOD BY AUTOMATED COUNT: 13.3 % (ref 12.3–15.4)
GLUCOSE SERPL-MCNC: 108 MG/DL (ref 65–99)
HCT VFR BLD AUTO: 42 % (ref 34–46.6)
HGB BLD-MCNC: 13.6 G/DL (ref 12–15.9)
IMM GRANULOCYTES # BLD AUTO: 0.02 10*3/MM3 (ref 0–0.05)
IMM GRANULOCYTES NFR BLD AUTO: 0.3 % (ref 0–0.5)
LYMPHOCYTES # BLD AUTO: 1.68 10*3/MM3 (ref 0.7–3.1)
LYMPHOCYTES NFR BLD AUTO: 26.6 % (ref 19.6–45.3)
MCH RBC QN AUTO: 30.8 PG (ref 26.6–33)
MCHC RBC AUTO-ENTMCNC: 32.4 G/DL (ref 31.5–35.7)
MCV RBC AUTO: 95.2 FL (ref 79–97)
MONOCYTES # BLD AUTO: 0.55 10*3/MM3 (ref 0.1–0.9)
MONOCYTES NFR BLD AUTO: 8.7 % (ref 5–12)
NEUTROPHILS NFR BLD AUTO: 3.83 10*3/MM3 (ref 1.7–7)
NEUTROPHILS NFR BLD AUTO: 60.6 % (ref 42.7–76)
NRBC BLD AUTO-RTO: 0 /100 WBC (ref 0–0.2)
PLATELET # BLD AUTO: 171 10*3/MM3 (ref 140–450)
PMV BLD AUTO: 10.6 FL (ref 6–12)
POTASSIUM SERPL-SCNC: 4.4 MMOL/L (ref 3.5–5.2)
RBC # BLD AUTO: 4.41 10*6/MM3 (ref 3.77–5.28)
SODIUM SERPL-SCNC: 144 MMOL/L (ref 136–145)
VIT B12 BLD-MCNC: >2000 PG/ML (ref 211–946)
WBC NRBC COR # BLD AUTO: 6.32 10*3/MM3 (ref 3.4–10.8)

## 2025-08-12 PROCEDURE — 86334 IMMUNOFIX E-PHORESIS SERUM: CPT | Performed by: INTERNAL MEDICINE

## 2025-08-12 PROCEDURE — 82607 VITAMIN B-12: CPT | Performed by: INTERNAL MEDICINE

## 2025-08-12 PROCEDURE — 80048 BASIC METABOLIC PNL TOTAL CA: CPT | Performed by: INTERNAL MEDICINE

## 2025-08-12 PROCEDURE — 83521 IG LIGHT CHAINS FREE EACH: CPT | Performed by: INTERNAL MEDICINE

## 2025-08-12 PROCEDURE — 36415 COLL VENOUS BLD VENIPUNCTURE: CPT

## 2025-08-12 PROCEDURE — 84165 PROTEIN E-PHORESIS SERUM: CPT | Performed by: INTERNAL MEDICINE

## 2025-08-12 PROCEDURE — 82784 ASSAY IGA/IGD/IGG/IGM EACH: CPT | Performed by: INTERNAL MEDICINE

## 2025-08-12 PROCEDURE — 85025 COMPLETE CBC W/AUTO DIFF WBC: CPT | Performed by: INTERNAL MEDICINE

## 2025-08-12 PROCEDURE — 84155 ASSAY OF PROTEIN SERUM: CPT | Performed by: INTERNAL MEDICINE

## 2025-08-14 LAB
ALBUMIN SERPL ELPH-MCNC: 3.5 G/DL (ref 2.9–4.4)
ALBUMIN/GLOB SERPL: 1.2 {RATIO} (ref 0.7–1.7)
ALPHA1 GLOB SERPL ELPH-MCNC: 0.2 G/DL (ref 0–0.4)
ALPHA2 GLOB SERPL ELPH-MCNC: 0.7 G/DL (ref 0.4–1)
B-GLOBULIN SERPL ELPH-MCNC: 0.8 G/DL (ref 0.7–1.3)
GAMMA GLOB SERPL ELPH-MCNC: 1.2 G/DL (ref 0.4–1.8)
GLOBULIN SER-MCNC: 3 G/DL (ref 2.2–3.9)
IGA SERPL-MCNC: 145 MG/DL (ref 64–422)
IGG SERPL-MCNC: 1391 MG/DL (ref 586–1602)
IGM SERPL-MCNC: 39 MG/DL (ref 26–217)
INTERPRETATION SERPL IEP-IMP: ABNORMAL
KAPPA LC FREE SER-MCNC: 23.5 MG/L (ref 3.3–19.4)
KAPPA LC FREE/LAMBDA FREE SER: 0.48 {RATIO} (ref 0.26–1.65)
LABORATORY COMMENT REPORT: ABNORMAL
LAMBDA LC FREE SERPL-MCNC: 48.5 MG/L (ref 5.7–26.3)
M PROTEIN SERPL ELPH-MCNC: 0.7 G/DL
PROT SERPL-MCNC: 6.5 G/DL (ref 6–8.5)

## 2025-08-19 ENCOUNTER — OFFICE VISIT (OUTPATIENT)
Dept: CARDIOLOGY | Facility: CLINIC | Age: OVER 89
End: 2025-08-19
Payer: MEDICARE

## 2025-08-19 VITALS
WEIGHT: 145.6 LBS | BODY MASS INDEX: 26.79 KG/M2 | HEIGHT: 62 IN | SYSTOLIC BLOOD PRESSURE: 122 MMHG | OXYGEN SATURATION: 95 % | DIASTOLIC BLOOD PRESSURE: 74 MMHG | RESPIRATION RATE: 18 BRPM | HEART RATE: 61 BPM

## 2025-08-19 DIAGNOSIS — I49.5 SSS (SICK SINUS SYNDROME): ICD-10-CM

## 2025-08-19 DIAGNOSIS — Z91.89 CHRONIC CHEST PAIN WITH LOW TO MODERATE RISK FOR CAD: ICD-10-CM

## 2025-08-19 DIAGNOSIS — I10 ESSENTIAL HYPERTENSION: ICD-10-CM

## 2025-08-19 DIAGNOSIS — I49.1 ATRIAL PREMATURE BEATS: ICD-10-CM

## 2025-08-19 DIAGNOSIS — Z95.0 PRESENCE OF CARDIAC PACEMAKER: ICD-10-CM

## 2025-08-19 DIAGNOSIS — R07.9 CHRONIC CHEST PAIN WITH LOW TO MODERATE RISK FOR CAD: ICD-10-CM

## 2025-08-19 DIAGNOSIS — I67.9 CEREBROVASCULAR SMALL VESSEL DISEASE: ICD-10-CM

## 2025-08-19 DIAGNOSIS — E78.2 MIXED HYPERLIPIDEMIA: ICD-10-CM

## 2025-08-19 DIAGNOSIS — G89.29 CHRONIC CHEST PAIN WITH LOW TO MODERATE RISK FOR CAD: ICD-10-CM

## 2025-08-19 DIAGNOSIS — R00.1 SYMPTOMATIC BRADYCARDIA: Primary | ICD-10-CM

## 2025-08-19 RX ORDER — POLYETHYLENE GLYCOL 3350 17 G/17G
17 POWDER, FOR SOLUTION ORAL DAILY
COMMUNITY

## 2025-08-22 DIAGNOSIS — E78.5 HYPERLIPIDEMIA: ICD-10-CM

## 2025-08-26 ENCOUNTER — OFFICE VISIT (OUTPATIENT)
Dept: ONCOLOGY | Facility: CLINIC | Age: OVER 89
End: 2025-08-26
Payer: MEDICARE

## 2025-08-26 VITALS
HEIGHT: 62 IN | SYSTOLIC BLOOD PRESSURE: 150 MMHG | OXYGEN SATURATION: 98 % | TEMPERATURE: 97.2 F | DIASTOLIC BLOOD PRESSURE: 80 MMHG | BODY MASS INDEX: 26.62 KG/M2 | HEART RATE: 65 BPM

## 2025-08-26 DIAGNOSIS — E53.8 B12 DEFICIENCY: Primary | ICD-10-CM

## 2025-08-26 DIAGNOSIS — D47.2 MGUS (MONOCLONAL GAMMOPATHY OF UNKNOWN SIGNIFICANCE): ICD-10-CM

## 2025-08-28 RX ORDER — LOSARTAN POTASSIUM 50 MG/1
50 TABLET ORAL DAILY
Qty: 90 TABLET | Refills: 3 | Status: SHIPPED | OUTPATIENT
Start: 2025-08-28

## 2025-08-28 RX ORDER — ROSUVASTATIN CALCIUM 10 MG/1
10 TABLET, COATED ORAL DAILY
Qty: 90 TABLET | Refills: 3 | Status: SHIPPED | OUTPATIENT
Start: 2025-08-28

## 2025-08-28 RX ORDER — CARVEDILOL 6.25 MG/1
6.25 TABLET ORAL 2 TIMES DAILY WITH MEALS
Qty: 180 TABLET | Refills: 3 | Status: SHIPPED | OUTPATIENT
Start: 2025-08-28

## 2025-08-28 RX ORDER — LEVOTHYROXINE SODIUM 75 UG/1
75 TABLET ORAL DAILY
Qty: 90 TABLET | Refills: 3 | Status: SHIPPED | OUTPATIENT
Start: 2025-08-28

## 2025-08-28 RX ORDER — GLIMEPIRIDE 2 MG/1
2 TABLET ORAL
Qty: 90 TABLET | Refills: 3 | Status: SHIPPED | OUTPATIENT
Start: 2025-08-28

## 2025-08-28 RX ORDER — ALLOPURINOL 100 MG/1
100 TABLET ORAL DAILY
Qty: 90 TABLET | Refills: 3 | Status: SHIPPED | OUTPATIENT
Start: 2025-08-28

## (undated) DEVICE — ADHS SKIN SURG TISS VISC PREMIERPRO EXOFIN HI/VISC FAST/DRY

## (undated) DEVICE — Device

## (undated) DEVICE — SOL IRR H2O BO 1000ML STRL

## (undated) DEVICE — GOWN ,SIRUS,NONREINFORCED 3XL: Brand: MEDLINE

## (undated) DEVICE — THE BITE BLOCK MAXI, LATEX FREE STRAP IS USED TO PROTECT THE ENDOSCOPE INSERTION TUBE FROM BEING BITTEN BY THE PATIENT.

## (undated) DEVICE — TBG PENCL TELESCP MEGADYNE SMOKE EVAC 10FT

## (undated) DEVICE — KT ORCA ORCAPOD DISP STRL

## (undated) DEVICE — STEROX BIPOLAR IS-1 ATRIAL/VENTRICULAR
Type: IMPLANTABLE DEVICE | Status: NON-FUNCTIONAL
Brand: FINELINE® II EZ STEROX
Removed: 2022-07-08

## (undated) DEVICE — LOU PACE DEFIB: Brand: MEDLINE INDUSTRIES, INC.

## (undated) DEVICE — GW INQWIRE FC PTFE J/3MM .035 180

## (undated) DEVICE — DEV INFL BALN BIG60 W/GAUGE 60ML

## (undated) DEVICE — SYR LL W/SCALE/MARK 3ML STRL

## (undated) DEVICE — BW-412T DISP COMBO CLEANING BRUSH: Brand: SINGLE USE COMBINATION CLEANING BRUSH

## (undated) DEVICE — ADAPT CLN BIOGUARD AIR/H2O DISP

## (undated) DEVICE — UNIVERSAL CUTTER: Brand: ACUITY™

## (undated) DEVICE — INTRO SHEATH PRELUDE SNAP .038 6F 13CM W/SDPRT

## (undated) DEVICE — ESOPHAGEAL/PYLORIC/COLONIC WIREGUIDED BALLOON DILATATION CATHETER: Brand: CRE WIREGUIDED

## (undated) DEVICE — PACE/SENSE LEAD
Type: IMPLANTABLE DEVICE | Status: NON-FUNCTIONAL
Brand: INGEVITY™+
Removed: 2022-07-08

## (undated) DEVICE — INTRO SHEATH PRELUDE SNAP .038 9F 13CM W/SDPRT BLK

## (undated) DEVICE — CATH PACE CARD SITE/SPEC RT/ATRIUM RT/VENT SEPTL DIL MP

## (undated) DEVICE — CATH PACE CARD SITE/SPEC RT/ATRIUM RT/VENT SEPTL DIL EXT/HK

## (undated) DEVICE — LINER SURG CANSTR SXN S/RIGD 1500CC